# Patient Record
Sex: FEMALE | Race: WHITE | Employment: OTHER | URBAN - METROPOLITAN AREA
[De-identification: names, ages, dates, MRNs, and addresses within clinical notes are randomized per-mention and may not be internally consistent; named-entity substitution may affect disease eponyms.]

---

## 2017-03-25 ENCOUNTER — APPOINTMENT (OUTPATIENT)
Dept: GENERAL RADIOLOGY | Age: 42
End: 2017-03-25
Payer: MEDICARE

## 2017-03-25 ENCOUNTER — HOSPITAL ENCOUNTER (EMERGENCY)
Age: 42
Discharge: HOME | End: 2017-03-25
Attending: EMERGENCY MEDICINE
Payer: MEDICARE

## 2017-03-25 VITALS
TEMPERATURE: 97.6 F | BODY MASS INDEX: 41.65 KG/M2 | RESPIRATION RATE: 18 BRPM | HEART RATE: 78 BPM | SYSTOLIC BLOOD PRESSURE: 113 MMHG | DIASTOLIC BLOOD PRESSURE: 68 MMHG | HEIGHT: 65 IN | OXYGEN SATURATION: 99 % | WEIGHT: 250 LBS

## 2017-03-25 DIAGNOSIS — G43.009 MIGRAINE WITHOUT AURA AND WITHOUT STATUS MIGRAINOSUS, NOT INTRACTABLE: Primary | ICD-10-CM

## 2017-03-25 DIAGNOSIS — J40 BRONCHITIS: ICD-10-CM

## 2017-03-25 LAB
ALBUMIN SERPL-MCNC: 3.7 G/DL (ref 3.9–4.9)
ALP BLD-CCNC: 154 U/L (ref 40–130)
ALT SERPL-CCNC: 38 U/L (ref 0–33)
ANION GAP SERPL CALCULATED.3IONS-SCNC: 12 MEQ/L (ref 7–13)
AST SERPL-CCNC: 30 U/L (ref 0–35)
BASOPHILS ABSOLUTE: 0.1 K/UL (ref 0–0.2)
BASOPHILS RELATIVE PERCENT: 0.7 %
BILIRUB SERPL-MCNC: 0.3 MG/DL (ref 0–1.2)
BUN BLDV-MCNC: 8 MG/DL (ref 6–20)
CALCIUM SERPL-MCNC: 9 MG/DL (ref 8.6–10.2)
CHLORIDE BLD-SCNC: 105 MEQ/L (ref 98–107)
CO2: 22 MEQ/L (ref 22–29)
CREAT SERPL-MCNC: 1.11 MG/DL (ref 0.5–0.9)
EOSINOPHILS ABSOLUTE: 0.2 K/UL (ref 0–0.7)
EOSINOPHILS RELATIVE PERCENT: 3.2 %
GFR AFRICAN AMERICAN: >60
GFR NON-AFRICAN AMERICAN: 53.9
GLOBULIN: 2.6 G/DL (ref 2.3–3.5)
GLUCOSE BLD-MCNC: 113 MG/DL (ref 74–109)
HCG QUALITATIVE: NEGATIVE
HCT VFR BLD CALC: 36.5 % (ref 37–47)
HEMOGLOBIN: 11.8 G/DL (ref 12–16)
LYMPHOCYTES ABSOLUTE: 2 K/UL (ref 1–4.8)
LYMPHOCYTES RELATIVE PERCENT: 26.4 %
MCH RBC QN AUTO: 26.7 PG (ref 27–31.3)
MCHC RBC AUTO-ENTMCNC: 32.4 % (ref 33–37)
MCV RBC AUTO: 82.4 FL (ref 82–100)
MONOCYTES ABSOLUTE: 0.5 K/UL (ref 0.2–0.8)
MONOCYTES RELATIVE PERCENT: 7.1 %
NEUTROPHILS ABSOLUTE: 4.6 K/UL (ref 1.4–6.5)
NEUTROPHILS RELATIVE PERCENT: 62.6 %
PDW BLD-RTO: 15.2 % (ref 11.5–14.5)
PLATELET # BLD: 260 K/UL (ref 130–400)
POTASSIUM SERPL-SCNC: 3.9 MEQ/L (ref 3.5–5.1)
RAPID INFLUENZA  B AGN: NEGATIVE
RAPID INFLUENZA A AGN: NEGATIVE
RBC # BLD: 4.42 M/UL (ref 4.2–5.4)
SODIUM BLD-SCNC: 139 MEQ/L (ref 132–144)
TOTAL PROTEIN: 6.3 G/DL (ref 6.4–8.1)
WBC # BLD: 7.4 K/UL (ref 4.8–10.8)

## 2017-03-25 PROCEDURE — 99284 EMERGENCY DEPT VISIT MOD MDM: CPT

## 2017-03-25 PROCEDURE — 36415 COLL VENOUS BLD VENIPUNCTURE: CPT

## 2017-03-25 PROCEDURE — 2580000003 HC RX 258: Performed by: EMERGENCY MEDICINE

## 2017-03-25 PROCEDURE — 85025 COMPLETE CBC W/AUTO DIFF WBC: CPT

## 2017-03-25 PROCEDURE — 80053 COMPREHEN METABOLIC PANEL: CPT

## 2017-03-25 PROCEDURE — 96374 THER/PROPH/DIAG INJ IV PUSH: CPT

## 2017-03-25 PROCEDURE — 96375 TX/PRO/DX INJ NEW DRUG ADDON: CPT

## 2017-03-25 PROCEDURE — 86403 PARTICLE AGGLUT ANTBDY SCRN: CPT

## 2017-03-25 PROCEDURE — 71010 XR CHEST PORTABLE: CPT

## 2017-03-25 PROCEDURE — 6360000002 HC RX W HCPCS: Performed by: EMERGENCY MEDICINE

## 2017-03-25 PROCEDURE — 84703 CHORIONIC GONADOTROPIN ASSAY: CPT

## 2017-03-25 RX ORDER — DIPHENHYDRAMINE HYDROCHLORIDE 50 MG/ML
50 INJECTION INTRAMUSCULAR; INTRAVENOUS ONCE
Status: COMPLETED | OUTPATIENT
Start: 2017-03-25 | End: 2017-03-25

## 2017-03-25 RX ORDER — PREDNISONE 10 MG/1
60 TABLET ORAL DAILY
Qty: 30 TABLET | Refills: 0 | Status: SHIPPED | OUTPATIENT
Start: 2017-03-25 | End: 2017-03-30

## 2017-03-25 RX ORDER — BENZONATATE 100 MG/1
100 CAPSULE ORAL 3 TIMES DAILY PRN
Qty: 20 CAPSULE | Refills: 0 | Status: ON HOLD | OUTPATIENT
Start: 2017-03-25 | End: 2018-06-08

## 2017-03-25 RX ORDER — MORPHINE SULFATE 4 MG/ML
4 INJECTION, SOLUTION INTRAMUSCULAR; INTRAVENOUS ONCE
Status: COMPLETED | OUTPATIENT
Start: 2017-03-25 | End: 2017-03-25

## 2017-03-25 RX ORDER — PROMETHAZINE HYDROCHLORIDE 25 MG/1
12.5-25 TABLET ORAL EVERY 6 HOURS PRN
Qty: 12 TABLET | Refills: 0 | Status: SHIPPED | OUTPATIENT
Start: 2017-03-25 | End: 2017-04-01

## 2017-03-25 RX ORDER — 0.9 % SODIUM CHLORIDE 0.9 %
1000 INTRAVENOUS SOLUTION INTRAVENOUS ONCE
Status: COMPLETED | OUTPATIENT
Start: 2017-03-25 | End: 2017-03-25

## 2017-03-25 RX ADMIN — PROCHLORPERAZINE EDISYLATE 10 MG: 5 INJECTION INTRAMUSCULAR; INTRAVENOUS at 13:26

## 2017-03-25 RX ADMIN — SODIUM CHLORIDE 1000 ML: 9 INJECTION, SOLUTION INTRAVENOUS at 13:21

## 2017-03-25 RX ADMIN — DIPHENHYDRAMINE HYDROCHLORIDE 50 MG: 50 INJECTION, SOLUTION INTRAMUSCULAR; INTRAVENOUS at 13:21

## 2017-03-25 RX ADMIN — MORPHINE SULFATE 4 MG: 4 INJECTION, SOLUTION INTRAMUSCULAR; INTRAVENOUS at 13:25

## 2017-03-25 ASSESSMENT — PAIN DESCRIPTION - FREQUENCY: FREQUENCY: CONTINUOUS

## 2017-03-25 ASSESSMENT — ENCOUNTER SYMPTOMS
DIARRHEA: 0
NAUSEA: 1
BACK PAIN: 0
FACIAL SWELLING: 0
VOMITING: 1
ABDOMINAL PAIN: 0
SHORTNESS OF BREATH: 0
EYE PAIN: 0
COUGH: 1

## 2017-03-25 ASSESSMENT — PAIN DESCRIPTION - LOCATION: LOCATION: HEAD;EAR

## 2017-03-25 ASSESSMENT — PAIN SCALES - GENERAL
PAINLEVEL_OUTOF10: 10
PAINLEVEL_OUTOF10: 10

## 2017-03-25 ASSESSMENT — PAIN DESCRIPTION - DESCRIPTORS: DESCRIPTORS: ACHING;THROBBING

## 2017-04-06 ENCOUNTER — HOSPITAL ENCOUNTER (EMERGENCY)
Age: 42
Discharge: HOME | End: 2017-04-06
Attending: EMERGENCY MEDICINE
Payer: MEDICARE

## 2017-04-06 VITALS
DIASTOLIC BLOOD PRESSURE: 83 MMHG | HEIGHT: 65 IN | WEIGHT: 265 LBS | RESPIRATION RATE: 18 BRPM | TEMPERATURE: 98.2 F | OXYGEN SATURATION: 96 % | HEART RATE: 67 BPM | SYSTOLIC BLOOD PRESSURE: 101 MMHG | BODY MASS INDEX: 44.15 KG/M2

## 2017-04-06 DIAGNOSIS — J01.10 ACUTE FRONTAL SINUSITIS, RECURRENCE NOT SPECIFIED: Primary | ICD-10-CM

## 2017-04-06 PROCEDURE — 6360000002 HC RX W HCPCS: Performed by: EMERGENCY MEDICINE

## 2017-04-06 PROCEDURE — 96372 THER/PROPH/DIAG INJ SC/IM: CPT

## 2017-04-06 PROCEDURE — 99282 EMERGENCY DEPT VISIT SF MDM: CPT

## 2017-04-06 PROCEDURE — 6370000000 HC RX 637 (ALT 250 FOR IP): Performed by: EMERGENCY MEDICINE

## 2017-04-06 RX ORDER — TRIAMCINOLONE ACETONIDE 40 MG/ML
40 INJECTION, SUSPENSION INTRA-ARTICULAR; INTRAMUSCULAR ONCE
Status: COMPLETED | OUTPATIENT
Start: 2017-04-06 | End: 2017-04-06

## 2017-04-06 RX ORDER — DOXYCYCLINE 100 MG/1
100 TABLET ORAL 2 TIMES DAILY
Qty: 20 TABLET | Refills: 0 | Status: SHIPPED | OUTPATIENT
Start: 2017-04-06 | End: 2017-04-16

## 2017-04-06 RX ORDER — NAPROXEN 500 MG/1
500 TABLET ORAL ONCE
Status: COMPLETED | OUTPATIENT
Start: 2017-04-06 | End: 2017-04-06

## 2017-04-06 RX ADMIN — NAPROXEN 500 MG: 500 TABLET ORAL at 18:45

## 2017-04-06 RX ADMIN — TRIAMCINOLONE ACETONIDE 40 MG: 40 INJECTION, SUSPENSION INTRA-ARTICULAR; INTRAMUSCULAR at 18:45

## 2017-04-06 ASSESSMENT — ENCOUNTER SYMPTOMS
SINUS PRESSURE: 1
SHORTNESS OF BREATH: 0
ABDOMINAL PAIN: 0
COUGH: 1
SORE THROAT: 0
DIARRHEA: 0
VOMITING: 0
NAUSEA: 0

## 2017-04-06 ASSESSMENT — PAIN SCALES - GENERAL
PAINLEVEL_OUTOF10: 8
PAINLEVEL_OUTOF10: 3
PAINLEVEL_OUTOF10: 9

## 2017-04-06 ASSESSMENT — PAIN DESCRIPTION - FREQUENCY: FREQUENCY: INTERMITTENT

## 2017-04-06 ASSESSMENT — PAIN DESCRIPTION - ORIENTATION: ORIENTATION: LEFT

## 2017-04-06 ASSESSMENT — PAIN DESCRIPTION - LOCATION: LOCATION: RIB CAGE

## 2017-04-27 ENCOUNTER — APPOINTMENT (OUTPATIENT)
Dept: GENERAL RADIOLOGY | Age: 42
End: 2017-04-27
Payer: MEDICARE

## 2017-04-27 ENCOUNTER — HOSPITAL ENCOUNTER (EMERGENCY)
Age: 42
Discharge: HOME | End: 2017-04-27
Attending: EMERGENCY MEDICINE
Payer: MEDICARE

## 2017-04-27 VITALS
RESPIRATION RATE: 24 BRPM | TEMPERATURE: 97.9 F | BODY MASS INDEX: 42.75 KG/M2 | SYSTOLIC BLOOD PRESSURE: 125 MMHG | OXYGEN SATURATION: 100 % | HEIGHT: 66 IN | HEART RATE: 62 BPM | WEIGHT: 266 LBS | DIASTOLIC BLOOD PRESSURE: 77 MMHG

## 2017-04-27 DIAGNOSIS — S22.32XD CLOSED FRACTURE OF ONE RIB OF LEFT SIDE WITH ROUTINE HEALING, SUBSEQUENT ENCOUNTER: Primary | ICD-10-CM

## 2017-04-27 PROCEDURE — 71101 X-RAY EXAM UNILAT RIBS/CHEST: CPT

## 2017-04-27 PROCEDURE — 96372 THER/PROPH/DIAG INJ SC/IM: CPT

## 2017-04-27 PROCEDURE — 6360000002 HC RX W HCPCS: Performed by: EMERGENCY MEDICINE

## 2017-04-27 PROCEDURE — 6370000000 HC RX 637 (ALT 250 FOR IP): Performed by: EMERGENCY MEDICINE

## 2017-04-27 PROCEDURE — 99283 EMERGENCY DEPT VISIT LOW MDM: CPT

## 2017-04-27 RX ORDER — PROMETHAZINE HYDROCHLORIDE 25 MG/ML
12.5 INJECTION, SOLUTION INTRAMUSCULAR; INTRAVENOUS ONCE
Status: COMPLETED | OUTPATIENT
Start: 2017-04-27 | End: 2017-04-27

## 2017-04-27 RX ORDER — MORPHINE SULFATE 4 MG/ML
4 INJECTION, SOLUTION INTRAMUSCULAR; INTRAVENOUS ONCE
Status: COMPLETED | OUTPATIENT
Start: 2017-04-27 | End: 2017-04-27

## 2017-04-27 RX ORDER — ONDANSETRON 4 MG/1
4 TABLET, ORALLY DISINTEGRATING ORAL ONCE
Status: COMPLETED | OUTPATIENT
Start: 2017-04-27 | End: 2017-04-27

## 2017-04-27 RX ORDER — OXYCODONE HYDROCHLORIDE AND ACETAMINOPHEN 5; 325 MG/1; MG/1
1 TABLET ORAL ONCE
Status: COMPLETED | OUTPATIENT
Start: 2017-04-27 | End: 2017-04-27

## 2017-04-27 RX ADMIN — ONDANSETRON 4 MG: 4 TABLET, ORALLY DISINTEGRATING ORAL at 18:14

## 2017-04-27 RX ADMIN — OXYCODONE HYDROCHLORIDE AND ACETAMINOPHEN 1 TABLET: 5; 325 TABLET ORAL at 18:05

## 2017-04-27 RX ADMIN — MORPHINE SULFATE 4 MG: 4 INJECTION, SOLUTION INTRAMUSCULAR; INTRAVENOUS at 19:17

## 2017-04-27 RX ADMIN — PROMETHAZINE HYDROCHLORIDE 12.5 MG: 25 INJECTION INTRAMUSCULAR; INTRAVENOUS at 18:56

## 2017-04-27 ASSESSMENT — ENCOUNTER SYMPTOMS
VOMITING: 0
COUGH: 0
COLOR CHANGE: 0
ABDOMINAL PAIN: 0
WHEEZING: 0
SHORTNESS OF BREATH: 0
NAUSEA: 0
DIARRHEA: 0
STRIDOR: 0
CONSTIPATION: 0

## 2017-04-27 ASSESSMENT — PAIN DESCRIPTION - LOCATION
LOCATION: RIB CAGE
LOCATION: RIB CAGE

## 2017-04-27 ASSESSMENT — PAIN SCALES - GENERAL
PAINLEVEL_OUTOF10: 10

## 2017-04-27 ASSESSMENT — PAIN DESCRIPTION - ORIENTATION: ORIENTATION: LEFT

## 2017-04-27 ASSESSMENT — PAIN DESCRIPTION - PAIN TYPE: TYPE: ACUTE PAIN

## 2017-06-27 ENCOUNTER — HOSPITAL ENCOUNTER (EMERGENCY)
Age: 42
Discharge: HOME | End: 2017-06-27
Payer: MEDICARE

## 2017-06-27 VITALS
BODY MASS INDEX: 44.32 KG/M2 | TEMPERATURE: 98.6 F | RESPIRATION RATE: 18 BRPM | OXYGEN SATURATION: 98 % | DIASTOLIC BLOOD PRESSURE: 68 MMHG | SYSTOLIC BLOOD PRESSURE: 115 MMHG | WEIGHT: 266 LBS | HEART RATE: 98 BPM | HEIGHT: 65 IN

## 2017-06-27 DIAGNOSIS — M54.40 BACK PAIN OF LUMBAR REGION WITH SCIATICA: Primary | ICD-10-CM

## 2017-06-27 PROCEDURE — 6360000002 HC RX W HCPCS: Performed by: NURSE PRACTITIONER

## 2017-06-27 PROCEDURE — 99282 EMERGENCY DEPT VISIT SF MDM: CPT

## 2017-06-27 PROCEDURE — 96372 THER/PROPH/DIAG INJ SC/IM: CPT

## 2017-06-27 RX ORDER — LIDOCAINE 50 MG/G
1 PATCH TOPICAL DAILY
Qty: 20 PATCH | Refills: 0 | Status: ON HOLD | OUTPATIENT
Start: 2017-06-27 | End: 2018-06-08

## 2017-06-27 RX ORDER — ORPHENADRINE CITRATE 30 MG/ML
60 INJECTION INTRAMUSCULAR; INTRAVENOUS ONCE
Status: COMPLETED | OUTPATIENT
Start: 2017-06-27 | End: 2017-06-27

## 2017-06-27 RX ORDER — METHYLPREDNISOLONE ACETATE 80 MG/ML
80 INJECTION, SUSPENSION INTRA-ARTICULAR; INTRALESIONAL; INTRAMUSCULAR; SOFT TISSUE ONCE
Status: COMPLETED | OUTPATIENT
Start: 2017-06-27 | End: 2017-06-27

## 2017-06-27 RX ORDER — ONDANSETRON 4 MG/1
4 TABLET, ORALLY DISINTEGRATING ORAL ONCE
Status: COMPLETED | OUTPATIENT
Start: 2017-06-27 | End: 2017-06-27

## 2017-06-27 RX ORDER — PREDNISONE 10 MG/1
50 TABLET ORAL DAILY
Qty: 25 TABLET | Refills: 0 | Status: SHIPPED | OUTPATIENT
Start: 2017-06-27 | End: 2017-07-02

## 2017-06-27 RX ADMIN — ONDANSETRON 4 MG: 4 TABLET, ORALLY DISINTEGRATING ORAL at 20:04

## 2017-06-27 RX ADMIN — METHYLPREDNISOLONE ACETATE 80 MG: 80 INJECTION, SUSPENSION INTRA-ARTICULAR; INTRALESIONAL; INTRAMUSCULAR; SOFT TISSUE at 19:40

## 2017-06-27 RX ADMIN — ORPHENADRINE CITRATE 60 MG: 30 INJECTION INTRAMUSCULAR; INTRAVENOUS at 19:40

## 2017-06-27 ASSESSMENT — PAIN SCALES - GENERAL: PAINLEVEL_OUTOF10: 10

## 2017-06-27 ASSESSMENT — PAIN DESCRIPTION - LOCATION: LOCATION: BACK

## 2017-06-27 ASSESSMENT — ENCOUNTER SYMPTOMS
COUGH: 0
SHORTNESS OF BREATH: 0
BACK PAIN: 1
ABDOMINAL PAIN: 0

## 2017-06-27 ASSESSMENT — PAIN DESCRIPTION - ORIENTATION: ORIENTATION: LOWER

## 2017-06-27 ASSESSMENT — PAIN DESCRIPTION - PAIN TYPE: TYPE: ACUTE PAIN;CHRONIC PAIN

## 2018-06-04 ENCOUNTER — HOSPITAL ENCOUNTER (EMERGENCY)
Age: 43
Discharge: HOME | End: 2018-06-04
Payer: MEDICARE

## 2018-06-04 VITALS
HEART RATE: 81 BPM | WEIGHT: 264 LBS | OXYGEN SATURATION: 96 % | SYSTOLIC BLOOD PRESSURE: 126 MMHG | RESPIRATION RATE: 16 BRPM | HEIGHT: 65 IN | BODY MASS INDEX: 43.99 KG/M2 | DIASTOLIC BLOOD PRESSURE: 77 MMHG | TEMPERATURE: 98.2 F

## 2018-06-04 DIAGNOSIS — Z76.0 PRESCRIPTION REFILL: Primary | ICD-10-CM

## 2018-06-04 PROCEDURE — 6370000000 HC RX 637 (ALT 250 FOR IP): Performed by: PHYSICIAN ASSISTANT

## 2018-06-04 PROCEDURE — 99283 EMERGENCY DEPT VISIT LOW MDM: CPT

## 2018-06-04 RX ORDER — OXYCODONE AND ACETAMINOPHEN 10; 325 MG/1; MG/1
1 TABLET ORAL EVERY 6 HOURS PRN
Qty: 12 TABLET | Refills: 0 | Status: SHIPPED | OUTPATIENT
Start: 2018-06-04 | End: 2018-06-07

## 2018-06-04 RX ORDER — OXYCODONE HYDROCHLORIDE AND ACETAMINOPHEN 5; 325 MG/1; MG/1
2 TABLET ORAL ONCE
Status: COMPLETED | OUTPATIENT
Start: 2018-06-04 | End: 2018-06-04

## 2018-06-04 RX ADMIN — OXYCODONE HYDROCHLORIDE AND ACETAMINOPHEN 2 TABLET: 5; 325 TABLET ORAL at 13:22

## 2018-06-04 ASSESSMENT — ENCOUNTER SYMPTOMS
COLOR CHANGE: 0
ABDOMINAL DISTENTION: 0
EYE DISCHARGE: 0
RHINORRHEA: 0
CONSTIPATION: 0
SHORTNESS OF BREATH: 0
ABDOMINAL PAIN: 0
SORE THROAT: 0

## 2018-06-04 ASSESSMENT — PAIN SCALES - GENERAL
PAINLEVEL_OUTOF10: 10
PAINLEVEL_OUTOF10: 10

## 2018-06-04 ASSESSMENT — PAIN DESCRIPTION - DESCRIPTORS
DESCRIPTORS: STABBING
DESCRIPTORS: STABBING

## 2018-06-04 ASSESSMENT — PAIN DESCRIPTION - PAIN TYPE
TYPE: ACUTE PAIN
TYPE: ACUTE PAIN

## 2018-06-04 ASSESSMENT — PAIN DESCRIPTION - FREQUENCY
FREQUENCY: CONTINUOUS
FREQUENCY: CONTINUOUS

## 2018-06-04 ASSESSMENT — PAIN DESCRIPTION - PROGRESSION: CLINICAL_PROGRESSION: GRADUALLY WORSENING

## 2018-06-04 ASSESSMENT — PAIN DESCRIPTION - ORIENTATION
ORIENTATION: MID;LOWER
ORIENTATION: MID;LOWER

## 2018-06-04 ASSESSMENT — PAIN DESCRIPTION - LOCATION
LOCATION: ABDOMEN

## 2018-06-04 ASSESSMENT — PAIN DESCRIPTION - ONSET: ONSET: ON-GOING

## 2018-06-05 ENCOUNTER — HOSPITAL ENCOUNTER (EMERGENCY)
Age: 43
Discharge: HOME | End: 2018-06-06
Payer: MEDICARE

## 2018-06-05 ENCOUNTER — APPOINTMENT (OUTPATIENT)
Dept: CT IMAGING | Age: 43
End: 2018-06-05
Payer: MEDICARE

## 2018-06-05 DIAGNOSIS — G89.18 POST-OP PAIN: Primary | ICD-10-CM

## 2018-06-05 LAB
ALBUMIN SERPL-MCNC: 3.7 G/DL (ref 3.9–4.9)
ALP BLD-CCNC: 93 U/L (ref 40–130)
ALT SERPL-CCNC: 11 U/L (ref 0–33)
ANION GAP SERPL CALCULATED.3IONS-SCNC: 11 MEQ/L (ref 7–13)
AST SERPL-CCNC: 15 U/L (ref 0–35)
BASOPHILS ABSOLUTE: 0.1 K/UL (ref 0–0.2)
BASOPHILS RELATIVE PERCENT: 0.8 %
BILIRUB SERPL-MCNC: 0.3 MG/DL (ref 0–1.2)
BILIRUBIN URINE: NEGATIVE
BLOOD, URINE: ABNORMAL
BUN BLDV-MCNC: 7 MG/DL (ref 6–20)
CALCIUM SERPL-MCNC: 8 MG/DL (ref 8.6–10.2)
CHLORIDE BLD-SCNC: 99 MEQ/L (ref 98–107)
CLARITY: ABNORMAL
CO2: 24 MEQ/L (ref 22–29)
COLOR: YELLOW
CREAT SERPL-MCNC: 0.9 MG/DL (ref 0.5–0.9)
EOSINOPHILS ABSOLUTE: 0.3 K/UL (ref 0–0.7)
EOSINOPHILS RELATIVE PERCENT: 4.3 %
GFR AFRICAN AMERICAN: >60
GFR NON-AFRICAN AMERICAN: >60
GLOBULIN: 2.7 G/DL (ref 2.3–3.5)
GLUCOSE BLD-MCNC: 99 MG/DL (ref 74–109)
GLUCOSE URINE: NEGATIVE MG/DL
HCT VFR BLD CALC: 35.8 % (ref 37–47)
HEMOGLOBIN: 11.9 G/DL (ref 12–16)
KETONES, URINE: NEGATIVE MG/DL
LACTIC ACID: 0.8 MMOL/L (ref 0.5–2.2)
LEUKOCYTE ESTERASE, URINE: ABNORMAL
LIPASE: 18 U/L (ref 13–60)
LYMPHOCYTES ABSOLUTE: 1.5 K/UL (ref 1–4.8)
LYMPHOCYTES RELATIVE PERCENT: 20.5 %
MCH RBC QN AUTO: 28.5 PG (ref 27–31.3)
MCHC RBC AUTO-ENTMCNC: 33.3 % (ref 33–37)
MCV RBC AUTO: 85.6 FL (ref 82–100)
MONOCYTES ABSOLUTE: 0.4 K/UL (ref 0.2–0.8)
MONOCYTES RELATIVE PERCENT: 5.2 %
NEUTROPHILS ABSOLUTE: 5.1 K/UL (ref 1.4–6.5)
NEUTROPHILS RELATIVE PERCENT: 69.2 %
NITRITE, URINE: NEGATIVE
PDW BLD-RTO: 15.5 % (ref 11.5–14.5)
PH UA: 7 (ref 5–9)
PLATELET # BLD: 271 K/UL (ref 130–400)
POTASSIUM SERPL-SCNC: 3.3 MEQ/L (ref 3.5–5.1)
PROTEIN UA: 30 MG/DL
RBC # BLD: 4.18 M/UL (ref 4.2–5.4)
SODIUM BLD-SCNC: 134 MEQ/L (ref 132–144)
SPECIFIC GRAVITY UA: 1 (ref 1–1.03)
TOTAL PROTEIN: 6.4 G/DL (ref 6.4–8.1)
URINE REFLEX TO CULTURE: YES
UROBILINOGEN, URINE: 0.2 E.U./DL
WBC # BLD: 7.4 K/UL (ref 4.8–10.8)

## 2018-06-05 PROCEDURE — 96375 TX/PRO/DX INJ NEW DRUG ADDON: CPT

## 2018-06-05 PROCEDURE — 96374 THER/PROPH/DIAG INJ IV PUSH: CPT

## 2018-06-05 PROCEDURE — 81001 URINALYSIS AUTO W/SCOPE: CPT

## 2018-06-05 PROCEDURE — 87086 URINE CULTURE/COLONY COUNT: CPT

## 2018-06-05 PROCEDURE — 2580000003 HC RX 258: Performed by: PHYSICIAN ASSISTANT

## 2018-06-05 PROCEDURE — 83605 ASSAY OF LACTIC ACID: CPT

## 2018-06-05 PROCEDURE — 80053 COMPREHEN METABOLIC PANEL: CPT

## 2018-06-05 PROCEDURE — 99284 EMERGENCY DEPT VISIT MOD MDM: CPT

## 2018-06-05 PROCEDURE — 36415 COLL VENOUS BLD VENIPUNCTURE: CPT

## 2018-06-05 PROCEDURE — 83690 ASSAY OF LIPASE: CPT

## 2018-06-05 PROCEDURE — 85025 COMPLETE CBC W/AUTO DIFF WBC: CPT

## 2018-06-05 PROCEDURE — 74176 CT ABD & PELVIS W/O CONTRAST: CPT

## 2018-06-05 PROCEDURE — 6360000002 HC RX W HCPCS: Performed by: PHYSICIAN ASSISTANT

## 2018-06-05 PROCEDURE — 51798 US URINE CAPACITY MEASURE: CPT

## 2018-06-05 RX ORDER — 0.9 % SODIUM CHLORIDE 0.9 %
1000 INTRAVENOUS SOLUTION INTRAVENOUS ONCE
Status: COMPLETED | OUTPATIENT
Start: 2018-06-05 | End: 2018-06-06

## 2018-06-05 RX ORDER — ONDANSETRON 2 MG/ML
4 INJECTION INTRAMUSCULAR; INTRAVENOUS ONCE
Status: COMPLETED | OUTPATIENT
Start: 2018-06-05 | End: 2018-06-05

## 2018-06-05 RX ADMIN — HYDROMORPHONE HYDROCHLORIDE 1 MG: 1 INJECTION, SOLUTION INTRAMUSCULAR; INTRAVENOUS; SUBCUTANEOUS at 22:34

## 2018-06-05 RX ADMIN — SODIUM CHLORIDE 1000 ML: 9 INJECTION, SOLUTION INTRAVENOUS at 22:34

## 2018-06-05 RX ADMIN — ONDANSETRON 4 MG: 2 INJECTION INTRAMUSCULAR; INTRAVENOUS at 22:33

## 2018-06-05 ASSESSMENT — PAIN DESCRIPTION - PAIN TYPE
TYPE: ACUTE PAIN

## 2018-06-05 ASSESSMENT — ENCOUNTER SYMPTOMS
ABDOMINAL PAIN: 1
NAUSEA: 1
DIARRHEA: 0
SHORTNESS OF BREATH: 0
VOMITING: 1
COUGH: 0

## 2018-06-05 ASSESSMENT — PAIN DESCRIPTION - ORIENTATION
ORIENTATION: LOWER

## 2018-06-05 ASSESSMENT — PAIN DESCRIPTION - LOCATION
LOCATION: ABDOMEN

## 2018-06-05 ASSESSMENT — PAIN DESCRIPTION - DESCRIPTORS: DESCRIPTORS: ACHING;THROBBING;SORE

## 2018-06-05 ASSESSMENT — PAIN DESCRIPTION - FREQUENCY
FREQUENCY: CONTINUOUS
FREQUENCY: INTERMITTENT

## 2018-06-05 ASSESSMENT — PAIN SCALES - GENERAL
PAINLEVEL_OUTOF10: 10
PAINLEVEL_OUTOF10: 10
PAINLEVEL_OUTOF10: 3

## 2018-06-06 VITALS
SYSTOLIC BLOOD PRESSURE: 111 MMHG | WEIGHT: 261 LBS | TEMPERATURE: 98.7 F | BODY MASS INDEX: 43.43 KG/M2 | OXYGEN SATURATION: 95 % | DIASTOLIC BLOOD PRESSURE: 59 MMHG | HEART RATE: 80 BPM | RESPIRATION RATE: 18 BRPM

## 2018-06-06 LAB
AMORPHOUS: ABNORMAL
BACTERIA: ABNORMAL /HPF
CRYSTALS, UA: ABNORMAL
EPITHELIAL CELLS, UA: ABNORMAL /HPF
MUCUS: PRESENT
RBC UA: ABNORMAL /HPF (ref 0–2)
WBC UA: ABNORMAL /HPF (ref 0–5)
YEAST: PRESENT

## 2018-06-06 RX ORDER — ONDANSETRON 4 MG/1
4-8 TABLET, ORALLY DISINTEGRATING ORAL EVERY 12 HOURS PRN
Qty: 12 TABLET | Refills: 0 | Status: ON HOLD | OUTPATIENT
Start: 2018-06-06 | End: 2018-06-08

## 2018-06-07 LAB — URINE CULTURE, ROUTINE: NORMAL

## 2018-06-08 ENCOUNTER — APPOINTMENT (OUTPATIENT)
Dept: CT IMAGING | Age: 43
End: 2018-06-08
Payer: MEDICARE

## 2018-06-08 ENCOUNTER — APPOINTMENT (OUTPATIENT)
Dept: GENERAL RADIOLOGY | Age: 43
End: 2018-06-08
Payer: MEDICARE

## 2018-06-08 ENCOUNTER — HOSPITAL ENCOUNTER (OUTPATIENT)
Age: 43
Setting detail: OBSERVATION
Discharge: HOME | End: 2018-06-09
Attending: INTERNAL MEDICINE | Admitting: INTERNAL MEDICINE
Payer: MEDICARE

## 2018-06-08 ENCOUNTER — APPOINTMENT (OUTPATIENT)
Dept: ULTRASOUND IMAGING | Age: 43
End: 2018-06-08
Payer: MEDICARE

## 2018-06-08 DIAGNOSIS — G89.29 OTHER CHRONIC PAIN: ICD-10-CM

## 2018-06-08 DIAGNOSIS — R55 SYNCOPE AND COLLAPSE: Primary | ICD-10-CM

## 2018-06-08 LAB
ALBUMIN SERPL-MCNC: 3.4 G/DL (ref 3.9–4.9)
ALP BLD-CCNC: 102 U/L (ref 40–130)
ALT SERPL-CCNC: 14 U/L (ref 0–33)
AMPHETAMINE SCREEN, URINE: ABNORMAL
ANION GAP SERPL CALCULATED.3IONS-SCNC: 13 MEQ/L (ref 7–13)
AST SERPL-CCNC: 25 U/L (ref 0–35)
BACTERIA: ABNORMAL /HPF
BARBITURATE SCREEN URINE: ABNORMAL
BASOPHILS ABSOLUTE: 0.1 K/UL (ref 0–0.2)
BASOPHILS RELATIVE PERCENT: 1.1 %
BENZODIAZEPINE SCREEN, URINE: POSITIVE
BILIRUB SERPL-MCNC: 0.3 MG/DL (ref 0–1.2)
BILIRUBIN URINE: NEGATIVE
BLOOD, URINE: ABNORMAL
BUN BLDV-MCNC: 7 MG/DL (ref 6–20)
CALCIUM SERPL-MCNC: 8.7 MG/DL (ref 8.6–10.2)
CANNABINOID SCREEN URINE: ABNORMAL
CARBAMAZEPINE LEVEL: <2 UG/ML (ref 4–10)
CHLORIDE BLD-SCNC: 101 MEQ/L (ref 98–107)
CHP ED QC CHECK: YES
CLARITY: ABNORMAL
CO2: 23 MEQ/L (ref 22–29)
COCAINE METABOLITE SCREEN URINE: ABNORMAL
COLOR: YELLOW
CREAT SERPL-MCNC: 0.99 MG/DL (ref 0.5–0.9)
EKG ATRIAL RATE: 61 BPM
EKG P AXIS: 45 DEGREES
EKG P-R INTERVAL: 196 MS
EKG Q-T INTERVAL: 434 MS
EKG QRS DURATION: 96 MS
EKG QTC CALCULATION (BAZETT): 436 MS
EKG R AXIS: 67 DEGREES
EKG T AXIS: 109 DEGREES
EKG VENTRICULAR RATE: 61 BPM
EOSINOPHILS ABSOLUTE: 0.4 K/UL (ref 0–0.7)
EOSINOPHILS RELATIVE PERCENT: 5.5 %
GFR AFRICAN AMERICAN: >60
GFR NON-AFRICAN AMERICAN: >60
GLOBULIN: 3.1 G/DL (ref 2.3–3.5)
GLUCOSE BLD-MCNC: 67 MG/DL
GLUCOSE BLD-MCNC: 67 MG/DL (ref 60–115)
GLUCOSE BLD-MCNC: 82 MG/DL (ref 74–109)
GLUCOSE URINE: NEGATIVE MG/DL
HCT VFR BLD CALC: 35.5 % (ref 37–47)
HEMOGLOBIN: 11.5 G/DL (ref 12–16)
KETONES, URINE: NEGATIVE MG/DL
LACTIC ACID: 0.9 MMOL/L (ref 0.5–2.2)
LEUKOCYTE ESTERASE, URINE: ABNORMAL
LYMPHOCYTES ABSOLUTE: 1.9 K/UL (ref 1–4.8)
LYMPHOCYTES RELATIVE PERCENT: 27.6 %
Lab: ABNORMAL
MCH RBC QN AUTO: 28.7 PG (ref 27–31.3)
MCHC RBC AUTO-ENTMCNC: 32.3 % (ref 33–37)
MCV RBC AUTO: 88.7 FL (ref 82–100)
MONOCYTES ABSOLUTE: 0.5 K/UL (ref 0.2–0.8)
MONOCYTES RELATIVE PERCENT: 6.7 %
NEUTROPHILS ABSOLUTE: 4 K/UL (ref 1.4–6.5)
NEUTROPHILS RELATIVE PERCENT: 59.1 %
NITRITE, URINE: NEGATIVE
OPIATE SCREEN URINE: ABNORMAL
PDW BLD-RTO: 15.8 % (ref 11.5–14.5)
PERFORMED ON: NORMAL
PH UA: 6.5 (ref 5–9)
PHENCYCLIDINE SCREEN URINE: ABNORMAL
PLATELET # BLD: 233 K/UL (ref 130–400)
POTASSIUM SERPL-SCNC: 4.2 MEQ/L (ref 3.5–5.1)
PROTEIN UA: 30 MG/DL
RBC # BLD: 4 M/UL (ref 4.2–5.4)
RBC UA: ABNORMAL /HPF (ref 0–2)
SODIUM BLD-SCNC: 137 MEQ/L (ref 132–144)
SPECIFIC GRAVITY UA: 1.01 (ref 1–1.03)
TOTAL CK: 67 U/L (ref 0–170)
TOTAL PROTEIN: 6.5 G/DL (ref 6.4–8.1)
UROBILINOGEN, URINE: 0.2 E.U./DL
WBC # BLD: 6.8 K/UL (ref 4.8–10.8)
WBC UA: ABNORMAL /HPF (ref 0–5)
YEAST: PRESENT

## 2018-06-08 PROCEDURE — 6360000002 HC RX W HCPCS: Performed by: PHYSICIAN ASSISTANT

## 2018-06-08 PROCEDURE — 6370000000 HC RX 637 (ALT 250 FOR IP): Performed by: INTERNAL MEDICINE

## 2018-06-08 PROCEDURE — G0378 HOSPITAL OBSERVATION PER HR: HCPCS

## 2018-06-08 PROCEDURE — 85025 COMPLETE CBC W/AUTO DIFF WBC: CPT

## 2018-06-08 PROCEDURE — 80307 DRUG TEST PRSMV CHEM ANLYZR: CPT

## 2018-06-08 PROCEDURE — 96375 TX/PRO/DX INJ NEW DRUG ADDON: CPT

## 2018-06-08 PROCEDURE — 80156 ASSAY CARBAMAZEPINE TOTAL: CPT

## 2018-06-08 PROCEDURE — 6360000002 HC RX W HCPCS: Performed by: INTERNAL MEDICINE

## 2018-06-08 PROCEDURE — 96374 THER/PROPH/DIAG INJ IV PUSH: CPT

## 2018-06-08 PROCEDURE — 99285 EMERGENCY DEPT VISIT HI MDM: CPT

## 2018-06-08 PROCEDURE — 6360000002 HC RX W HCPCS: Performed by: NURSE PRACTITIONER

## 2018-06-08 PROCEDURE — 74018 RADEX ABDOMEN 1 VIEW: CPT

## 2018-06-08 PROCEDURE — 72125 CT NECK SPINE W/O DYE: CPT

## 2018-06-08 PROCEDURE — 2580000003 HC RX 258: Performed by: PHYSICIAN ASSISTANT

## 2018-06-08 PROCEDURE — 81001 URINALYSIS AUTO W/SCOPE: CPT

## 2018-06-08 PROCEDURE — 72050 X-RAY EXAM NECK SPINE 4/5VWS: CPT

## 2018-06-08 PROCEDURE — 36415 COLL VENOUS BLD VENIPUNCTURE: CPT

## 2018-06-08 PROCEDURE — 83605 ASSAY OF LACTIC ACID: CPT

## 2018-06-08 PROCEDURE — 93005 ELECTROCARDIOGRAM TRACING: CPT

## 2018-06-08 PROCEDURE — 82550 ASSAY OF CK (CPK): CPT

## 2018-06-08 PROCEDURE — 96376 TX/PRO/DX INJ SAME DRUG ADON: CPT

## 2018-06-08 PROCEDURE — 70450 CT HEAD/BRAIN W/O DYE: CPT

## 2018-06-08 PROCEDURE — 93880 EXTRACRANIAL BILAT STUDY: CPT

## 2018-06-08 PROCEDURE — 71045 X-RAY EXAM CHEST 1 VIEW: CPT

## 2018-06-08 PROCEDURE — 94640 AIRWAY INHALATION TREATMENT: CPT

## 2018-06-08 PROCEDURE — 80053 COMPREHEN METABOLIC PANEL: CPT

## 2018-06-08 RX ORDER — ALPRAZOLAM 0.5 MG/1
1 TABLET ORAL NIGHTLY
Status: DISCONTINUED | OUTPATIENT
Start: 2018-06-08 | End: 2018-06-09 | Stop reason: HOSPADM

## 2018-06-08 RX ORDER — ONDANSETRON 2 MG/ML
4 INJECTION INTRAMUSCULAR; INTRAVENOUS EVERY 6 HOURS PRN
Status: DISCONTINUED | OUTPATIENT
Start: 2018-06-08 | End: 2018-06-09 | Stop reason: HOSPADM

## 2018-06-08 RX ORDER — KETOROLAC TROMETHAMINE 30 MG/ML
30 INJECTION, SOLUTION INTRAMUSCULAR; INTRAVENOUS EVERY 6 HOURS PRN
Status: DISCONTINUED | OUTPATIENT
Start: 2018-06-08 | End: 2018-06-09 | Stop reason: HOSPADM

## 2018-06-08 RX ORDER — SODIUM CHLORIDE 0.9 % (FLUSH) 0.9 %
10 SYRINGE (ML) INJECTION PRN
Status: DISCONTINUED | OUTPATIENT
Start: 2018-06-08 | End: 2018-06-09 | Stop reason: HOSPADM

## 2018-06-08 RX ORDER — TRAMADOL HYDROCHLORIDE 50 MG/1
50 TABLET ORAL EVERY 6 HOURS PRN
Status: DISCONTINUED | OUTPATIENT
Start: 2018-06-08 | End: 2018-06-09 | Stop reason: HOSPADM

## 2018-06-08 RX ORDER — PAROXETINE HYDROCHLORIDE 20 MG/1
60 TABLET, FILM COATED ORAL EVERY MORNING
Status: DISCONTINUED | OUTPATIENT
Start: 2018-06-09 | End: 2018-06-09 | Stop reason: HOSPADM

## 2018-06-08 RX ORDER — PROPRANOLOL HCL 60 MG
60 CAPSULE, EXTENDED RELEASE 24HR ORAL DAILY
Status: DISCONTINUED | OUTPATIENT
Start: 2018-06-08 | End: 2018-06-09 | Stop reason: HOSPADM

## 2018-06-08 RX ORDER — SODIUM CHLORIDE 9 MG/ML
INJECTION, SOLUTION INTRAVENOUS CONTINUOUS
Status: DISCONTINUED | OUTPATIENT
Start: 2018-06-08 | End: 2018-06-09 | Stop reason: HOSPADM

## 2018-06-08 RX ORDER — CETIRIZINE HYDROCHLORIDE 10 MG/1
10 TABLET ORAL DAILY
Status: DISCONTINUED | OUTPATIENT
Start: 2018-06-08 | End: 2018-06-09 | Stop reason: HOSPADM

## 2018-06-08 RX ORDER — ZOLPIDEM TARTRATE 5 MG/1
10 TABLET ORAL NIGHTLY
Status: DISCONTINUED | OUTPATIENT
Start: 2018-06-08 | End: 2018-06-09 | Stop reason: HOSPADM

## 2018-06-08 RX ORDER — SODIUM CHLORIDE 0.9 % (FLUSH) 0.9 %
10 SYRINGE (ML) INJECTION EVERY 12 HOURS SCHEDULED
Status: DISCONTINUED | OUTPATIENT
Start: 2018-06-08 | End: 2018-06-09 | Stop reason: HOSPADM

## 2018-06-08 RX ORDER — ONDANSETRON 2 MG/ML
4 INJECTION INTRAMUSCULAR; INTRAVENOUS ONCE
Status: COMPLETED | OUTPATIENT
Start: 2018-06-08 | End: 2018-06-08

## 2018-06-08 RX ORDER — FENTANYL CITRATE 50 UG/ML
50 INJECTION, SOLUTION INTRAMUSCULAR; INTRAVENOUS ONCE
Status: COMPLETED | OUTPATIENT
Start: 2018-06-08 | End: 2018-06-08

## 2018-06-08 RX ORDER — DIPHENHYDRAMINE HCL 25 MG
25 TABLET ORAL EVERY 6 HOURS PRN
Status: DISCONTINUED | OUTPATIENT
Start: 2018-06-08 | End: 2018-06-09 | Stop reason: HOSPADM

## 2018-06-08 RX ORDER — BUDESONIDE AND FORMOTEROL FUMARATE DIHYDRATE 160; 4.5 UG/1; UG/1
2 AEROSOL RESPIRATORY (INHALATION) 2 TIMES DAILY
Status: DISCONTINUED | OUTPATIENT
Start: 2018-06-08 | End: 2018-06-08 | Stop reason: CLARIF

## 2018-06-08 RX ORDER — ARIPIPRAZOLE 15 MG/1
15 TABLET ORAL DAILY
Status: DISCONTINUED | OUTPATIENT
Start: 2018-06-08 | End: 2018-06-09 | Stop reason: HOSPADM

## 2018-06-08 RX ORDER — OMEPRAZOLE 20 MG/1
40 CAPSULE, DELAYED RELEASE ORAL DAILY
COMMUNITY
End: 2018-09-03

## 2018-06-08 RX ORDER — TRAZODONE HYDROCHLORIDE 150 MG/1
300 TABLET ORAL NIGHTLY
Status: DISCONTINUED | OUTPATIENT
Start: 2018-06-08 | End: 2018-06-09 | Stop reason: HOSPADM

## 2018-06-08 RX ADMIN — KETOROLAC TROMETHAMINE 30 MG: 30 INJECTION, SOLUTION INTRAMUSCULAR at 16:24

## 2018-06-08 RX ADMIN — ONDANSETRON 4 MG: 2 INJECTION INTRAMUSCULAR; INTRAVENOUS at 12:02

## 2018-06-08 RX ADMIN — FENTANYL CITRATE 50 MCG: 50 INJECTION, SOLUTION INTRAMUSCULAR; INTRAVENOUS at 12:05

## 2018-06-08 RX ADMIN — ONDANSETRON 4 MG: 2 INJECTION INTRAMUSCULAR; INTRAVENOUS at 09:30

## 2018-06-08 RX ADMIN — TRAZODONE HYDROCHLORIDE 300 MG: 150 TABLET ORAL at 20:28

## 2018-06-08 RX ADMIN — DIPHENHYDRAMINE HCL 25 MG: 25 TABLET ORAL at 15:56

## 2018-06-08 RX ADMIN — ZOLPIDEM TARTRATE 10 MG: 5 TABLET ORAL at 23:18

## 2018-06-08 RX ADMIN — ENOXAPARIN SODIUM 40 MG: 40 INJECTION SUBCUTANEOUS at 20:27

## 2018-06-08 RX ADMIN — HYDROMORPHONE HYDROCHLORIDE 1 MG: 1 INJECTION, SOLUTION INTRAMUSCULAR; INTRAVENOUS; SUBCUTANEOUS at 20:27

## 2018-06-08 RX ADMIN — SODIUM CHLORIDE: 9 INJECTION, SOLUTION INTRAVENOUS at 15:32

## 2018-06-08 RX ADMIN — TOPIRAMATE 200 MG: 200 TABLET, FILM COATED ORAL at 20:29

## 2018-06-08 RX ADMIN — ALPRAZOLAM 1 MG: 0.5 TABLET ORAL at 23:17

## 2018-06-08 RX ADMIN — Medication 2 PUFF: at 20:49

## 2018-06-08 ASSESSMENT — ENCOUNTER SYMPTOMS
EYE DISCHARGE: 0
SORE THROAT: 0
ABDOMINAL PAIN: 1
CONSTIPATION: 0
RHINORRHEA: 0
SHORTNESS OF BREATH: 0
ABDOMINAL DISTENTION: 0
COLOR CHANGE: 0

## 2018-06-08 ASSESSMENT — PAIN DESCRIPTION - LOCATION
LOCATION: ABDOMEN;NECK;BACK
LOCATION: BACK;ABDOMEN;NECK

## 2018-06-08 ASSESSMENT — PAIN DESCRIPTION - PAIN TYPE
TYPE: SURGICAL PAIN;ACUTE PAIN
TYPE: ACUTE PAIN

## 2018-06-08 ASSESSMENT — PAIN SCALES - GENERAL
PAINLEVEL_OUTOF10: 5
PAINLEVEL_OUTOF10: 10

## 2018-06-09 VITALS
WEIGHT: 265 LBS | SYSTOLIC BLOOD PRESSURE: 118 MMHG | OXYGEN SATURATION: 96 % | BODY MASS INDEX: 44.15 KG/M2 | TEMPERATURE: 98 F | HEIGHT: 65 IN | HEART RATE: 65 BPM | DIASTOLIC BLOOD PRESSURE: 76 MMHG | RESPIRATION RATE: 16 BRPM

## 2018-06-09 LAB
ANION GAP SERPL CALCULATED.3IONS-SCNC: 12 MEQ/L (ref 7–13)
BASOPHILS ABSOLUTE: 0.1 K/UL (ref 0–0.2)
BASOPHILS RELATIVE PERCENT: 1.3 %
BUN BLDV-MCNC: 7 MG/DL (ref 6–20)
CALCIUM SERPL-MCNC: 8.3 MG/DL (ref 8.6–10.2)
CHLORIDE BLD-SCNC: 103 MEQ/L (ref 98–107)
CO2: 22 MEQ/L (ref 22–29)
CREAT SERPL-MCNC: 0.83 MG/DL (ref 0.5–0.9)
EOSINOPHILS ABSOLUTE: 0.3 K/UL (ref 0–0.7)
EOSINOPHILS RELATIVE PERCENT: 6 %
GFR AFRICAN AMERICAN: >60
GFR NON-AFRICAN AMERICAN: >60
GLUCOSE BLD-MCNC: 99 MG/DL (ref 74–109)
HCT VFR BLD CALC: 33.4 % (ref 37–47)
HEMOGLOBIN: 11.2 G/DL (ref 12–16)
LYMPHOCYTES ABSOLUTE: 1.7 K/UL (ref 1–4.8)
LYMPHOCYTES RELATIVE PERCENT: 30.5 %
MCH RBC QN AUTO: 28.7 PG (ref 27–31.3)
MCHC RBC AUTO-ENTMCNC: 33.4 % (ref 33–37)
MCV RBC AUTO: 86 FL (ref 82–100)
MONOCYTES ABSOLUTE: 0.4 K/UL (ref 0.2–0.8)
MONOCYTES RELATIVE PERCENT: 7.2 %
NEUTROPHILS ABSOLUTE: 3.1 K/UL (ref 1.4–6.5)
NEUTROPHILS RELATIVE PERCENT: 55 %
PDW BLD-RTO: 15.5 % (ref 11.5–14.5)
PLATELET # BLD: 258 K/UL (ref 130–400)
POTASSIUM REFLEX MAGNESIUM: 3.7 MEQ/L (ref 3.5–5.1)
RBC # BLD: 3.88 M/UL (ref 4.2–5.4)
SODIUM BLD-SCNC: 137 MEQ/L (ref 132–144)
WBC # BLD: 5.7 K/UL (ref 4.8–10.8)

## 2018-06-09 PROCEDURE — 6370000000 HC RX 637 (ALT 250 FOR IP): Performed by: INTERNAL MEDICINE

## 2018-06-09 PROCEDURE — 85025 COMPLETE CBC W/AUTO DIFF WBC: CPT

## 2018-06-09 PROCEDURE — 6360000002 HC RX W HCPCS: Performed by: INTERNAL MEDICINE

## 2018-06-09 PROCEDURE — G0378 HOSPITAL OBSERVATION PER HR: HCPCS

## 2018-06-09 PROCEDURE — 36415 COLL VENOUS BLD VENIPUNCTURE: CPT

## 2018-06-09 PROCEDURE — 2580000003 HC RX 258: Performed by: PHYSICIAN ASSISTANT

## 2018-06-09 PROCEDURE — 96376 TX/PRO/DX INJ SAME DRUG ADON: CPT

## 2018-06-09 PROCEDURE — 94760 N-INVAS EAR/PLS OXIMETRY 1: CPT

## 2018-06-09 PROCEDURE — 6360000002 HC RX W HCPCS: Performed by: PHYSICIAN ASSISTANT

## 2018-06-09 PROCEDURE — 94640 AIRWAY INHALATION TREATMENT: CPT

## 2018-06-09 PROCEDURE — 80048 BASIC METABOLIC PNL TOTAL CA: CPT

## 2018-06-09 RX ORDER — ACETAMINOPHEN 500 MG
1000 TABLET ORAL ONCE
Status: DISCONTINUED | OUTPATIENT
Start: 2018-06-09 | End: 2018-06-09 | Stop reason: HOSPADM

## 2018-06-09 RX ADMIN — KETOROLAC TROMETHAMINE 30 MG: 30 INJECTION, SOLUTION INTRAMUSCULAR at 14:35

## 2018-06-09 RX ADMIN — CETIRIZINE HYDROCHLORIDE 10 MG: 10 TABLET, FILM COATED ORAL at 08:35

## 2018-06-09 RX ADMIN — KETOROLAC TROMETHAMINE 30 MG: 30 INJECTION, SOLUTION INTRAMUSCULAR at 08:35

## 2018-06-09 RX ADMIN — ONDANSETRON 4 MG: 2 INJECTION INTRAMUSCULAR; INTRAVENOUS at 17:51

## 2018-06-09 RX ADMIN — Medication 2 PUFF: at 07:41

## 2018-06-09 RX ADMIN — PROPRANOLOL HYDROCHLORIDE 60 MG: 60 CAPSULE, EXTENDED RELEASE ORAL at 08:36

## 2018-06-09 RX ADMIN — DIPHENHYDRAMINE HCL 25 MG: 25 TABLET ORAL at 03:36

## 2018-06-09 RX ADMIN — ARIPIPRAZOLE 15 MG: 15 TABLET ORAL at 08:35

## 2018-06-09 RX ADMIN — DIPHENHYDRAMINE HCL 25 MG: 25 TABLET ORAL at 14:35

## 2018-06-09 RX ADMIN — TOPIRAMATE 200 MG: 200 TABLET, FILM COATED ORAL at 08:35

## 2018-06-09 RX ADMIN — PAROXETINE HYDROCHLORIDE 60 MG: 20 TABLET, FILM COATED ORAL at 08:35

## 2018-06-09 RX ADMIN — DIPHENHYDRAMINE HCL 25 MG: 25 TABLET ORAL at 08:35

## 2018-06-09 RX ADMIN — SODIUM CHLORIDE: 9 INJECTION, SOLUTION INTRAVENOUS at 08:35

## 2018-06-09 ASSESSMENT — PAIN SCALES - GENERAL
PAINLEVEL_OUTOF10: 10
PAINLEVEL_OUTOF10: 10

## 2018-06-11 PROCEDURE — 93010 ELECTROCARDIOGRAM REPORT: CPT | Performed by: INTERNAL MEDICINE

## 2018-06-19 ENCOUNTER — HOSPITAL ENCOUNTER (EMERGENCY)
Age: 43
Discharge: HOME | End: 2018-06-19
Attending: EMERGENCY MEDICINE
Payer: MEDICARE

## 2018-06-19 ENCOUNTER — APPOINTMENT (OUTPATIENT)
Dept: CT IMAGING | Age: 43
End: 2018-06-19
Payer: MEDICARE

## 2018-06-19 VITALS
HEART RATE: 62 BPM | OXYGEN SATURATION: 98 % | BODY MASS INDEX: 39.32 KG/M2 | DIASTOLIC BLOOD PRESSURE: 73 MMHG | HEIGHT: 65 IN | TEMPERATURE: 97.7 F | RESPIRATION RATE: 18 BRPM | SYSTOLIC BLOOD PRESSURE: 119 MMHG | WEIGHT: 236 LBS

## 2018-06-19 DIAGNOSIS — R10.32 CHRONIC BILATERAL LOWER ABDOMINAL PAIN: Primary | ICD-10-CM

## 2018-06-19 DIAGNOSIS — R31.29 HEMATURIA, MICROSCOPIC: ICD-10-CM

## 2018-06-19 DIAGNOSIS — R10.31 CHRONIC BILATERAL LOWER ABDOMINAL PAIN: Primary | ICD-10-CM

## 2018-06-19 DIAGNOSIS — G89.29 CHRONIC BILATERAL LOWER ABDOMINAL PAIN: Primary | ICD-10-CM

## 2018-06-19 LAB
BACTERIA: ABNORMAL /HPF
BILIRUBIN URINE: NEGATIVE
BLOOD, URINE: ABNORMAL
CLARITY: ABNORMAL
COLOR: YELLOW
CRYSTALS, UA: ABNORMAL
EPITHELIAL CELLS, UA: ABNORMAL /HPF
GLUCOSE URINE: NEGATIVE MG/DL
KETONES, URINE: NEGATIVE MG/DL
LEUKOCYTE ESTERASE, URINE: ABNORMAL
NITRITE, URINE: NEGATIVE
PH UA: 7 (ref 5–9)
PROTEIN UA: 30 MG/DL
RBC UA: ABNORMAL /HPF (ref 0–2)
RENAL EPITHELIAL, UA: ABNORMAL /HPF
SPECIFIC GRAVITY UA: 1.02 (ref 1–1.03)
URINE REFLEX TO CULTURE: YES
UROBILINOGEN, URINE: 0.2 E.U./DL
WBC UA: ABNORMAL /HPF (ref 0–5)
YEAST: PRESENT

## 2018-06-19 PROCEDURE — 6370000000 HC RX 637 (ALT 250 FOR IP): Performed by: EMERGENCY MEDICINE

## 2018-06-19 PROCEDURE — 87086 URINE CULTURE/COLONY COUNT: CPT

## 2018-06-19 PROCEDURE — 81001 URINALYSIS AUTO W/SCOPE: CPT

## 2018-06-19 PROCEDURE — 6360000002 HC RX W HCPCS: Performed by: EMERGENCY MEDICINE

## 2018-06-19 PROCEDURE — 99284 EMERGENCY DEPT VISIT MOD MDM: CPT

## 2018-06-19 PROCEDURE — 74176 CT ABD & PELVIS W/O CONTRAST: CPT

## 2018-06-19 RX ORDER — ONDANSETRON 4 MG/1
4 TABLET, ORALLY DISINTEGRATING ORAL ONCE
Status: COMPLETED | OUTPATIENT
Start: 2018-06-19 | End: 2018-06-19

## 2018-06-19 RX ORDER — OXYCODONE HYDROCHLORIDE AND ACETAMINOPHEN 5; 325 MG/1; MG/1
2 TABLET ORAL ONCE
Status: COMPLETED | OUTPATIENT
Start: 2018-06-19 | End: 2018-06-19

## 2018-06-19 RX ORDER — HYOSCYAMINE SULFATE 0.12 MG/1
1 TABLET SUBLINGUAL 3 TIMES DAILY PRN
Qty: 9 EACH | Refills: 0 | Status: SHIPPED | OUTPATIENT
Start: 2018-06-19 | End: 2018-09-03

## 2018-06-19 RX ADMIN — ONDANSETRON 4 MG: 4 TABLET, ORALLY DISINTEGRATING ORAL at 20:47

## 2018-06-19 RX ADMIN — OXYCODONE HYDROCHLORIDE AND ACETAMINOPHEN 2 TABLET: 5; 325 TABLET ORAL at 19:32

## 2018-06-19 ASSESSMENT — PAIN SCALES - GENERAL: PAINLEVEL_OUTOF10: 10

## 2018-06-19 ASSESSMENT — ENCOUNTER SYMPTOMS
COUGH: 0
SHORTNESS OF BREATH: 0
VOMITING: 0
ABDOMINAL PAIN: 1

## 2018-06-19 ASSESSMENT — PAIN DESCRIPTION - LOCATION: LOCATION: ABDOMEN;BACK

## 2018-06-19 ASSESSMENT — PAIN DESCRIPTION - PAIN TYPE: TYPE: ACUTE PAIN

## 2018-06-21 LAB — URINE CULTURE, ROUTINE: NORMAL

## 2018-06-27 ENCOUNTER — APPOINTMENT (OUTPATIENT)
Dept: CT IMAGING | Age: 43
End: 2018-06-27
Payer: MEDICARE

## 2018-06-27 ENCOUNTER — HOSPITAL ENCOUNTER (EMERGENCY)
Age: 43
Discharge: HOME | End: 2018-06-27
Attending: STUDENT IN AN ORGANIZED HEALTH CARE EDUCATION/TRAINING PROGRAM
Payer: MEDICARE

## 2018-06-27 VITALS
OXYGEN SATURATION: 100 % | HEIGHT: 65 IN | HEART RATE: 67 BPM | SYSTOLIC BLOOD PRESSURE: 126 MMHG | BODY MASS INDEX: 44.32 KG/M2 | WEIGHT: 266 LBS | TEMPERATURE: 97.5 F | RESPIRATION RATE: 16 BRPM | DIASTOLIC BLOOD PRESSURE: 78 MMHG

## 2018-06-27 DIAGNOSIS — R10.30 LOWER ABDOMINAL PAIN: Primary | ICD-10-CM

## 2018-06-27 DIAGNOSIS — F11.10 OPIATE ABUSE, CONTINUOUS (HCC): ICD-10-CM

## 2018-06-27 LAB
ALBUMIN SERPL-MCNC: 3.7 G/DL (ref 3.9–4.9)
ALP BLD-CCNC: 109 U/L (ref 40–130)
ALT SERPL-CCNC: 13 U/L (ref 0–33)
AMPHETAMINE SCREEN, URINE: POSITIVE
ANION GAP SERPL CALCULATED.3IONS-SCNC: 13 MEQ/L (ref 7–13)
AST SERPL-CCNC: 16 U/L (ref 0–35)
BARBITURATE SCREEN URINE: ABNORMAL
BASOPHILS ABSOLUTE: 0.1 K/UL (ref 0–0.2)
BASOPHILS RELATIVE PERCENT: 1 %
BENZODIAZEPINE SCREEN, URINE: POSITIVE
BILIRUB SERPL-MCNC: 0.3 MG/DL (ref 0–1.2)
BILIRUBIN URINE: NEGATIVE
BLOOD, URINE: NEGATIVE
BUN BLDV-MCNC: 15 MG/DL (ref 6–20)
CALCIUM SERPL-MCNC: 8.5 MG/DL (ref 8.6–10.2)
CANNABINOID SCREEN URINE: ABNORMAL
CHLORIDE BLD-SCNC: 94 MEQ/L (ref 98–107)
CLARITY: ABNORMAL
CO2: 22 MEQ/L (ref 22–29)
COCAINE METABOLITE SCREEN URINE: ABNORMAL
COLOR: YELLOW
CREAT SERPL-MCNC: 0.88 MG/DL (ref 0.5–0.9)
EOSINOPHILS ABSOLUTE: 0.3 K/UL (ref 0–0.7)
EOSINOPHILS RELATIVE PERCENT: 3.8 %
EPITHELIAL CELLS, UA: ABNORMAL /HPF
GFR AFRICAN AMERICAN: >60
GFR NON-AFRICAN AMERICAN: >60
GLOBULIN: 2.9 G/DL (ref 2.3–3.5)
GLUCOSE BLD-MCNC: 93 MG/DL (ref 74–109)
GLUCOSE URINE: NEGATIVE MG/DL
HCT VFR BLD CALC: 34.8 % (ref 37–47)
HEMOGLOBIN: 11.6 G/DL (ref 12–16)
KETONES, URINE: NEGATIVE MG/DL
LACTIC ACID: 1 MMOL/L (ref 0.5–2.2)
LEUKOCYTE ESTERASE, URINE: ABNORMAL
LYMPHOCYTES ABSOLUTE: 1 K/UL (ref 1–4.8)
LYMPHOCYTES RELATIVE PERCENT: 13.8 %
Lab: ABNORMAL
MCH RBC QN AUTO: 28.3 PG (ref 27–31.3)
MCHC RBC AUTO-ENTMCNC: 33.3 % (ref 33–37)
MCV RBC AUTO: 84.9 FL (ref 82–100)
MONOCYTES ABSOLUTE: 0.3 K/UL (ref 0.2–0.8)
MONOCYTES RELATIVE PERCENT: 4.3 %
NEUTROPHILS ABSOLUTE: 5.5 K/UL (ref 1.4–6.5)
NEUTROPHILS RELATIVE PERCENT: 77.1 %
NITRITE, URINE: NEGATIVE
OPIATE SCREEN URINE: POSITIVE
PDW BLD-RTO: 15.3 % (ref 11.5–14.5)
PH UA: 7.5 (ref 5–9)
PHENCYCLIDINE SCREEN URINE: ABNORMAL
PLATELET # BLD: 264 K/UL (ref 130–400)
POTASSIUM SERPL-SCNC: 4.2 MEQ/L (ref 3.5–5.1)
PROTEIN UA: ABNORMAL MG/DL
RBC # BLD: 4.1 M/UL (ref 4.2–5.4)
RBC UA: ABNORMAL /HPF (ref 0–2)
SODIUM BLD-SCNC: 129 MEQ/L (ref 132–144)
SPECIFIC GRAVITY UA: 1.02 (ref 1–1.03)
TOTAL CK: 61 U/L (ref 0–170)
TOTAL PROTEIN: 6.6 G/DL (ref 6.4–8.1)
URINE REFLEX TO CULTURE: YES
UROBILINOGEN, URINE: 1 E.U./DL
WBC # BLD: 7.1 K/UL (ref 4.8–10.8)
WBC UA: ABNORMAL /HPF (ref 0–5)

## 2018-06-27 PROCEDURE — 83605 ASSAY OF LACTIC ACID: CPT

## 2018-06-27 PROCEDURE — 85025 COMPLETE CBC W/AUTO DIFF WBC: CPT

## 2018-06-27 PROCEDURE — 74176 CT ABD & PELVIS W/O CONTRAST: CPT

## 2018-06-27 PROCEDURE — 87086 URINE CULTURE/COLONY COUNT: CPT

## 2018-06-27 PROCEDURE — 80307 DRUG TEST PRSMV CHEM ANLYZR: CPT

## 2018-06-27 PROCEDURE — 96374 THER/PROPH/DIAG INJ IV PUSH: CPT

## 2018-06-27 PROCEDURE — 2500000003 HC RX 250 WO HCPCS: Performed by: NURSE PRACTITIONER

## 2018-06-27 PROCEDURE — 81001 URINALYSIS AUTO W/SCOPE: CPT

## 2018-06-27 PROCEDURE — 99284 EMERGENCY DEPT VISIT MOD MDM: CPT

## 2018-06-27 PROCEDURE — 82550 ASSAY OF CK (CPK): CPT

## 2018-06-27 PROCEDURE — 36415 COLL VENOUS BLD VENIPUNCTURE: CPT

## 2018-06-27 PROCEDURE — 80053 COMPREHEN METABOLIC PANEL: CPT

## 2018-06-27 RX ORDER — KETAMINE HYDROCHLORIDE 50 MG/ML
10 INJECTION, SOLUTION, CONCENTRATE INTRAMUSCULAR; INTRAVENOUS ONCE
Status: COMPLETED | OUTPATIENT
Start: 2018-06-27 | End: 2018-06-27

## 2018-06-27 RX ADMIN — KETAMINE HYDROCHLORIDE 10 MG: 50 INJECTION, SOLUTION INTRAMUSCULAR; INTRAVENOUS at 21:00

## 2018-06-27 ASSESSMENT — ENCOUNTER SYMPTOMS
TROUBLE SWALLOWING: 0
COUGH: 0
SORE THROAT: 0
ABDOMINAL PAIN: 1
NAUSEA: 0
BACK PAIN: 0
COLOR CHANGE: 0
CONSTIPATION: 0
VOMITING: 0
SHORTNESS OF BREATH: 0
VOICE CHANGE: 0
DIARRHEA: 0

## 2018-06-27 ASSESSMENT — PAIN DESCRIPTION - FREQUENCY: FREQUENCY: CONTINUOUS

## 2018-06-27 ASSESSMENT — PAIN SCALES - GENERAL
PAINLEVEL_OUTOF10: 10
PAINLEVEL_OUTOF10: 10

## 2018-06-27 ASSESSMENT — PAIN DESCRIPTION - PAIN TYPE: TYPE: ACUTE PAIN

## 2018-06-27 ASSESSMENT — PAIN DESCRIPTION - DESCRIPTORS: DESCRIPTORS: ACHING;BURNING;STABBING

## 2018-06-28 ASSESSMENT — ENCOUNTER SYMPTOMS
NAUSEA: 0
TROUBLE SWALLOWING: 0
VOICE CHANGE: 0
COUGH: 0
BACK PAIN: 0
SHORTNESS OF BREATH: 0
COLOR CHANGE: 0
CONSTIPATION: 0
SORE THROAT: 0
VOMITING: 0
DIARRHEA: 0
ABDOMINAL PAIN: 1

## 2018-06-29 LAB — URINE CULTURE, ROUTINE: NORMAL

## 2018-07-10 ENCOUNTER — HOSPITAL ENCOUNTER (EMERGENCY)
Age: 43
Discharge: HOME | End: 2018-07-10
Attending: EMERGENCY MEDICINE
Payer: MEDICARE

## 2018-07-10 VITALS
TEMPERATURE: 98.6 F | HEIGHT: 65 IN | SYSTOLIC BLOOD PRESSURE: 128 MMHG | BODY MASS INDEX: 40.65 KG/M2 | DIASTOLIC BLOOD PRESSURE: 83 MMHG | OXYGEN SATURATION: 99 % | HEART RATE: 62 BPM | RESPIRATION RATE: 20 BRPM | WEIGHT: 244 LBS

## 2018-07-10 DIAGNOSIS — N39.0 URINARY TRACT INFECTION ASSOCIATED WITH CYSTOSTOMY CATHETER, INITIAL ENCOUNTER (HCC): Primary | ICD-10-CM

## 2018-07-10 DIAGNOSIS — T83.510A URINARY TRACT INFECTION ASSOCIATED WITH CYSTOSTOMY CATHETER, INITIAL ENCOUNTER (HCC): Primary | ICD-10-CM

## 2018-07-10 LAB
ALBUMIN SERPL-MCNC: 3.6 G/DL (ref 3.9–4.9)
ALP BLD-CCNC: 139 U/L (ref 40–130)
ALT SERPL-CCNC: 60 U/L (ref 0–33)
ANION GAP SERPL CALCULATED.3IONS-SCNC: 13 MEQ/L (ref 7–13)
AST SERPL-CCNC: 33 U/L (ref 0–35)
BACTERIA: ABNORMAL /HPF
BASOPHILS ABSOLUTE: 0.1 K/UL (ref 0–0.2)
BASOPHILS RELATIVE PERCENT: 0.6 %
BILIRUB SERPL-MCNC: 0.4 MG/DL (ref 0–1.2)
BILIRUBIN URINE: ABNORMAL
BLOOD, URINE: NEGATIVE
BUN BLDV-MCNC: 16 MG/DL (ref 6–20)
CALCIUM SERPL-MCNC: 8.9 MG/DL (ref 8.6–10.2)
CHLORIDE BLD-SCNC: 103 MEQ/L (ref 98–107)
CLARITY: ABNORMAL
CO2: 21 MEQ/L (ref 22–29)
COLOR: ABNORMAL
CREAT SERPL-MCNC: 0.85 MG/DL (ref 0.5–0.9)
EOSINOPHILS ABSOLUTE: 0.2 K/UL (ref 0–0.7)
EOSINOPHILS RELATIVE PERCENT: 1.8 %
EPITHELIAL CELLS, UA: ABNORMAL /HPF
GFR AFRICAN AMERICAN: >60
GFR NON-AFRICAN AMERICAN: >60
GLOBULIN: 3.3 G/DL (ref 2.3–3.5)
GLUCOSE BLD-MCNC: 101 MG/DL (ref 74–109)
GLUCOSE URINE: NEGATIVE MG/DL
HCT VFR BLD CALC: 36.6 % (ref 37–47)
HEMOGLOBIN: 12.4 G/DL (ref 12–16)
KETONES, URINE: ABNORMAL MG/DL
LACTIC ACID: 1.3 MMOL/L (ref 0.5–2.2)
LEUKOCYTE ESTERASE, URINE: ABNORMAL
LYMPHOCYTES ABSOLUTE: 1.6 K/UL (ref 1–4.8)
LYMPHOCYTES RELATIVE PERCENT: 17.8 %
MCH RBC QN AUTO: 29.3 PG (ref 27–31.3)
MCHC RBC AUTO-ENTMCNC: 33.7 % (ref 33–37)
MCV RBC AUTO: 86.8 FL (ref 82–100)
MONOCYTES ABSOLUTE: 0.6 K/UL (ref 0.2–0.8)
MONOCYTES RELATIVE PERCENT: 6.7 %
MUCUS: PRESENT
NEUTROPHILS ABSOLUTE: 6.5 K/UL (ref 1.4–6.5)
NEUTROPHILS RELATIVE PERCENT: 73.1 %
NITRITE, URINE: NEGATIVE
PDW BLD-RTO: 14.9 % (ref 11.5–14.5)
PH UA: 5.5 (ref 5–9)
PLATELET # BLD: 256 K/UL (ref 130–400)
POTASSIUM SERPL-SCNC: 3.6 MEQ/L (ref 3.5–5.1)
PROTEIN UA: 100 MG/DL
RBC # BLD: 4.22 M/UL (ref 4.2–5.4)
RBC UA: ABNORMAL /HPF (ref 0–2)
RENAL EPITHELIAL, UA: ABNORMAL /HPF
SODIUM BLD-SCNC: 137 MEQ/L (ref 132–144)
SPECIFIC GRAVITY UA: 1.04 (ref 1–1.03)
TOTAL PROTEIN: 6.9 G/DL (ref 6.4–8.1)
URINE REFLEX TO CULTURE: YES
UROBILINOGEN, URINE: 0.2 E.U./DL
WBC # BLD: 8.9 K/UL (ref 4.8–10.8)
WBC UA: ABNORMAL /HPF (ref 0–5)
YEAST: PRESENT

## 2018-07-10 PROCEDURE — 85025 COMPLETE CBC W/AUTO DIFF WBC: CPT

## 2018-07-10 PROCEDURE — 80053 COMPREHEN METABOLIC PANEL: CPT

## 2018-07-10 PROCEDURE — 99284 EMERGENCY DEPT VISIT MOD MDM: CPT

## 2018-07-10 PROCEDURE — 83605 ASSAY OF LACTIC ACID: CPT

## 2018-07-10 PROCEDURE — 81001 URINALYSIS AUTO W/SCOPE: CPT

## 2018-07-10 PROCEDURE — 96374 THER/PROPH/DIAG INJ IV PUSH: CPT

## 2018-07-10 PROCEDURE — 2500000003 HC RX 250 WO HCPCS: Performed by: EMERGENCY MEDICINE

## 2018-07-10 PROCEDURE — 6370000000 HC RX 637 (ALT 250 FOR IP): Performed by: PHYSICIAN ASSISTANT

## 2018-07-10 PROCEDURE — 2580000003 HC RX 258: Performed by: EMERGENCY MEDICINE

## 2018-07-10 PROCEDURE — 87086 URINE CULTURE/COLONY COUNT: CPT

## 2018-07-10 RX ORDER — 0.9 % SODIUM CHLORIDE 0.9 %
1000 INTRAVENOUS SOLUTION INTRAVENOUS ONCE
Status: COMPLETED | OUTPATIENT
Start: 2018-07-10 | End: 2018-07-10

## 2018-07-10 RX ORDER — SULFAMETHOXAZOLE AND TRIMETHOPRIM 800; 160 MG/1; MG/1
1 TABLET ORAL 2 TIMES DAILY
Qty: 20 TABLET | Refills: 0 | Status: SHIPPED | OUTPATIENT
Start: 2018-07-10 | End: 2018-07-20

## 2018-07-10 RX ORDER — KETAMINE HYDROCHLORIDE 50 MG/ML
10 INJECTION, SOLUTION, CONCENTRATE INTRAMUSCULAR; INTRAVENOUS ONCE
Status: COMPLETED | OUTPATIENT
Start: 2018-07-10 | End: 2018-07-10

## 2018-07-10 RX ORDER — 0.9 % SODIUM CHLORIDE 0.9 %
500 INTRAVENOUS SOLUTION INTRAVENOUS ONCE
Status: DISCONTINUED | OUTPATIENT
Start: 2018-07-10 | End: 2018-07-10

## 2018-07-10 RX ORDER — SULFAMETHOXAZOLE AND TRIMETHOPRIM 800; 160 MG/1; MG/1
1 TABLET ORAL ONCE
Status: COMPLETED | OUTPATIENT
Start: 2018-07-10 | End: 2018-07-10

## 2018-07-10 RX ORDER — FLUCONAZOLE 150 MG/1
150 TABLET ORAL ONCE
Qty: 2 TABLET | Refills: 0 | Status: SHIPPED | OUTPATIENT
Start: 2018-07-10 | End: 2018-07-10

## 2018-07-10 RX ORDER — FLUCONAZOLE 100 MG/1
200 TABLET ORAL ONCE
Status: COMPLETED | OUTPATIENT
Start: 2018-07-10 | End: 2018-07-10

## 2018-07-10 RX ORDER — KETAMINE HYDROCHLORIDE 100 MG/ML
0.5 INJECTION, SOLUTION INTRAMUSCULAR; INTRAVENOUS ONCE
Status: DISCONTINUED | OUTPATIENT
Start: 2018-07-10 | End: 2018-07-10

## 2018-07-10 RX ADMIN — KETAMINE HYDROCHLORIDE 10 MG: 50 INJECTION, SOLUTION INTRAMUSCULAR; INTRAVENOUS at 21:22

## 2018-07-10 RX ADMIN — SULFAMETHOXAZOLE AND TRIMETHOPRIM 1 TABLET: 800; 160 TABLET ORAL at 22:49

## 2018-07-10 RX ADMIN — FLUCONAZOLE 200 MG: 100 TABLET ORAL at 22:49

## 2018-07-10 RX ADMIN — SODIUM CHLORIDE 1000 ML: 9 INJECTION, SOLUTION INTRAVENOUS at 21:22

## 2018-07-10 ASSESSMENT — PAIN DESCRIPTION - DESCRIPTORS: DESCRIPTORS: ACHING;CRAMPING

## 2018-07-10 ASSESSMENT — PAIN DESCRIPTION - ORIENTATION: ORIENTATION: LOWER

## 2018-07-10 ASSESSMENT — PAIN SCALES - GENERAL: PAINLEVEL_OUTOF10: 10

## 2018-07-10 ASSESSMENT — PAIN DESCRIPTION - LOCATION: LOCATION: ABDOMEN;BACK

## 2018-07-10 ASSESSMENT — PAIN DESCRIPTION - PAIN TYPE: TYPE: ACUTE PAIN

## 2018-07-11 NOTE — ED PROVIDER NOTES
insurance does not cover ODT. PAROXETINE (PAXIL) 40 MG TABLET    Take 60 mg by mouth every morning     PROPRANOLOL (INDERAL LA) 60 MG CR CAPSULE    Take 1 capsule by mouth daily    TOPIRAMATE (TOPAMAX) 200 MG TABLET    Take 200 mg by mouth 2 times daily    TRAZODONE (DESYREL) 50 MG TABLET    Take 300 mg by mouth nightly        ALLERGIES     Latex; Ciprofloxacin; Contrast [iodides]; Daypro [oxaprozin]; Estrogens; Iodine; Lamictal [lamotrigine]; Lyrica [pregabalin]; Macrobid [nitrofurantoin monohyd macro]; Macrolides and ketolides; Shellfish-derived products; Tape [adhesive tape]; Tegretol [carbamazepine]; Toradol [ketorolac tromethamine]; Tramadol; Vicodin [hydrocodone-acetaminophen]; and Zanaflex [tizanidine hcl]    FAMILY HISTORY       Family History   Problem Relation Age of Onset    Cancer Father     Cancer Paternal Aunt           SOCIAL HISTORY       Social History     Social History    Marital status: Single     Spouse name: N/A    Number of children: N/A    Years of education: N/A     Social History Main Topics    Smoking status: Never Smoker    Smokeless tobacco: Never Used    Alcohol use No    Drug use: No    Sexual activity: No     Other Topics Concern    None     Social History Narrative    None       SCREENINGS             PHYSICAL EXAM    (up to 7 for level 4, 8 or more for level 5)     ED Triage Vitals [07/10/18 2020]   BP Temp Temp Source Pulse Resp SpO2 Height Weight   (!) 149/99 98.6 °F (37 °C) Oral 66 18 98 % 5' 5\" (1.651 m) 244 lb (110.7 kg)       Physical Exam   Constitutional: She is oriented to person, place, and time. She appears well-developed and well-nourished. No distress. HENT:   Head: Normocephalic and atraumatic. Mouth/Throat: No oropharyngeal exudate. Eyes: Pupils are equal, round, and reactive to light. Conjunctivae and EOM are normal. No scleral icterus. Neck: Normal range of motion. Neck supple. No tracheal deviation present.    Cardiovascular: Normal rate,

## 2018-07-12 LAB — URINE CULTURE, ROUTINE: NORMAL

## 2018-07-15 ENCOUNTER — HOSPITAL ENCOUNTER (EMERGENCY)
Age: 43
Discharge: HOME | End: 2018-07-15
Attending: FAMILY MEDICINE
Payer: MEDICARE

## 2018-07-15 VITALS
DIASTOLIC BLOOD PRESSURE: 72 MMHG | RESPIRATION RATE: 20 BRPM | OXYGEN SATURATION: 95 % | HEART RATE: 63 BPM | HEIGHT: 65 IN | SYSTOLIC BLOOD PRESSURE: 130 MMHG | WEIGHT: 244 LBS | TEMPERATURE: 98.2 F | BODY MASS INDEX: 40.65 KG/M2

## 2018-07-15 DIAGNOSIS — N39.0 URINARY TRACT INFECTION ASSOCIATED WITH CYSTOSTOMY CATHETER, INITIAL ENCOUNTER (HCC): Primary | ICD-10-CM

## 2018-07-15 DIAGNOSIS — G89.18 POSTOPERATIVE PAIN: ICD-10-CM

## 2018-07-15 DIAGNOSIS — K59.00 CONSTIPATION, UNSPECIFIED CONSTIPATION TYPE: ICD-10-CM

## 2018-07-15 DIAGNOSIS — T83.510A URINARY TRACT INFECTION ASSOCIATED WITH CYSTOSTOMY CATHETER, INITIAL ENCOUNTER (HCC): Primary | ICD-10-CM

## 2018-07-15 LAB
ALBUMIN SERPL-MCNC: 3.7 G/DL (ref 3.9–4.9)
ALP BLD-CCNC: 127 U/L (ref 40–130)
ALT SERPL-CCNC: 27 U/L (ref 0–33)
AMYLASE: 14 U/L (ref 28–100)
ANION GAP SERPL CALCULATED.3IONS-SCNC: 11 MEQ/L (ref 7–13)
AST SERPL-CCNC: 17 U/L (ref 0–35)
BACTERIA: ABNORMAL /HPF
BASOPHILS ABSOLUTE: 0.1 K/UL (ref 0–0.2)
BASOPHILS RELATIVE PERCENT: 1 %
BILIRUB SERPL-MCNC: <0.2 MG/DL (ref 0–1.2)
BILIRUBIN URINE: NEGATIVE
BLOOD, URINE: ABNORMAL
BUN BLDV-MCNC: 13 MG/DL (ref 6–20)
CALCIUM SERPL-MCNC: 8.8 MG/DL (ref 8.6–10.2)
CHLORIDE BLD-SCNC: 105 MEQ/L (ref 98–107)
CLARITY: ABNORMAL
CO2: 24 MEQ/L (ref 22–29)
COLOR: YELLOW
CREAT SERPL-MCNC: 1.06 MG/DL (ref 0.5–0.9)
EOSINOPHILS ABSOLUTE: 0.2 K/UL (ref 0–0.7)
EOSINOPHILS RELATIVE PERCENT: 4.5 %
EPITHELIAL CELLS, UA: ABNORMAL /HPF
GFR AFRICAN AMERICAN: >60
GFR NON-AFRICAN AMERICAN: 56.5
GLOBULIN: 2.9 G/DL (ref 2.3–3.5)
GLUCOSE BLD-MCNC: 95 MG/DL (ref 74–109)
GLUCOSE URINE: NEGATIVE MG/DL
HCT VFR BLD CALC: 35.7 % (ref 37–47)
HEMOGLOBIN: 11.8 G/DL (ref 12–16)
KETONES, URINE: NEGATIVE MG/DL
LEUKOCYTE ESTERASE, URINE: ABNORMAL
LIPASE: 17 U/L (ref 13–60)
LYMPHOCYTES ABSOLUTE: 1.5 K/UL (ref 1–4.8)
LYMPHOCYTES RELATIVE PERCENT: 26.8 %
MCH RBC QN AUTO: 28.4 PG (ref 27–31.3)
MCHC RBC AUTO-ENTMCNC: 33.1 % (ref 33–37)
MCV RBC AUTO: 85.8 FL (ref 82–100)
MONOCYTES ABSOLUTE: 0.3 K/UL (ref 0.2–0.8)
MONOCYTES RELATIVE PERCENT: 6.1 %
MUCUS: PRESENT
NEUTROPHILS ABSOLUTE: 3.4 K/UL (ref 1.4–6.5)
NEUTROPHILS RELATIVE PERCENT: 61.6 %
NITRITE, URINE: NEGATIVE
PDW BLD-RTO: 15.4 % (ref 11.5–14.5)
PH UA: 5.5 (ref 5–9)
PLATELET # BLD: 267 K/UL (ref 130–400)
POTASSIUM REFLEX MAGNESIUM: 3.9 MEQ/L (ref 3.5–5.1)
PROTEIN UA: 100 MG/DL
RBC # BLD: 4.16 M/UL (ref 4.2–5.4)
RBC UA: ABNORMAL /HPF (ref 0–2)
RENAL EPITHELIAL, UA: ABNORMAL /HPF
SODIUM BLD-SCNC: 140 MEQ/L (ref 132–144)
SPECIFIC GRAVITY UA: 1.01 (ref 1–1.03)
TOTAL PROTEIN: 6.6 G/DL (ref 6.4–8.1)
URINE REFLEX TO CULTURE: YES
UROBILINOGEN, URINE: 0.2 E.U./DL
WBC # BLD: 5.5 K/UL (ref 4.8–10.8)
WBC UA: ABNORMAL /HPF (ref 0–5)
YEAST: PRESENT

## 2018-07-15 PROCEDURE — 36415 COLL VENOUS BLD VENIPUNCTURE: CPT

## 2018-07-15 PROCEDURE — 80053 COMPREHEN METABOLIC PANEL: CPT

## 2018-07-15 PROCEDURE — 96372 THER/PROPH/DIAG INJ SC/IM: CPT

## 2018-07-15 PROCEDURE — 82150 ASSAY OF AMYLASE: CPT

## 2018-07-15 PROCEDURE — 81001 URINALYSIS AUTO W/SCOPE: CPT

## 2018-07-15 PROCEDURE — 85025 COMPLETE CBC W/AUTO DIFF WBC: CPT

## 2018-07-15 PROCEDURE — 6360000002 HC RX W HCPCS: Performed by: FAMILY MEDICINE

## 2018-07-15 PROCEDURE — 87086 URINE CULTURE/COLONY COUNT: CPT

## 2018-07-15 PROCEDURE — 6370000000 HC RX 637 (ALT 250 FOR IP): Performed by: FAMILY MEDICINE

## 2018-07-15 PROCEDURE — 83690 ASSAY OF LIPASE: CPT

## 2018-07-15 PROCEDURE — 99283 EMERGENCY DEPT VISIT LOW MDM: CPT

## 2018-07-15 RX ORDER — DICYCLOMINE HYDROCHLORIDE 10 MG/ML
20 INJECTION INTRAMUSCULAR ONCE
Status: COMPLETED | OUTPATIENT
Start: 2018-07-15 | End: 2018-07-15

## 2018-07-15 RX ORDER — SULFAMETHOXAZOLE AND TRIMETHOPRIM 800; 160 MG/1; MG/1
1 TABLET ORAL ONCE
Status: COMPLETED | OUTPATIENT
Start: 2018-07-15 | End: 2018-07-15

## 2018-07-15 RX ORDER — SULFAMETHOXAZOLE AND TRIMETHOPRIM 800; 160 MG/1; MG/1
1 TABLET ORAL 2 TIMES DAILY
Qty: 20 TABLET | Refills: 0 | Status: SHIPPED | OUTPATIENT
Start: 2018-07-15 | End: 2018-07-25

## 2018-07-15 RX ORDER — POLYETHYLENE GLYCOL 3350 17 G/17G
17 POWDER, FOR SOLUTION ORAL DAILY
Qty: 510 G | Refills: 0 | Status: SHIPPED | OUTPATIENT
Start: 2018-07-15 | End: 2018-08-14

## 2018-07-15 RX ADMIN — DICYCLOMINE HYDROCHLORIDE 20 MG: 20 INJECTION, SOLUTION INTRAMUSCULAR at 21:23

## 2018-07-15 RX ADMIN — SULFAMETHOXAZOLE AND TRIMETHOPRIM 1 TABLET: 800; 160 TABLET ORAL at 21:43

## 2018-07-15 ASSESSMENT — ENCOUNTER SYMPTOMS
GASTROINTESTINAL NEGATIVE: 1
RESPIRATORY NEGATIVE: 1
EYES NEGATIVE: 1

## 2018-07-15 ASSESSMENT — PAIN DESCRIPTION - ONSET: ONSET: ON-GOING

## 2018-07-15 ASSESSMENT — PAIN SCALES - GENERAL: PAINLEVEL_OUTOF10: 10

## 2018-07-15 ASSESSMENT — PAIN DESCRIPTION - DESCRIPTORS: DESCRIPTORS: PRESSURE;SHARP

## 2018-07-15 ASSESSMENT — PAIN DESCRIPTION - FREQUENCY: FREQUENCY: CONTINUOUS

## 2018-07-16 NOTE — ED PROVIDER NOTES
[facility] ED  eMERGENCY dEPARTMENT eNCOUnter      Pt Name: Galo Ivory  MRN: [id number]  [place] 1975  Date of evaluation: 7/15/2018  Provider: Rosa Chacon MD    CHIEF COMPLAINT       Chief Complaint   Patient presents with    Abdominal Pain     suprapubic cath complications. pain in lower back. HISTORY OF PRESENT ILLNESS   (Location/Symptom, Timing/Onset, Context/Setting, Quality, Duration, Modifying Factors, Severity)  Note limiting factors. Galo Ivory is a 37 y.o. female who presents to the emergency department   Abdominal pain  Patient presented to the ER for abdominal pain diffuse. State the pain have been going on for the last 1-1/2 months since she had a suprapubic cath placed June 1, 2018. Patient already been in the ER twice had 2 CAT scans that showed no acute disease had positive UTI that she is currently on amoxicillin for. Denied fevers chills currently taking oxycodone from her primary care doctor for the pain states have irritation around the catheter site but deny fevers chills followed smelling discharge and denies any other complaint or concern            Nursing Notes were reviewed. REVIEW OF SYSTEMS    (2-9 systems for level 4, 10 or more for level 5)     Review of Systems   Constitutional: Negative. Negative for activity change, appetite change, chills, diaphoresis, fatigue, fever and unexpected weight change. HENT: Negative. Eyes: Negative. Respiratory: Negative. Apnea:      Cardiovascular: Negative. Gastrointestinal: Negative. Endocrine: Negative. Genitourinary: Negative. Musculoskeletal: Negative. Hematological: Negative. Except as noted above the remainder of the review of systems was reviewed and negative.        PAST MEDICAL HISTORY     Past Medical History:   Diagnosis Date    Asthma     Cerebral artery occlusion with cerebral infarction ([facility] [address]     Chronic back pain     Depression     Headache     Kidney stone  Seizures ([facility] [address] 5/24/2016         SURGICAL HISTORY       Past Surgical History:   Procedure Laterality Date    APPENDECTOMY      BACK SURGERY      CHOLECYSTECTOMY      HYSTERECTOMY      KNEE SURGERY Bilateral     OTHER SURGICAL HISTORY      sup/pub cath june 1, 2018         CURRENT MEDICATIONS       Previous Medications    ALPRAZOLAM (XANAX) 0.5 MG TABLET    Take 1 mg by mouth nightly. .    ARIPIPRAZOLE (ABILIFY) 15 MG TABLET    Take 15 mg by mouth daily    BUDESONIDE-FORMOTEROL (SYMBICORT) 80-4.5 MCG/ACT AERO    Inhale 2 puffs into the lungs 2 times daily as needed     EPINEPHRINE 1 MG/ML INJECTION    Inject 0.2 mg into the skin as needed    ESLICARBAZEPINE ACETATE 400 MG TABS    Take 800 mg by mouth nightly     FEXOFENADINE (ALLEGRA) 60 MG TABLET    Take 60 mg by mouth 2 times daily    HYOSCYAMINE SULFATE SL (LEVSIN/SL) 0.125 MG SUBL    Place 1 tablet under the tongue 3 times daily as needed (ab pain)    MECLIZINE (ANTIVERT) 25 MG CHEW    Take 25 mg by mouth 2 times daily as needed    NITROGLYCERIN (NITROSTAT) 0.4 MG SL TABLET    Place 0.4 mg under the tongue every 5 minutes as needed for Chest pain Dissolve 1 tablet under tongue for chest pain and repeat every 5 min up to max of 3 total doses. If no relief after 3 doses call 911    OMEPRAZOLE (PRILOSEC) 20 MG DELAYED RELEASE CAPSULE    Take 40 mg by mouth daily    ONDANSETRON (ZOFRAN ODT) 4 MG DISINTEGRATING TABLET    Take 1-2 tablets by mouth every 12 hours as needed for Nausea May Sub regular tablet (non-ODT) if insurance does not cover ODT.     PAROXETINE (PAXIL) 40 MG TABLET    Take 60 mg by mouth every morning     PROPRANOLOL (INDERAL LA) 60 MG CR CAPSULE    Take 1 capsule by mouth daily    SULFAMETHOXAZOLE-TRIMETHOPRIM (BACTRIM DS) 800-160 MG PER TABLET    Take 1 tablet by mouth 2 times daily for 10 days    TOPIRAMATE (TOPAMAX) 200 MG TABLET    Take 200 mg by mouth 2 times daily    TRAZODONE (DESYREL) 50 MG TABLET    Take 300 mg by mouth nightly ALLERGIES     Latex; Ciprofloxacin; Contrast [iodides]; Daypro [oxaprozin]; Estrogens; Iodine; Lamictal [lamotrigine]; Lyrica [pregabalin]; Macrobid [nitrofurantoin monohyd macro]; Macrolides and ketolides; Shellfish-derived products; Tape [adhesive tape]; Tegretol [carbamazepine]; Toradol [ketorolac tromethamine]; Tramadol; Vicodin [hydrocodone-acetaminophen]; and Zanaflex [tizanidine hcl]    FAMILY HISTORY       Family History   Problem Relation Age of Onset    Cancer Father     Cancer Paternal Aunt           SOCIAL HISTORY       Social History     Social History    Marital status:      Spouse name: N/A    Number of children: N/A    Years of education: N/A     Social History Main Topics    Smoking status: Never Smoker    Smokeless tobacco: Never Used    Alcohol use No    Drug use: No    Sexual activity: No     Other Topics Concern    None     Social History Narrative    None       SCREENINGS             PHYSICAL EXAM    (up to 7 for level 4, 8 or more for level 5)     ED Triage Vitals   BP Temp Temp src Pulse Resp SpO2 Height Weight   07/15/18 1959 07/15/18 1955 -- 07/15/18 1955 07/15/18 1955 07/15/18 1955 07/15/18 1955 07/15/18 1955   124/76 98.2 °F (36.8 °C)  69 19 98 % 5' 5\" (1.651 m) 244 lb (110.7 kg)       Physical Exam   Constitutional: She is oriented to person, place, and time. She appears well-developed and well-nourished. HENT:   Head: Normocephalic and atraumatic. Eyes: Conjunctivae and EOM are normal. Pupils are equal, round, and reactive to light. Neck: Normal range of motion. Neck supple. Cardiovascular: Normal rate, regular rhythm, normal heart sounds and intact distal pulses. Pulmonary/Chest: Effort normal and breath sounds normal.   Abdominal: Soft. There is generalized tenderness. Suprapubic cath in place with sutures around it small granulation tissue around it but no sign of abdominal wall cellulitis   Musculoskeletal: Normal range of motion. Management Options  Constipation, unspecified constipation type:   Postoperative pain:   Urinary tract infection associated with cystostomy catheter, initial encounter [facility]):   Diagnosis management comments: Impression presents with pain for the last one and half months since suprapubic catheter was placed. Patient have been in the ER multiple times and was seen with her primary care doctor for same complaint had 2 CAT scans that were normal.  Culture was negative twice today present for the same complaint renal function stable side of the suprapubic catheter looks irritated but no sign of abdominal wall cellulitis there is positive for leukocytes Bactrim was added to patient antibiotic and was advised to follow-up with her primary care And surgeon for post op follow-up  Patient verbalized understanding and agreed with the plan       Amount and/or Complexity of Data Reviewed  Clinical lab tests: ordered and reviewed          CONSULTS:  None    PROCEDURES:  Unless otherwise noted below, none     Procedures    FINAL IMPRESSION      1. Urinary tract infection associated with cystostomy catheter, initial encounter ([facility] [address]    2. Constipation, unspecified constipation type    3. Postoperative pain          DISPOSITION/PLAN   DISPOSITION Decision To Discharge 07/15/2018 09:38:09 PM      PATIENT REFERRED TO:  No follow-up provider specified.     DISCHARGE MEDICATIONS:  New Prescriptions    POLYETHYLENE GLYCOL (MIRALAX) POWDER    Take 17 g by mouth daily          (Please note that portions of this note were completed with a voice recognition program.  Efforts were made to edit the dictations but occasionally words are mis-transcribed.)    Courtney Gonzalez MD (electronically signed)  Attending Emergency Physician          Berenice Adams MD  07/15/18 6052       Berenice Adams MD  07/15/18 [phone]

## 2018-07-17 LAB — URINE CULTURE, ROUTINE: NORMAL

## 2018-07-25 ENCOUNTER — APPOINTMENT (OUTPATIENT)
Dept: CT IMAGING | Age: 43
End: 2018-07-25
Payer: MEDICARE

## 2018-07-25 ENCOUNTER — HOSPITAL ENCOUNTER (EMERGENCY)
Age: 43
Discharge: HOME | End: 2018-07-26
Payer: MEDICARE

## 2018-07-25 ENCOUNTER — APPOINTMENT (OUTPATIENT)
Dept: GENERAL RADIOLOGY | Age: 43
End: 2018-07-25
Payer: MEDICARE

## 2018-07-25 VITALS
TEMPERATURE: 98.7 F | HEART RATE: 72 BPM | DIASTOLIC BLOOD PRESSURE: 47 MMHG | RESPIRATION RATE: 20 BRPM | BODY MASS INDEX: 44.32 KG/M2 | HEIGHT: 65 IN | WEIGHT: 266 LBS | OXYGEN SATURATION: 96 % | SYSTOLIC BLOOD PRESSURE: 124 MMHG

## 2018-07-25 DIAGNOSIS — Z76.5 DRUG-SEEKING BEHAVIOR: ICD-10-CM

## 2018-07-25 DIAGNOSIS — R55 SYNCOPE AND COLLAPSE: Primary | ICD-10-CM

## 2018-07-25 LAB
ALBUMIN SERPL-MCNC: 3.6 G/DL (ref 3.9–4.9)
ALP BLD-CCNC: 106 U/L (ref 40–130)
ALT SERPL-CCNC: 15 U/L (ref 0–33)
AMPHETAMINE SCREEN, URINE: ABNORMAL
ANION GAP SERPL CALCULATED.3IONS-SCNC: 12 MEQ/L (ref 7–13)
AST SERPL-CCNC: 26 U/L (ref 0–35)
BARBITURATE SCREEN URINE: ABNORMAL
BASOPHILS ABSOLUTE: 0.1 K/UL (ref 0–0.2)
BASOPHILS RELATIVE PERCENT: 0.9 %
BENZODIAZEPINE SCREEN, URINE: POSITIVE
BILIRUB SERPL-MCNC: 0.3 MG/DL (ref 0–1.2)
BILIRUBIN URINE: NEGATIVE
BLOOD, URINE: NEGATIVE
BUN BLDV-MCNC: 11 MG/DL (ref 6–20)
CALCIUM SERPL-MCNC: 8.6 MG/DL (ref 8.6–10.2)
CANNABINOID SCREEN URINE: ABNORMAL
CHLORIDE BLD-SCNC: 99 MEQ/L (ref 98–107)
CLARITY: CLEAR
CO2: 22 MEQ/L (ref 22–29)
COCAINE METABOLITE SCREEN URINE: ABNORMAL
COLOR: YELLOW
CREAT SERPL-MCNC: 0.91 MG/DL (ref 0.5–0.9)
EKG ATRIAL RATE: 72 BPM
EKG P AXIS: 45 DEGREES
EKG P-R INTERVAL: 200 MS
EKG Q-T INTERVAL: 398 MS
EKG QRS DURATION: 96 MS
EKG QTC CALCULATION (BAZETT): 435 MS
EKG R AXIS: 83 DEGREES
EKG T AXIS: 103 DEGREES
EKG VENTRICULAR RATE: 72 BPM
EOSINOPHILS ABSOLUTE: 0.2 K/UL (ref 0–0.7)
EOSINOPHILS RELATIVE PERCENT: 3.2 %
EPITHELIAL CELLS, UA: ABNORMAL /HPF
GFR AFRICAN AMERICAN: >60
GFR NON-AFRICAN AMERICAN: >60
GLOBULIN: 3.2 G/DL (ref 2.3–3.5)
GLUCOSE BLD-MCNC: 96 MG/DL (ref 74–109)
GLUCOSE URINE: NEGATIVE MG/DL
HCT VFR BLD CALC: 35.1 % (ref 37–47)
HEMOGLOBIN: 12.1 G/DL (ref 12–16)
KETONES, URINE: NEGATIVE MG/DL
LEUKOCYTE ESTERASE, URINE: ABNORMAL
LYMPHOCYTES ABSOLUTE: 1.2 K/UL (ref 1–4.8)
LYMPHOCYTES RELATIVE PERCENT: 17.7 %
Lab: ABNORMAL
MCH RBC QN AUTO: 29.2 PG (ref 27–31.3)
MCHC RBC AUTO-ENTMCNC: 34.5 % (ref 33–37)
MCV RBC AUTO: 84.5 FL (ref 82–100)
MONOCYTES ABSOLUTE: 0.3 K/UL (ref 0.2–0.8)
MONOCYTES RELATIVE PERCENT: 4.6 %
NEUTROPHILS ABSOLUTE: 4.8 K/UL (ref 1.4–6.5)
NEUTROPHILS RELATIVE PERCENT: 73.6 %
NITRITE, URINE: NEGATIVE
OPIATE SCREEN URINE: ABNORMAL
PDW BLD-RTO: 15 % (ref 11.5–14.5)
PH UA: 7 (ref 5–9)
PHENCYCLIDINE SCREEN URINE: ABNORMAL
PLATELET # BLD: 241 K/UL (ref 130–400)
POTASSIUM SERPL-SCNC: 5.1 MEQ/L (ref 3.5–5.1)
PROTEIN UA: NEGATIVE MG/DL
RBC # BLD: 4.16 M/UL (ref 4.2–5.4)
RBC UA: ABNORMAL /HPF (ref 0–2)
SODIUM BLD-SCNC: 133 MEQ/L (ref 132–144)
SPECIFIC GRAVITY UA: 1.01 (ref 1–1.03)
TOTAL PROTEIN: 6.8 G/DL (ref 6.4–8.1)
TROPONIN: <0.01 NG/ML (ref 0–0.01)
URINE REFLEX TO CULTURE: YES
UROBILINOGEN, URINE: 0.2 E.U./DL
WBC # BLD: 6.6 K/UL (ref 4.8–10.8)
WBC UA: ABNORMAL /HPF (ref 0–5)

## 2018-07-25 PROCEDURE — 93005 ELECTROCARDIOGRAM TRACING: CPT

## 2018-07-25 PROCEDURE — 87086 URINE CULTURE/COLONY COUNT: CPT

## 2018-07-25 PROCEDURE — 80307 DRUG TEST PRSMV CHEM ANLYZR: CPT

## 2018-07-25 PROCEDURE — 84484 ASSAY OF TROPONIN QUANT: CPT

## 2018-07-25 PROCEDURE — 70450 CT HEAD/BRAIN W/O DYE: CPT

## 2018-07-25 PROCEDURE — 81001 URINALYSIS AUTO W/SCOPE: CPT

## 2018-07-25 PROCEDURE — 2580000003 HC RX 258: Performed by: NURSE PRACTITIONER

## 2018-07-25 PROCEDURE — 80053 COMPREHEN METABOLIC PANEL: CPT

## 2018-07-25 PROCEDURE — 85025 COMPLETE CBC W/AUTO DIFF WBC: CPT

## 2018-07-25 PROCEDURE — 71046 X-RAY EXAM CHEST 2 VIEWS: CPT

## 2018-07-25 PROCEDURE — 99285 EMERGENCY DEPT VISIT HI MDM: CPT

## 2018-07-25 PROCEDURE — 36415 COLL VENOUS BLD VENIPUNCTURE: CPT

## 2018-07-25 RX ORDER — 0.9 % SODIUM CHLORIDE 0.9 %
1000 INTRAVENOUS SOLUTION INTRAVENOUS ONCE
Status: COMPLETED | OUTPATIENT
Start: 2018-07-25 | End: 2018-07-26

## 2018-07-25 RX ADMIN — SODIUM CHLORIDE 1000 ML: 9 INJECTION, SOLUTION INTRAVENOUS at 22:25

## 2018-07-25 ASSESSMENT — ENCOUNTER SYMPTOMS
BACK PAIN: 0
NAUSEA: 0
COLOR CHANGE: 0
SHORTNESS OF BREATH: 0
COUGH: 0
CONSTIPATION: 0
TROUBLE SWALLOWING: 0
VOMITING: 0
ABDOMINAL PAIN: 0
SORE THROAT: 0
DIARRHEA: 0
VOICE CHANGE: 0

## 2018-07-25 ASSESSMENT — PAIN DESCRIPTION - ORIENTATION: ORIENTATION: LOWER;MID

## 2018-07-25 ASSESSMENT — PAIN DESCRIPTION - LOCATION: LOCATION: ABDOMEN

## 2018-07-25 ASSESSMENT — PAIN DESCRIPTION - PAIN TYPE: TYPE: CHRONIC PAIN

## 2018-07-25 ASSESSMENT — PAIN SCALES - GENERAL: PAINLEVEL_OUTOF10: 10

## 2018-07-26 PROCEDURE — 93010 ELECTROCARDIOGRAM REPORT: CPT | Performed by: INTERNAL MEDICINE

## 2018-07-26 PROCEDURE — 96374 THER/PROPH/DIAG INJ IV PUSH: CPT

## 2018-07-26 PROCEDURE — 6360000002 HC RX W HCPCS: Performed by: NURSE PRACTITIONER

## 2018-07-26 RX ORDER — ONDANSETRON 2 MG/ML
4 INJECTION INTRAMUSCULAR; INTRAVENOUS ONCE
Status: COMPLETED | OUTPATIENT
Start: 2018-07-26 | End: 2018-07-26

## 2018-07-26 RX ADMIN — ONDANSETRON HYDROCHLORIDE 4 MG: 2 INJECTION, SOLUTION INTRAMUSCULAR; INTRAVENOUS at 00:27

## 2018-07-26 NOTE — ED TRIAGE NOTES
Pt to Ed with c/o syncopal episode at home. Pt states she was on the couch and went to get up and doesn't remember what happened. Pt has a hx of seizures. Episode witnessed by  per pt. Pt states she hit her head on the floor. Pt c/o blurred vision that she has been having for hours. Pt able to move extremities x4. Speech clear, tongue midline. Pt has right leg weakness from prior CVA.

## 2018-07-26 NOTE — ED NOTES
Bed: 19  Expected date: 7/25/18  Expected time: 9:24 PM  Means of arrival:   Comments:  36 yo f syncopal episode, A/O x 4,      Tomas Olson, RN  07/25/18 2129

## 2018-07-26 NOTE — ED PROVIDER NOTES
time again the patient requested pain medication and I explained to her that she would not be getting a prescription for pain medication and reviewed her with her that she just received a prescription for 120 oxycodone. The patient did not appear to be happy with me that I would not give her any pain medication in the ER or any for home. CRITICAL CARE TIME     Total Critical Care time (not applicable if blank)      Total minutes, excluding separately reportable procedures. There was a high probability of clinically significant/life threatening deterioration in the patient's condition which required my urgent intervention. This includes discussing the case with consultants, reviewing laboratory studies and images independently, arranging disposition, and speaking with patient/family    CONSULTS:  None    PROCEDURES:  Unless otherwise noted below, none     Procedures    FINAL IMPRESSION      1. Syncope and collapse    2. Drug-seeking behavior          DISPOSITION/PLAN   DISPOSITION Decision To Discharge 07/26/2018 12:02:30 AM      PATIENT REFERRED TO:  Yolis Joseph Alert  [phone]    Schedule an appointment as soon as possible for a visit in 1 day        DISCHARGE MEDICATIONS:  Discharge Medication List as of 7/26/2018 12:02 AM        Attestation: The Prescription Monitoring Report for this patient was reviewed today. (Michael Nicholas, SILKE - CNP)  Documentation: Possible medication side effects, risk of tolerance/dependence & alternative treatments discussed. , Potential drug abuse or diversion identified, see note documentation. (SILKE Esquivel - CNP)    (Please note that portions of this note were completed with a voice recognition program.  Efforts were made to edit the dictations but occasionally words and phrases are mis-transcribed.)    SILKE Esquivel CNP  (electronically signed)                 SILKE Esquivel -

## 2018-07-27 LAB — URINE CULTURE, ROUTINE: NORMAL

## 2018-08-02 ENCOUNTER — HOSPITAL ENCOUNTER (EMERGENCY)
Age: 43
Discharge: HOME | End: 2018-08-02
Attending: EMERGENCY MEDICINE
Payer: MEDICARE

## 2018-08-02 VITALS
BODY MASS INDEX: 46.07 KG/M2 | HEART RATE: 87 BPM | WEIGHT: 244 LBS | OXYGEN SATURATION: 98 % | DIASTOLIC BLOOD PRESSURE: 87 MMHG | SYSTOLIC BLOOD PRESSURE: 124 MMHG | RESPIRATION RATE: 20 BRPM | HEIGHT: 61 IN | TEMPERATURE: 97.4 F

## 2018-08-02 PROCEDURE — 6370000000 HC RX 637 (ALT 250 FOR IP): Performed by: EMERGENCY MEDICINE

## 2018-08-02 PROCEDURE — 6360000002 HC RX W HCPCS: Performed by: EMERGENCY MEDICINE

## 2018-08-02 PROCEDURE — 99282 EMERGENCY DEPT VISIT SF MDM: CPT

## 2018-08-02 RX ORDER — DIPHENHYDRAMINE HCL 25 MG
25 TABLET ORAL ONCE
Status: COMPLETED | OUTPATIENT
Start: 2018-08-02 | End: 2018-08-02

## 2018-08-02 RX ORDER — METOCLOPRAMIDE 10 MG/1
10 TABLET ORAL 3 TIMES DAILY PRN
Qty: 15 TABLET | Refills: 0 | Status: SHIPPED | OUTPATIENT
Start: 2018-08-02 | End: 2018-09-03

## 2018-08-02 RX ORDER — DEXAMETHASONE SODIUM PHOSPHATE 10 MG/ML
10 INJECTION INTRAMUSCULAR; INTRAVENOUS ONCE
Status: COMPLETED | OUTPATIENT
Start: 2018-08-02 | End: 2018-08-02

## 2018-08-02 RX ORDER — PROCHLORPERAZINE MALEATE 10 MG
10 TABLET ORAL ONCE
Status: COMPLETED | OUTPATIENT
Start: 2018-08-02 | End: 2018-08-02

## 2018-08-02 RX ADMIN — PROCHLORPERAZINE MALEATE 10 MG: 10 TABLET, FILM COATED ORAL at 01:39

## 2018-08-02 RX ADMIN — DIPHENHYDRAMINE HCL 25 MG: 25 TABLET ORAL at 01:39

## 2018-08-02 RX ADMIN — DEXAMETHASONE SODIUM PHOSPHATE 10 MG: 10 INJECTION INTRAMUSCULAR; INTRAVENOUS at 01:39

## 2018-08-02 ASSESSMENT — PAIN DESCRIPTION - FREQUENCY: FREQUENCY: CONTINUOUS

## 2018-08-02 ASSESSMENT — PAIN DESCRIPTION - PAIN TYPE: TYPE: ACUTE PAIN

## 2018-08-02 ASSESSMENT — PAIN DESCRIPTION - DESCRIPTORS: DESCRIPTORS: THROBBING

## 2018-08-02 ASSESSMENT — ENCOUNTER SYMPTOMS
COUGH: 0
SHORTNESS OF BREATH: 0
VOMITING: 0

## 2018-08-02 ASSESSMENT — PAIN SCALES - GENERAL: PAINLEVEL_OUTOF10: 10

## 2018-08-02 ASSESSMENT — PAIN DESCRIPTION - LOCATION: LOCATION: HEAD

## 2018-08-02 NOTE — ED NOTES
Patient's discharge instructions reviewed. Verbalized understanding. Asked to speak to [address]  about her care. [address] RN notified. Kamla Khan RN  08/02/18 0937

## 2018-08-02 NOTE — ED PROVIDER NOTES
by mouth 2 times daily    TRAZODONE (DESYREL) 50 MG TABLET    Take 300 mg by mouth nightly        ALLERGIES     Latex; Ciprofloxacin; Contrast [iodides]; Daypro [oxaprozin]; Estrogens; Iodine; Lamictal [lamotrigine]; Lyrica [pregabalin]; Macrobid [nitrofurantoin monohyd macro]; Macrolides and ketolides; Shellfish-derived products; Tape [adhesive tape]; Tegretol [carbamazepine]; Toradol [ketorolac tromethamine]; Tramadol; Vicodin [hydrocodone-acetaminophen]; and Zanaflex [tizanidine hcl]    FAMILY HISTORY       Family History   Problem Relation Age of Onset    Cancer Father     Cancer Paternal Aunt           SOCIAL HISTORY       Social History     Social History    Marital status:      Spouse name: N/A    Number of children: N/A    Years of education: N/A     Social History Main Topics    Smoking status: Never Smoker    Smokeless tobacco: Never Used    Alcohol use No    Drug use: No    Sexual activity: No     Other Topics Concern    None     Social History Narrative    None       SCREENINGS             PHYSICAL EXAM    (up to 7 for level 4, 8 or more for level 5)     ED Triage Vitals [08/02/18 0056]   BP Temp Temp src Pulse Resp SpO2 Height Weight   124/87 97.4 °F (36.3 °C) -- 87 20 98 % 5' 1\" (1.549 m) 244 lb (110.7 kg)       Physical Exam   Constitutional: She appears well-developed and well-nourished. No distress. HENT:   Head: Normocephalic and atraumatic. Mouth/Throat: Oropharynx is clear and moist.   No mathew sign, no raccoon eye, no palpable scalp hematoma or skull fracture   Eyes: Conjunctivae are normal.   Pupils are equally round and reacting normally. Extraoccular muscles are grossly intact. Neck: Normal range of motion. No tracheal deviation present. Cardiovascular: Normal rate, regular rhythm, normal heart sounds and intact distal pulses. Exam reveals no friction rub. No murmur heard. Pulmonary/Chest: Effort normal and breath sounds normal. No stridor.  No respiratory visits to the emergency department for headaches, her recent CT scan and her presentation today I don't think any neuro imaging is indicated. I am going to give her Benadryl and Reglan by mouth, I will also give her some Decadron to try to prevent recurrence. I will discharge her with a prescription for Reglan and Benadryl. Standard anticipatory guidance given to patient upon discharge. Have given them a specific time frame in which to follow-up and who to follow-up with. I have also advised them that they should return to the emergency department if they get worse, or not getting better or develop any new or concerning symptoms. Patient demonstrates understanding. No orders to display              CONSULTS:  None    PROCEDURES:  Unless otherwise noted below, none     Procedures    FINAL IMPRESSION      1.  Chronic migraine          DISPOSITION/PLAN   DISPOSITION Decision To Discharge 08/02/2018 01:15:29 AM      PATIENT REFERRED TO:  Ashu Avila MD  [facility] [address] [phone]    In 3 days        DISCHARGE MEDICATIONS:  New Prescriptions    METOCLOPRAMIDE (REGLAN) 10 MG TABLET    Take 1 tablet by mouth 3 times daily as needed (headache) Take a 25mg benadryl with this medication          (Please note that portions of this note were completed with a voice recognition program.  Efforts were made to edit the dictations but occasionally words are mis-transcribed.)    Lyla Jaime DO (electronically signed)  Attending Emergency Physician          Lyla Jaime DO  08/02/18 0120

## 2018-08-16 ENCOUNTER — HOSPITAL ENCOUNTER (EMERGENCY)
Age: 43
Discharge: HOME | End: 2018-08-16
Attending: STUDENT IN AN ORGANIZED HEALTH CARE EDUCATION/TRAINING PROGRAM
Payer: MEDICARE

## 2018-08-16 ENCOUNTER — APPOINTMENT (OUTPATIENT)
Dept: GENERAL RADIOLOGY | Age: 43
End: 2018-08-16
Payer: MEDICARE

## 2018-08-16 VITALS
OXYGEN SATURATION: 97 % | BODY MASS INDEX: 37.49 KG/M2 | DIASTOLIC BLOOD PRESSURE: 65 MMHG | TEMPERATURE: 97.3 F | HEIGHT: 65 IN | SYSTOLIC BLOOD PRESSURE: 106 MMHG | HEART RATE: 60 BPM | WEIGHT: 225 LBS | RESPIRATION RATE: 14 BRPM

## 2018-08-16 DIAGNOSIS — R07.9 CHEST PAIN, UNSPECIFIED TYPE: Primary | ICD-10-CM

## 2018-08-16 DIAGNOSIS — G89.29 OTHER CHRONIC PAIN: ICD-10-CM

## 2018-08-16 DIAGNOSIS — E66.9 OBESITY, UNSPECIFIED CLASSIFICATION, UNSPECIFIED OBESITY TYPE, UNSPECIFIED WHETHER SERIOUS COMORBIDITY PRESENT: ICD-10-CM

## 2018-08-16 LAB
ALBUMIN SERPL-MCNC: 3.9 G/DL (ref 3.9–4.9)
ALP BLD-CCNC: 126 U/L (ref 40–130)
ALT SERPL-CCNC: 15 U/L (ref 0–33)
ANION GAP SERPL CALCULATED.3IONS-SCNC: 15 MEQ/L (ref 7–13)
APTT: 30.4 SEC (ref 21.6–35.4)
AST SERPL-CCNC: 23 U/L (ref 0–35)
BASOPHILS ABSOLUTE: 0.1 K/UL (ref 0–0.2)
BASOPHILS RELATIVE PERCENT: 0.8 %
BILIRUB SERPL-MCNC: 0.3 MG/DL (ref 0–1.2)
BUN BLDV-MCNC: 13 MG/DL (ref 6–20)
CALCIUM SERPL-MCNC: 8.9 MG/DL (ref 8.6–10.2)
CHLORIDE BLD-SCNC: 96 MEQ/L (ref 98–107)
CO2: 22 MEQ/L (ref 22–29)
CREAT SERPL-MCNC: 0.96 MG/DL (ref 0.5–0.9)
EKG ATRIAL RATE: 64 BPM
EKG ATRIAL RATE: 65 BPM
EKG P AXIS: 22 DEGREES
EKG P AXIS: 34 DEGREES
EKG P-R INTERVAL: 200 MS
EKG P-R INTERVAL: 202 MS
EKG Q-T INTERVAL: 430 MS
EKG Q-T INTERVAL: 438 MS
EKG QRS DURATION: 92 MS
EKG QRS DURATION: 98 MS
EKG QTC CALCULATION (BAZETT): 443 MS
EKG QTC CALCULATION (BAZETT): 455 MS
EKG R AXIS: 65 DEGREES
EKG R AXIS: 66 DEGREES
EKG T AXIS: 84 DEGREES
EKG T AXIS: 85 DEGREES
EKG VENTRICULAR RATE: 64 BPM
EKG VENTRICULAR RATE: 65 BPM
EOSINOPHILS ABSOLUTE: 0.2 K/UL (ref 0–0.7)
EOSINOPHILS RELATIVE PERCENT: 3.2 %
GFR AFRICAN AMERICAN: >60
GFR NON-AFRICAN AMERICAN: >60
GLOBULIN: 3 G/DL (ref 2.3–3.5)
GLUCOSE BLD-MCNC: 76 MG/DL (ref 74–109)
HCT VFR BLD CALC: 36.7 % (ref 37–47)
HEMOGLOBIN: 12.6 G/DL (ref 12–16)
INR BLD: 1
LYMPHOCYTES ABSOLUTE: 1.7 K/UL (ref 1–4.8)
LYMPHOCYTES RELATIVE PERCENT: 24.5 %
MCH RBC QN AUTO: 28.9 PG (ref 27–31.3)
MCHC RBC AUTO-ENTMCNC: 34.3 % (ref 33–37)
MCV RBC AUTO: 84.3 FL (ref 82–100)
MONOCYTES ABSOLUTE: 0.4 K/UL (ref 0.2–0.8)
MONOCYTES RELATIVE PERCENT: 6.5 %
NEUTROPHILS ABSOLUTE: 4.5 K/UL (ref 1.4–6.5)
NEUTROPHILS RELATIVE PERCENT: 65 %
PDW BLD-RTO: 14.8 % (ref 11.5–14.5)
PLATELET # BLD: 291 K/UL (ref 130–400)
POTASSIUM SERPL-SCNC: 3.9 MEQ/L (ref 3.5–5.1)
PROTHROMBIN TIME: 10.7 SEC (ref 9.6–12.3)
RBC # BLD: 4.35 M/UL (ref 4.2–5.4)
SODIUM BLD-SCNC: 133 MEQ/L (ref 132–144)
TOTAL CK: 143 U/L (ref 0–170)
TOTAL PROTEIN: 6.9 G/DL (ref 6.4–8.1)
TROPONIN: <0.01 NG/ML (ref 0–0.01)
TROPONIN: <0.01 NG/ML (ref 0–0.01)
WBC # BLD: 6.9 K/UL (ref 4.8–10.8)

## 2018-08-16 PROCEDURE — 36415 COLL VENOUS BLD VENIPUNCTURE: CPT

## 2018-08-16 PROCEDURE — 6360000002 HC RX W HCPCS: Performed by: STUDENT IN AN ORGANIZED HEALTH CARE EDUCATION/TRAINING PROGRAM

## 2018-08-16 PROCEDURE — 84484 ASSAY OF TROPONIN QUANT: CPT

## 2018-08-16 PROCEDURE — 6370000000 HC RX 637 (ALT 250 FOR IP): Performed by: STUDENT IN AN ORGANIZED HEALTH CARE EDUCATION/TRAINING PROGRAM

## 2018-08-16 PROCEDURE — 85025 COMPLETE CBC W/AUTO DIFF WBC: CPT

## 2018-08-16 PROCEDURE — 85730 THROMBOPLASTIN TIME PARTIAL: CPT

## 2018-08-16 PROCEDURE — 82550 ASSAY OF CK (CPK): CPT

## 2018-08-16 PROCEDURE — 96372 THER/PROPH/DIAG INJ SC/IM: CPT

## 2018-08-16 PROCEDURE — 80053 COMPREHEN METABOLIC PANEL: CPT

## 2018-08-16 PROCEDURE — 93005 ELECTROCARDIOGRAM TRACING: CPT

## 2018-08-16 PROCEDURE — 71045 X-RAY EXAM CHEST 1 VIEW: CPT

## 2018-08-16 PROCEDURE — 85610 PROTHROMBIN TIME: CPT

## 2018-08-16 PROCEDURE — 99285 EMERGENCY DEPT VISIT HI MDM: CPT

## 2018-08-16 RX ORDER — ORPHENADRINE CITRATE 30 MG/ML
60 INJECTION INTRAMUSCULAR; INTRAVENOUS ONCE
Status: COMPLETED | OUTPATIENT
Start: 2018-08-16 | End: 2018-08-16

## 2018-08-16 RX ORDER — CYCLOBENZAPRINE HCL 10 MG
10 TABLET ORAL 3 TIMES DAILY PRN
Qty: 9 TABLET | Refills: 0 | Status: SHIPPED | OUTPATIENT
Start: 2018-08-16 | End: 2018-08-26

## 2018-08-16 RX ORDER — CYCLOBENZAPRINE HCL 10 MG
10 TABLET ORAL ONCE
Status: COMPLETED | OUTPATIENT
Start: 2018-08-16 | End: 2018-08-16

## 2018-08-16 RX ORDER — ACETAMINOPHEN 500 MG
1000 TABLET ORAL ONCE
Status: DISCONTINUED | OUTPATIENT
Start: 2018-08-16 | End: 2018-08-16 | Stop reason: HOSPADM

## 2018-08-16 RX ORDER — ASPIRIN 81 MG/1
162 TABLET, CHEWABLE ORAL ONCE
Status: COMPLETED | OUTPATIENT
Start: 2018-08-16 | End: 2018-08-16

## 2018-08-16 RX ADMIN — ASPIRIN 81 MG 162 MG: 81 TABLET ORAL at 17:25

## 2018-08-16 RX ADMIN — ORPHENADRINE CITRATE 60 MG: 30 INJECTION INTRAMUSCULAR; INTRAVENOUS at 17:25

## 2018-08-16 RX ADMIN — CYCLOBENZAPRINE HYDROCHLORIDE 10 MG: 10 TABLET, FILM COATED ORAL at 20:53

## 2018-08-16 ASSESSMENT — ENCOUNTER SYMPTOMS
DIARRHEA: 0
SINUS PRESSURE: 0
TROUBLE SWALLOWING: 0
COUGH: 0
ABDOMINAL PAIN: 0
BACK PAIN: 0
CHEST TIGHTNESS: 0
SHORTNESS OF BREATH: 0
VOMITING: 0

## 2018-08-16 ASSESSMENT — PAIN SCALES - GENERAL
PAINLEVEL_OUTOF10: 10
PAINLEVEL_OUTOF10: 10

## 2018-08-16 ASSESSMENT — PAIN DESCRIPTION - LOCATION: LOCATION: CHEST

## 2018-08-16 ASSESSMENT — HEART SCORE: ECG: 1

## 2018-08-16 NOTE — ED TRIAGE NOTES
Pt c/o chest pain since this morning. Home health nurse gave her 1 nitro but her  Blood pressure then dropped. Pt took 2 asa at home and 2 given by squad. Pt was seen at [place] recently for chest pain and told she was having heart burn.  Skin warm dry color wnl

## 2018-08-16 NOTE — ED NOTES
Monitor alarm sounded off showing vfib/vtach  Went in room check on patient and asked if she was ok rubi said she was fine  Had no ppain  Monitor now shows normal sinus rythim   Dr Rickey Bazan notifed  repaet ekg performed and I printed off monitor strip for dr cat  He believes its artifact and dr also reviewd ecg      Teddy Dial RN  08/16/18 1946

## 2018-08-16 NOTE — ED PROVIDER NOTES
back pain     Depression     Headache     Kidney stone     Seizures ([facility] [address] 5/24/2016         SURGICAL HISTORY       Past Surgical History:   Procedure Laterality Date    APPENDECTOMY      BACK SURGERY      CHOLECYSTECTOMY      HYSTERECTOMY      KNEE SURGERY Bilateral     OTHER SURGICAL HISTORY      sup/pub cath june 1, 2018         CURRENT MEDICATIONS       Previous Medications    ALPRAZOLAM (XANAX) 0.5 MG TABLET    Take 1 mg by mouth nightly. .    ARIPIPRAZOLE (ABILIFY) 15 MG TABLET    Take 15 mg by mouth daily    BUDESONIDE-FORMOTEROL (SYMBICORT) 80-4.5 MCG/ACT AERO    Inhale 2 puffs into the lungs 2 times daily as needed     EPINEPHRINE 1 MG/ML INJECTION    Inject 0.2 mg into the skin as needed    ESLICARBAZEPINE ACETATE 400 MG TABS    Take 800 mg by mouth nightly     FEXOFENADINE (ALLEGRA) 60 MG TABLET    Take 60 mg by mouth 2 times daily    HYOSCYAMINE SULFATE SL (LEVSIN/SL) 0.125 MG SUBL    Place 1 tablet under the tongue 3 times daily as needed (ab pain)    MECLIZINE (ANTIVERT) 25 MG CHEW    Take 25 mg by mouth 2 times daily as needed    METOCLOPRAMIDE (REGLAN) 10 MG TABLET    Take 1 tablet by mouth 3 times daily as needed (headache) Take a 25mg benadryl with this medication    NITROGLYCERIN (NITROSTAT) 0.4 MG SL TABLET    Place 0.4 mg under the tongue every 5 minutes as needed for Chest pain Dissolve 1 tablet under tongue for chest pain and repeat every 5 min up to max of 3 total doses. If no relief after 3 doses call 911    OMEPRAZOLE (PRILOSEC) 20 MG DELAYED RELEASE CAPSULE    Take 40 mg by mouth daily    ONDANSETRON (ZOFRAN ODT) 4 MG DISINTEGRATING TABLET    Take 1-2 tablets by mouth every 12 hours as needed for Nausea May Sub regular tablet (non-ODT) if insurance does not cover ODT.     PAROXETINE (PAXIL) 40 MG TABLET    Take 60 mg by mouth every morning     PROPRANOLOL (INDERAL LA) 60 MG CR CAPSULE    Take 1 capsule by mouth daily    TOPIRAMATE (TOPAMAX) 200 MG TABLET    Take 200 mg by mouth 2 times daily    TRAZODONE (DESYREL) 50 MG TABLET    Take 300 mg by mouth nightly        ALLERGIES     Latex; Ciprofloxacin; Contrast [iodides]; Daypro [oxaprozin]; Estrogens; Iodine; Lamictal [lamotrigine]; Lyrica [pregabalin]; Macrobid [nitrofurantoin monohyd macro]; Macrolides and ketolides; Shellfish-derived products; Tape [adhesive tape]; Tegretol [carbamazepine]; Toradol [ketorolac tromethamine]; Tramadol; Tylenol [acetaminophen]; Vicodin [hydrocodone-acetaminophen]; and Zanaflex [tizanidine hcl]    FAMILY HISTORY       Family History   Problem Relation Age of Onset    Cancer Father     Cancer Paternal Aunt           SOCIAL HISTORY       Social History     Social History    Marital status:      Spouse name: N/A    Number of children: N/A    Years of education: N/A     Social History Main Topics    Smoking status: Never Smoker    Smokeless tobacco: Never Used    Alcohol use No    Drug use: No    Sexual activity: No     Other Topics Concern    None     Social History Narrative    None       SCREENINGS      Heart Score for chest pain patients  History: Moderately Suspicious  ECG: Non-Specifc repolarization disturbance/LBTB/PM  Patient Age: < 45 years  *Risk factors for Atherosclerotic disease: Obesity  Risk Factors: 1 or 2 risk factors  Troponin: < 1X normal limit  Heart Score Total: 3      PHYSICAL EXAM    (up to 7 for level 4, 8 or more for level 5)     ED Triage Vitals [08/16/18 1631]   BP Temp Temp src Pulse Resp SpO2 Height Weight   119/71 -- -- 64 20 96 % 5' 5\" (1.651 m) 225 lb (102.1 kg)       Physical Exam   Constitutional: She is oriented to person, place, and time. She appears well-developed and well-nourished. No distress. HENT:   Head: Normocephalic and atraumatic. Head is without Hutchison's sign. Right Ear: External ear normal.   Left Ear: External ear normal.   Nose: Nose normal.   Mouth/Throat: Oropharynx is clear and moist. No oropharyngeal exudate.    Eyes: Pupils are equal, round, and reactive to light. Conjunctivae and EOM are normal. Right eye exhibits no discharge. No foreign body present in the right eye. Left eye exhibits no discharge and no exudate. No scleral icterus. Neck: Normal range of motion. Neck supple. No JVD present. No neck rigidity. No tracheal deviation present. No thyromegaly present. Cardiovascular: Normal rate, regular rhythm, normal heart sounds and intact distal pulses. Exam reveals no gallop, no distant heart sounds and no friction rub. No murmur heard. Pulmonary/Chest: Effort normal and breath sounds normal. No stridor. No respiratory distress. She has no wheezes. She has no rales. She exhibits bony tenderness. She exhibits no tenderness. Abdominal: Soft. Bowel sounds are normal. She exhibits no distension, no pulsatile liver, no ascites and no mass. There is no hepatosplenomegaly. There is no tenderness. There is no rebound and no guarding. Musculoskeletal: Normal range of motion. She exhibits no edema or tenderness. No calf tenderness to palpation. Lymphadenopathy:        Head (right side): No submental adenopathy present. Head (left side): No submental adenopathy present. She has no cervical adenopathy. Neurological: She is alert and oriented to person, place, and time. She has normal reflexes. No cranial nerve deficit. Coordination normal.   Skin: Skin is warm and dry. No rash noted. She is not diaphoretic. No erythema. No pallor. Psychiatric: She has a normal mood and affect. Her behavior is normal. Judgment and thought content normal.   Nursing note and vitals reviewed. DIAGNOSTIC RESULTS     EKG: All EKG's are interpreted by the Emergency Department Physician who either signs or Co-signs this chart in the absence of a cardiologist.    EKG: Normal sinus rhythm at 64 bpm, there are Q waves in III and aVF, there is T-wave inversion in leads V1, V2, V3 and V4. There is T-wave flattening in V5.   This EKG was

## 2018-08-16 NOTE — ED NOTES
Bed: 26  Expected date: 8/16/18  Expected time: 4:00 PM  Means of arrival: [facility]  Comments:  39 F - CP 68,133/75,99% 2l. NTG x2. ASA x4.      José Davidson RN  08/16/18 0031

## 2018-08-17 PROCEDURE — 93010 ELECTROCARDIOGRAM REPORT: CPT | Performed by: INTERNAL MEDICINE

## 2018-08-17 NOTE — ED NOTES
Pt was very pleasant as I went over dispo and thank me for her care      Teddy Erwin, LEANA  08/16/18 2100

## 2018-08-19 ASSESSMENT — ENCOUNTER SYMPTOMS
VOMITING: 0
DIARRHEA: 0
APNEA: 0
ALLERGIC/IMMUNOLOGIC NEGATIVE: 1
BACK PAIN: 1
EYE PAIN: 0
ABDOMINAL PAIN: 1
COLOR CHANGE: 0
TROUBLE SWALLOWING: 0
SHORTNESS OF BREATH: 0

## 2018-08-20 PROCEDURE — 93010 ELECTROCARDIOGRAM REPORT: CPT | Performed by: INTERNAL MEDICINE

## 2018-09-03 ENCOUNTER — APPOINTMENT (OUTPATIENT)
Dept: CT IMAGING | Age: 43
End: 2018-09-03
Payer: MEDICARE

## 2018-09-03 ENCOUNTER — HOSPITAL ENCOUNTER (EMERGENCY)
Age: 43
Discharge: HOME | End: 2018-09-04
Payer: MEDICARE

## 2018-09-03 DIAGNOSIS — N39.0 URINARY TRACT INFECTION ASSOCIATED WITH CATHETERIZATION OF URINARY TRACT, UNSPECIFIED INDWELLING URINARY CATHETER TYPE, INITIAL ENCOUNTER (HCC): ICD-10-CM

## 2018-09-03 DIAGNOSIS — R56.9 SEIZURE (HCC): Primary | ICD-10-CM

## 2018-09-03 DIAGNOSIS — T83.511A URINARY TRACT INFECTION ASSOCIATED WITH CATHETERIZATION OF URINARY TRACT, UNSPECIFIED INDWELLING URINARY CATHETER TYPE, INITIAL ENCOUNTER (HCC): ICD-10-CM

## 2018-09-03 LAB
ALBUMIN SERPL-MCNC: 3.8 G/DL (ref 3.9–4.9)
ALP BLD-CCNC: 88 U/L (ref 40–130)
ALT SERPL-CCNC: 7 U/L (ref 0–33)
ANION GAP SERPL CALCULATED.3IONS-SCNC: 12 MEQ/L (ref 7–13)
AST SERPL-CCNC: 16 U/L (ref 0–35)
BACTERIA: ABNORMAL /HPF
BILIRUB SERPL-MCNC: <0.2 MG/DL (ref 0–1.2)
BILIRUBIN URINE: ABNORMAL
BLOOD, URINE: ABNORMAL
BUN BLDV-MCNC: 10 MG/DL (ref 6–20)
CALCIUM SERPL-MCNC: 8.6 MG/DL (ref 8.6–10.2)
CHLORIDE BLD-SCNC: 95 MEQ/L (ref 98–107)
CLARITY: ABNORMAL
CO2: 24 MEQ/L (ref 22–29)
COLOR: ABNORMAL
CREAT SERPL-MCNC: 1 MG/DL (ref 0.5–0.9)
EKG ATRIAL RATE: 75 BPM
EKG P AXIS: 54 DEGREES
EKG P-R INTERVAL: 222 MS
EKG Q-T INTERVAL: 416 MS
EKG QRS DURATION: 94 MS
EKG QTC CALCULATION (BAZETT): 464 MS
EKG R AXIS: 72 DEGREES
EKG T AXIS: 89 DEGREES
EKG VENTRICULAR RATE: 75 BPM
EPITHELIAL CELLS, UA: ABNORMAL /HPF
GFR AFRICAN AMERICAN: >60
GFR NON-AFRICAN AMERICAN: >60
GLOBULIN: 2.7 G/DL (ref 2.3–3.5)
GLUCOSE BLD-MCNC: 109 MG/DL (ref 74–109)
GLUCOSE URINE: NEGATIVE MG/DL
KETONES, URINE: NEGATIVE MG/DL
LEUKOCYTE ESTERASE, URINE: ABNORMAL
MAGNESIUM: 1.9 MG/DL (ref 1.7–2.3)
NITRITE, URINE: POSITIVE
PH UA: 5 (ref 5–9)
POTASSIUM SERPL-SCNC: 4 MEQ/L (ref 3.5–5.1)
PROTEIN UA: 100 MG/DL
RBC UA: ABNORMAL /HPF (ref 0–2)
SODIUM BLD-SCNC: 131 MEQ/L (ref 132–144)
SPECIFIC GRAVITY UA: 1.03 (ref 1–1.03)
TOTAL PROTEIN: 6.5 G/DL (ref 6.4–8.1)
URINE REFLEX TO CULTURE: YES
UROBILINOGEN, URINE: 0.2 E.U./DL
WBC UA: ABNORMAL /HPF (ref 0–5)

## 2018-09-03 PROCEDURE — 81001 URINALYSIS AUTO W/SCOPE: CPT

## 2018-09-03 PROCEDURE — 99285 EMERGENCY DEPT VISIT HI MDM: CPT

## 2018-09-03 PROCEDURE — 70450 CT HEAD/BRAIN W/O DYE: CPT

## 2018-09-03 PROCEDURE — 87077 CULTURE AEROBIC IDENTIFY: CPT

## 2018-09-03 PROCEDURE — 87086 URINE CULTURE/COLONY COUNT: CPT

## 2018-09-03 PROCEDURE — 80053 COMPREHEN METABOLIC PANEL: CPT

## 2018-09-03 PROCEDURE — 83735 ASSAY OF MAGNESIUM: CPT

## 2018-09-03 PROCEDURE — 85025 COMPLETE CBC W/AUTO DIFF WBC: CPT

## 2018-09-03 PROCEDURE — 93005 ELECTROCARDIOGRAM TRACING: CPT

## 2018-09-03 PROCEDURE — 87186 SC STD MICRODIL/AGAR DIL: CPT

## 2018-09-03 PROCEDURE — 36415 COLL VENOUS BLD VENIPUNCTURE: CPT

## 2018-09-03 RX ORDER — SODIUM CHLORIDE 0.9 % (FLUSH) 0.9 %
3 SYRINGE (ML) INJECTION EVERY 8 HOURS
Status: DISCONTINUED | OUTPATIENT
Start: 2018-09-03 | End: 2018-09-04 | Stop reason: HOSPADM

## 2018-09-03 RX ORDER — LORAZEPAM 0.5 MG/1
0.5 TABLET ORAL DAILY PRN
COMMUNITY

## 2018-09-03 RX ORDER — ZOLPIDEM TARTRATE 10 MG/1
10 TABLET ORAL NIGHTLY PRN
COMMUNITY

## 2018-09-03 RX ORDER — FLUCONAZOLE 200 MG/1
200 TABLET ORAL PRN
COMMUNITY
End: 2018-12-16

## 2018-09-03 ASSESSMENT — PAIN DESCRIPTION - PAIN TYPE: TYPE: ACUTE PAIN

## 2018-09-03 ASSESSMENT — PAIN DESCRIPTION - LOCATION: LOCATION: HEAD

## 2018-09-03 ASSESSMENT — PAIN DESCRIPTION - DESCRIPTORS: DESCRIPTORS: ACHING

## 2018-09-03 ASSESSMENT — PAIN SCALES - GENERAL: PAINLEVEL_OUTOF10: 10

## 2018-09-04 VITALS
RESPIRATION RATE: 18 BRPM | HEIGHT: 65 IN | OXYGEN SATURATION: 99 % | HEART RATE: 76 BPM | WEIGHT: 236 LBS | SYSTOLIC BLOOD PRESSURE: 101 MMHG | BODY MASS INDEX: 39.32 KG/M2 | DIASTOLIC BLOOD PRESSURE: 50 MMHG | TEMPERATURE: 98.2 F

## 2018-09-04 LAB
ANISOCYTOSIS: ABNORMAL
BANDED NEUTROPHILS RELATIVE PERCENT: 2 % (ref 5–11)
BASOPHILS ABSOLUTE: 0.3 K/UL (ref 0–0.2)
BASOPHILS RELATIVE PERCENT: 4 %
EOSINOPHILS ABSOLUTE: 0.1 K/UL (ref 0–0.7)
EOSINOPHILS RELATIVE PERCENT: 1 %
HCT VFR BLD CALC: 32.6 % (ref 37–47)
HEMOGLOBIN: 11.2 G/DL (ref 12–16)
LYMPHOCYTES ABSOLUTE: 2.3 K/UL (ref 1–4.8)
LYMPHOCYTES RELATIVE PERCENT: 27 %
MCH RBC QN AUTO: 28.7 PG (ref 27–31.3)
MCHC RBC AUTO-ENTMCNC: 34.3 % (ref 33–37)
MCV RBC AUTO: 83.8 FL (ref 82–100)
MONOCYTES ABSOLUTE: 0.3 K/UL (ref 0.2–0.8)
MONOCYTES RELATIVE PERCENT: 3.8 %
NEUTROPHILS ABSOLUTE: 5.6 K/UL (ref 1.4–6.5)
NEUTROPHILS RELATIVE PERCENT: 62 %
PDW BLD-RTO: 14.8 % (ref 11.5–14.5)
PLATELET # BLD: 272 K/UL (ref 130–400)
PLATELET SLIDE REVIEW: NORMAL
POIKILOCYTES: ABNORMAL
RBC # BLD: 3.89 M/UL (ref 4.2–5.4)
WBC # BLD: 8.7 K/UL (ref 4.8–10.8)

## 2018-09-04 PROCEDURE — 96375 TX/PRO/DX INJ NEW DRUG ADDON: CPT

## 2018-09-04 PROCEDURE — 96374 THER/PROPH/DIAG INJ IV PUSH: CPT

## 2018-09-04 PROCEDURE — 93010 ELECTROCARDIOGRAM REPORT: CPT | Performed by: INTERNAL MEDICINE

## 2018-09-04 PROCEDURE — 6360000002 HC RX W HCPCS: Performed by: PHYSICIAN ASSISTANT

## 2018-09-04 PROCEDURE — 6370000000 HC RX 637 (ALT 250 FOR IP): Performed by: PHYSICIAN ASSISTANT

## 2018-09-04 PROCEDURE — 2580000003 HC RX 258: Performed by: PHYSICIAN ASSISTANT

## 2018-09-04 RX ORDER — MORPHINE SULFATE 4 MG/ML
4 INJECTION, SOLUTION INTRAMUSCULAR; INTRAVENOUS ONCE
Status: COMPLETED | OUTPATIENT
Start: 2018-09-04 | End: 2018-09-04

## 2018-09-04 RX ORDER — SULFAMETHOXAZOLE AND TRIMETHOPRIM 800; 160 MG/1; MG/1
1 TABLET ORAL 2 TIMES DAILY
Qty: 20 TABLET | Refills: 0 | Status: SHIPPED | OUTPATIENT
Start: 2018-09-04 | End: 2018-09-14

## 2018-09-04 RX ORDER — LORAZEPAM 2 MG/ML
1 INJECTION INTRAMUSCULAR ONCE
Status: COMPLETED | OUTPATIENT
Start: 2018-09-04 | End: 2018-09-04

## 2018-09-04 RX ORDER — SULFAMETHOXAZOLE AND TRIMETHOPRIM 800; 160 MG/1; MG/1
1 TABLET ORAL ONCE
Status: COMPLETED | OUTPATIENT
Start: 2018-09-04 | End: 2018-09-04

## 2018-09-04 RX ORDER — ONDANSETRON 4 MG/1
4 TABLET, ORALLY DISINTEGRATING ORAL ONCE
Status: COMPLETED | OUTPATIENT
Start: 2018-09-04 | End: 2018-09-04

## 2018-09-04 RX ADMIN — LORAZEPAM 1 MG: 2 INJECTION INTRAMUSCULAR; INTRAVENOUS at 01:15

## 2018-09-04 RX ADMIN — Medication 3 ML: at 01:16

## 2018-09-04 RX ADMIN — SULFAMETHOXAZOLE AND TRIMETHOPRIM 1 TABLET: 800; 160 TABLET ORAL at 01:15

## 2018-09-04 RX ADMIN — ONDANSETRON 4 MG: 4 TABLET, ORALLY DISINTEGRATING ORAL at 00:38

## 2018-09-04 RX ADMIN — MORPHINE SULFATE 4 MG: 4 INJECTION, SOLUTION INTRAMUSCULAR; INTRAVENOUS at 01:16

## 2018-09-04 ASSESSMENT — ENCOUNTER SYMPTOMS
EYE DISCHARGE: 0
ANAL BLEEDING: 0
NAUSEA: 1
BACK PAIN: 1
VOMITING: 0
VOICE CHANGE: 0
APNEA: 0
PHOTOPHOBIA: 0
ABDOMINAL DISTENTION: 0

## 2018-09-04 ASSESSMENT — PAIN SCALES - GENERAL: PAINLEVEL_OUTOF10: 10

## 2018-09-04 NOTE — ED TRIAGE NOTES
Patient arrived to the ER with c/o seizures x3 today  Upon arrival patient A&Ox3  Per squad the received information from  that patient has seized 3 times at home today.  stated that the seizures were lasting 3-6 mins each time.    Patient alert and oriented x3  Respirations equal and unlabored, lungs clear throughout  Skin pink warm dry   Dressing applied to Sup/Pub catheter site

## 2018-09-04 NOTE — ED PROVIDER NOTES
easily. Psychiatric/Behavioral: Negative for behavioral problems, self-injury and sleep disturbance. All other systems reviewed and are negative. Except as noted above the remainder of the review of systems was reviewed and negative. PAST MEDICAL HISTORY     Past Medical History:   Diagnosis Date    Asthma     Cerebral artery occlusion with cerebral infarction ([facility] [address]     Chronic back pain     Depression     Headache     Kidney stone     Seizures ([facility] [address] 5/24/2016         SURGICAL HISTORY       Past Surgical History:   Procedure Laterality Date    APPENDECTOMY      BACK SURGERY      CHOLECYSTECTOMY      HYSTERECTOMY      KNEE SURGERY Bilateral     OTHER SURGICAL HISTORY      sup/pub cath june 1, 2018         CURRENT MEDICATIONS       Previous Medications    ALPRAZOLAM (XANAX) 0.5 MG TABLET    Take 0.5 mg by mouth 2 times daily. .    ARIPIPRAZOLE (ABILIFY) 15 MG TABLET    Take 15 mg by mouth daily    BUDESONIDE-FORMOTEROL (SYMBICORT) 80-4.5 MCG/ACT AERO    Inhale 2 puffs into the lungs 2 times daily as needed     EPINEPHRINE 1 MG/ML INJECTION    Inject 0.2 mg into the skin as needed    ESLICARBAZEPINE ACETATE 400 MG TABS    Take 400 mg by mouth nightly     FEXOFENADINE (ALLEGRA) 60 MG TABLET    Take 60 mg by mouth 2 times daily    FLUCONAZOLE (DIFLUCAN) 200 MG TABLET    Take 200 mg by mouth as needed As needed for Yeast Infection    LORAZEPAM (ATIVAN) 0.5 MG TABLET    Take 0.5 mg by mouth daily as needed for Anxiety. Xavier Regan NITROGLYCERIN (NITROSTAT) 0.4 MG SL TABLET    Place 0.4 mg under the tongue every 5 minutes as needed for Chest pain Dissolve 1 tablet under tongue for chest pain and repeat every 5 min up to max of 3 total doses. If no relief after 3 doses call 911    ONDANSETRON (ZOFRAN ODT) 4 MG DISINTEGRATING TABLET    Take 1-2 tablets by mouth every 12 hours as needed for Nausea May Sub regular tablet (non-ODT) if insurance does not cover ODT.     PAROXETINE (PAXIL) 40 MG TABLET    Take reactive to light. Right eye exhibits no discharge. Left eye exhibits no discharge. Neck: Normal range of motion. Neck supple. Cardiovascular: Normal rate, regular rhythm, normal heart sounds and intact distal pulses. Pulmonary/Chest: Effort normal and breath sounds normal. No stridor. No respiratory distress. She has no wheezes. Abdominal: Soft. Bowel sounds are normal. She exhibits no distension. There is no tenderness. Musculoskeletal: She exhibits no tenderness. noted right upper and lower extremity patient weakness states history of strokes. Neurological: She is alert and oriented to person, place, and time. Skin: Skin is warm. No erythema. Psychiatric: She has a normal mood and affect. Her behavior is normal.   Nursing note and vitals reviewed. DIAGNOSTIC RESULTS     EKG: All EKG's are interpreted by the Emergency Department Physician who either signs or Co-signs this chart in the absence of a cardiologist.     Sinus rhythm first degree AV block OR interval 222 ms prolonged QT.  Compared to 16 aug/    RADIOLOGY:   Non-plain film images such as CT, Ultrasound and MRI are read by the radiologist. Plain radiographic images are visualized and preliminarily interpreted by the emergency physician with the below findings:     Final radiology report    Interpretation per the Radiologist below, if available at the time of this note:    CT Head WO Contrast    (Results Pending)         ED BEDSIDE ULTRASOUND:   Performed by ED Physician - none    LABS:  Labs Reviewed   COMPREHENSIVE METABOLIC PANEL - Abnormal; Notable for the following:        Result Value    Sodium 131 (*)     Chloride 95 (*)     CREATININE 1.00 (*)     Alb 3.8 (*)     All other components within normal limits   CBC WITH AUTO DIFFERENTIAL - Abnormal; Notable for the following:     RBC 3.89 (*)     Hemoglobin 11.2 (*)     Hematocrit 32.6 (*)     RDW 14.8 (*)     All other components within normal limits   URINE RT REFLEX TO CULTURE 2 days      [facility]) ED  [address]  [address] [address]  [phone]    If symptoms worsen    Eleanor Mendoza, [address]  [phone]    Schedule an appointment as soon as possible for a visit in 2 days        DISCHARGE MEDICATIONS:  New Prescriptions    No medications on file          (Please note that portions of this note were completed with a voice recognition program.  Efforts were made to edit the dictations but occasionally words are mis-transcribed.)    Cinthia Neri PA-C (electronically signed)  Attending Emergency Physician         Cinthia Neri PA-C  09/04/18 6632

## 2018-09-04 NOTE — ED NOTES
Blood samples collected via Butterfly needle  Patient tolerated well  Blood sampled labeled and sent to lab per this LEANA Middleton RN  09/03/18 3272

## 2018-09-06 ENCOUNTER — APPOINTMENT (OUTPATIENT)
Dept: GENERAL RADIOLOGY | Age: 43
End: 2018-09-06
Payer: MEDICARE

## 2018-09-06 ENCOUNTER — HOSPITAL ENCOUNTER (EMERGENCY)
Age: 43
Discharge: HOME | End: 2018-09-06
Attending: STUDENT IN AN ORGANIZED HEALTH CARE EDUCATION/TRAINING PROGRAM
Payer: MEDICARE

## 2018-09-06 VITALS
RESPIRATION RATE: 16 BRPM | HEART RATE: 71 BPM | TEMPERATURE: 98.5 F | OXYGEN SATURATION: 98 % | DIASTOLIC BLOOD PRESSURE: 53 MMHG | SYSTOLIC BLOOD PRESSURE: 106 MMHG

## 2018-09-06 DIAGNOSIS — G40.919 BREAKTHROUGH SEIZURE (HCC): Primary | ICD-10-CM

## 2018-09-06 DIAGNOSIS — E87.1 HYPONATREMIA: ICD-10-CM

## 2018-09-06 LAB
ALBUMIN SERPL-MCNC: 3.7 G/DL (ref 3.9–4.9)
ALP BLD-CCNC: 99 U/L (ref 40–130)
ALT SERPL-CCNC: 19 U/L (ref 0–33)
AMPHETAMINE SCREEN, URINE: NORMAL
ANION GAP SERPL CALCULATED.3IONS-SCNC: 12 MEQ/L (ref 7–13)
AST SERPL-CCNC: 25 U/L (ref 0–35)
BARBITURATE SCREEN URINE: NORMAL
BASOPHILS ABSOLUTE: 0.1 K/UL (ref 0–0.2)
BASOPHILS RELATIVE PERCENT: 1.1 %
BENZODIAZEPINE SCREEN, URINE: NORMAL
BILIRUB SERPL-MCNC: 0.3 MG/DL (ref 0–1.2)
BUN BLDV-MCNC: 8 MG/DL (ref 6–20)
CALCIUM SERPL-MCNC: 8.9 MG/DL (ref 8.6–10.2)
CANNABINOID SCREEN URINE: NORMAL
CHLORIDE BLD-SCNC: 98 MEQ/L (ref 98–107)
CHP ED QC CHECK: NORMAL
CO2: 21 MEQ/L (ref 22–29)
COCAINE METABOLITE SCREEN URINE: NORMAL
CREAT SERPL-MCNC: 0.95 MG/DL (ref 0.5–0.9)
EOSINOPHILS ABSOLUTE: 0.3 K/UL (ref 0–0.7)
EOSINOPHILS RELATIVE PERCENT: 4.7 %
ETHANOL PERCENT: NORMAL G/DL
ETHANOL: <10 MG/DL (ref 0–0.08)
GFR AFRICAN AMERICAN: >60
GFR NON-AFRICAN AMERICAN: >60
GLOBULIN: 2.8 G/DL (ref 2.3–3.5)
GLUCOSE BLD-MCNC: 114 MG/DL (ref 74–109)
GLUCOSE BLD-MCNC: 85 MG/DL
GLUCOSE BLD-MCNC: 85 MG/DL (ref 60–115)
HCT VFR BLD CALC: 35.6 % (ref 37–47)
HEMOGLOBIN: 12 G/DL (ref 12–16)
LACTIC ACID: 0.9 MMOL/L (ref 0.5–2.2)
LYMPHOCYTES ABSOLUTE: 1.3 K/UL (ref 1–4.8)
LYMPHOCYTES RELATIVE PERCENT: 22.1 %
Lab: NORMAL
MCH RBC QN AUTO: 28.7 PG (ref 27–31.3)
MCHC RBC AUTO-ENTMCNC: 33.9 % (ref 33–37)
MCV RBC AUTO: 84.9 FL (ref 82–100)
MONOCYTES ABSOLUTE: 0.4 K/UL (ref 0.2–0.8)
MONOCYTES RELATIVE PERCENT: 6.2 %
NEUTROPHILS ABSOLUTE: 3.9 K/UL (ref 1.4–6.5)
NEUTROPHILS RELATIVE PERCENT: 65.9 %
OPIATE SCREEN URINE: NORMAL
ORGANISM: ABNORMAL
PDW BLD-RTO: 14.5 % (ref 11.5–14.5)
PERFORMED ON: NORMAL
PHENCYCLIDINE SCREEN URINE: NORMAL
PLATELET # BLD: 290 K/UL (ref 130–400)
POTASSIUM SERPL-SCNC: 4 MEQ/L (ref 3.5–5.1)
PROLACTIN: 16.2 NG/ML
RBC # BLD: 4.19 M/UL (ref 4.2–5.4)
SODIUM BLD-SCNC: 131 MEQ/L (ref 132–144)
TOTAL CK: 66 U/L (ref 0–170)
TOTAL PROTEIN: 6.5 G/DL (ref 6.4–8.1)
URINE CULTURE, ROUTINE: ABNORMAL
URINE CULTURE, ROUTINE: ABNORMAL
WBC # BLD: 5.9 K/UL (ref 4.8–10.8)

## 2018-09-06 PROCEDURE — 84146 ASSAY OF PROLACTIN: CPT

## 2018-09-06 PROCEDURE — 80307 DRUG TEST PRSMV CHEM ANLYZR: CPT

## 2018-09-06 PROCEDURE — 80053 COMPREHEN METABOLIC PANEL: CPT

## 2018-09-06 PROCEDURE — 99284 EMERGENCY DEPT VISIT MOD MDM: CPT

## 2018-09-06 PROCEDURE — 82550 ASSAY OF CK (CPK): CPT

## 2018-09-06 PROCEDURE — 71045 X-RAY EXAM CHEST 1 VIEW: CPT

## 2018-09-06 PROCEDURE — 96365 THER/PROPH/DIAG IV INF INIT: CPT

## 2018-09-06 PROCEDURE — 85025 COMPLETE CBC W/AUTO DIFF WBC: CPT

## 2018-09-06 PROCEDURE — 83605 ASSAY OF LACTIC ACID: CPT

## 2018-09-06 PROCEDURE — G0480 DRUG TEST DEF 1-7 CLASSES: HCPCS

## 2018-09-06 PROCEDURE — 6360000002 HC RX W HCPCS: Performed by: STUDENT IN AN ORGANIZED HEALTH CARE EDUCATION/TRAINING PROGRAM

## 2018-09-06 PROCEDURE — 36415 COLL VENOUS BLD VENIPUNCTURE: CPT

## 2018-09-06 PROCEDURE — 2580000003 HC RX 258: Performed by: STUDENT IN AN ORGANIZED HEALTH CARE EDUCATION/TRAINING PROGRAM

## 2018-09-06 RX ORDER — LEVETIRACETAM 500 MG/1
750 TABLET ORAL 2 TIMES DAILY
Qty: 60 TABLET | Refills: 0 | Status: ON HOLD | OUTPATIENT
Start: 2018-09-06 | End: 2020-08-20 | Stop reason: HOSPADM

## 2018-09-06 RX ORDER — 0.9 % SODIUM CHLORIDE 0.9 %
1000 INTRAVENOUS SOLUTION INTRAVENOUS ONCE
Status: COMPLETED | OUTPATIENT
Start: 2018-09-06 | End: 2018-09-06

## 2018-09-06 RX ORDER — ONDANSETRON 4 MG/1
4 TABLET, ORALLY DISINTEGRATING ORAL ONCE
Status: COMPLETED | OUTPATIENT
Start: 2018-09-06 | End: 2018-09-06

## 2018-09-06 RX ADMIN — LEVETIRACETAM 1000 MG: 100 INJECTION, SOLUTION INTRAVENOUS at 15:25

## 2018-09-06 RX ADMIN — ONDANSETRON 4 MG: 4 TABLET, ORALLY DISINTEGRATING ORAL at 16:51

## 2018-09-06 RX ADMIN — SODIUM CHLORIDE 1000 ML: 9 INJECTION, SOLUTION INTRAVENOUS at 15:56

## 2018-09-06 ASSESSMENT — ENCOUNTER SYMPTOMS
TROUBLE SWALLOWING: 0
SINUS PRESSURE: 0
VOMITING: 0
BACK PAIN: 0
SHORTNESS OF BREATH: 0
COUGH: 0
ABDOMINAL PAIN: 0
DIARRHEA: 0
CHEST TIGHTNESS: 0

## 2018-09-06 NOTE — ED PROVIDER NOTES
[address] [facility] ED  eMERGENCY dEPARTMENT eNCOUnter      Pt Name: Ashkan Long  MRN: [id number]  [place] 1975  Date of evaluation: 9/6/2018  Provider: Sonali Ross Dr       Chief Complaint   Patient presents with    Seizures     Pt c/o seizure today         HISTORY OF PRESENT ILLNESS   (Location/Symptom, Timing/Onset, Context/Setting, Quality, Duration, Modifying Factors, Severity)  Note limiting factors. Ashkan Long is a 37 y.o. female who presents to the emergency department With complaint of seizure today. Patient was laying in bed home health that comes to the patient's house and she had had a convulsive episode. It is unknown how long the seizure lasted. Patient had another seizure per EMS. Patient denies any fever, chills or cough. Patient states that she has a headache which often happens after a seizure. The ER physician asked the patient if she has seen a neurologist and she states now my family doctor takes care of all of my medical needs including seizures. Patient recently had a death of a friend and has had increase in seizures since the recent death. HPI    Nursing Notes were reviewed. REVIEW OF SYSTEMS    (2-9 systems for level 4, 10 or more for level 5)     Review of Systems   Constitutional: Negative for activity change, appetite change, chills, fever and unexpected weight change. HENT: Negative for drooling, ear pain, nosebleeds, sinus pressure and trouble swallowing. Respiratory: Negative for cough, chest tightness and shortness of breath. Cardiovascular: Negative for chest pain and leg swelling. Gastrointestinal: Negative for abdominal pain, diarrhea and vomiting. Endocrine: Negative for polydipsia and polyphagia. Genitourinary: Negative for dysuria, flank pain and frequency. Musculoskeletal: Negative for back pain and myalgias. Skin: Negative for pallor and rash. Neurological: Positive for seizures and headaches.  Negative PROPRANOLOL (INDERAL LA) 60 MG CR CAPSULE    Take 1 capsule by mouth daily    SULFAMETHOXAZOLE-TRIMETHOPRIM (BACTRIM DS) 800-160 MG PER TABLET    Take 1 tablet by mouth 2 times daily for 10 days    TOPIRAMATE (TOPAMAX) 200 MG TABLET    Take 200 mg by mouth 2 times daily    TRAZODONE (DESYREL) 50 MG TABLET    Take 300 mg by mouth nightly     ZOLPIDEM (AMBIEN) 10 MG TABLET    Take 10 mg by mouth nightly as needed for Sleep. .       ALLERGIES     Latex; Ciprofloxacin; Contrast [iodides]; Daypro [oxaprozin]; Estrogens; Iodine; Lamictal [lamotrigine]; Lyrica [pregabalin]; Macrobid [nitrofurantoin monohyd macro]; Macrolides and ketolides; Shellfish-derived products; Tape [adhesive tape]; Tegretol [carbamazepine]; Toradol [ketorolac tromethamine]; Tramadol; Tylenol [acetaminophen]; Vicodin [hydrocodone-acetaminophen]; and Zanaflex [tizanidine hcl]    FAMILY HISTORY       Family History   Problem Relation Age of Onset    Cancer Father     Cancer Paternal Aunt           SOCIAL HISTORY       Social History     Social History    Marital status:      Spouse name: N/A    Number of children: N/A    Years of education: N/A     Social History Main Topics    Smoking status: Never Smoker    Smokeless tobacco: Never Used    Alcohol use No    Drug use: No    Sexual activity: No     Other Topics Concern    Not on file     Social History Narrative    No narrative on file       SCREENINGS             PHYSICAL EXAM    (up to 7 for level 4, 8 or more for level 5)     ED Triage Vitals [09/06/18 1457]   BP Temp Temp Source Pulse Resp SpO2 Height Weight   116/72 98.5 °F (36.9 °C) Oral 78 18 100 % -- --       Physical Exam   Constitutional: She is oriented to person, place, and time. She appears well-developed and well-nourished. No distress. No photophobia. No phonophobia. HENT:   Head: Normocephalic and atraumatic. Head is without Hutchison's sign.    Right Ear: External ear normal.   Left Ear: External ear normal. Nose: Nose normal.   Mouth/Throat: Oropharynx is clear and moist. No oropharyngeal exudate. Eyes: Pupils are equal, round, and reactive to light. Conjunctivae and EOM are normal. No foreign body present in the right eye. Left eye exhibits no exudate. No scleral icterus. Neck: Normal range of motion. Neck supple. No JVD present. No neck rigidity. No tracheal deviation present. No thyromegaly present. No meningismus. Cardiovascular: Normal rate, regular rhythm, normal heart sounds and intact distal pulses. Exam reveals no gallop, no distant heart sounds and no friction rub. No murmur heard. Pulmonary/Chest: Effort normal and breath sounds normal. No stridor. No respiratory distress. She has no wheezes. Abdominal: Soft. Bowel sounds are normal. She exhibits no distension, no pulsatile liver, no ascites and no mass. There is no hepatosplenomegaly. There is no tenderness. There is no rebound and no guarding. Musculoskeletal: Normal range of motion. She exhibits no edema or tenderness. Lymphadenopathy:        Head (right side): No submental adenopathy present. Head (left side): No submental adenopathy present. She has no cervical adenopathy. Neurological: She is alert and oriented to person, place, and time. She has normal reflexes. No cranial nerve deficit. Coordination normal.   Skin: Skin is warm and dry. No rash noted. She is not diaphoretic. No erythema. No pallor. Psychiatric: She has a normal mood and affect. Her behavior is normal. Judgment and thought content normal.   Nursing note and vitals reviewed.       DIAGNOSTIC RESULTS     EKG: All EKG's are interpreted by the Emergency Department Physician who either signs or Co-signs this chart in the absence of a cardiologist.        RADIOLOGY:   Non-plain film images such as CT, Ultrasound and MRI are read by the radiologist. Plain radiographic images are visualized and preliminarily interpreted by the emergency physician with the

## 2018-09-08 ENCOUNTER — HOSPITAL ENCOUNTER (EMERGENCY)
Age: 43
Discharge: HOME | End: 2018-09-08
Attending: EMERGENCY MEDICINE
Payer: MEDICARE

## 2018-09-08 ENCOUNTER — APPOINTMENT (OUTPATIENT)
Dept: CT IMAGING | Age: 43
End: 2018-09-08
Payer: MEDICARE

## 2018-09-08 VITALS
TEMPERATURE: 98 F | HEART RATE: 68 BPM | SYSTOLIC BLOOD PRESSURE: 135 MMHG | RESPIRATION RATE: 16 BRPM | DIASTOLIC BLOOD PRESSURE: 76 MMHG | OXYGEN SATURATION: 95 %

## 2018-09-08 DIAGNOSIS — G40.919 BREAKTHROUGH SEIZURE (HCC): Primary | ICD-10-CM

## 2018-09-08 DIAGNOSIS — N10 ACUTE PYELONEPHRITIS: ICD-10-CM

## 2018-09-08 LAB
ALBUMIN SERPL-MCNC: 3.6 G/DL (ref 3.9–4.9)
ALP BLD-CCNC: 96 U/L (ref 40–130)
ALT SERPL-CCNC: 19 U/L (ref 0–33)
AMPHETAMINE SCREEN, URINE: ABNORMAL
ANION GAP SERPL CALCULATED.3IONS-SCNC: 11 MEQ/L (ref 7–13)
AST SERPL-CCNC: 19 U/L (ref 0–35)
BACTERIA: ABNORMAL /HPF
BARBITURATE SCREEN URINE: ABNORMAL
BASOPHILS ABSOLUTE: 0 K/UL (ref 0–0.2)
BASOPHILS RELATIVE PERCENT: 0.7 %
BENZODIAZEPINE SCREEN, URINE: POSITIVE
BILIRUB SERPL-MCNC: <0.2 MG/DL (ref 0–1.2)
BILIRUBIN URINE: NEGATIVE
BLOOD, URINE: ABNORMAL
BUN BLDV-MCNC: 8 MG/DL (ref 6–20)
CALCIUM SERPL-MCNC: 8.9 MG/DL (ref 8.6–10.2)
CANNABINOID SCREEN URINE: ABNORMAL
CHLORIDE BLD-SCNC: 100 MEQ/L (ref 98–107)
CLARITY: ABNORMAL
CO2: 23 MEQ/L (ref 22–29)
COCAINE METABOLITE SCREEN URINE: ABNORMAL
COLOR: YELLOW
CREAT SERPL-MCNC: 1.07 MG/DL (ref 0.5–0.9)
EOSINOPHILS ABSOLUTE: 0.2 K/UL (ref 0–0.7)
EOSINOPHILS RELATIVE PERCENT: 4.6 %
EPITHELIAL CELLS, UA: ABNORMAL /HPF
GFR AFRICAN AMERICAN: >60
GFR NON-AFRICAN AMERICAN: 55.9
GLOBULIN: 2.9 G/DL (ref 2.3–3.5)
GLUCOSE BLD-MCNC: 98 MG/DL (ref 74–109)
GLUCOSE URINE: NEGATIVE MG/DL
HCT VFR BLD CALC: 35.1 % (ref 37–47)
HEMOGLOBIN: 11.9 G/DL (ref 12–16)
KETONES, URINE: NEGATIVE MG/DL
LEUKOCYTE ESTERASE, URINE: ABNORMAL
LYMPHOCYTES ABSOLUTE: 1.5 K/UL (ref 1–4.8)
LYMPHOCYTES RELATIVE PERCENT: 27.9 %
Lab: ABNORMAL
MCH RBC QN AUTO: 28.7 PG (ref 27–31.3)
MCHC RBC AUTO-ENTMCNC: 34 % (ref 33–37)
MCV RBC AUTO: 84.5 FL (ref 82–100)
MONOCYTES ABSOLUTE: 0.3 K/UL (ref 0.2–0.8)
MONOCYTES RELATIVE PERCENT: 6.2 %
NEUTROPHILS ABSOLUTE: 3.1 K/UL (ref 1.4–6.5)
NEUTROPHILS RELATIVE PERCENT: 60.6 %
NITRITE, URINE: POSITIVE
OPIATE SCREEN URINE: ABNORMAL
PDW BLD-RTO: 14.4 % (ref 11.5–14.5)
PH UA: 7.5 (ref 5–9)
PHENCYCLIDINE SCREEN URINE: ABNORMAL
PLATELET # BLD: 292 K/UL (ref 130–400)
POTASSIUM SERPL-SCNC: 4.2 MEQ/L (ref 3.5–5.1)
PROLACTIN: 19.6 NG/ML
PROTEIN UA: 30 MG/DL
RBC # BLD: 4.15 M/UL (ref 4.2–5.4)
RBC UA: ABNORMAL /HPF (ref 0–2)
SODIUM BLD-SCNC: 134 MEQ/L (ref 132–144)
SPECIFIC GRAVITY UA: 1.02 (ref 1–1.03)
TOTAL CK: 48 U/L (ref 0–170)
TOTAL PROTEIN: 6.5 G/DL (ref 6.4–8.1)
URINE REFLEX TO CULTURE: YES
UROBILINOGEN, URINE: 0.2 E.U./DL
WBC # BLD: 5.2 K/UL (ref 4.8–10.8)
WBC UA: ABNORMAL /HPF (ref 0–5)

## 2018-09-08 PROCEDURE — 96365 THER/PROPH/DIAG IV INF INIT: CPT

## 2018-09-08 PROCEDURE — 2580000003 HC RX 258: Performed by: EMERGENCY MEDICINE

## 2018-09-08 PROCEDURE — 96376 TX/PRO/DX INJ SAME DRUG ADON: CPT

## 2018-09-08 PROCEDURE — 70450 CT HEAD/BRAIN W/O DYE: CPT

## 2018-09-08 PROCEDURE — 99284 EMERGENCY DEPT VISIT MOD MDM: CPT

## 2018-09-08 PROCEDURE — 80307 DRUG TEST PRSMV CHEM ANLYZR: CPT

## 2018-09-08 PROCEDURE — 80053 COMPREHEN METABOLIC PANEL: CPT

## 2018-09-08 PROCEDURE — 36415 COLL VENOUS BLD VENIPUNCTURE: CPT

## 2018-09-08 PROCEDURE — 85025 COMPLETE CBC W/AUTO DIFF WBC: CPT

## 2018-09-08 PROCEDURE — 80201 ASSAY OF TOPIRAMATE: CPT

## 2018-09-08 PROCEDURE — 81001 URINALYSIS AUTO W/SCOPE: CPT

## 2018-09-08 PROCEDURE — 80177 DRUG SCRN QUAN LEVETIRACETAM: CPT

## 2018-09-08 PROCEDURE — 87086 URINE CULTURE/COLONY COUNT: CPT

## 2018-09-08 PROCEDURE — 87186 SC STD MICRODIL/AGAR DIL: CPT

## 2018-09-08 PROCEDURE — 96366 THER/PROPH/DIAG IV INF ADDON: CPT

## 2018-09-08 PROCEDURE — 6360000002 HC RX W HCPCS: Performed by: EMERGENCY MEDICINE

## 2018-09-08 PROCEDURE — 82550 ASSAY OF CK (CPK): CPT

## 2018-09-08 PROCEDURE — 87077 CULTURE AEROBIC IDENTIFY: CPT

## 2018-09-08 PROCEDURE — 96375 TX/PRO/DX INJ NEW DRUG ADDON: CPT

## 2018-09-08 PROCEDURE — 96372 THER/PROPH/DIAG INJ SC/IM: CPT

## 2018-09-08 PROCEDURE — 96368 THER/DIAG CONCURRENT INF: CPT

## 2018-09-08 PROCEDURE — 84146 ASSAY OF PROLACTIN: CPT

## 2018-09-08 RX ORDER — PHENOBARBITAL SODIUM 65 MG/ML
130 INJECTION INTRAMUSCULAR ONCE
Status: COMPLETED | OUTPATIENT
Start: 2018-09-08 | End: 2018-09-08

## 2018-09-08 RX ORDER — PHENOBARBITAL 60 MG/1
60 TABLET ORAL 2 TIMES DAILY
Qty: 14 TABLET | Refills: 0 | Status: SHIPPED | OUTPATIENT
Start: 2018-09-08 | End: 2018-09-15

## 2018-09-08 RX ORDER — 0.9 % SODIUM CHLORIDE 0.9 %
1000 INTRAVENOUS SOLUTION INTRAVENOUS ONCE
Status: COMPLETED | OUTPATIENT
Start: 2018-09-08 | End: 2018-09-08

## 2018-09-08 RX ORDER — LORAZEPAM 2 MG/ML
2 INJECTION INTRAMUSCULAR ONCE
Status: COMPLETED | OUTPATIENT
Start: 2018-09-08 | End: 2018-09-08

## 2018-09-08 RX ORDER — LORAZEPAM 2 MG/ML
INJECTION INTRAMUSCULAR
Status: DISCONTINUED
Start: 2018-09-08 | End: 2018-09-09 | Stop reason: HOSPADM

## 2018-09-08 RX ORDER — LORAZEPAM 2 MG/ML
1 INJECTION INTRAMUSCULAR ONCE
Status: COMPLETED | OUTPATIENT
Start: 2018-09-08 | End: 2018-09-08

## 2018-09-08 RX ORDER — KETOROLAC TROMETHAMINE 30 MG/ML
30 INJECTION, SOLUTION INTRAMUSCULAR; INTRAVENOUS ONCE
Status: DISCONTINUED | OUTPATIENT
Start: 2018-09-08 | End: 2018-09-09 | Stop reason: HOSPADM

## 2018-09-08 RX ORDER — CEPHALEXIN 500 MG/1
500 CAPSULE ORAL 4 TIMES DAILY
Qty: 40 CAPSULE | Refills: 0 | Status: SHIPPED | OUTPATIENT
Start: 2018-09-08 | End: 2018-09-29

## 2018-09-08 RX ORDER — PHENOBARBITAL SODIUM 65 MG/ML
65 INJECTION INTRAMUSCULAR ONCE
Status: DISCONTINUED | OUTPATIENT
Start: 2018-09-08 | End: 2018-09-08

## 2018-09-08 RX ORDER — DIPHENHYDRAMINE HYDROCHLORIDE 50 MG/ML
25 INJECTION INTRAMUSCULAR; INTRAVENOUS ONCE
Status: DISCONTINUED | OUTPATIENT
Start: 2018-09-08 | End: 2018-09-09 | Stop reason: HOSPADM

## 2018-09-08 RX ADMIN — LORAZEPAM 2 MG: 2 INJECTION INTRAMUSCULAR; INTRAVENOUS at 19:41

## 2018-09-08 RX ADMIN — PHENOBARBITAL SODIUM 130 MG: 65 INJECTION INTRAMUSCULAR; INTRAVENOUS at 19:47

## 2018-09-08 RX ADMIN — SODIUM CHLORIDE 1000 ML: 9 INJECTION, SOLUTION INTRAVENOUS at 20:48

## 2018-09-08 RX ADMIN — LORAZEPAM 1 MG: 2 INJECTION INTRAMUSCULAR; INTRAVENOUS at 21:30

## 2018-09-08 RX ADMIN — LEVETIRACETAM 500 MG: 100 INJECTION, SOLUTION INTRAVENOUS at 22:32

## 2018-09-08 RX ADMIN — CEFTRIAXONE SODIUM 1 G: 1 INJECTION, POWDER, FOR SOLUTION INTRAMUSCULAR; INTRAVENOUS at 21:30

## 2018-09-08 ASSESSMENT — ENCOUNTER SYMPTOMS
EYE ITCHING: 0
ABDOMINAL PAIN: 0
DIARRHEA: 0
WHEEZING: 0
TROUBLE SWALLOWING: 0
PHOTOPHOBIA: 0
EYE DISCHARGE: 0
EYE REDNESS: 0
RHINORRHEA: 0
COUGH: 0
ANAL BLEEDING: 0
EYE PAIN: 0
CHEST TIGHTNESS: 0
BLOOD IN STOOL: 0
SHORTNESS OF BREATH: 0
FACIAL SWELLING: 0
CHOKING: 0
BACK PAIN: 0
NAUSEA: 0
VOICE CHANGE: 0
VOMITING: 0
STRIDOR: 0
COLOR CHANGE: 0
CONSTIPATION: 0
ABDOMINAL DISTENTION: 0
SINUS PRESSURE: 0
SORE THROAT: 0

## 2018-09-08 ASSESSMENT — PAIN SCALES - GENERAL: PAINLEVEL_OUTOF10: 10

## 2018-09-08 NOTE — ED PROVIDER NOTES
[address] [facility] ED  eMERGENCY dEPARTMENT eNCOUnter      Pt Name: Vincent Cook  MRN: [id number]  [place] 1975  Date of evaluation: 2018  Provider: Maya Pearl MD    CHIEF COMPLAINT       Chief Complaint   Patient presents with    Seizures     hx seizures. approx 13 seizures with 25 minutes per . Pt presents postictal.         HISTORY OF PRESENT ILLNESS   (Location/Symptom, Timing/Onset, Context/Setting, Quality, Duration, Modifying Factors, Severity)  Note limiting factors. Vincent Cook is a 37 y.o. female who presents to the emergency department Who presents to the emergency department by EMS after suffering 13 seizures within 25 minutes according to her . The seizures were witnessed duration less than 1 minute. Timing & onset approximately 30 minutes ago. Context in setting the patient was in bed at home when the seizures occurred.  relates there was a family death and the  was today. Quality pain is none the patient nods her head stating there are no areas of discomfort or pain anywhere. Duration as above modifying factors patient just here in this emergency department 2 days ago for exactly the same thing she was started on Keppra than in his head more breakthrough seizures. Severity:    HPI    Nursing Notes were reviewed. REVIEW OF SYSTEMS    (2-9 systems for level 4, 10 or more for level 5)     Review of Systems   Constitutional: Negative for appetite change, chills, diaphoresis, fatigue and fever. HENT: Negative for congestion, dental problem, ear discharge, ear pain, facial swelling, hearing loss, mouth sores, nosebleeds, postnasal drip, rhinorrhea, sinus pressure, sneezing, sore throat, tinnitus, trouble swallowing and voice change. Eyes: Negative for photophobia, pain, discharge, redness, itching and visual disturbance. Respiratory: Negative for cough, choking, chest tightness, shortness of breath, wheezing and stridor. Cardiovascular: Negative for chest pain, palpitations and leg swelling. Gastrointestinal: Negative for abdominal distention, abdominal pain, anal bleeding, blood in stool, constipation, diarrhea, nausea and vomiting. Endocrine: Negative for cold intolerance, heat intolerance, polydipsia and polyphagia. Genitourinary: Negative for decreased urine volume, difficulty urinating, dysuria, enuresis, flank pain, frequency, genital sores, hematuria and urgency. Musculoskeletal: Negative for arthralgias, back pain, gait problem, joint swelling, myalgias, neck pain and neck stiffness. Skin: Negative for color change, pallor, rash and wound. Allergic/Immunologic: Negative for environmental allergies and food allergies. Neurological: Positive for seizures and speech difficulty. Negative for dizziness, tremors, syncope, facial asymmetry, weakness, light-headedness, numbness and headaches. Hematological: Negative for adenopathy. Does not bruise/bleed easily. Psychiatric/Behavioral: Negative for agitation, behavioral problems, confusion, decreased concentration, dysphoric mood, hallucinations, self-injury, sleep disturbance and suicidal ideas. The patient is not nervous/anxious and is not hyperactive. Except as noted above the remainder of the review of systems was reviewed and negative. PAST MEDICAL HISTORY     Past Medical History:   Diagnosis Date    Asthma     Cerebral artery occlusion with cerebral infarction ([facility] [address]     Chronic back pain     Depression     Headache     Kidney stone     Seizures ([facility] [address] 5/24/2016         SURGICAL HISTORY       Past Surgical History:   Procedure Laterality Date    APPENDECTOMY      BACK SURGERY      CHOLECYSTECTOMY      HYSTERECTOMY      KNEE SURGERY Bilateral     OTHER SURGICAL HISTORY      sup/pub cath june 1, 2018         CURRENT MEDICATIONS       Previous Medications    ALPRAZOLAM (XANAX) 0.5 MG TABLET    Take 0.5 mg by mouth 2 times daily. Ascencion Bahena Tramadol; Tylenol [acetaminophen]; Vicodin [hydrocodone-acetaminophen]; and Zanaflex [tizanidine hcl]    FAMILY HISTORY       Family History   Problem Relation Age of Onset    Cancer Father     Cancer Paternal Aunt           SOCIAL HISTORY       Social History     Social History    Marital status:      Spouse name: N/A    Number of children: N/A    Years of education: N/A     Social History Main Topics    Smoking status: Never Smoker    Smokeless tobacco: Never Used    Alcohol use No    Drug use: No    Sexual activity: No     Other Topics Concern    Not on file     Social History Narrative    No narrative on file       SCREENINGS    Alex Coma Scale  Eye Opening: Spontaneous  Best Verbal Response: Confused  Best Motor Response: Obeys commands  Alex Coma Scale Score: 14        PHYSICAL EXAM    (up to 7 for level 4, 8 or more for level 5)     ED Triage Vitals [09/08/18 1918]   BP Temp Temp Source Pulse Resp SpO2 Height Weight   139/79 98.1 °F (36.7 °C) Oral 82 (!) 100 100 % -- --       Physical Exam   Constitutional: She is oriented to person, place, and time. She appears well-developed and well-nourished. No distress. HENT:   Head: Normocephalic and atraumatic. Right Ear: External ear normal.   Left Ear: External ear normal.   Nose: Nose normal.   Mouth/Throat: Oropharynx is clear and moist. No oropharyngeal exudate. Eyes: Pupils are equal, round, and reactive to light. Conjunctivae and EOM are normal. Right eye exhibits no discharge. Left eye exhibits no discharge. No scleral icterus. Neck: Normal range of motion. Neck supple. No JVD present. No tracheal deviation present. No thyromegaly present. Cardiovascular: Normal rate, regular rhythm, normal heart sounds and intact distal pulses. Exam reveals no gallop and no friction rub. No murmur heard. Pulmonary/Chest: Effort normal and breath sounds normal. No stridor. No respiratory distress. She has no wheezes. She has no rales.

## 2018-09-08 NOTE — ED TRIAGE NOTES
Pt presents to ED by squad with  at side.  states pt had approx 13 seizures at home within approx 25 minutes. Hx seizures. Pt presents postictal-answering simple questions but A&O x1.

## 2018-09-08 NOTE — ED NOTES
Bed: 18  Expected date: 9/8/18  Expected time: 7:01 PM  Means of arrival: [facility]  Comments:  [address], RN  09/08/18 5962

## 2018-09-09 NOTE — ED NOTES
Patient revitalized prior to CT. Pain assessment was reevaluated. Patient rates pain a 10/10. To CT via cart with seizure pads on side rails.       Gurvinder Gone  09/08/18 2031

## 2018-09-09 NOTE — ED NOTES
Patient return from CT. No acute distress noted. Bedside tele was reapplied. Attempt made to get urine from the gallego catheter but attempt was unsuccessfully. Unable to get urine. Gaudencio RN was notified.        Glenny Vick  09/08/18 2046

## 2018-09-09 NOTE — ED NOTES
Patient to CT via [address]. No acute distress noted.       Savannah Frias  09/08/18 2024

## 2018-09-10 LAB
ORGANISM: ABNORMAL
URINE CULTURE, ROUTINE: ABNORMAL
URINE CULTURE, ROUTINE: ABNORMAL

## 2018-09-11 LAB
KEPPRA: 9 UG/ML (ref 12–46)
TOPIRAMATE LEVEL: 10.3 UG/ML (ref 5–20)

## 2018-09-29 ENCOUNTER — APPOINTMENT (OUTPATIENT)
Dept: CT IMAGING | Age: 43
End: 2018-09-29
Payer: MEDICARE

## 2018-09-29 ENCOUNTER — APPOINTMENT (OUTPATIENT)
Dept: GENERAL RADIOLOGY | Age: 43
End: 2018-09-29
Payer: MEDICARE

## 2018-09-29 ENCOUNTER — HOSPITAL ENCOUNTER (EMERGENCY)
Age: 43
Discharge: HOME | End: 2018-09-29
Attending: EMERGENCY MEDICINE
Payer: MEDICARE

## 2018-09-29 VITALS
WEIGHT: 244 LBS | SYSTOLIC BLOOD PRESSURE: 141 MMHG | RESPIRATION RATE: 14 BRPM | HEIGHT: 65 IN | BODY MASS INDEX: 40.65 KG/M2 | DIASTOLIC BLOOD PRESSURE: 76 MMHG | HEART RATE: 71 BPM | TEMPERATURE: 98.5 F | OXYGEN SATURATION: 98 %

## 2018-09-29 DIAGNOSIS — S09.90XA CLOSED HEAD INJURY, INITIAL ENCOUNTER: ICD-10-CM

## 2018-09-29 DIAGNOSIS — S93.401A SPRAIN OF RIGHT ANKLE, UNSPECIFIED LIGAMENT, INITIAL ENCOUNTER: ICD-10-CM
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
## 2018-09-29 DIAGNOSIS — M25.511 ACUTE PAIN OF RIGHT SHOULDER: ICD-10-CM

## 2018-09-29 PROCEDURE — 6360000002 HC RX W HCPCS: Performed by: EMERGENCY MEDICINE

## 2018-09-29 PROCEDURE — 73610 X-RAY EXAM OF ANKLE: CPT

## 2018-09-29 PROCEDURE — 6370000000 HC RX 637 (ALT 250 FOR IP): Performed by: EMERGENCY MEDICINE

## 2018-09-29 PROCEDURE — 99284 EMERGENCY DEPT VISIT MOD MDM: CPT

## 2018-09-29 PROCEDURE — 72125 CT NECK SPINE W/O DYE: CPT

## 2018-09-29 PROCEDURE — 96372 THER/PROPH/DIAG INJ SC/IM: CPT

## 2018-09-29 PROCEDURE — 73030 X-RAY EXAM OF SHOULDER: CPT

## 2018-09-29 PROCEDURE — 70450 CT HEAD/BRAIN W/O DYE: CPT

## 2018-09-29 RX ORDER — MELOXICAM 15 MG/1
15 TABLET ORAL DAILY
Qty: 30 TABLET | Refills: 0 | Status: ON HOLD | OUTPATIENT
Start: 2018-09-29 | End: 2019-01-17

## 2018-09-29 RX ORDER — MECLIZINE HYDROCHLORIDE 25 MG/1
25 TABLET ORAL 2 TIMES DAILY PRN
Qty: 30 TABLET | Refills: 0 | Status: SHIPPED | OUTPATIENT
Start: 2018-09-29 | End: 2018-10-09

## 2018-09-29 RX ORDER — ONDANSETRON 4 MG/1
4 TABLET, ORALLY DISINTEGRATING ORAL ONCE
Status: COMPLETED | OUTPATIENT
Start: 2018-09-29 | End: 2018-09-29

## 2018-09-29 RX ORDER — MORPHINE SULFATE 2 MG/ML
4 INJECTION, SOLUTION INTRAMUSCULAR; INTRAVENOUS ONCE
Status: COMPLETED | OUTPATIENT
Start: 2018-09-29 | End: 2018-09-29

## 2018-09-29 RX ORDER — KETOROLAC TROMETHAMINE 30 MG/ML
30 INJECTION, SOLUTION INTRAMUSCULAR; INTRAVENOUS ONCE
Status: DISCONTINUED | OUTPATIENT
Start: 2018-09-29 | End: 2018-09-29 | Stop reason: HOSPADM

## 2018-09-29 RX ORDER — MECLIZINE HYDROCHLORIDE 25 MG/1
25 TABLET ORAL ONCE
Status: COMPLETED | OUTPATIENT
Start: 2018-09-29 | End: 2018-09-29

## 2018-09-29 RX ADMIN — MORPHINE SULFATE 4 MG: 2 INJECTION, SOLUTION INTRAMUSCULAR; INTRAVENOUS at 19:38

## 2018-09-29 RX ADMIN — MECLIZINE HYDROCHLORIDE 25 MG: 25 TABLET ORAL at 21:19

## 2018-09-29 RX ADMIN — ONDANSETRON 4 MG: 4 TABLET, ORALLY DISINTEGRATING ORAL at 19:38

## 2018-09-29 ASSESSMENT — PAIN DESCRIPTION - ORIENTATION
ORIENTATION: RIGHT
ORIENTATION: RIGHT

## 2018-09-29 ASSESSMENT — PAIN SCALES - WONG BAKER: WONGBAKER_NUMERICALRESPONSE: 2

## 2018-09-29 ASSESSMENT — ENCOUNTER SYMPTOMS
ABDOMINAL PAIN: 0
DIARRHEA: 0
BACK PAIN: 0
COUGH: 0
SHORTNESS OF BREATH: 0
VOMITING: 0
SORE THROAT: 0
NAUSEA: 0

## 2018-09-29 ASSESSMENT — PAIN SCALES - GENERAL
PAINLEVEL_OUTOF10: 2
PAINLEVEL_OUTOF10: 8
PAINLEVEL_OUTOF10: 10

## 2018-09-29 ASSESSMENT — PAIN DESCRIPTION - FREQUENCY: FREQUENCY: CONTINUOUS

## 2018-09-29 ASSESSMENT — PAIN DESCRIPTION - DESCRIPTORS: DESCRIPTORS: ACHING;POUNDING

## 2018-09-29 ASSESSMENT — PAIN DESCRIPTION - PROGRESSION: CLINICAL_PROGRESSION: GRADUALLY WORSENING

## 2018-09-29 NOTE — ED TRIAGE NOTES
Pt here after fall this afternoon at 3pm pt states she fell out of shower hitting her head then loc hurting her right shoulder right ankle and back of her head

## 2018-09-29 NOTE — ED PROVIDER NOTES
[address] [facility] ED  eMERGENCY dEPARTMENT eNCOUnter      Pt Name: Gi Osborn  MRN: [id number]  [place] 1975  Date of evaluation: 9/29/2018  Provider: Bridget Cho MD    CHIEF COMPLAINT           HPI  Gi Osborn is a 37 y.o. female per chart review has a h/o seizures, syncope, depression/anxiety, low back pain presents to the ED s/p fall. Pt notes she slipped in the shower around 3:30 pm.  Pt notes she hit her head with LOC. Pt notes moderate, constant, aching, headache, neck pain, R shoulder pain, R ankle pain. ROS  Review of Systems   Constitutional: Negative for activity change, chills and fever. HENT: Negative for ear pain and sore throat. Eyes: Negative for visual disturbance. Respiratory: Negative for cough and shortness of breath. Cardiovascular: Negative for chest pain, palpitations and leg swelling. Gastrointestinal: Negative for abdominal pain, diarrhea, nausea and vomiting. Genitourinary: Negative for dysuria. Musculoskeletal: Positive for neck pain. Negative for back pain. R shoulder pain, R ankle pain   Skin: Negative for rash. Neurological: Positive for headaches. Negative for dizziness and weakness. Except as noted above the remainder of the review of systems was reviewed and negative. PAST MEDICAL HISTORY     Past Medical History:   Diagnosis Date    Asthma     Cerebral artery occlusion with cerebral infarction ([facility] [address]     Chronic back pain     Depression     Headache     Kidney stone     Seizures ([facility] [address] 5/24/2016         SURGICAL HISTORY       Past Surgical History:   Procedure Laterality Date    APPENDECTOMY      BACK SURGERY      CHOLECYSTECTOMY      HYSTERECTOMY      KNEE SURGERY Bilateral     OTHER SURGICAL HISTORY      sup/pub cath june 1, 2018         CURRENT MEDICATIONS       Previous Medications    ALPRAZOLAM (XANAX) 0.5 MG TABLET    Take 0.5 mg by mouth 2 times daily.  .    ARIPIPRAZOLE (ABILIFY) 15 MG TABLET    Take 15 mg

## 2018-09-30 NOTE — ED NOTES
Air cast applied to right ankle good movement and sensations msp intact     Valdez Angelo RN  09/29/18 2121

## 2018-11-20 ENCOUNTER — HOSPITAL ENCOUNTER (EMERGENCY)
Age: 43
Discharge: HOME | End: 2018-11-20
Payer: MEDICARE

## 2018-11-20 ENCOUNTER — APPOINTMENT (OUTPATIENT)
Dept: CT IMAGING | Age: 43
End: 2018-11-20
Payer: MEDICARE

## 2018-11-20 ENCOUNTER — APPOINTMENT (OUTPATIENT)
Dept: GENERAL RADIOLOGY | Age: 43
End: 2018-11-20
Payer: MEDICARE

## 2018-11-20 VITALS
TEMPERATURE: 98.6 F | SYSTOLIC BLOOD PRESSURE: 100 MMHG | DIASTOLIC BLOOD PRESSURE: 52 MMHG | RESPIRATION RATE: 18 BRPM | WEIGHT: 217 LBS | HEART RATE: 58 BPM | OXYGEN SATURATION: 97 % | HEIGHT: 65 IN | BODY MASS INDEX: 36.15 KG/M2

## 2018-11-20 DIAGNOSIS — T83.510A URINARY TRACT INFECTION ASSOCIATED WITH CYSTOSTOMY CATHETER, INITIAL ENCOUNTER (HCC): ICD-10-CM

## 2018-11-20 DIAGNOSIS — S09.90XA INJURY OF HEAD, INITIAL ENCOUNTER: Primary | ICD-10-CM

## 2018-11-20 DIAGNOSIS — N39.0 URINARY TRACT INFECTION ASSOCIATED WITH CYSTOSTOMY CATHETER, INITIAL ENCOUNTER (HCC): ICD-10-CM

## 2018-11-20 LAB
ALBUMIN SERPL-MCNC: 3.7 G/DL (ref 3.9–4.9)
ALP BLD-CCNC: 100 U/L (ref 40–130)
ALT SERPL-CCNC: 16 U/L (ref 0–33)
AMORPHOUS: ABNORMAL
ANION GAP SERPL CALCULATED.3IONS-SCNC: 10 MEQ/L (ref 7–13)
ANISOCYTOSIS: ABNORMAL
AST SERPL-CCNC: 19 U/L (ref 0–35)
BACTERIA: ABNORMAL /HPF
BASOPHILS ABSOLUTE: 0 K/UL (ref 0–0.2)
BASOPHILS RELATIVE PERCENT: 1.2 %
BILIRUB SERPL-MCNC: 0.4 MG/DL (ref 0–1.2)
BILIRUBIN URINE: ABNORMAL
BLOOD, URINE: ABNORMAL
BUN BLDV-MCNC: 11 MG/DL (ref 6–20)
CALCIUM SERPL-MCNC: 9.2 MG/DL (ref 8.6–10.2)
CHLORIDE BLD-SCNC: 100 MEQ/L (ref 98–107)
CLARITY: ABNORMAL
CO2: 24 MEQ/L (ref 22–29)
COLOR: ABNORMAL
CREAT SERPL-MCNC: 1.05 MG/DL (ref 0.5–0.9)
CRYSTALS, UA: ABNORMAL
EOSINOPHILS ABSOLUTE: 0.2 K/UL (ref 0–0.7)
EOSINOPHILS RELATIVE PERCENT: 2 %
EPITHELIAL CELLS, UA: ABNORMAL /HPF
GFR AFRICAN AMERICAN: >60
GFR NON-AFRICAN AMERICAN: 57
GLOBULIN: 3 G/DL (ref 2.3–3.5)
GLUCOSE BLD-MCNC: 89 MG/DL (ref 74–109)
GLUCOSE URINE: NEGATIVE MG/DL
HCT VFR BLD CALC: 37.7 % (ref 37–47)
HEMOGLOBIN: 12.8 G/DL (ref 12–16)
INR BLD: 1.1
KETONES, URINE: ABNORMAL MG/DL
LEUKOCYTE ESTERASE, URINE: ABNORMAL
LYMPHOCYTES ABSOLUTE: 2.1 K/UL (ref 1–4.8)
LYMPHOCYTES RELATIVE PERCENT: 21 %
MCH RBC QN AUTO: 28.9 PG (ref 27–31.3)
MCHC RBC AUTO-ENTMCNC: 33.9 % (ref 33–37)
MCV RBC AUTO: 85.2 FL (ref 82–100)
MICROCYTES: ABNORMAL
MONOCYTES ABSOLUTE: 0.1 K/UL (ref 0.2–0.8)
MONOCYTES RELATIVE PERCENT: 0.9 %
NEUTROPHILS ABSOLUTE: 7.6 K/UL (ref 1.4–6.5)
NEUTROPHILS RELATIVE PERCENT: 76 %
NITRITE, URINE: POSITIVE
PDW BLD-RTO: 16.5 % (ref 11.5–14.5)
PH UA: 5.5 (ref 5–9)
PLATELET # BLD: 210 K/UL (ref 130–400)
PLATELET SLIDE REVIEW: NORMAL
POTASSIUM SERPL-SCNC: 4.7 MEQ/L (ref 3.5–5.1)
PROTEIN UA: 30 MG/DL
PROTHROMBIN TIME: 11.1 SEC (ref 9.6–12.3)
RBC # BLD: 4.42 M/UL (ref 4.2–5.4)
RBC UA: ABNORMAL /HPF (ref 0–2)
SODIUM BLD-SCNC: 134 MEQ/L (ref 132–144)
SPECIFIC GRAVITY UA: 1.02 (ref 1–1.03)
TOTAL PROTEIN: 6.7 G/DL (ref 6.4–8.1)
URINE REFLEX TO CULTURE: YES
UROBILINOGEN, URINE: 1 E.U./DL
WBC # BLD: 10 K/UL (ref 4.8–10.8)
WBC UA: ABNORMAL /HPF (ref 0–5)

## 2018-11-20 PROCEDURE — 36415 COLL VENOUS BLD VENIPUNCTURE: CPT

## 2018-11-20 PROCEDURE — 87086 URINE CULTURE/COLONY COUNT: CPT

## 2018-11-20 PROCEDURE — 99285 EMERGENCY DEPT VISIT HI MDM: CPT

## 2018-11-20 PROCEDURE — 85025 COMPLETE CBC W/AUTO DIFF WBC: CPT

## 2018-11-20 PROCEDURE — 80053 COMPREHEN METABOLIC PANEL: CPT

## 2018-11-20 PROCEDURE — 2580000003 HC RX 258: Performed by: NURSE PRACTITIONER

## 2018-11-20 PROCEDURE — 81001 URINALYSIS AUTO W/SCOPE: CPT

## 2018-11-20 PROCEDURE — 96374 THER/PROPH/DIAG INJ IV PUSH: CPT

## 2018-11-20 PROCEDURE — 85610 PROTHROMBIN TIME: CPT

## 2018-11-20 PROCEDURE — 74176 CT ABD & PELVIS W/O CONTRAST: CPT

## 2018-11-20 PROCEDURE — 73110 X-RAY EXAM OF WRIST: CPT

## 2018-11-20 PROCEDURE — 6370000000 HC RX 637 (ALT 250 FOR IP): Performed by: NURSE PRACTITIONER

## 2018-11-20 PROCEDURE — 70450 CT HEAD/BRAIN W/O DYE: CPT

## 2018-11-20 PROCEDURE — 96375 TX/PRO/DX INJ NEW DRUG ADDON: CPT

## 2018-11-20 PROCEDURE — 87077 CULTURE AEROBIC IDENTIFY: CPT

## 2018-11-20 PROCEDURE — 6360000002 HC RX W HCPCS: Performed by: NURSE PRACTITIONER

## 2018-11-20 PROCEDURE — 72125 CT NECK SPINE W/O DYE: CPT

## 2018-11-20 PROCEDURE — 87186 SC STD MICRODIL/AGAR DIL: CPT

## 2018-11-20 RX ORDER — ONDANSETRON 2 MG/ML
4 INJECTION INTRAMUSCULAR; INTRAVENOUS EVERY 10 MIN PRN
Status: DISCONTINUED | OUTPATIENT
Start: 2018-11-20 | End: 2018-11-20 | Stop reason: HOSPADM

## 2018-11-20 RX ORDER — FENTANYL CITRATE 50 UG/ML
50 INJECTION, SOLUTION INTRAMUSCULAR; INTRAVENOUS EVERY 30 MIN PRN
Status: DISCONTINUED | OUTPATIENT
Start: 2018-11-20 | End: 2018-11-20 | Stop reason: HOSPADM

## 2018-11-20 RX ORDER — SULFAMETHOXAZOLE AND TRIMETHOPRIM 800; 160 MG/1; MG/1
1 TABLET ORAL ONCE
Status: COMPLETED | OUTPATIENT
Start: 2018-11-20 | End: 2018-11-20

## 2018-11-20 RX ORDER — 0.9 % SODIUM CHLORIDE 0.9 %
1000 INTRAVENOUS SOLUTION INTRAVENOUS ONCE
Status: COMPLETED | OUTPATIENT
Start: 2018-11-20 | End: 2018-11-20

## 2018-11-20 RX ORDER — SULFAMETHOXAZOLE AND TRIMETHOPRIM 800; 160 MG/1; MG/1
1 TABLET ORAL 2 TIMES DAILY
Qty: 14 TABLET | Refills: 0 | Status: ON HOLD | OUTPATIENT
Start: 2018-11-20 | End: 2019-01-17

## 2018-11-20 RX ADMIN — FENTANYL CITRATE 50 MCG: 50 INJECTION, SOLUTION INTRAMUSCULAR; INTRAVENOUS at 10:27

## 2018-11-20 RX ADMIN — SULFAMETHOXAZOLE AND TRIMETHOPRIM 1 TABLET: 800; 160 TABLET ORAL at 11:32

## 2018-11-20 RX ADMIN — ONDANSETRON 4 MG: 2 INJECTION INTRAMUSCULAR; INTRAVENOUS at 10:27

## 2018-11-20 RX ADMIN — SODIUM CHLORIDE 1000 ML: 9 INJECTION, SOLUTION INTRAVENOUS at 10:27

## 2018-11-20 ASSESSMENT — PAIN DESCRIPTION - DURATION
DURATION_2: CONTINUOUS
DURATION_2: INTERMITTENT

## 2018-11-20 ASSESSMENT — ENCOUNTER SYMPTOMS
NAUSEA: 0
PHOTOPHOBIA: 0
DIARRHEA: 0
COUGH: 0
SHORTNESS OF BREATH: 0
VOMITING: 0
EYE PAIN: 0
RHINORRHEA: 0
ABDOMINAL PAIN: 0
BACK PAIN: 0
SORE THROAT: 0

## 2018-11-20 ASSESSMENT — PAIN SCALES - GENERAL
PAINLEVEL_OUTOF10: 8
PAINLEVEL_OUTOF10: 5
PAINLEVEL_OUTOF10: 8

## 2018-11-20 ASSESSMENT — PAIN DESCRIPTION - LOCATION
LOCATION: HAND
LOCATION_2: ABDOMEN
LOCATION: WRIST
LOCATION_2: ABDOMEN

## 2018-11-20 ASSESSMENT — PAIN DESCRIPTION - DESCRIPTORS
DESCRIPTORS_2: DISCOMFORT
DESCRIPTORS: ACHING;DISCOMFORT
DESCRIPTORS: ACHING;DISCOMFORT
DESCRIPTORS_2: DISCOMFORT;PRESSURE;SHARP

## 2018-11-20 ASSESSMENT — PAIN DESCRIPTION - PROGRESSION: CLINICAL_PROGRESSION_2: GRADUALLY WORSENING

## 2018-11-20 ASSESSMENT — PAIN DESCRIPTION - INTENSITY
RATING_2: 8
RATING_2: 5

## 2018-11-20 ASSESSMENT — PAIN DESCRIPTION - ONSET
ONSET: ON-GOING
ONSET: ON-GOING

## 2018-11-20 ASSESSMENT — PAIN DESCRIPTION - DIRECTION: RADIATING_TOWARDS_2: VAGINA

## 2018-11-20 ASSESSMENT — PAIN DESCRIPTION - ORIENTATION
ORIENTATION: LEFT
ORIENTATION: LEFT

## 2018-11-20 ASSESSMENT — PAIN DESCRIPTION - FREQUENCY
FREQUENCY: CONTINUOUS
FREQUENCY: CONTINUOUS

## 2018-11-23 LAB
ORGANISM: ABNORMAL
ORGANISM: ABNORMAL
URINE CULTURE, ROUTINE: ABNORMAL

## 2018-11-24 ENCOUNTER — APPOINTMENT (OUTPATIENT)
Dept: GENERAL RADIOLOGY | Age: 43
End: 2018-11-24
Payer: MEDICARE

## 2018-11-24 ENCOUNTER — HOSPITAL ENCOUNTER (EMERGENCY)
Age: 43
Discharge: HOME | End: 2018-11-24
Payer: MEDICARE

## 2018-11-24 VITALS
OXYGEN SATURATION: 100 % | HEIGHT: 65 IN | BODY MASS INDEX: 36.15 KG/M2 | HEART RATE: 63 BPM | DIASTOLIC BLOOD PRESSURE: 59 MMHG | WEIGHT: 217 LBS | RESPIRATION RATE: 16 BRPM | TEMPERATURE: 97.8 F | SYSTOLIC BLOOD PRESSURE: 106 MMHG

## 2018-11-24 DIAGNOSIS — M54.5 BILATERAL LOW BACK PAIN, UNSPECIFIED CHRONICITY, WITH SCIATICA PRESENCE UNSPECIFIED: ICD-10-CM

## 2018-11-24 DIAGNOSIS — N39.0 URINARY TRACT INFECTION ASSOCIATED WITH CATHETERIZATION OF URINARY TRACT, UNSPECIFIED INDWELLING URINARY CATHETER TYPE, SUBSEQUENT ENCOUNTER: Primary | ICD-10-CM

## 2018-11-24 DIAGNOSIS — M54.2 NECK PAIN: ICD-10-CM

## 2018-11-24 DIAGNOSIS — T83.511D URINARY TRACT INFECTION ASSOCIATED WITH CATHETERIZATION OF URINARY TRACT, UNSPECIFIED INDWELLING URINARY CATHETER TYPE, SUBSEQUENT ENCOUNTER: Primary | ICD-10-CM

## 2018-11-24 LAB
ALBUMIN SERPL-MCNC: 3.6 G/DL (ref 3.9–4.9)
ALP BLD-CCNC: 87 U/L (ref 40–130)
ALT SERPL-CCNC: 11 U/L (ref 0–33)
ANION GAP SERPL CALCULATED.3IONS-SCNC: 9 MEQ/L (ref 7–13)
AST SERPL-CCNC: 12 U/L (ref 0–35)
BACTERIA: ABNORMAL /HPF
BASOPHILS ABSOLUTE: 0.1 K/UL (ref 0–0.2)
BASOPHILS RELATIVE PERCENT: 0.9 %
BILIRUB SERPL-MCNC: <0.2 MG/DL (ref 0–1.2)
BILIRUBIN URINE: NEGATIVE
BLOOD, URINE: ABNORMAL
BUN BLDV-MCNC: 8 MG/DL (ref 6–20)
CALCIUM SERPL-MCNC: 8.9 MG/DL (ref 8.6–10.2)
CHLORIDE BLD-SCNC: 103 MEQ/L (ref 98–107)
CLARITY: ABNORMAL
CO2: 25 MEQ/L (ref 22–29)
COLOR: YELLOW
CREAT SERPL-MCNC: 1.15 MG/DL (ref 0.5–0.9)
CRYSTALS, UA: ABNORMAL
EOSINOPHILS ABSOLUTE: 0.1 K/UL (ref 0–0.7)
EOSINOPHILS RELATIVE PERCENT: 1.9 %
EPITHELIAL CELLS, UA: ABNORMAL /HPF
GFR AFRICAN AMERICAN: >60
GFR NON-AFRICAN AMERICAN: 51.3
GLOBULIN: 2.5 G/DL (ref 2.3–3.5)
GLUCOSE BLD-MCNC: 119 MG/DL (ref 74–109)
GLUCOSE URINE: NEGATIVE MG/DL
HCT VFR BLD CALC: 34.7 % (ref 37–47)
HEMOGLOBIN: 11.5 G/DL (ref 12–16)
KETONES, URINE: NEGATIVE MG/DL
LACTIC ACID: 1 MMOL/L (ref 0.5–2.2)
LEUKOCYTE ESTERASE, URINE: ABNORMAL
LYMPHOCYTES ABSOLUTE: 1.3 K/UL (ref 1–4.8)
LYMPHOCYTES RELATIVE PERCENT: 18.2 %
MCH RBC QN AUTO: 28.5 PG (ref 27–31.3)
MCHC RBC AUTO-ENTMCNC: 33.1 % (ref 33–37)
MCV RBC AUTO: 86 FL (ref 82–100)
MONOCYTES ABSOLUTE: 0.3 K/UL (ref 0.2–0.8)
MONOCYTES RELATIVE PERCENT: 4.6 %
NEUTROPHILS ABSOLUTE: 5.4 K/UL (ref 1.4–6.5)
NEUTROPHILS RELATIVE PERCENT: 74.4 %
NITRITE, URINE: NEGATIVE
PDW BLD-RTO: 16.3 % (ref 11.5–14.5)
PH UA: 6 (ref 5–9)
PLATELET # BLD: 263 K/UL (ref 130–400)
POTASSIUM SERPL-SCNC: 4 MEQ/L (ref 3.5–5.1)
PROTEIN UA: 30 MG/DL
RBC # BLD: 4.03 M/UL (ref 4.2–5.4)
RBC UA: ABNORMAL /HPF (ref 0–2)
SODIUM BLD-SCNC: 137 MEQ/L (ref 132–144)
SPECIFIC GRAVITY UA: 1.02 (ref 1–1.03)
TOTAL PROTEIN: 6.1 G/DL (ref 6.4–8.1)
URINE REFLEX TO CULTURE: YES
UROBILINOGEN, URINE: 1 E.U./DL
WBC # BLD: 7.2 K/UL (ref 4.8–10.8)
WBC UA: ABNORMAL /HPF (ref 0–5)

## 2018-11-24 PROCEDURE — 83605 ASSAY OF LACTIC ACID: CPT

## 2018-11-24 PROCEDURE — 71045 X-RAY EXAM CHEST 1 VIEW: CPT

## 2018-11-24 PROCEDURE — 99285 EMERGENCY DEPT VISIT HI MDM: CPT

## 2018-11-24 PROCEDURE — 36415 COLL VENOUS BLD VENIPUNCTURE: CPT

## 2018-11-24 PROCEDURE — 80053 COMPREHEN METABOLIC PANEL: CPT

## 2018-11-24 PROCEDURE — 85025 COMPLETE CBC W/AUTO DIFF WBC: CPT

## 2018-11-24 PROCEDURE — 87086 URINE CULTURE/COLONY COUNT: CPT

## 2018-11-24 PROCEDURE — 6360000002 HC RX W HCPCS: Performed by: PHYSICIAN ASSISTANT

## 2018-11-24 PROCEDURE — 96374 THER/PROPH/DIAG INJ IV PUSH: CPT

## 2018-11-24 PROCEDURE — 81001 URINALYSIS AUTO W/SCOPE: CPT

## 2018-11-24 PROCEDURE — 6370000000 HC RX 637 (ALT 250 FOR IP): Performed by: PHYSICIAN ASSISTANT

## 2018-11-24 RX ORDER — LORAZEPAM 2 MG/ML
1 INJECTION INTRAMUSCULAR ONCE
Status: COMPLETED | OUTPATIENT
Start: 2018-11-24 | End: 2018-11-24

## 2018-11-24 RX ORDER — OXYCODONE HYDROCHLORIDE AND ACETAMINOPHEN 5; 325 MG/1; MG/1
1 TABLET ORAL ONCE
Status: COMPLETED | OUTPATIENT
Start: 2018-11-24 | End: 2018-11-24

## 2018-11-24 RX ORDER — OXYCODONE AND ACETAMINOPHEN 10; 325 MG/1; MG/1
1 TABLET ORAL 2 TIMES DAILY
Status: ON HOLD | COMMUNITY
End: 2019-06-13 | Stop reason: HOSPADM

## 2018-11-24 RX ADMIN — LORAZEPAM 1 MG: 2 INJECTION INTRAMUSCULAR; INTRAVENOUS at 19:25

## 2018-11-24 RX ADMIN — OXYCODONE AND ACETAMINOPHEN 1 TABLET: 5; 325 TABLET ORAL at 19:25

## 2018-11-24 RX ADMIN — OXYCODONE AND ACETAMINOPHEN 1 TABLET: 5; 325 TABLET ORAL at 22:07

## 2018-11-24 ASSESSMENT — ENCOUNTER SYMPTOMS
EYE DISCHARGE: 0
COUGH: 1
BACK PAIN: 1
NAUSEA: 1
APNEA: 0
VOMITING: 1
ABDOMINAL DISTENTION: 0
VOICE CHANGE: 0
ANAL BLEEDING: 0
PHOTOPHOBIA: 0
ABDOMINAL PAIN: 0

## 2018-11-24 ASSESSMENT — PAIN DESCRIPTION - LOCATION
LOCATION: NECK
LOCATION: NECK

## 2018-11-24 ASSESSMENT — PAIN SCALES - GENERAL
PAINLEVEL_OUTOF10: 8
PAINLEVEL_OUTOF10: 10
PAINLEVEL_OUTOF10: 10
PAINLEVEL_OUTOF10: 9

## 2018-11-24 ASSESSMENT — PAIN DESCRIPTION - ONSET: ONSET: ON-GOING

## 2018-11-24 ASSESSMENT — PAIN DESCRIPTION - PAIN TYPE
TYPE: ACUTE PAIN
TYPE: ACUTE PAIN

## 2018-11-24 ASSESSMENT — PAIN DESCRIPTION - FREQUENCY: FREQUENCY: INTERMITTENT

## 2018-11-24 ASSESSMENT — PAIN DESCRIPTION - DESCRIPTORS: DESCRIPTORS: ACHING;TIGHTNESS

## 2018-11-24 ASSESSMENT — PAIN DESCRIPTION - PROGRESSION: CLINICAL_PROGRESSION: NOT CHANGED

## 2018-11-24 NOTE — ED PROVIDER NOTES
for abdominal distention, abdominal pain and anal bleeding. Endocrine: Negative for cold intolerance, heat intolerance and polyphagia. Genitourinary: Negative for genital sores. Musculoskeletal: Positive for back pain and neck pain. Negative for joint swelling and neck stiffness. Skin: Negative for pallor. Allergic/Immunologic: Negative for immunocompromised state. Neurological: Negative for seizures and facial asymmetry. Hematological: Does not bruise/bleed easily. Psychiatric/Behavioral: Negative for behavioral problems, self-injury and sleep disturbance. All other systems reviewed and are negative. Except as noted above the remainder of the review of systems was reviewed and negative. PAST MEDICAL HISTORY     Past Medical History:   Diagnosis Date    Asthma     Cerebral artery occlusion with [facility] infarction ([facility] [address]     Chronic back pain     Depression     Headache     Kidney stone     Seizures ([facility] [address] 5/24/2016         SURGICALHISTORY       Past Surgical History:   Procedure Laterality Date    APPENDECTOMY      BACK SURGERY      CHOLECYSTECTOMY      HYSTERECTOMY      KNEE SURGERY Bilateral     OTHER SURGICAL HISTORY      sup/pub cath june 1, 2018         CURRENT MEDICATIONS       Previous Medications    ALPRAZOLAM (XANAX) 0.5 MG TABLET    Take 0.5 mg by mouth 2 times daily.  .    ARIPIPRAZOLE (ABILIFY) 15 MG TABLET    Take 15 mg by mouth daily    BUDESONIDE-FORMOTEROL (SYMBICORT) 80-4.5 MCG/ACT AERO    Inhale 2 puffs into the lungs 2 times daily as needed     EPINEPHRINE 1 MG/ML INJECTION    Inject 0.2 mg into the skin as needed    ESLICARBAZEPINE ACETATE 400 MG TABS    Take 400 mg by mouth nightly     FEXOFENADINE (ALLEGRA) 60 MG TABLET    Take 60 mg by mouth 2 times daily    FLUCONAZOLE (DIFLUCAN) 200 MG TABLET    Take 200 mg by mouth as needed As needed for Yeast Infection    LEVETIRACETAM (KEPPRA) 500 MG TABLET    Take 2 tablets by mouth 2 times daily    LORAZEPAM (ATIVAN) 0.5 MG TABLET    Take 0.5 mg by mouth daily as needed for Anxiety. .    MELOXICAM (MOBIC) 15 MG TABLET    Take 1 tablet by mouth daily    NITROGLYCERIN (NITROSTAT) 0.4 MG SL TABLET    Place 0.4 mg under the tongue every 5 minutes as needed for Chest pain Dissolve 1 tablet under tongue for chest pain and repeat every 5 min up to max of 3 total doses. If no relief after 3 doses call 911    ONDANSETRON (ZOFRAN ODT) 4 MG DISINTEGRATING TABLET    Take 1-2 tablets by mouth every 12 hours as needed for Nausea May Sub regular tablet (non-ODT) if insurance does not cover ODT. OXYCODONE-ACETAMINOPHEN (PERCOCET)  MG PER TABLET    Take 1 tablet by mouth 2 times daily. Judythe Layer PAROXETINE (PAXIL) 40 MG TABLET    Take 40 mg by mouth every morning     PROPRANOLOL (INDERAL LA) 60 MG CR CAPSULE    Take 1 capsule by mouth daily    SULFAMETHOXAZOLE-TRIMETHOPRIM (BACTRIM DS) 800-160 MG PER TABLET    Take 1 tablet by mouth 2 times daily    TOPIRAMATE (TOPAMAX) 200 MG TABLET    Take 200 mg by mouth 2 times daily    TRAZODONE (DESYREL) 50 MG TABLET    Take 300 mg by mouth nightly     ZOLPIDEM (AMBIEN) 10 MG TABLET    Take 10 mg by mouth nightly as needed for Sleep. .       ALLERGIES     Latex; Ciprofloxacin; Contrast [iodides]; Daypro [oxaprozin]; Estrogens; Iodine; Lamictal [lamotrigine]; Lyrica [pregabalin]; Macrobid [nitrofurantoin monohyd macro]; Macrolides and ketolides; Shellfish-derived products; Tape [adhesive tape]; Tegretol [carbamazepine]; Toradol [ketorolac tromethamine]; Tramadol; Tylenol [acetaminophen];  Vicodin [hydrocodone-acetaminophen]; and Zanaflex [tizanidine hcl]    FAMILY HISTORY       Family History   Problem Relation Age of Onset    Cancer Father     Cancer Paternal Aunt           SOCIAL HISTORY       Social History     Social History    Marital status:      Spouse name: N/A    Number of children: N/A    Years of education: N/A     Social History Main Topics    Smoking status: Never Smoker interpreted by the emergency physician with the below findings:     nad/see rad report      Interpretation per the Radiologist below, if available at the time ofthis note:    XR CHEST PORTABLE    (Results Pending)         ED BEDSIDE ULTRASOUND:   Performed by ED Physician - none    LABS:  Labs Reviewed   COMPREHENSIVE METABOLIC PANEL - Abnormal; Notable for the following:        Result Value    Glucose 119 (*)     CREATININE 1.15 (*)     GFR Non- 51.3 (*)     Total Protein 6.1 (*)     Alb 3.6 (*)     All other components within normal limits   CBC WITH AUTO DIFFERENTIAL - Abnormal; Notable for the following:     RBC 4.03 (*)     Hemoglobin 11.5 (*)     Hematocrit 34.7 (*)     RDW 16.3 (*)     All other components within normal limits   URINE RT REFLEX TO CULTURE - Abnormal; Notable for the following:     Clarity, UA SL CLOUDY (*)     Blood, Urine SMALL (*)     Protein, UA 30 (*)     Leukocyte Esterase, Urine MODERATE (*)     All other components within normal limits   URINE CULTURE   LACTIC ACID, PLASMA   MICROSCOPIC URINALYSIS       All other labs were within normal range or not returned as of this dictation. EMERGENCY DEPARTMENT COURSE and DIFFERENTIAL DIAGNOSIS/MDM:   Vitals:    Vitals:    11/24/18 1759 11/24/18 1930 11/24/18 2104   BP: 132/79 (!) 100/35 (!) 106/59   Pulse: 71 66 63   Resp: 18 18 16   Temp: 97.9 °F (36.6 °C)  97.8 °F (36.6 °C)   TempSrc: Oral Oral Oral   SpO2: 97% 97% 100%   Weight: 217 lb (98.4 kg)     Height: 5' 5\" (1.651 m)              MDM  Number of Diagnoses or Management Options  Bilateral low back pain, unspecified chronicity, with sciatica presence unspecified:   Neck pain:   Urinary tract infection associated with catheterization of urinary tract, unspecified indwelling urinary catheter type, subsequent encounter:   Diagnosis management comments: Patient's serial exams on initial assessment are inconsistent.   She has tenderness and immediately afterwards has no

## 2018-11-26 LAB — URINE CULTURE, ROUTINE: NORMAL

## 2018-12-06 ENCOUNTER — HOSPITAL ENCOUNTER (EMERGENCY)
Age: 43
Discharge: HOME | End: 2018-12-06
Attending: EMERGENCY MEDICINE
Payer: MEDICARE

## 2018-12-06 VITALS
WEIGHT: 212 LBS | HEART RATE: 79 BPM | HEIGHT: 65 IN | TEMPERATURE: 98.1 F | OXYGEN SATURATION: 98 % | RESPIRATION RATE: 16 BRPM | SYSTOLIC BLOOD PRESSURE: 109 MMHG | BODY MASS INDEX: 35.32 KG/M2 | DIASTOLIC BLOOD PRESSURE: 74 MMHG

## 2018-12-06 DIAGNOSIS — N30.00 ACUTE CYSTITIS WITHOUT HEMATURIA: ICD-10-CM

## 2018-12-06 DIAGNOSIS — J11.1 INFLUENZA-LIKE SYNDROME: Primary | ICD-10-CM

## 2018-12-06 LAB
ANION GAP SERPL CALCULATED.3IONS-SCNC: 14 MEQ/L (ref 7–13)
BASOPHILS ABSOLUTE: 0.1 K/UL (ref 0–0.2)
BASOPHILS RELATIVE PERCENT: 1.1 %
BUN BLDV-MCNC: 6 MG/DL (ref 6–20)
CALCIUM SERPL-MCNC: 9.2 MG/DL (ref 8.6–10.2)
CHLORIDE BLD-SCNC: 101 MEQ/L (ref 98–107)
CO2: 22 MEQ/L (ref 22–29)
CREAT SERPL-MCNC: 0.9 MG/DL (ref 0.5–0.9)
EOSINOPHILS ABSOLUTE: 0.2 K/UL (ref 0–0.7)
EOSINOPHILS RELATIVE PERCENT: 3.5 %
GFR AFRICAN AMERICAN: >60
GFR NON-AFRICAN AMERICAN: >60
GLUCOSE BLD-MCNC: 80 MG/DL (ref 74–109)
HCT VFR BLD CALC: 36.7 % (ref 37–47)
HEMOGLOBIN: 12.5 G/DL (ref 12–16)
LYMPHOCYTES ABSOLUTE: 1.1 K/UL (ref 1–4.8)
LYMPHOCYTES RELATIVE PERCENT: 16.7 %
MCH RBC QN AUTO: 29.4 PG (ref 27–31.3)
MCHC RBC AUTO-ENTMCNC: 33.9 % (ref 33–37)
MCV RBC AUTO: 86.8 FL (ref 82–100)
MONOCYTES ABSOLUTE: 0.4 K/UL (ref 0.2–0.8)
MONOCYTES RELATIVE PERCENT: 5.5 %
NEUTROPHILS ABSOLUTE: 5 K/UL (ref 1.4–6.5)
NEUTROPHILS RELATIVE PERCENT: 73.2 %
PDW BLD-RTO: 17.1 % (ref 11.5–14.5)
PLATELET # BLD: 242 K/UL (ref 130–400)
POTASSIUM SERPL-SCNC: 4 MEQ/L (ref 3.5–5.1)
RAPID INFLUENZA  B AGN: NEGATIVE
RAPID INFLUENZA A AGN: NEGATIVE
RBC # BLD: 4.24 M/UL (ref 4.2–5.4)
SODIUM BLD-SCNC: 137 MEQ/L (ref 132–144)
WBC # BLD: 6.8 K/UL (ref 4.8–10.8)

## 2018-12-06 PROCEDURE — 96365 THER/PROPH/DIAG IV INF INIT: CPT

## 2018-12-06 PROCEDURE — 96375 TX/PRO/DX INJ NEW DRUG ADDON: CPT

## 2018-12-06 PROCEDURE — 36415 COLL VENOUS BLD VENIPUNCTURE: CPT

## 2018-12-06 PROCEDURE — 80048 BASIC METABOLIC PNL TOTAL CA: CPT

## 2018-12-06 PROCEDURE — 2580000003 HC RX 258: Performed by: EMERGENCY MEDICINE

## 2018-12-06 PROCEDURE — 99284 EMERGENCY DEPT VISIT MOD MDM: CPT

## 2018-12-06 PROCEDURE — 6360000002 HC RX W HCPCS: Performed by: EMERGENCY MEDICINE

## 2018-12-06 PROCEDURE — 87804 INFLUENZA ASSAY W/OPTIC: CPT

## 2018-12-06 PROCEDURE — 85025 COMPLETE CBC W/AUTO DIFF WBC: CPT

## 2018-12-06 RX ORDER — LORAZEPAM 2 MG/ML
0.5 INJECTION INTRAMUSCULAR ONCE
Status: COMPLETED | OUTPATIENT
Start: 2018-12-06 | End: 2018-12-06

## 2018-12-06 RX ORDER — PROMETHAZINE HYDROCHLORIDE 25 MG/1
25 SUPPOSITORY RECTAL EVERY 6 HOURS PRN
Qty: 20 SUPPOSITORY | Refills: 0 | Status: SHIPPED | OUTPATIENT
Start: 2018-12-06 | End: 2018-12-13

## 2018-12-06 RX ORDER — 0.9 % SODIUM CHLORIDE 0.9 %
1000 INTRAVENOUS SOLUTION INTRAVENOUS ONCE
Status: COMPLETED | OUTPATIENT
Start: 2018-12-06 | End: 2018-12-06

## 2018-12-06 RX ORDER — SODIUM CHLORIDE 0.9 % (FLUSH) 0.9 %
3 SYRINGE (ML) INJECTION EVERY 8 HOURS
Status: DISCONTINUED | OUTPATIENT
Start: 2018-12-06 | End: 2018-12-07 | Stop reason: HOSPADM

## 2018-12-06 RX ORDER — ONDANSETRON 2 MG/ML
4 INJECTION INTRAMUSCULAR; INTRAVENOUS ONCE
Status: COMPLETED | OUTPATIENT
Start: 2018-12-06 | End: 2018-12-06

## 2018-12-06 RX ORDER — SULFAMETHOXAZOLE AND TRIMETHOPRIM 800; 160 MG/1; MG/1
1 TABLET ORAL 2 TIMES DAILY
Qty: 14 TABLET | Refills: 0 | Status: SHIPPED | OUTPATIENT
Start: 2018-12-06 | End: 2018-12-13

## 2018-12-06 RX ORDER — ONDANSETRON 4 MG/1
4 TABLET, FILM COATED ORAL EVERY 8 HOURS PRN
Qty: 20 TABLET | Refills: 0 | Status: ON HOLD | OUTPATIENT
Start: 2018-12-06 | End: 2019-01-19 | Stop reason: HOSPADM

## 2018-12-06 RX ADMIN — CEFTRIAXONE SODIUM 1 G: 1 INJECTION, POWDER, FOR SOLUTION INTRAMUSCULAR; INTRAVENOUS at 20:18

## 2018-12-06 RX ADMIN — LORAZEPAM 0.5 MG: 2 INJECTION INTRAMUSCULAR; INTRAVENOUS at 21:27

## 2018-12-06 RX ADMIN — ONDANSETRON 4 MG: 2 INJECTION INTRAMUSCULAR; INTRAVENOUS at 20:18

## 2018-12-06 RX ADMIN — SODIUM CHLORIDE 1000 ML: 9 INJECTION, SOLUTION INTRAVENOUS at 20:17

## 2018-12-06 ASSESSMENT — PAIN SCALES - GENERAL
PAINLEVEL_OUTOF10: 0
PAINLEVEL_OUTOF10: 10

## 2018-12-06 ASSESSMENT — PAIN DESCRIPTION - DESCRIPTORS: DESCRIPTORS: ACHING

## 2018-12-06 ASSESSMENT — PAIN DESCRIPTION - FREQUENCY: FREQUENCY: CONTINUOUS

## 2018-12-06 ASSESSMENT — PAIN DESCRIPTION - ONSET: ONSET: PROGRESSIVE

## 2018-12-06 ASSESSMENT — PAIN DESCRIPTION - PAIN TYPE: TYPE: ACUTE PAIN

## 2018-12-06 ASSESSMENT — PAIN DESCRIPTION - LOCATION: LOCATION: GENERALIZED

## 2018-12-07 NOTE — ED NOTES
heplock placed in right ac, labs and blood cultures obtained and at the bedside     Maeve Mendez RN  12/06/18 1955

## 2018-12-07 NOTE — ED PROVIDER NOTES
All other components within normal limits   RAPID INFLUENZA A/B ANTIGENS       All other labs were within normal range or not returned as of this dictation. EMERGENCY DEPARTMENT COURSE and DIFFERENTIAL DIAGNOSIS/MDM:   Vitals:    Vitals:    12/06/18 1859 12/06/18 2041   BP: 111/73 107/71   Pulse: 74 78   Resp: 18 17   Temp: 98.1 °F (36.7 °C)    TempSrc: Oral    SpO2: 98% 98%   Weight: 212 lb (96.2 kg)    Height: 5' 5\" (1.651 m)            MDM    CRITICAL CARE TIME   Total Critical Care time was  minutes, excluding separately reportableprocedures. There was a high probability of clinicallysignificant/life threatening deterioration in the patient's condition which required my urgent intervention. CONSULTS:  None    PROCEDURES:  Unless otherwise noted below, none     Procedures    FINAL IMPRESSION      1. Influenza-like syndrome    2. Acute cystitis without hematuria          DISPOSITION/PLAN   DISPOSITION Decision To Discharge 12/06/2018 09:10:20 PM      PATIENT REFERRED TO:  Huber Brown [facility]  [phone]    In 3 days  Return for weakening, worsening, persistent fevers      DISCHARGE MEDICATIONS:  New Prescriptions    ONDANSETRON (ZOFRAN) 4 MG TABLET    Take 1 tablet by mouth every 8 hours as needed for Nausea    PROMETHAZINE (PHENERGAN) 25 MG SUPPOSITORY    Place 1 suppository rectally every 6 hours as needed for Nausea WARNING:  May cause drowsiness. May impair ability to operate vehicles or machinery. Do not use in combination with alcohol.     SULFAMETHOXAZOLE-TRIMETHOPRIM (BACTRIM DS) 800-160 MG PER TABLET    Take 1 tablet by mouth 2 times daily for 7 days          (Please note that portions of this note were completed with a voice recognition program.  Efforts were made to edit the dictations but occasionally words are mis-transcribed.)    Meredith Ball MD (electronically signed)  Attending Emergency Physician          Meredith Ball MD  12/06/18 5260

## 2018-12-09 ENCOUNTER — HOSPITAL ENCOUNTER (EMERGENCY)
Age: 43
Discharge: HOME | End: 2018-12-09
Payer: MEDICARE

## 2018-12-09 ENCOUNTER — APPOINTMENT (OUTPATIENT)
Dept: GENERAL RADIOLOGY | Age: 43
End: 2018-12-09
Payer: MEDICARE

## 2018-12-09 VITALS
HEIGHT: 65 IN | RESPIRATION RATE: 15 BRPM | OXYGEN SATURATION: 96 % | HEART RATE: 60 BPM | TEMPERATURE: 98.1 F | SYSTOLIC BLOOD PRESSURE: 125 MMHG | BODY MASS INDEX: 35.32 KG/M2 | DIASTOLIC BLOOD PRESSURE: 68 MMHG | WEIGHT: 212 LBS

## 2018-12-09 DIAGNOSIS — R53.1 GENERAL WEAKNESS: ICD-10-CM

## 2018-12-09 DIAGNOSIS — R19.7 VOMITING AND DIARRHEA: Primary | ICD-10-CM

## 2018-12-09 DIAGNOSIS — R11.10 VOMITING AND DIARRHEA: Primary | ICD-10-CM

## 2018-12-09 LAB
ALBUMIN SERPL-MCNC: 3.6 G/DL (ref 3.9–4.9)
ALP BLD-CCNC: 84 U/L (ref 40–130)
ALT SERPL-CCNC: 14 U/L (ref 0–33)
AMORPHOUS: ABNORMAL
ANION GAP SERPL CALCULATED.3IONS-SCNC: 12 MEQ/L (ref 7–13)
AST SERPL-CCNC: 15 U/L (ref 0–35)
BACTERIA: ABNORMAL /HPF
BASOPHILS ABSOLUTE: 0 K/UL (ref 0–0.2)
BASOPHILS RELATIVE PERCENT: 0.6 %
BILIRUB SERPL-MCNC: 0.3 MG/DL (ref 0–1.2)
BILIRUBIN URINE: NEGATIVE
BLOOD, URINE: ABNORMAL
BUN BLDV-MCNC: 4 MG/DL (ref 6–20)
CALCIUM SERPL-MCNC: 9 MG/DL (ref 8.6–10.2)
CHLORIDE BLD-SCNC: 102 MEQ/L (ref 98–107)
CLARITY: CLEAR
CO2: 25 MEQ/L (ref 22–29)
COLOR: YELLOW
CREAT SERPL-MCNC: 1.01 MG/DL (ref 0.5–0.9)
CRYSTALS, UA: ABNORMAL
EOSINOPHILS ABSOLUTE: 0.2 K/UL (ref 0–0.7)
EOSINOPHILS RELATIVE PERCENT: 3.2 %
EPITHELIAL CELLS, UA: ABNORMAL /HPF
GFR AFRICAN AMERICAN: >60
GFR NON-AFRICAN AMERICAN: 59.6
GLOBULIN: 3 G/DL (ref 2.3–3.5)
GLUCOSE BLD-MCNC: 102 MG/DL (ref 74–109)
GLUCOSE URINE: NEGATIVE MG/DL
HCT VFR BLD CALC: 34.4 % (ref 37–47)
HEMOGLOBIN: 11.6 G/DL (ref 12–16)
KETONES, URINE: NEGATIVE MG/DL
LEUKOCYTE ESTERASE, URINE: ABNORMAL
LYMPHOCYTES ABSOLUTE: 1.2 K/UL (ref 1–4.8)
LYMPHOCYTES RELATIVE PERCENT: 17.7 %
MCH RBC QN AUTO: 29.2 PG (ref 27–31.3)
MCHC RBC AUTO-ENTMCNC: 33.8 % (ref 33–37)
MCV RBC AUTO: 86.4 FL (ref 82–100)
MONOCYTES ABSOLUTE: 0.3 K/UL (ref 0.2–0.8)
MONOCYTES RELATIVE PERCENT: 4.8 %
NEUTROPHILS ABSOLUTE: 4.8 K/UL (ref 1.4–6.5)
NEUTROPHILS RELATIVE PERCENT: 73.7 %
NITRITE, URINE: NEGATIVE
PDW BLD-RTO: 17.6 % (ref 11.5–14.5)
PH UA: 6 (ref 5–9)
PLATELET # BLD: 248 K/UL (ref 130–400)
POTASSIUM SERPL-SCNC: 3.5 MEQ/L (ref 3.5–5.1)
PROTEIN UA: ABNORMAL MG/DL
RBC # BLD: 3.99 M/UL (ref 4.2–5.4)
RBC UA: ABNORMAL /HPF (ref 0–2)
SODIUM BLD-SCNC: 139 MEQ/L (ref 132–144)
SPECIFIC GRAVITY UA: 1.01 (ref 1–1.03)
TOTAL PROTEIN: 6.6 G/DL (ref 6.4–8.1)
URINE REFLEX TO CULTURE: YES
UROBILINOGEN, URINE: 0.2 E.U./DL
WBC # BLD: 6.5 K/UL (ref 4.8–10.8)
WBC UA: ABNORMAL /HPF (ref 0–5)
YEAST: PRESENT

## 2018-12-09 PROCEDURE — 96375 TX/PRO/DX INJ NEW DRUG ADDON: CPT

## 2018-12-09 PROCEDURE — 6370000000 HC RX 637 (ALT 250 FOR IP): Performed by: PHYSICIAN ASSISTANT

## 2018-12-09 PROCEDURE — 80053 COMPREHEN METABOLIC PANEL: CPT

## 2018-12-09 PROCEDURE — 96372 THER/PROPH/DIAG INJ SC/IM: CPT

## 2018-12-09 PROCEDURE — 96374 THER/PROPH/DIAG INJ IV PUSH: CPT

## 2018-12-09 PROCEDURE — 6360000002 HC RX W HCPCS: Performed by: PHYSICIAN ASSISTANT

## 2018-12-09 PROCEDURE — 71045 X-RAY EXAM CHEST 1 VIEW: CPT

## 2018-12-09 PROCEDURE — 87086 URINE CULTURE/COLONY COUNT: CPT

## 2018-12-09 PROCEDURE — 85025 COMPLETE CBC W/AUTO DIFF WBC: CPT

## 2018-12-09 PROCEDURE — 99284 EMERGENCY DEPT VISIT MOD MDM: CPT

## 2018-12-09 PROCEDURE — 81001 URINALYSIS AUTO W/SCOPE: CPT

## 2018-12-09 PROCEDURE — 36415 COLL VENOUS BLD VENIPUNCTURE: CPT

## 2018-12-09 RX ORDER — HYDROXYZINE PAMOATE 50 MG/1
50 CAPSULE ORAL ONCE
Status: COMPLETED | OUTPATIENT
Start: 2018-12-09 | End: 2018-12-09

## 2018-12-09 RX ORDER — AMITRIPTYLINE HYDROCHLORIDE 100 MG/1
100 TABLET, FILM COATED ORAL NIGHTLY
COMMUNITY

## 2018-12-09 RX ORDER — HYDROXYZINE PAMOATE 25 MG/1
25-50 CAPSULE ORAL 3 TIMES DAILY PRN
Qty: 30 CAPSULE | Refills: 0 | Status: SHIPPED | OUTPATIENT
Start: 2018-12-09 | End: 2018-12-23

## 2018-12-09 RX ORDER — ONDANSETRON 4 MG/1
4 TABLET, ORALLY DISINTEGRATING ORAL EVERY 8 HOURS PRN
Qty: 20 TABLET | Refills: 0 | Status: SHIPPED | OUTPATIENT
Start: 2018-12-09 | End: 2019-01-30

## 2018-12-09 RX ORDER — KETOROLAC TROMETHAMINE 30 MG/ML
30 INJECTION, SOLUTION INTRAMUSCULAR; INTRAVENOUS ONCE
Status: COMPLETED | OUTPATIENT
Start: 2018-12-09 | End: 2018-12-09

## 2018-12-09 RX ORDER — ONDANSETRON 2 MG/ML
4 INJECTION INTRAMUSCULAR; INTRAVENOUS ONCE
Status: COMPLETED | OUTPATIENT
Start: 2018-12-09 | End: 2018-12-09

## 2018-12-09 RX ORDER — DIPHENHYDRAMINE HYDROCHLORIDE 50 MG/ML
25 INJECTION INTRAMUSCULAR; INTRAVENOUS ONCE
Status: COMPLETED | OUTPATIENT
Start: 2018-12-09 | End: 2018-12-09

## 2018-12-09 RX ORDER — PROMETHAZINE HYDROCHLORIDE 25 MG/ML
25 INJECTION, SOLUTION INTRAMUSCULAR; INTRAVENOUS ONCE
Status: COMPLETED | OUTPATIENT
Start: 2018-12-09 | End: 2018-12-09

## 2018-12-09 RX ADMIN — KETOROLAC TROMETHAMINE 30 MG: 30 INJECTION, SOLUTION INTRAMUSCULAR at 20:56

## 2018-12-09 RX ADMIN — HYDROXYZINE PAMOATE 50 MG: 50 CAPSULE ORAL at 22:04

## 2018-12-09 RX ADMIN — ONDANSETRON 4 MG: 2 INJECTION INTRAMUSCULAR; INTRAVENOUS at 20:57

## 2018-12-09 RX ADMIN — PROMETHAZINE HYDROCHLORIDE 25 MG: 25 INJECTION, SOLUTION INTRAMUSCULAR; INTRAVENOUS at 22:05

## 2018-12-09 RX ADMIN — DIPHENHYDRAMINE HYDROCHLORIDE 25 MG: 50 INJECTION, SOLUTION INTRAMUSCULAR; INTRAVENOUS at 20:57

## 2018-12-09 ASSESSMENT — ENCOUNTER SYMPTOMS
SHORTNESS OF BREATH: 0
VOMITING: 1
EYE PAIN: 0
NAUSEA: 1
ABDOMINAL PAIN: 0
RHINORRHEA: 1
DIARRHEA: 1
COUGH: 1
TROUBLE SWALLOWING: 0
APNEA: 0
COLOR CHANGE: 0
ALLERGIC/IMMUNOLOGIC NEGATIVE: 1

## 2018-12-09 ASSESSMENT — PAIN DESCRIPTION - LOCATION: LOCATION: GENERALIZED

## 2018-12-09 ASSESSMENT — PAIN SCALES - GENERAL
PAINLEVEL_OUTOF10: 5
PAINLEVEL_OUTOF10: 5

## 2018-12-09 ASSESSMENT — PAIN DESCRIPTION - PAIN TYPE: TYPE: ACUTE PAIN

## 2018-12-09 ASSESSMENT — PAIN DESCRIPTION - ONSET: ONSET: ON-GOING

## 2018-12-09 ASSESSMENT — PAIN DESCRIPTION - DESCRIPTORS: DESCRIPTORS: ACHING

## 2018-12-09 ASSESSMENT — PAIN DESCRIPTION - PROGRESSION: CLINICAL_PROGRESSION: NOT CHANGED

## 2018-12-10 NOTE — ED NOTES
Bed: 24  Expected date:   Expected time:   Means of arrival:   Comments:  43yr female, flu like symptoms x 8 days, seen here 3 days ago had positive flu swab, PRINCESS Blanchard RN  12/09/18 8350

## 2018-12-11 LAB — URINE CULTURE, ROUTINE: NORMAL

## 2018-12-16 ENCOUNTER — HOSPITAL ENCOUNTER (EMERGENCY)
Age: 43
Discharge: HOME | End: 2018-12-16
Payer: MEDICARE

## 2018-12-16 VITALS
TEMPERATURE: 98.6 F | SYSTOLIC BLOOD PRESSURE: 109 MMHG | OXYGEN SATURATION: 95 % | DIASTOLIC BLOOD PRESSURE: 83 MMHG | HEART RATE: 62 BPM | BODY MASS INDEX: 35.28 KG/M2 | WEIGHT: 212 LBS | RESPIRATION RATE: 14 BRPM

## 2018-12-16 DIAGNOSIS — B37.41 YEAST CYSTITIS: ICD-10-CM

## 2018-12-16 DIAGNOSIS — N30.01 ACUTE CYSTITIS WITH HEMATURIA: Primary | ICD-10-CM

## 2018-12-16 LAB
ALBUMIN SERPL-MCNC: 3.4 G/DL (ref 3.9–4.9)
ALP BLD-CCNC: 86 U/L (ref 40–130)
ALT SERPL-CCNC: 20 U/L (ref 0–33)
ANION GAP SERPL CALCULATED.3IONS-SCNC: 13 MEQ/L (ref 7–13)
AST SERPL-CCNC: 18 U/L (ref 0–35)
BACTERIA: NEGATIVE /HPF
BASOPHILS ABSOLUTE: 0.1 K/UL (ref 0–0.2)
BASOPHILS RELATIVE PERCENT: 1.2 %
BILIRUB SERPL-MCNC: 0.4 MG/DL (ref 0–1.2)
BILIRUBIN URINE: NEGATIVE
BLOOD, URINE: ABNORMAL
BUN BLDV-MCNC: 6 MG/DL (ref 6–20)
CALCIUM SERPL-MCNC: 8.8 MG/DL (ref 8.6–10.2)
CHLORIDE BLD-SCNC: 104 MEQ/L (ref 98–107)
CLARITY: CLEAR
CO2: 23 MEQ/L (ref 22–29)
COLOR: YELLOW
CREAT SERPL-MCNC: 0.95 MG/DL (ref 0.5–0.9)
EOSINOPHILS ABSOLUTE: 0.2 K/UL (ref 0–0.7)
EOSINOPHILS RELATIVE PERCENT: 2.5 %
EPITHELIAL CELLS, UA: ABNORMAL /HPF (ref 0–5)
GFR AFRICAN AMERICAN: >60
GFR NON-AFRICAN AMERICAN: >60
GLOBULIN: 2.8 G/DL (ref 2.3–3.5)
GLUCOSE BLD-MCNC: 89 MG/DL (ref 74–109)
GLUCOSE URINE: NEGATIVE MG/DL
HCT VFR BLD CALC: 37.8 % (ref 37–47)
HEMOGLOBIN: 12.6 G/DL (ref 12–16)
HYALINE CASTS: ABNORMAL /HPF (ref 0–5)
KETONES, URINE: NEGATIVE MG/DL
LEUKOCYTE ESTERASE, URINE: ABNORMAL
LYMPHOCYTES ABSOLUTE: 1.6 K/UL (ref 1–4.8)
LYMPHOCYTES RELATIVE PERCENT: 19.8 %
MCH RBC QN AUTO: 29.2 PG (ref 27–31.3)
MCHC RBC AUTO-ENTMCNC: 33.5 % (ref 33–37)
MCV RBC AUTO: 87.1 FL (ref 82–100)
MONOCYTES ABSOLUTE: 0.5 K/UL (ref 0.2–0.8)
MONOCYTES RELATIVE PERCENT: 5.7 %
NEUTROPHILS ABSOLUTE: 5.6 K/UL (ref 1.4–6.5)
NEUTROPHILS RELATIVE PERCENT: 70.8 %
NITRITE, URINE: NEGATIVE
PDW BLD-RTO: 17.3 % (ref 11.5–14.5)
PH UA: 6 (ref 5–9)
PLATELET # BLD: 227 K/UL (ref 130–400)
POTASSIUM SERPL-SCNC: 3.9 MEQ/L (ref 3.5–5.1)
PROTEIN UA: ABNORMAL MG/DL
RBC # BLD: 4.34 M/UL (ref 4.2–5.4)
RBC UA: ABNORMAL /HPF (ref 0–5)
REJECTED TEST: NORMAL
SODIUM BLD-SCNC: 140 MEQ/L (ref 132–144)
SPECIFIC GRAVITY UA: 1.01 (ref 1–1.03)
TOTAL PROTEIN: 6.2 G/DL (ref 6.4–8.1)
URINE REFLEX TO CULTURE: YES
UROBILINOGEN, URINE: 0.2 E.U./DL
WBC # BLD: 7.9 K/UL (ref 4.8–10.8)
WBC UA: ABNORMAL /HPF (ref 0–5)
YEAST: PRESENT

## 2018-12-16 PROCEDURE — 85025 COMPLETE CBC W/AUTO DIFF WBC: CPT

## 2018-12-16 PROCEDURE — 81001 URINALYSIS AUTO W/SCOPE: CPT

## 2018-12-16 PROCEDURE — 6370000000 HC RX 637 (ALT 250 FOR IP): Performed by: NURSE PRACTITIONER

## 2018-12-16 PROCEDURE — 96365 THER/PROPH/DIAG IV INF INIT: CPT

## 2018-12-16 PROCEDURE — 80053 COMPREHEN METABOLIC PANEL: CPT

## 2018-12-16 PROCEDURE — 99284 EMERGENCY DEPT VISIT MOD MDM: CPT

## 2018-12-16 PROCEDURE — 2580000003 HC RX 258: Performed by: NURSE PRACTITIONER

## 2018-12-16 PROCEDURE — 6360000002 HC RX W HCPCS: Performed by: NURSE PRACTITIONER

## 2018-12-16 PROCEDURE — 87086 URINE CULTURE/COLONY COUNT: CPT

## 2018-12-16 PROCEDURE — 96375 TX/PRO/DX INJ NEW DRUG ADDON: CPT

## 2018-12-16 PROCEDURE — 36415 COLL VENOUS BLD VENIPUNCTURE: CPT

## 2018-12-16 RX ORDER — FLUCONAZOLE 150 MG/1
150 TABLET ORAL DAILY
Qty: 2 TABLET | Refills: 0 | Status: SHIPPED | OUTPATIENT
Start: 2018-12-16 | End: 2018-12-18

## 2018-12-16 RX ORDER — KETOROLAC TROMETHAMINE 30 MG/ML
30 INJECTION, SOLUTION INTRAMUSCULAR; INTRAVENOUS ONCE
Status: COMPLETED | OUTPATIENT
Start: 2018-12-16 | End: 2018-12-16

## 2018-12-16 RX ORDER — DIPHENHYDRAMINE HYDROCHLORIDE 50 MG/ML
25 INJECTION INTRAMUSCULAR; INTRAVENOUS ONCE
Status: COMPLETED | OUTPATIENT
Start: 2018-12-16 | End: 2018-12-16

## 2018-12-16 RX ORDER — LORAZEPAM 2 MG/ML
1 INJECTION INTRAMUSCULAR ONCE
Status: COMPLETED | OUTPATIENT
Start: 2018-12-16 | End: 2018-12-16

## 2018-12-16 RX ORDER — FLUCONAZOLE 100 MG/1
200 TABLET ORAL ONCE
Status: COMPLETED | OUTPATIENT
Start: 2018-12-16 | End: 2018-12-16

## 2018-12-16 RX ORDER — CEPHALEXIN 500 MG/1
500 CAPSULE ORAL 2 TIMES DAILY
Qty: 14 CAPSULE | Refills: 0 | Status: SHIPPED | OUTPATIENT
Start: 2018-12-16 | End: 2018-12-23

## 2018-12-16 RX ORDER — 0.9 % SODIUM CHLORIDE 0.9 %
1000 INTRAVENOUS SOLUTION INTRAVENOUS ONCE
Status: COMPLETED | OUTPATIENT
Start: 2018-12-16 | End: 2018-12-16

## 2018-12-16 RX ORDER — ONDANSETRON 2 MG/ML
4 INJECTION INTRAMUSCULAR; INTRAVENOUS ONCE
Status: COMPLETED | OUTPATIENT
Start: 2018-12-16 | End: 2018-12-16

## 2018-12-16 RX ADMIN — ONDANSETRON 4 MG: 2 INJECTION INTRAMUSCULAR; INTRAVENOUS at 04:46

## 2018-12-16 RX ADMIN — DIPHENHYDRAMINE HYDROCHLORIDE 25 MG: 50 INJECTION, SOLUTION INTRAMUSCULAR; INTRAVENOUS at 05:33

## 2018-12-16 RX ADMIN — FLUCONAZOLE 200 MG: 100 TABLET ORAL at 06:03

## 2018-12-16 RX ADMIN — CEFTRIAXONE SODIUM 1 G: 1 INJECTION, POWDER, FOR SOLUTION INTRAMUSCULAR; INTRAVENOUS at 06:04

## 2018-12-16 RX ADMIN — LORAZEPAM 1 MG: 2 INJECTION, SOLUTION INTRAMUSCULAR; INTRAVENOUS at 04:46

## 2018-12-16 RX ADMIN — SODIUM CHLORIDE 1000 ML: 9 INJECTION, SOLUTION INTRAVENOUS at 05:11

## 2018-12-16 RX ADMIN — KETOROLAC TROMETHAMINE 30 MG: 30 INJECTION, SOLUTION INTRAMUSCULAR at 05:33

## 2018-12-16 ASSESSMENT — ENCOUNTER SYMPTOMS
VOMITING: 1
CONSTIPATION: 0
WHEEZING: 0
NAUSEA: 1
DIARRHEA: 0
BLOOD IN STOOL: 0
RHINORRHEA: 0
SORE THROAT: 0
TROUBLE SWALLOWING: 0
BACK PAIN: 0
ABDOMINAL PAIN: 1
COUGH: 0
ABDOMINAL DISTENTION: 0
COLOR CHANGE: 0
CHEST TIGHTNESS: 0
SHORTNESS OF BREATH: 0

## 2018-12-16 ASSESSMENT — PAIN SCALES - GENERAL
PAINLEVEL_OUTOF10: 9
PAINLEVEL_OUTOF10: 9

## 2018-12-16 ASSESSMENT — PAIN DESCRIPTION - FREQUENCY: FREQUENCY: CONTINUOUS

## 2018-12-16 ASSESSMENT — PAIN DESCRIPTION - LOCATION: LOCATION: ABDOMEN

## 2018-12-16 ASSESSMENT — PAIN DESCRIPTION - DESCRIPTORS: DESCRIPTORS: CRAMPING;SHARP

## 2018-12-16 ASSESSMENT — PAIN DESCRIPTION - PAIN TYPE: TYPE: ACUTE PAIN

## 2018-12-16 NOTE — ED PROVIDER NOTES
CARE TIME       CONSULTS:  None    PROCEDURES:  Unless otherwise noted below, none     Procedures    FINAL IMPRESSION      1. Acute cystitis with hematuria    2.  Yeast cystitis          DISPOSITION/PLAN   DISPOSITION Decision To Discharge 12/16/2018 06:39:37 AM      PATIENT REFERRED TO:  Vipin Bean  [address]  [place] [address]  [phone]    Call in 1 day      Cecilia Up MD  [address] [facility]  [phone]    Call in 1 day        DISCHARGE MEDICATIONS:  New Prescriptions    CEPHALEXIN (KEFLEX) 500 MG CAPSULE    Take 1 capsule by mouth 2 times daily for 7 days    FLUCONAZOLE (DIFLUCAN) 150 MG TABLET    Take 1 tablet by mouth daily for 2 doses          (Please notethat portions of this note were completed with a voice recognition program.  Efforts were made to edit the dictations but occasionally words are mis-transcribed.)    Carmian Evans PA-C (electronically signed)  Attending Emergency Physician         Carmina Evans PA-C  12/16/18 4474

## 2018-12-17 ENCOUNTER — HOSPITAL ENCOUNTER (EMERGENCY)
Age: 43
Discharge: HOME | End: 2018-12-18
Payer: MEDICARE

## 2018-12-17 ENCOUNTER — APPOINTMENT (OUTPATIENT)
Dept: GENERAL RADIOLOGY | Age: 43
End: 2018-12-17
Payer: MEDICARE

## 2018-12-17 DIAGNOSIS — F41.1 ANXIETY STATE: ICD-10-CM

## 2018-12-17 DIAGNOSIS — R11.2 NAUSEA AND VOMITING, INTRACTABILITY OF VOMITING NOT SPECIFIED, UNSPECIFIED VOMITING TYPE: ICD-10-CM

## 2018-12-17 DIAGNOSIS — R10.84 GENERALIZED ABDOMINAL PAIN: Primary | ICD-10-CM

## 2018-12-17 DIAGNOSIS — R05.9 COUGH: ICD-10-CM

## 2018-12-17 DIAGNOSIS — Z91.199 HISTORY OF NONCOMPLIANCE WITH MEDICAL TREATMENT: ICD-10-CM

## 2018-12-17 DIAGNOSIS — J06.9 UPPER RESPIRATORY TRACT INFECTION, UNSPECIFIED TYPE: ICD-10-CM

## 2018-12-17 LAB
AMPHETAMINE SCREEN, URINE: POSITIVE
ANION GAP SERPL CALCULATED.3IONS-SCNC: 13 MEQ/L (ref 7–13)
BARBITURATE SCREEN URINE: ABNORMAL
BASOPHILS ABSOLUTE: 0.1 K/UL (ref 0–0.2)
BASOPHILS RELATIVE PERCENT: 0.7 %
BENZODIAZEPINE SCREEN, URINE: ABNORMAL
BUN BLDV-MCNC: 6 MG/DL (ref 6–20)
CALCIUM SERPL-MCNC: 9.1 MG/DL (ref 8.6–10.2)
CANNABINOID SCREEN URINE: ABNORMAL
CHLORIDE BLD-SCNC: 100 MEQ/L (ref 98–107)
CO2: 26 MEQ/L (ref 22–29)
COCAINE METABOLITE SCREEN URINE: ABNORMAL
CREAT SERPL-MCNC: 1.07 MG/DL (ref 0.5–0.9)
EKG ATRIAL RATE: 54 BPM
EKG P AXIS: 36 DEGREES
EKG P-R INTERVAL: 172 MS
EKG Q-T INTERVAL: 442 MS
EKG QRS DURATION: 90 MS
EKG QTC CALCULATION (BAZETT): 419 MS
EKG R AXIS: 71 DEGREES
EKG T AXIS: 68 DEGREES
EKG VENTRICULAR RATE: 54 BPM
EOSINOPHILS ABSOLUTE: 0.2 K/UL (ref 0–0.7)
EOSINOPHILS RELATIVE PERCENT: 2.4 %
GFR AFRICAN AMERICAN: >60
GFR NON-AFRICAN AMERICAN: 55.8
GLUCOSE BLD-MCNC: 95 MG/DL (ref 74–109)
HCT VFR BLD CALC: 38.8 % (ref 37–47)
HEMOGLOBIN: 13.1 G/DL (ref 12–16)
LIPASE: 22 U/L (ref 13–60)
LYMPHOCYTES ABSOLUTE: 1.6 K/UL (ref 1–4.8)
LYMPHOCYTES RELATIVE PERCENT: 21.4 %
Lab: ABNORMAL
MCH RBC QN AUTO: 29.3 PG (ref 27–31.3)
MCHC RBC AUTO-ENTMCNC: 33.6 % (ref 33–37)
MCV RBC AUTO: 87 FL (ref 82–100)
MONOCYTES ABSOLUTE: 0.4 K/UL (ref 0.2–0.8)
MONOCYTES RELATIVE PERCENT: 5.1 %
NEUTROPHILS ABSOLUTE: 5.4 K/UL (ref 1.4–6.5)
NEUTROPHILS RELATIVE PERCENT: 70.4 %
OPIATE SCREEN URINE: ABNORMAL
PDW BLD-RTO: 17.4 % (ref 11.5–14.5)
PHENCYCLIDINE SCREEN URINE: ABNORMAL
PLATELET # BLD: 264 K/UL (ref 130–400)
POTASSIUM SERPL-SCNC: 4.2 MEQ/L (ref 3.5–5.1)
RBC # BLD: 4.46 M/UL (ref 4.2–5.4)
SODIUM BLD-SCNC: 139 MEQ/L (ref 132–144)
TROPONIN: <0.01 NG/ML (ref 0–0.01)
URINE CULTURE, ROUTINE: NORMAL
WBC # BLD: 7.6 K/UL (ref 4.8–10.8)

## 2018-12-17 PROCEDURE — 36415 COLL VENOUS BLD VENIPUNCTURE: CPT

## 2018-12-17 PROCEDURE — 93005 ELECTROCARDIOGRAM TRACING: CPT

## 2018-12-17 PROCEDURE — 80048 BASIC METABOLIC PNL TOTAL CA: CPT

## 2018-12-17 PROCEDURE — 83690 ASSAY OF LIPASE: CPT

## 2018-12-17 PROCEDURE — 85025 COMPLETE CBC W/AUTO DIFF WBC: CPT

## 2018-12-17 PROCEDURE — 99285 EMERGENCY DEPT VISIT HI MDM: CPT

## 2018-12-17 PROCEDURE — 84484 ASSAY OF TROPONIN QUANT: CPT

## 2018-12-17 PROCEDURE — 6360000002 HC RX W HCPCS: Performed by: PHYSICIAN ASSISTANT

## 2018-12-17 PROCEDURE — 71046 X-RAY EXAM CHEST 2 VIEWS: CPT

## 2018-12-17 PROCEDURE — 96375 TX/PRO/DX INJ NEW DRUG ADDON: CPT

## 2018-12-17 PROCEDURE — 80307 DRUG TEST PRSMV CHEM ANLYZR: CPT

## 2018-12-17 PROCEDURE — 2580000003 HC RX 258: Performed by: PHYSICIAN ASSISTANT

## 2018-12-17 PROCEDURE — 96374 THER/PROPH/DIAG INJ IV PUSH: CPT

## 2018-12-17 RX ORDER — ONDANSETRON 2 MG/ML
4 INJECTION INTRAMUSCULAR; INTRAVENOUS ONCE
Status: COMPLETED | OUTPATIENT
Start: 2018-12-17 | End: 2018-12-17

## 2018-12-17 RX ORDER — DIPHENHYDRAMINE HYDROCHLORIDE 50 MG/ML
50 INJECTION INTRAMUSCULAR; INTRAVENOUS ONCE
Status: COMPLETED | OUTPATIENT
Start: 2018-12-17 | End: 2018-12-17

## 2018-12-17 RX ORDER — 0.9 % SODIUM CHLORIDE 0.9 %
1000 INTRAVENOUS SOLUTION INTRAVENOUS ONCE
Status: COMPLETED | OUTPATIENT
Start: 2018-12-17 | End: 2018-12-18

## 2018-12-17 RX ORDER — KETOROLAC TROMETHAMINE 30 MG/ML
30 INJECTION, SOLUTION INTRAMUSCULAR; INTRAVENOUS ONCE
Status: COMPLETED | OUTPATIENT
Start: 2018-12-17 | End: 2018-12-17

## 2018-12-17 RX ADMIN — ONDANSETRON 4 MG: 2 INJECTION INTRAMUSCULAR; INTRAVENOUS at 22:38

## 2018-12-17 RX ADMIN — DIPHENHYDRAMINE HYDROCHLORIDE 50 MG: 50 INJECTION, SOLUTION INTRAMUSCULAR; INTRAVENOUS at 22:40

## 2018-12-17 RX ADMIN — SODIUM CHLORIDE 1000 ML: 9 INJECTION, SOLUTION INTRAVENOUS at 22:37

## 2018-12-17 RX ADMIN — KETOROLAC TROMETHAMINE 30 MG: 30 INJECTION, SOLUTION INTRAMUSCULAR at 22:39

## 2018-12-17 ASSESSMENT — PAIN DESCRIPTION - PAIN TYPE: TYPE: ACUTE PAIN

## 2018-12-17 ASSESSMENT — PAIN DESCRIPTION - LOCATION: LOCATION: ABDOMEN

## 2018-12-17 ASSESSMENT — PAIN SCALES - GENERAL
PAINLEVEL_OUTOF10: 10
PAINLEVEL_OUTOF10: 10

## 2018-12-18 VITALS
HEART RATE: 60 BPM | BODY MASS INDEX: 36.99 KG/M2 | HEIGHT: 65 IN | DIASTOLIC BLOOD PRESSURE: 66 MMHG | TEMPERATURE: 97.9 F | OXYGEN SATURATION: 99 % | SYSTOLIC BLOOD PRESSURE: 117 MMHG | RESPIRATION RATE: 16 BRPM | WEIGHT: 222 LBS

## 2018-12-18 RX ORDER — PROMETHAZINE HYDROCHLORIDE 25 MG/1
25 SUPPOSITORY RECTAL EVERY 6 HOURS PRN
Qty: 7 SUPPOSITORY | Refills: 0 | Status: SHIPPED | OUTPATIENT
Start: 2018-12-18 | End: 2018-12-25

## 2018-12-18 ASSESSMENT — ENCOUNTER SYMPTOMS
BLOOD IN STOOL: 0
EYE DISCHARGE: 0
NAUSEA: 1
DIARRHEA: 0
ABDOMINAL PAIN: 1
VOMITING: 1
VOICE CHANGE: 0
COUGH: 1
ABDOMINAL DISTENTION: 0
ANAL BLEEDING: 0
COLOR CHANGE: 0
PHOTOPHOBIA: 0
WHEEZING: 1
APNEA: 0

## 2018-12-18 NOTE — ED NOTES
Pt c/o n&v, veena lorenz aware of it, 0 emesis noted. Pt a&ox4, skin w/d/pink, pulses palp. msp' s intact.      Brock Skiff, ELANA  12/18/18 [address] [place] [address], RN  12/18/18 7681

## 2018-12-18 NOTE — ED PROVIDER NOTES
for immunocompromised state. Neurological: Negative for seizures and facial asymmetry. Hematological: Does not bruise/bleed easily. Psychiatric/Behavioral: Negative for behavioral problems, self-injury and sleep disturbance. All other systems reviewed and are negative. Except as noted above the remainder of the review of systems was reviewed and negative. PAST MEDICAL HISTORY     Past Medical History:   Diagnosis Date    Asthma     Cerebral [facility] occlusion with [facility] infarction ([facility] [address]     Chronic back pain     Depression     Headache     Kidney stone     Seizures ([facility] [address] 5/24/2016         SURGICALHISTORY       Past Surgical History:   Procedure Laterality Date    APPENDECTOMY      BACK SURGERY      CHOLECYSTECTOMY      HYSTERECTOMY      KNEE SURGERY Bilateral     OTHER SURGICAL HISTORY      sup/pub cath june 1, 2018         CURRENT MEDICATIONS       Previous Medications    ALPRAZOLAM (XANAX) 0.5 MG TABLET    Take 0.5 mg by mouth 2 times daily. Jolaine Dent     AMITRIPTYLINE (ELAVIL) 100 MG TABLET    Take 100 mg by mouth nightly    ARIPIPRAZOLE (ABILIFY) 15 MG TABLET    Take 15 mg by mouth daily    BUDESONIDE-FORMOTEROL (SYMBICORT) 80-4.5 MCG/ACT AERO    Inhale 2 puffs into the lungs 2 times daily as needed     CEPHALEXIN (KEFLEX) 500 MG CAPSULE    Take 1 capsule by mouth 2 times daily for 7 days    EPINEPHRINE 1 MG/ML INJECTION    Inject 0.2 mg into the skin as needed    ESLICARBAZEPINE ACETATE (APTIOM) 800 MG TABLET    Take 400 mg by mouth nightly    ESLICARBAZEPINE ACETATE 400 MG TABS    Take 400 mg by mouth nightly     FEXOFENADINE (ALLEGRA) 60 MG TABLET    Take 60 mg by mouth 2 times daily    FLUCONAZOLE (DIFLUCAN) 150 MG TABLET    Take 1 tablet by mouth daily for 2 doses    HYDROXYZINE (VISTARIL) 25 MG CAPSULE    Take 1-2 capsules by mouth 3 times daily as needed for Anxiety    LEVETIRACETAM (KEPPRA) 500 MG TABLET    Take 2 tablets by mouth 2 times daily    LORAZEPAM (ATIVAN) 0.5 MG TABLET Smoker    Smokeless tobacco: Never Used    Alcohol use No    Drug use: No    Sexual activity: No     Other Topics Concern    None     Social History Narrative    None       SCREENINGS      @FLOW([id number])@      PHYSICAL EXAM    (up to 7 for level 4, 8 or more for level 5)     ED Triage Vitals [12/17/18 2143]   BP Temp Temp Source Pulse Resp SpO2 Height Weight   133/61 97.9 °F (36.6 °C) Oral 66 20 98 % 5' 5\" (1.651 m) 222 lb (100.7 kg)       Physical Exam   Constitutional: She is oriented to person, place, and time. She appears well-developed and well-nourished. No distress. HENT:   Head: Normocephalic and atraumatic. Mouth/Throat: Oropharynx is clear and moist. No oropharyngeal exudate. Moist oral mucosa. Eyes: Pupils are equal, round, and reactive to light. Right eye exhibits no discharge. Left eye exhibits no discharge. Neck: Normal range of motion. Neck supple. Cardiovascular: Normal rate, regular rhythm, normal heart sounds and intact distal pulses. Pulmonary/Chest: Effort normal and breath sounds normal. No stridor. No respiratory distress. She has no wheezes. She has no rales. Abdominal: Soft. Bowel sounds are normal. She exhibits no distension. There is tenderness. There is no rebound and no guarding. Genitourinary:   Genitourinary Comments: Suprapubic noted   Musculoskeletal: Normal range of motion. She exhibits no edema. Neurological: She is alert and oriented to person, place, and time. Skin: Skin is warm. She is not diaphoretic. No erythema. Psychiatric: She has a normal mood and affect. Nursing note and vitals reviewed. DIAGNOSTIC RESULTS     EKG: All EKG's are interpreted by the Emergency Department Physician who either signs or Co-signsthis chart in the absence of a cardiologist.    ekg sinus bradycardia rate 54 compared to 9/3/18 ekg v2 similar in appearance.     RADIOLOGY:   Non-plain filmimages such as CT, Ultrasound and MRI are read by the radiologist. Janet Hill We are to discharge to home. Amount and/or Complexity of Data Reviewed  Clinical lab tests: reviewed and ordered  Tests in the radiology section of CPT®: reviewed and ordered  Discuss the patient with other providers: yes        CRITICAL CARE TIME       CONSULTS:  None    PROCEDURES:  Unless otherwise noted below, none     Procedures    FINAL IMPRESSION      1. Generalized abdominal pain    2. Nausea and vomiting, intractability of vomiting not specified, unspecified vomiting type    3. Upper respiratory tract infection, unspecified type    4. History of noncompliance with medical treatment    5. Cough    6. Anxiety state          DISPOSITION/PLAN   DISPOSITION Discharge - Pending Orders Complete 12/18/2018 12:44:34 AM      PATIENT REFERRED TO:  [address] Summer Perez  [phone]    Schedule an appointment as soon as possible for a visit in 2 days      [facility]) ED  [address]  [address] [address]  [phone]    If symptoms worsen    Shea Morales MD  [address], [address]  [phone]    Schedule an appointment as soon as possible for a visit       Dr Hernandez/Urology    Schedule an appointment as soon as possible for a visit in 2 days        DISCHARGE MEDICATIONS:  New Prescriptions    PROMETHAZINE (PROMETHEGAN) 25 MG SUPPOSITORY    Place 1 suppository rectally every 6 hours as needed for Nausea (or vomiting)     Attestation: The Prescription Monitoring Report for this patient was reviewed today.  Elsa Lopez PA-C)    (Please note that portions of this note were completed with a voice recognition program.  Efforts were made to edit the dictations but occasionally words are mis-transcribed.)    Elsa Lopez PA-C (electronically signed)  Attending Emergency Physician         Elsa Lopez PA-C  12/18/18 7576

## 2018-12-20 ENCOUNTER — APPOINTMENT (OUTPATIENT)
Dept: CT IMAGING | Age: 43
End: 2018-12-20
Payer: MEDICARE

## 2018-12-20 ENCOUNTER — HOSPITAL ENCOUNTER (EMERGENCY)
Age: 43
Discharge: HOME | End: 2018-12-21
Attending: EMERGENCY MEDICINE
Payer: MEDICARE

## 2018-12-20 DIAGNOSIS — R10.84 GENERALIZED ABDOMINAL PAIN: ICD-10-CM

## 2018-12-20 DIAGNOSIS — R10.9 FLANK PAIN: Primary | ICD-10-CM

## 2018-12-20 LAB
ALBUMIN SERPL-MCNC: 3.9 G/DL (ref 3.9–4.9)
ALP BLD-CCNC: 100 U/L (ref 40–130)
ALT SERPL-CCNC: 25 U/L (ref 0–33)
ANION GAP SERPL CALCULATED.3IONS-SCNC: 13 MEQ/L (ref 7–13)
AST SERPL-CCNC: 25 U/L (ref 0–35)
BACTERIA: ABNORMAL /HPF
BASOPHILS ABSOLUTE: 0.1 K/UL (ref 0–0.2)
BASOPHILS RELATIVE PERCENT: 0.8 %
BILIRUB SERPL-MCNC: 0.5 MG/DL (ref 0–1.2)
BILIRUBIN URINE: ABNORMAL
BLOOD, URINE: ABNORMAL
BUN BLDV-MCNC: 6 MG/DL (ref 6–20)
CALCIUM SERPL-MCNC: 8.9 MG/DL (ref 8.6–10.2)
CHLORIDE BLD-SCNC: 100 MEQ/L (ref 98–107)
CLARITY: ABNORMAL
CO2: 25 MEQ/L (ref 22–29)
COLOR: ABNORMAL
CREAT SERPL-MCNC: 1.21 MG/DL (ref 0.5–0.9)
CRYSTALS, UA: ABNORMAL
EOSINOPHILS ABSOLUTE: 0.1 K/UL (ref 0–0.7)
EOSINOPHILS RELATIVE PERCENT: 1.4 %
EPITHELIAL CELLS, UA: ABNORMAL /HPF
EPITHELIAL CELLS, UA: ABNORMAL /HPF (ref 0–5)
GFR AFRICAN AMERICAN: 58.6
GFR NON-AFRICAN AMERICAN: 48.4
GLOBULIN: 3 G/DL (ref 2.3–3.5)
GLUCOSE BLD-MCNC: 88 MG/DL (ref 74–109)
GLUCOSE URINE: NEGATIVE MG/DL
HCT VFR BLD CALC: 38.5 % (ref 37–47)
HEMOGLOBIN: 12.6 G/DL (ref 12–16)
HYALINE CASTS: ABNORMAL /HPF (ref 0–5)
KETONES, URINE: ABNORMAL MG/DL
LACTIC ACID: 1.4 MMOL/L (ref 0.5–2.2)
LEUKOCYTE ESTERASE, URINE: ABNORMAL
LYMPHOCYTES ABSOLUTE: 1.2 K/UL (ref 1–4.8)
LYMPHOCYTES RELATIVE PERCENT: 13.7 %
MCH RBC QN AUTO: 28.8 PG (ref 27–31.3)
MCHC RBC AUTO-ENTMCNC: 32.7 % (ref 33–37)
MCV RBC AUTO: 88.1 FL (ref 82–100)
MONOCYTES ABSOLUTE: 0.5 K/UL (ref 0.2–0.8)
MONOCYTES RELATIVE PERCENT: 5.3 %
NEUTROPHILS ABSOLUTE: 6.8 K/UL (ref 1.4–6.5)
NEUTROPHILS RELATIVE PERCENT: 78.8 %
NITRITE, URINE: NEGATIVE
PDW BLD-RTO: 17.1 % (ref 11.5–14.5)
PH UA: 5.5 (ref 5–9)
PLATELET # BLD: 241 K/UL (ref 130–400)
POTASSIUM SERPL-SCNC: 4.1 MEQ/L (ref 3.5–5.1)
PROTEIN UA: >=300 MG/DL
RBC # BLD: 4.37 M/UL (ref 4.2–5.4)
RBC UA: ABNORMAL /HPF (ref 0–2)
SODIUM BLD-SCNC: 138 MEQ/L (ref 132–144)
SPECIFIC GRAVITY UA: 1.03 (ref 1–1.03)
TOTAL PROTEIN: 6.9 G/DL (ref 6.4–8.1)
URINE REFLEX TO CULTURE: YES
UROBILINOGEN, URINE: 0.2 E.U./DL
WBC # BLD: 8.7 K/UL (ref 4.8–10.8)
WBC UA: ABNORMAL /HPF (ref 0–5)
YEAST: PRESENT

## 2018-12-20 PROCEDURE — 6360000002 HC RX W HCPCS: Performed by: EMERGENCY MEDICINE

## 2018-12-20 PROCEDURE — 83605 ASSAY OF LACTIC ACID: CPT

## 2018-12-20 PROCEDURE — 85025 COMPLETE CBC W/AUTO DIFF WBC: CPT

## 2018-12-20 PROCEDURE — 81001 URINALYSIS AUTO W/SCOPE: CPT

## 2018-12-20 PROCEDURE — 2580000003 HC RX 258: Performed by: EMERGENCY MEDICINE

## 2018-12-20 PROCEDURE — 96374 THER/PROPH/DIAG INJ IV PUSH: CPT

## 2018-12-20 PROCEDURE — 99284 EMERGENCY DEPT VISIT MOD MDM: CPT

## 2018-12-20 PROCEDURE — 80053 COMPREHEN METABOLIC PANEL: CPT

## 2018-12-20 PROCEDURE — 51798 US URINE CAPACITY MEASURE: CPT

## 2018-12-20 PROCEDURE — 74176 CT ABD & PELVIS W/O CONTRAST: CPT

## 2018-12-20 PROCEDURE — 6370000000 HC RX 637 (ALT 250 FOR IP): Performed by: EMERGENCY MEDICINE

## 2018-12-20 PROCEDURE — 87086 URINE CULTURE/COLONY COUNT: CPT

## 2018-12-20 PROCEDURE — 36415 COLL VENOUS BLD VENIPUNCTURE: CPT

## 2018-12-20 RX ORDER — 0.9 % SODIUM CHLORIDE 0.9 %
1000 INTRAVENOUS SOLUTION INTRAVENOUS ONCE
Status: COMPLETED | OUTPATIENT
Start: 2018-12-20 | End: 2018-12-21

## 2018-12-20 RX ORDER — 0.9 % SODIUM CHLORIDE 0.9 %
1000 INTRAVENOUS SOLUTION INTRAVENOUS ONCE
Status: DISCONTINUED | OUTPATIENT
Start: 2018-12-20 | End: 2018-12-21 | Stop reason: HOSPADM

## 2018-12-20 RX ORDER — METOCLOPRAMIDE HYDROCHLORIDE 5 MG/ML
10 INJECTION INTRAMUSCULAR; INTRAVENOUS ONCE
Status: COMPLETED | OUTPATIENT
Start: 2018-12-20 | End: 2018-12-20

## 2018-12-20 RX ORDER — LOPERAMIDE HYDROCHLORIDE 2 MG/1
4 CAPSULE ORAL ONCE
Status: COMPLETED | OUTPATIENT
Start: 2018-12-20 | End: 2018-12-20

## 2018-12-20 RX ADMIN — SODIUM CHLORIDE 1000 ML: 9 INJECTION, SOLUTION INTRAVENOUS at 21:56

## 2018-12-20 RX ADMIN — METOCLOPRAMIDE 10 MG: 5 INJECTION, SOLUTION INTRAMUSCULAR; INTRAVENOUS at 21:56

## 2018-12-20 RX ADMIN — LOPERAMIDE HYDROCHLORIDE 4 MG: 2 CAPSULE ORAL at 21:57

## 2018-12-20 ASSESSMENT — PAIN DESCRIPTION - PAIN TYPE: TYPE: ACUTE PAIN

## 2018-12-20 ASSESSMENT — ENCOUNTER SYMPTOMS
SHORTNESS OF BREATH: 0
BACK PAIN: 1
ABDOMINAL PAIN: 0
VOMITING: 1
DIARRHEA: 1
SORE THROAT: 0
EYE PAIN: 0
NAUSEA: 1
CHEST TIGHTNESS: 0

## 2018-12-20 ASSESSMENT — PAIN DESCRIPTION - LOCATION: LOCATION: FLANK

## 2018-12-20 ASSESSMENT — PAIN SCALES - GENERAL: PAINLEVEL_OUTOF10: 10

## 2018-12-20 ASSESSMENT — PAIN DESCRIPTION - ORIENTATION: ORIENTATION: RIGHT;LEFT

## 2018-12-21 VITALS
HEART RATE: 78 BPM | HEIGHT: 65 IN | WEIGHT: 220 LBS | SYSTOLIC BLOOD PRESSURE: 126 MMHG | BODY MASS INDEX: 36.65 KG/M2 | TEMPERATURE: 97.8 F | OXYGEN SATURATION: 97 % | DIASTOLIC BLOOD PRESSURE: 55 MMHG | RESPIRATION RATE: 18 BRPM

## 2018-12-21 RX ORDER — DICYCLOMINE HYDROCHLORIDE 10 MG/1
20 CAPSULE ORAL EVERY 6 HOURS PRN
Qty: 20 CAPSULE | Refills: 0 | Status: SHIPPED | OUTPATIENT
Start: 2018-12-21 | End: 2019-02-15

## 2018-12-21 NOTE — ED PROVIDER NOTES
[address] [facility] ED  eMERGENCY dEPARTMENTeNCOUnter      Pt Name: Joslyn Walsh  MRN: [id number]  Armsnayanagfroderick 1975  Date ofevaluation: 12/20/2018  Provider: SILKE Garcia [id number]       Chief Complaint   Patient presents with    Flank Pain     bilateral starting today         HISTORY OF PRESENT ILLNESS   (Location/Symptom, Timing/Onset,Context/Setting, Quality, Duration, Modifying Factors, Severity)  Note limiting factors. Joslyn Walsh is a 37 y.o. female who presents to the emergency department . Patient presents with bilateral back pain. When I first went into the room, she told me that she's been sick for the last 27 days. She states that she's been vomiting. She did not go to her doctor because he is in [place] and they don't currently have a car. She was seen this week in the emergency department . She states that she threw up the Zofran and pooped out a Phenergan suppository because she has diarrhea. She did not take anything for the diarrhea. She does have a suprapubic catheter and is under treatment for urinary tract infection with Bactrim at this time. She states that her urologist is in [place]. HPI    NursingNotes were reviewed. REVIEW OF SYSTEMS    (2-9 systems for level 4, 10 or more for level 5)     Review of Systems   Constitutional: Negative for activity change, appetite change, fatigue and fever. HENT: Negative for congestion and sore throat. Eyes: Negative for pain and visual disturbance. Respiratory: Negative for chest tightness and shortness of breath. Cardiovascular: Negative for chest pain. Gastrointestinal: Positive for diarrhea, nausea and vomiting. Negative for abdominal pain. Endocrine: Negative for polydipsia. Genitourinary: Positive for flank pain. Negative for urgency. Musculoskeletal: Positive for back pain. Negative for gait problem and neck stiffness. Skin: Negative for rash.    Neurological: Negative for the below findings:    Preliminary Ct abdomen and pelvis shows: No nephrolithiasis, hydronephrosis, or obstructive uropathy. Is a suprapubic catheter in place. Mild stranding along the track, likely reactive and no fluid collections. Normal course and caliber of intestines with inflammatory changes. Interpretation per the Radiologist below, if available at the time ofthis note:    CT ABDOMEN PELVIS WO CONTRAST Additional Contrast? None    (Results Pending)         ED BEDSIDE ULTRASOUND:   Performed by ED Physician - none    LABS:  Labs Reviewed   CBC WITH AUTO DIFFERENTIAL - Abnormal; Notable for the following:        Result Value    MCHC 32.7 (*)     RDW 17.1 (*)     Neutrophils # 6.8 (*)     All other components within normal limits   COMPREHENSIVE METABOLIC PANEL - Abnormal; Notable for the following:     CREATININE 1.21 (*)     GFR Non- 48.4 (*)     GFR  58.6 (*)     All other components within normal limits   URINE RT REFLEX TO CULTURE - Abnormal; Notable for the following:     Color, UA DARK YELLOW (*)     Clarity, UA TURBID (*)     Bilirubin Urine SMALL (*)     Ketones, Urine TRACE (*)     Blood, Urine LARGE (*)     Protein, UA >=300 (*)     Leukocyte Esterase, Urine SMALL (*)     All other components within normal limits   MICROSCOPIC URINALYSIS - Abnormal; Notable for the following:     WBC, UA 20-50 (*)     RBC, UA 10-20 (*)     Yeast, UA Present (*)     All other components within normal limits   URINE CULTURE   LACTIC ACID, PLASMA       All other labs were within normal range or not returned as of this dictation.     EMERGENCY DEPARTMENT COURSE and DIFFERENTIAL DIAGNOSIS/MDM:   Vitals:    Vitals:    12/20/18 2016 12/20/18 2251 12/21/18 0000   BP: (!) 123/57 (!) 121/55 (!) 126/55   Pulse: 77 74 78   Resp: 18 18 18   Temp: 97.8 °F (36.6 °C)     TempSrc: Oral     SpO2: 96% 97% 97%   Weight: 220 lb (99.8 kg)     Height: 5' 5\" (1.651 m)         I assumed care of this patient at the end of my attending shift. She presents to the emergency room with the above-noted complaint. She is currently taking an antibiotic for urinary tract infection. Seen this week with the same complaint. Her labs, CT scan of abdomen and pelvis are unremarkable. She was provided with IV fluids and Reglan and Imodium while in the emergency room. She is nontoxic and vital signs are normal. I will discharge her home in stable condition. I provided her with a prescription for Bentyl. I've instructed her on side effects of this medication. I have provided her with anticipatory guidance have instructed her on follow-up and when to return to the emergency room and what timeframe. Patient verbalized understanding has no further questions at this time. MDM      REASSESSMENT          CRITICAL CARE TIME   Total Critical Care time was  minutes, excluding separatelyreportable procedures. There was a high probability ofclinically significant/life threatening deterioration in the patient's condition which required my urgent intervention. CONSULTS:  None    PROCEDURES:  Unless otherwise noted below, none     Procedures    FINAL IMPRESSION      1. Flank pain    2. Generalized abdominal pain          DISPOSITION/PLAN   DISPOSITION Decision To Discharge 12/21/2018 12:21:12 AM      PATIENT REFERREDTO:  Belia Lee  [address] [place] [address] Dr Dr Stahl [facility]  [phone]    Schedule an appointment as soon as possible for a visit in 1 day        DISCHARGEMEDICATIONS:  New Prescriptions    DICYCLOMINE (BENTYL) 10 MG CAPSULE    Take 2 capsules by mouth every 6 hours as needed (cramps)     Controlled Substances Monitoring:     RX Monitoring 12/17/2018   Attestation The Prescription Monitoring Report for this patient was reviewed today.    Documentation -       (Please note that portions of this note were completed with a voice recognition program.  Efforts were made to edit the dictations but occasionally words are

## 2018-12-22 LAB — URINE CULTURE, ROUTINE: NORMAL

## 2018-12-23 ENCOUNTER — HOSPITAL ENCOUNTER (EMERGENCY)
Age: 43
Discharge: HOME | End: 2018-12-23
Payer: MEDICARE

## 2018-12-23 VITALS
BODY MASS INDEX: 35.82 KG/M2 | RESPIRATION RATE: 16 BRPM | HEIGHT: 65 IN | TEMPERATURE: 98.2 F | DIASTOLIC BLOOD PRESSURE: 49 MMHG | OXYGEN SATURATION: 95 % | SYSTOLIC BLOOD PRESSURE: 113 MMHG | HEART RATE: 71 BPM | WEIGHT: 215 LBS

## 2018-12-23 DIAGNOSIS — G89.29 CHRONIC ABDOMINAL PAIN: Primary | ICD-10-CM

## 2018-12-23 DIAGNOSIS — R11.0 NAUSEA: ICD-10-CM

## 2018-12-23 DIAGNOSIS — R10.9 CHRONIC ABDOMINAL PAIN: Primary | ICD-10-CM

## 2018-12-23 LAB
ALBUMIN SERPL-MCNC: 3.4 G/DL (ref 3.9–4.9)
ALP BLD-CCNC: 94 U/L (ref 40–130)
ALT SERPL-CCNC: 32 U/L (ref 0–33)
AMORPHOUS: ABNORMAL
ANION GAP SERPL CALCULATED.3IONS-SCNC: 15 MEQ/L (ref 7–13)
AST SERPL-CCNC: 32 U/L (ref 0–35)
BACTERIA: ABNORMAL /HPF
BASOPHILS ABSOLUTE: 0.1 K/UL (ref 0–0.2)
BASOPHILS RELATIVE PERCENT: 1.1 %
BILIRUB SERPL-MCNC: 0.4 MG/DL (ref 0–1.2)
BILIRUBIN URINE: ABNORMAL
BLOOD, URINE: ABNORMAL
BUN BLDV-MCNC: 10 MG/DL (ref 6–20)
CALCIUM SERPL-MCNC: 8.7 MG/DL (ref 8.6–10.2)
CHLORIDE BLD-SCNC: 98 MEQ/L (ref 98–107)
CLARITY: ABNORMAL
CO2: 21 MEQ/L (ref 22–29)
COLOR: ABNORMAL
CREAT SERPL-MCNC: 0.94 MG/DL (ref 0.5–0.9)
CRYSTALS, UA: ABNORMAL
EOSINOPHILS ABSOLUTE: 0.2 K/UL (ref 0–0.7)
EOSINOPHILS RELATIVE PERCENT: 3.2 %
EPITHELIAL CELLS, UA: ABNORMAL /HPF (ref 0–5)
GFR AFRICAN AMERICAN: >60
GFR NON-AFRICAN AMERICAN: >60
GLOBULIN: 2.9 G/DL (ref 2.3–3.5)
GLUCOSE BLD-MCNC: 99 MG/DL (ref 74–109)
GLUCOSE URINE: NEGATIVE MG/DL
HCT VFR BLD CALC: 35.8 % (ref 37–47)
HEMOGLOBIN: 11.6 G/DL (ref 12–16)
KETONES, URINE: ABNORMAL MG/DL
LEUKOCYTE ESTERASE, URINE: ABNORMAL
LYMPHOCYTES ABSOLUTE: 1.2 K/UL (ref 1–4.8)
LYMPHOCYTES RELATIVE PERCENT: 20.1 %
MCH RBC QN AUTO: 28.8 PG (ref 27–31.3)
MCHC RBC AUTO-ENTMCNC: 32.5 % (ref 33–37)
MCV RBC AUTO: 88.4 FL (ref 82–100)
MONOCYTES ABSOLUTE: 0.3 K/UL (ref 0.2–0.8)
MONOCYTES RELATIVE PERCENT: 4.9 %
MUCUS: PRESENT
NEUTROPHILS ABSOLUTE: 4.4 K/UL (ref 1.4–6.5)
NEUTROPHILS RELATIVE PERCENT: 70.7 %
NITRITE, URINE: NEGATIVE
PDW BLD-RTO: 17.1 % (ref 11.5–14.5)
PH UA: 6 (ref 5–9)
PLATELET # BLD: 222 K/UL (ref 130–400)
POTASSIUM SERPL-SCNC: 3.8 MEQ/L (ref 3.5–5.1)
PROTEIN UA: 100 MG/DL
RBC # BLD: 4.05 M/UL (ref 4.2–5.4)
RBC UA: ABNORMAL /HPF (ref 0–2)
SODIUM BLD-SCNC: 134 MEQ/L (ref 132–144)
SPECIFIC GRAVITY UA: 1.03 (ref 1–1.03)
TOTAL PROTEIN: 6.3 G/DL (ref 6.4–8.1)
URINE REFLEX TO CULTURE: YES
UROBILINOGEN, URINE: 0.2 E.U./DL
WBC # BLD: 6.2 K/UL (ref 4.8–10.8)
WBC UA: >100 /HPF (ref 0–5)
YEAST: PRESENT

## 2018-12-23 PROCEDURE — 6360000002 HC RX W HCPCS: Performed by: PHYSICIAN ASSISTANT

## 2018-12-23 PROCEDURE — 87086 URINE CULTURE/COLONY COUNT: CPT

## 2018-12-23 PROCEDURE — 81001 URINALYSIS AUTO W/SCOPE: CPT

## 2018-12-23 PROCEDURE — 36415 COLL VENOUS BLD VENIPUNCTURE: CPT

## 2018-12-23 PROCEDURE — 80053 COMPREHEN METABOLIC PANEL: CPT

## 2018-12-23 PROCEDURE — 99284 EMERGENCY DEPT VISIT MOD MDM: CPT

## 2018-12-23 PROCEDURE — 6370000000 HC RX 637 (ALT 250 FOR IP): Performed by: PHYSICIAN ASSISTANT

## 2018-12-23 PROCEDURE — 85025 COMPLETE CBC W/AUTO DIFF WBC: CPT

## 2018-12-23 RX ORDER — ONDANSETRON 4 MG/1
4-8 TABLET, ORALLY DISINTEGRATING ORAL EVERY 12 HOURS PRN
Qty: 12 TABLET | Refills: 0 | Status: ON HOLD | OUTPATIENT
Start: 2018-12-23 | End: 2019-01-19 | Stop reason: HOSPADM

## 2018-12-23 RX ORDER — DICYCLOMINE HCL 20 MG
20 TABLET ORAL ONCE
Status: COMPLETED | OUTPATIENT
Start: 2018-12-23 | End: 2018-12-23

## 2018-12-23 RX ORDER — ONDANSETRON 4 MG/1
4 TABLET, ORALLY DISINTEGRATING ORAL ONCE
Status: COMPLETED | OUTPATIENT
Start: 2018-12-23 | End: 2018-12-23

## 2018-12-23 RX ADMIN — DICYCLOMINE HYDROCHLORIDE 20 MG: 20 TABLET ORAL at 02:37

## 2018-12-23 RX ADMIN — ONDANSETRON 4 MG: 4 TABLET, ORALLY DISINTEGRATING ORAL at 01:58

## 2018-12-23 ASSESSMENT — ENCOUNTER SYMPTOMS
VOMITING: 0
BACK PAIN: 1
PHOTOPHOBIA: 0
DIARRHEA: 0
SHORTNESS OF BREATH: 0
EYE PAIN: 0
COUGH: 0
SORE THROAT: 0
ABDOMINAL PAIN: 1
RHINORRHEA: 0
NAUSEA: 0

## 2018-12-23 ASSESSMENT — PAIN DESCRIPTION - PAIN TYPE: TYPE: CHRONIC PAIN

## 2018-12-23 ASSESSMENT — PAIN SCALES - GENERAL: PAINLEVEL_OUTOF10: 10

## 2018-12-23 ASSESSMENT — PAIN DESCRIPTION - PROGRESSION: CLINICAL_PROGRESSION: NOT CHANGED

## 2018-12-23 ASSESSMENT — PAIN DESCRIPTION - LOCATION: LOCATION: FLANK

## 2018-12-23 ASSESSMENT — PAIN DESCRIPTION - ORIENTATION: ORIENTATION: LEFT;RIGHT

## 2018-12-23 ASSESSMENT — PAIN DESCRIPTION - DESCRIPTORS: DESCRIPTORS: ACHING

## 2018-12-23 NOTE — ED PROVIDER NOTES
dicyclomine (BENTYL) 10 MG capsule Take 2 capsules by mouth every 6 hours as needed (cramps), Disp-20 capsule, R-0Print      promethazine (PROMETHEGAN) 25 MG suppository Place 1 suppository rectally every 6 hours as needed for Nausea (or vomiting), Disp-7 suppository, R-0Print      cephALEXin (KEFLEX) 500 MG capsule Take 1 capsule by mouth 2 times daily for 7 days, Disp-14 capsule, [id number]      !! eslicarbazepine acetate (APTIOM) 800 MG tablet Take 400 mg by mouth nightlyHistorical Med      amitriptyline (ELAVIL) 100 MG tablet Take 100 mg by mouth nightlyHistorical Med      hydrOXYzine (VISTARIL) 25 MG capsule Take 1-2 capsules by mouth 3 times daily as needed for Anxiety, Disp-30 capsule, R-0Print      !! ondansetron (ZOFRAN ODT) 4 MG disintegrating tablet Take 1 tablet by mouth every 8 hours as needed for Nausea, Disp-20 tablet, R-0Print      ondansetron (ZOFRAN) 4 MG tablet Take 1 tablet by mouth every 8 hours as needed for Nausea, Disp-20 tablet, R-0Print      oxyCODONE-acetaminophen (PERCOCET)  MG per tablet Take 1 tablet by mouth 2 times daily. Odalis Bonner Historical Med      sulfamethoxazole-trimethoprim (BACTRIM DS) 800-160 MG per tablet Take 1 tablet by mouth 2 times daily, Disp-14 tablet, R-0Print      meloxicam (MOBIC) 15 MG tablet Take 1 tablet by mouth daily, Disp-30 tablet, R-0Print      levETIRAcetam (KEPPRA) 500 MG tablet Take 2 tablets by mouth 2 times daily, Disp-60 tablet, R-0Print      zolpidem (AMBIEN) 10 MG tablet Take 10 mg by mouth nightly as needed for Sleep. Odalis Rowell Med      LORazepam (ATIVAN) 0.5 MG tablet Take 0.5 mg by mouth daily as needed for Anxiety. Odalis Rowell Med      EPINEPHrine 1 mg/mL injection Inject 0.2 mg into the skin as needed      nitroGLYCERIN (NITROSTAT) 0.4 MG SL tablet Place 0.4 mg under the tongue every 5 minutes as needed for Chest pain Dissolve 1 tablet under tongue for chest pain and repeat every 5 min up to max of 3 total doses.   If no relief after 3 doses call [phone] budesonide-formoterol (SYMBICORT) 80-4.5 MCG/ACT AERO Inhale 2 puffs into the lungs 2 times daily as needed Historical Med      propranolol (INDERAL LA) 60 MG CR capsule Take 1 capsule by mouth daily, Disp-30 capsule, R-3      ARIPiprazole (ABILIFY) 15 MG tablet Take 15 mg by mouth daily      ALPRAZolam (XANAX) 0.5 MG tablet Take 0.5 mg by mouth 2 times daily. Marian Jhaveri Historical Med      PARoxetine (PAXIL) 40 MG tablet Take 40 mg by mouth every morning Historical Med      topiramate (TOPAMAX) 200 MG tablet Take 200 mg by mouth 2 times daily      !! Eslicarbazepine Acetate 400 MG TABS Take 400 mg by mouth nightly Historical Med      traZODone (DESYREL) 50 MG tablet Take 300 mg by mouth nightly Historical Med      fexofenadine (ALLEGRA) 60 MG tablet Take 60 mg by mouth 2 times daily       ! ! - Potential duplicate medications found. Please discuss with provider. ALLERGIES     Latex; Ciprofloxacin; Contrast [iodides]; Daypro [oxaprozin]; Estrogens; Iodine; Lamictal [lamotrigine]; Lyrica [pregabalin]; Macrobid [nitrofurantoin monohyd macro]; Macrolides and ketolides; Shellfish-derived products; Tape [adhesive tape]; Tegretol [carbamazepine]; Toradol [ketorolac tromethamine]; Tramadol; Tylenol [acetaminophen];  Vicodin [hydrocodone-acetaminophen]; and Zanaflex [tizanidine hcl]    FAMILY HISTORY       Family History   Problem Relation Age of Onset    Cancer Father     Cancer Paternal Aunt           SOCIAL HISTORY       Social History     Social History    Marital status:      Spouse name: N/A    Number of children: N/A    Years of education: N/A     Social History Main Topics    Smoking status: Never Smoker    Smokeless tobacco: Never Used    Alcohol use No    Drug use: No    Sexual activity: No     Other Topics Concern    None     Social History Narrative    None       SCREENINGS      @FLOW([id number])@      PHYSICAL EXAM    (up to 7 for level 4, 8 or more for level 5)     ED Triage Vitals [12/23/18

## 2018-12-23 NOTE — ED NOTES
Lab called for draw     Evelyn Dolan.  Sadi Luz, [address] [place] [address]  12/23/18 4268

## 2018-12-24 LAB — URINE CULTURE, ROUTINE: NORMAL

## 2019-01-14 ENCOUNTER — HOSPITAL ENCOUNTER (EMERGENCY)
Age: 44
Discharge: HOME | End: 2019-01-14
Attending: EMERGENCY MEDICINE
Payer: MEDICARE

## 2019-01-14 ENCOUNTER — APPOINTMENT (OUTPATIENT)
Dept: GENERAL RADIOLOGY | Age: 44
End: 2019-01-14
Payer: MEDICARE

## 2019-01-14 ENCOUNTER — APPOINTMENT (OUTPATIENT)
Dept: CT IMAGING | Age: 44
End: 2019-01-14
Payer: MEDICARE

## 2019-01-14 VITALS
DIASTOLIC BLOOD PRESSURE: 65 MMHG | OXYGEN SATURATION: 95 % | TEMPERATURE: 98.6 F | SYSTOLIC BLOOD PRESSURE: 122 MMHG | RESPIRATION RATE: 14 BRPM | HEART RATE: 68 BPM

## 2019-01-14 DIAGNOSIS — Z43.5 ENCOUNTER FOR SUPRAPUBIC CATHETER CARE (HCC): ICD-10-CM

## 2019-01-14 DIAGNOSIS — R56.9 SEIZURE (HCC): Primary | ICD-10-CM

## 2019-01-14 LAB
ALBUMIN SERPL-MCNC: 4.1 G/DL (ref 3.9–4.9)
ALP BLD-CCNC: 100 U/L (ref 40–130)
ALT SERPL-CCNC: 15 U/L (ref 0–33)
ANION GAP SERPL CALCULATED.3IONS-SCNC: 9 MEQ/L (ref 7–13)
AST SERPL-CCNC: 17 U/L (ref 0–35)
BASOPHILS ABSOLUTE: 0.1 K/UL (ref 0–0.2)
BASOPHILS RELATIVE PERCENT: 0.9 %
BILIRUB SERPL-MCNC: 0.3 MG/DL (ref 0–1.2)
BUN BLDV-MCNC: 17 MG/DL (ref 6–20)
CALCIUM SERPL-MCNC: 8.9 MG/DL (ref 8.6–10.2)
CHLORIDE BLD-SCNC: 105 MEQ/L (ref 98–107)
CO2: 28 MEQ/L (ref 22–29)
CREAT SERPL-MCNC: 1.03 MG/DL (ref 0.5–0.9)
EKG ATRIAL RATE: 64 BPM
EKG P AXIS: 48 DEGREES
EKG P-R INTERVAL: 180 MS
EKG Q-T INTERVAL: 428 MS
EKG QRS DURATION: 86 MS
EKG QTC CALCULATION (BAZETT): 441 MS
EKG R AXIS: 74 DEGREES
EKG T AXIS: 102 DEGREES
EKG VENTRICULAR RATE: 64 BPM
EOSINOPHILS ABSOLUTE: 0.2 K/UL (ref 0–0.7)
EOSINOPHILS RELATIVE PERCENT: 2.7 %
GFR AFRICAN AMERICAN: >60
GFR NON-AFRICAN AMERICAN: 58.3
GLOBULIN: 2.8 G/DL (ref 2.3–3.5)
GLUCOSE BLD-MCNC: 83 MG/DL (ref 74–109)
HCT VFR BLD CALC: 35.8 % (ref 37–47)
HEMOGLOBIN: 12.2 G/DL (ref 12–16)
LACTIC ACID: 1 MMOL/L (ref 0.5–2.2)
LYMPHOCYTES ABSOLUTE: 1.3 K/UL (ref 1–4.8)
LYMPHOCYTES RELATIVE PERCENT: 16.8 %
MCH RBC QN AUTO: 30.4 PG (ref 27–31.3)
MCHC RBC AUTO-ENTMCNC: 34.1 % (ref 33–37)
MCV RBC AUTO: 89.2 FL (ref 82–100)
MONOCYTES ABSOLUTE: 0.4 K/UL (ref 0.2–0.8)
MONOCYTES RELATIVE PERCENT: 5.4 %
NEUTROPHILS ABSOLUTE: 5.8 K/UL (ref 1.4–6.5)
NEUTROPHILS RELATIVE PERCENT: 74.2 %
PDW BLD-RTO: 16.8 % (ref 11.5–14.5)
PLATELET # BLD: 272 K/UL (ref 130–400)
POTASSIUM SERPL-SCNC: 4.5 MEQ/L (ref 3.5–5.1)
RBC # BLD: 4.02 M/UL (ref 4.2–5.4)
SODIUM BLD-SCNC: 142 MEQ/L (ref 132–144)
TOTAL PROTEIN: 6.9 G/DL (ref 6.4–8.1)
WBC # BLD: 7.9 K/UL (ref 4.8–10.8)

## 2019-01-14 PROCEDURE — 73030 X-RAY EXAM OF SHOULDER: CPT

## 2019-01-14 PROCEDURE — 99285 EMERGENCY DEPT VISIT HI MDM: CPT

## 2019-01-14 PROCEDURE — 6370000000 HC RX 637 (ALT 250 FOR IP): Performed by: EMERGENCY MEDICINE

## 2019-01-14 PROCEDURE — 93005 ELECTROCARDIOGRAM TRACING: CPT

## 2019-01-14 PROCEDURE — 80053 COMPREHEN METABOLIC PANEL: CPT

## 2019-01-14 PROCEDURE — 96375 TX/PRO/DX INJ NEW DRUG ADDON: CPT

## 2019-01-14 PROCEDURE — 36415 COLL VENOUS BLD VENIPUNCTURE: CPT

## 2019-01-14 PROCEDURE — 83605 ASSAY OF LACTIC ACID: CPT

## 2019-01-14 PROCEDURE — 6360000002 HC RX W HCPCS: Performed by: EMERGENCY MEDICINE

## 2019-01-14 PROCEDURE — 96374 THER/PROPH/DIAG INJ IV PUSH: CPT

## 2019-01-14 PROCEDURE — 85025 COMPLETE CBC W/AUTO DIFF WBC: CPT

## 2019-01-14 PROCEDURE — 2580000003 HC RX 258: Performed by: EMERGENCY MEDICINE

## 2019-01-14 PROCEDURE — 70450 CT HEAD/BRAIN W/O DYE: CPT

## 2019-01-14 PROCEDURE — 73090 X-RAY EXAM OF FOREARM: CPT

## 2019-01-14 RX ORDER — 0.9 % SODIUM CHLORIDE 0.9 %
1000 INTRAVENOUS SOLUTION INTRAVENOUS ONCE
Status: COMPLETED | OUTPATIENT
Start: 2019-01-14 | End: 2019-01-14

## 2019-01-14 RX ORDER — MORPHINE SULFATE 4 MG/ML
4 INJECTION, SOLUTION INTRAMUSCULAR; INTRAVENOUS
Status: DISCONTINUED | OUTPATIENT
Start: 2019-01-14 | End: 2019-01-14 | Stop reason: HOSPADM

## 2019-01-14 RX ORDER — ONDANSETRON 2 MG/ML
4 INJECTION INTRAMUSCULAR; INTRAVENOUS ONCE
Status: COMPLETED | OUTPATIENT
Start: 2019-01-14 | End: 2019-01-14

## 2019-01-14 RX ORDER — DIPHENHYDRAMINE HYDROCHLORIDE 50 MG/ML
50 INJECTION INTRAMUSCULAR; INTRAVENOUS ONCE
Status: COMPLETED | OUTPATIENT
Start: 2019-01-14 | End: 2019-01-14

## 2019-01-14 RX ORDER — GINSENG 100 MG
CAPSULE ORAL ONCE
Status: COMPLETED | OUTPATIENT
Start: 2019-01-14 | End: 2019-01-14

## 2019-01-14 RX ORDER — CEPHALEXIN 500 MG/1
500 CAPSULE ORAL 2 TIMES DAILY
Qty: 10 CAPSULE | Refills: 0 | Status: SHIPPED | OUTPATIENT
Start: 2019-01-14 | End: 2019-01-19

## 2019-01-14 RX ADMIN — MORPHINE SULFATE 4 MG: 4 INJECTION, SOLUTION INTRAMUSCULAR; INTRAVENOUS at 12:56

## 2019-01-14 RX ADMIN — SODIUM CHLORIDE 1000 ML: 9 INJECTION, SOLUTION INTRAVENOUS at 12:53

## 2019-01-14 RX ADMIN — BACITRACIN: 500 OINTMENT TOPICAL at 15:21

## 2019-01-14 RX ADMIN — ONDANSETRON 4 MG: 2 INJECTION INTRAMUSCULAR; INTRAVENOUS at 12:54

## 2019-01-14 RX ADMIN — DIPHENHYDRAMINE HYDROCHLORIDE 50 MG: 50 INJECTION INTRAMUSCULAR; INTRAVENOUS at 13:13

## 2019-01-14 ASSESSMENT — ENCOUNTER SYMPTOMS
DIARRHEA: 0
SORE THROAT: 0
BACK PAIN: 0
NAUSEA: 0
VOMITING: 0
ABDOMINAL PAIN: 0
COUGH: 0
SHORTNESS OF BREATH: 0

## 2019-01-14 ASSESSMENT — PAIN DESCRIPTION - PAIN TYPE: TYPE: ACUTE PAIN;CHRONIC PAIN

## 2019-01-14 ASSESSMENT — PAIN DESCRIPTION - LOCATION: LOCATION: ARM;SHOULDER

## 2019-01-14 ASSESSMENT — PAIN DESCRIPTION - ORIENTATION: ORIENTATION: RIGHT

## 2019-01-14 ASSESSMENT — PAIN SCALES - GENERAL
PAINLEVEL_OUTOF10: 10
PAINLEVEL_OUTOF10: 8

## 2019-01-16 ENCOUNTER — HOSPITAL ENCOUNTER (OUTPATIENT)
Age: 44
Setting detail: OBSERVATION
Discharge: NURSING, REHAB OR LONG-TERM CARE | End: 2019-01-19
Attending: INTERNAL MEDICINE | Admitting: INTERNAL MEDICINE
Payer: MEDICARE

## 2019-01-16 ENCOUNTER — APPOINTMENT (OUTPATIENT)
Dept: CT IMAGING | Age: 44
End: 2019-01-16
Payer: MEDICARE

## 2019-01-16 DIAGNOSIS — T45.7X1A ANTICOAGULANT-INDUCED BLEEDING (HCC): ICD-10-CM

## 2019-01-16 DIAGNOSIS — D68.9 ANTICOAGULANT-INDUCED BLEEDING (HCC): ICD-10-CM

## 2019-01-16 DIAGNOSIS — K92.1 GASTROINTESTINAL HEMORRHAGE WITH MELENA: Primary | ICD-10-CM

## 2019-01-16 LAB
BACTERIA: NEGATIVE /HPF
BASOPHILS ABSOLUTE: 0 K/UL (ref 0–0.2)
BASOPHILS RELATIVE PERCENT: 0.3 %
BILIRUBIN URINE: NEGATIVE
BLOOD, URINE: ABNORMAL
CLARITY: ABNORMAL
COLOR: YELLOW
EOSINOPHILS ABSOLUTE: 0.2 K/UL (ref 0–0.7)
EOSINOPHILS RELATIVE PERCENT: 3.4 %
EPITHELIAL CELLS, UA: ABNORMAL /HPF (ref 0–5)
GLUCOSE URINE: NEGATIVE MG/DL
HCT VFR BLD CALC: 35.5 % (ref 37–47)
HEMOGLOBIN: 12 G/DL (ref 12–16)
HYALINE CASTS: ABNORMAL /HPF (ref 0–5)
KETONES, URINE: ABNORMAL MG/DL
LEUKOCYTE ESTERASE, URINE: ABNORMAL
LYMPHOCYTES ABSOLUTE: 1.1 K/UL (ref 1–4.8)
LYMPHOCYTES RELATIVE PERCENT: 21.7 %
MCH RBC QN AUTO: 30 PG (ref 27–31.3)
MCHC RBC AUTO-ENTMCNC: 33.8 % (ref 33–37)
MCV RBC AUTO: 88.8 FL (ref 82–100)
MONOCYTES ABSOLUTE: 0.4 K/UL (ref 0.2–0.8)
MONOCYTES RELATIVE PERCENT: 7 %
NEUTROPHILS ABSOLUTE: 3.5 K/UL (ref 1.4–6.5)
NEUTROPHILS RELATIVE PERCENT: 67.6 %
NITRITE, URINE: NEGATIVE
PDW BLD-RTO: 16.3 % (ref 11.5–14.5)
PH UA: 5.5 (ref 5–9)
PLATELET # BLD: 240 K/UL (ref 130–400)
PROTEIN UA: 100 MG/DL
RBC # BLD: 3.99 M/UL (ref 4.2–5.4)
RBC UA: >100 /HPF (ref 0–5)
SPECIFIC GRAVITY UA: 1.03 (ref 1–1.03)
URINE REFLEX TO CULTURE: YES
UROBILINOGEN, URINE: 0.2 E.U./DL
WBC # BLD: 5.2 K/UL (ref 4.8–10.8)
WBC UA: ABNORMAL /HPF (ref 0–5)

## 2019-01-16 PROCEDURE — 6360000002 HC RX W HCPCS: Performed by: PHYSICIAN ASSISTANT

## 2019-01-16 PROCEDURE — 86850 RBC ANTIBODY SCREEN: CPT

## 2019-01-16 PROCEDURE — 87086 URINE CULTURE/COLONY COUNT: CPT

## 2019-01-16 PROCEDURE — 83690 ASSAY OF LIPASE: CPT

## 2019-01-16 PROCEDURE — 36415 COLL VENOUS BLD VENIPUNCTURE: CPT

## 2019-01-16 PROCEDURE — 86901 BLOOD TYPING SEROLOGIC RH(D): CPT

## 2019-01-16 PROCEDURE — 96374 THER/PROPH/DIAG INJ IV PUSH: CPT

## 2019-01-16 PROCEDURE — 85025 COMPLETE CBC W/AUTO DIFF WBC: CPT

## 2019-01-16 PROCEDURE — 99285 EMERGENCY DEPT VISIT HI MDM: CPT

## 2019-01-16 PROCEDURE — 80053 COMPREHEN METABOLIC PANEL: CPT

## 2019-01-16 PROCEDURE — 85610 PROTHROMBIN TIME: CPT

## 2019-01-16 PROCEDURE — 83605 ASSAY OF LACTIC ACID: CPT

## 2019-01-16 PROCEDURE — 85730 THROMBOPLASTIN TIME PARTIAL: CPT

## 2019-01-16 PROCEDURE — 81001 URINALYSIS AUTO W/SCOPE: CPT

## 2019-01-16 PROCEDURE — 93010 ELECTROCARDIOGRAM REPORT: CPT | Performed by: INTERNAL MEDICINE

## 2019-01-16 PROCEDURE — 2580000003 HC RX 258: Performed by: PHYSICIAN ASSISTANT

## 2019-01-16 PROCEDURE — 86900 BLOOD TYPING SEROLOGIC ABO: CPT

## 2019-01-16 RX ORDER — ONDANSETRON 2 MG/ML
4 INJECTION INTRAMUSCULAR; INTRAVENOUS ONCE
Status: COMPLETED | OUTPATIENT
Start: 2019-01-16 | End: 2019-01-16

## 2019-01-16 RX ORDER — 0.9 % SODIUM CHLORIDE 0.9 %
1000 INTRAVENOUS SOLUTION INTRAVENOUS ONCE
Status: COMPLETED | OUTPATIENT
Start: 2019-01-16 | End: 2019-01-17

## 2019-01-16 RX ADMIN — SODIUM CHLORIDE 1000 ML: 9 INJECTION, SOLUTION INTRAVENOUS at 23:30

## 2019-01-16 RX ADMIN — ONDANSETRON 4 MG: 2 INJECTION INTRAMUSCULAR; INTRAVENOUS at 23:30

## 2019-01-16 ASSESSMENT — ENCOUNTER SYMPTOMS
RECTAL PAIN: 0
ALLERGIC/IMMUNOLOGIC NEGATIVE: 1
SHORTNESS OF BREATH: 0
TROUBLE SWALLOWING: 0
COLOR CHANGE: 0
VOMITING: 0
NAUSEA: 1
BLOOD IN STOOL: 1
EYE PAIN: 0
APNEA: 0
ABDOMINAL PAIN: 1

## 2019-01-16 ASSESSMENT — PAIN DESCRIPTION - FREQUENCY: FREQUENCY: CONTINUOUS

## 2019-01-16 ASSESSMENT — PAIN DESCRIPTION - ONSET: ONSET: PROGRESSIVE

## 2019-01-16 ASSESSMENT — PAIN SCALES - GENERAL: PAINLEVEL_OUTOF10: 10

## 2019-01-16 ASSESSMENT — PAIN DESCRIPTION - LOCATION: LOCATION: ABDOMEN

## 2019-01-16 ASSESSMENT — PAIN DESCRIPTION - PAIN TYPE: TYPE: ACUTE PAIN

## 2019-01-17 ENCOUNTER — APPOINTMENT (OUTPATIENT)
Dept: CT IMAGING | Age: 44
End: 2019-01-17
Payer: MEDICARE

## 2019-01-17 PROBLEM — K92.2 GIB (GASTROINTESTINAL BLEEDING): Status: ACTIVE | Noted: 2019-01-17

## 2019-01-17 LAB
ABO/RH: NORMAL
ALBUMIN SERPL-MCNC: 4 G/DL (ref 3.9–4.9)
ALP BLD-CCNC: 98 U/L (ref 40–130)
ALT SERPL-CCNC: 16 U/L (ref 0–33)
ANION GAP SERPL CALCULATED.3IONS-SCNC: 12 MEQ/L (ref 7–13)
ANTIBODY SCREEN: NORMAL
APTT: 32.6 SEC (ref 21.6–35.4)
AST SERPL-CCNC: 22 U/L (ref 0–35)
BILIRUB SERPL-MCNC: 0.3 MG/DL (ref 0–1.2)
BUN BLDV-MCNC: 14 MG/DL (ref 6–20)
CALCIUM SERPL-MCNC: 8.9 MG/DL (ref 8.6–10.2)
CHLORIDE BLD-SCNC: 104 MEQ/L (ref 98–107)
CO2: 23 MEQ/L (ref 22–29)
CREAT SERPL-MCNC: 1.09 MG/DL (ref 0.5–0.9)
GFR AFRICAN AMERICAN: >60
GFR NON-AFRICAN AMERICAN: 54.6
GLOBULIN: 2.8 G/DL (ref 2.3–3.5)
GLUCOSE BLD-MCNC: 114 MG/DL (ref 74–109)
HEMOCCULT STL QL: NORMAL
HEMOGLOBIN: 11.2 G/DL (ref 12–16)
HEMOGLOBIN: 11.4 G/DL (ref 12–16)
HEMOGLOBIN: 11.6 G/DL (ref 12–16)
INR BLD: 1.2
LACTIC ACID: 1.1 MMOL/L (ref 0.5–2.2)
LIPASE: 16 U/L (ref 13–60)
POTASSIUM SERPL-SCNC: 4 MEQ/L (ref 3.5–5.1)
PROTHROMBIN TIME: 11.7 SEC (ref 9–11.5)
SODIUM BLD-SCNC: 139 MEQ/L (ref 132–144)
TOTAL PROTEIN: 6.8 G/DL (ref 6.4–8.1)

## 2019-01-17 PROCEDURE — 2580000003 HC RX 258: Performed by: NURSE PRACTITIONER

## 2019-01-17 PROCEDURE — G8979 MOBILITY GOAL STATUS: HCPCS

## 2019-01-17 PROCEDURE — 97162 PT EVAL MOD COMPLEX 30 MIN: CPT

## 2019-01-17 PROCEDURE — 6370000000 HC RX 637 (ALT 250 FOR IP): Performed by: INTERNAL MEDICINE

## 2019-01-17 PROCEDURE — 96376 TX/PRO/DX INJ SAME DRUG ADON: CPT

## 2019-01-17 PROCEDURE — G0378 HOSPITAL OBSERVATION PER HR: HCPCS

## 2019-01-17 PROCEDURE — 96372 THER/PROPH/DIAG INJ SC/IM: CPT

## 2019-01-17 PROCEDURE — 6360000002 HC RX W HCPCS: Performed by: NURSE PRACTITIONER

## 2019-01-17 PROCEDURE — 36415 COLL VENOUS BLD VENIPUNCTURE: CPT

## 2019-01-17 PROCEDURE — G8978 MOBILITY CURRENT STATUS: HCPCS

## 2019-01-17 PROCEDURE — 96375 TX/PRO/DX INJ NEW DRUG ADDON: CPT

## 2019-01-17 PROCEDURE — 97166 OT EVAL MOD COMPLEX 45 MIN: CPT

## 2019-01-17 PROCEDURE — G8988 SELF CARE GOAL STATUS: HCPCS

## 2019-01-17 PROCEDURE — C9113 INJ PANTOPRAZOLE SODIUM, VIA: HCPCS | Performed by: PHYSICIAN ASSISTANT

## 2019-01-17 PROCEDURE — 85018 HEMOGLOBIN: CPT

## 2019-01-17 PROCEDURE — 74176 CT ABD & PELVIS W/O CONTRAST: CPT

## 2019-01-17 PROCEDURE — C9113 INJ PANTOPRAZOLE SODIUM, VIA: HCPCS | Performed by: NURSE PRACTITIONER

## 2019-01-17 PROCEDURE — 82274 ASSAY TEST FOR BLOOD FECAL: CPT

## 2019-01-17 PROCEDURE — 6360000002 HC RX W HCPCS: Performed by: PHYSICIAN ASSISTANT

## 2019-01-17 PROCEDURE — G8987 SELF CARE CURRENT STATUS: HCPCS

## 2019-01-17 RX ORDER — ACETAMINOPHEN 325 MG/1
650 TABLET ORAL ONCE
Status: COMPLETED | OUTPATIENT
Start: 2019-01-17 | End: 2019-01-17

## 2019-01-17 RX ORDER — DIPHENHYDRAMINE HCL 25 MG
12.5 TABLET ORAL ONCE
Status: COMPLETED | OUTPATIENT
Start: 2019-01-17 | End: 2019-01-17

## 2019-01-17 RX ORDER — DICYCLOMINE HYDROCHLORIDE 10 MG/1
20 CAPSULE ORAL EVERY 6 HOURS PRN
Status: DISCONTINUED | OUTPATIENT
Start: 2019-01-17 | End: 2019-01-19 | Stop reason: HOSPADM

## 2019-01-17 RX ORDER — ARIPIPRAZOLE 5 MG/1
15 TABLET ORAL DAILY
Status: DISCONTINUED | OUTPATIENT
Start: 2019-01-17 | End: 2019-01-19 | Stop reason: HOSPADM

## 2019-01-17 RX ORDER — SODIUM CHLORIDE 9 MG/ML
INJECTION, SOLUTION INTRAVENOUS CONTINUOUS
Status: DISCONTINUED | OUTPATIENT
Start: 2019-01-17 | End: 2019-01-18

## 2019-01-17 RX ORDER — ONDANSETRON 2 MG/ML
4 INJECTION INTRAMUSCULAR; INTRAVENOUS EVERY 6 HOURS PRN
Status: DISCONTINUED | OUTPATIENT
Start: 2019-01-17 | End: 2019-01-19 | Stop reason: HOSPADM

## 2019-01-17 RX ORDER — PROPRANOLOL HCL 60 MG
120 CAPSULE, EXTENDED RELEASE 24HR ORAL DAILY
Status: DISCONTINUED | OUTPATIENT
Start: 2019-01-17 | End: 2019-01-19 | Stop reason: HOSPADM

## 2019-01-17 RX ORDER — DIPHENHYDRAMINE HCL 25 MG
12.5 TABLET ORAL EVERY 6 HOURS PRN
Status: DISCONTINUED | OUTPATIENT
Start: 2019-01-17 | End: 2019-01-19 | Stop reason: HOSPADM

## 2019-01-17 RX ORDER — DICYCLOMINE HYDROCHLORIDE 10 MG/ML
20 INJECTION INTRAMUSCULAR ONCE
Status: COMPLETED | OUTPATIENT
Start: 2019-01-17 | End: 2019-01-17

## 2019-01-17 RX ORDER — SODIUM CHLORIDE 0.9 % (FLUSH) 0.9 %
10 SYRINGE (ML) INJECTION EVERY 12 HOURS SCHEDULED
Status: DISCONTINUED | OUTPATIENT
Start: 2019-01-17 | End: 2019-01-19 | Stop reason: HOSPADM

## 2019-01-17 RX ORDER — PANTOPRAZOLE SODIUM 40 MG/10ML
40 INJECTION, POWDER, LYOPHILIZED, FOR SOLUTION INTRAVENOUS ONCE
Status: COMPLETED | OUTPATIENT
Start: 2019-01-17 | End: 2019-01-17

## 2019-01-17 RX ORDER — PAROXETINE HYDROCHLORIDE 20 MG/1
40 TABLET, FILM COATED ORAL EVERY MORNING
Status: DISCONTINUED | OUTPATIENT
Start: 2019-01-17 | End: 2019-01-19 | Stop reason: HOSPADM

## 2019-01-17 RX ORDER — SODIUM CHLORIDE 0.9 % (FLUSH) 0.9 %
10 SYRINGE (ML) INJECTION PRN
Status: DISCONTINUED | OUTPATIENT
Start: 2019-01-17 | End: 2019-01-19 | Stop reason: HOSPADM

## 2019-01-17 RX ORDER — 0.9 % SODIUM CHLORIDE 0.9 %
10 VIAL (ML) INJECTION EVERY 12 HOURS
Status: DISCONTINUED | OUTPATIENT
Start: 2019-01-17 | End: 2019-01-19

## 2019-01-17 RX ORDER — ACETAMINOPHEN 325 MG/1
650 TABLET ORAL EVERY 6 HOURS PRN
Status: DISCONTINUED | OUTPATIENT
Start: 2019-01-17 | End: 2019-01-19 | Stop reason: HOSPADM

## 2019-01-17 RX ORDER — PANTOPRAZOLE SODIUM 40 MG/10ML
40 INJECTION, POWDER, LYOPHILIZED, FOR SOLUTION INTRAVENOUS EVERY 12 HOURS
Status: DISCONTINUED | OUTPATIENT
Start: 2019-01-17 | End: 2019-01-19

## 2019-01-17 RX ORDER — AMITRIPTYLINE HYDROCHLORIDE 50 MG/1
100 TABLET, FILM COATED ORAL NIGHTLY
Status: DISCONTINUED | OUTPATIENT
Start: 2019-01-17 | End: 2019-01-19 | Stop reason: HOSPADM

## 2019-01-17 RX ADMIN — SODIUM CHLORIDE: 9 INJECTION, SOLUTION INTRAVENOUS at 17:41

## 2019-01-17 RX ADMIN — ACETAMINOPHEN 650 MG: 325 TABLET ORAL at 07:39

## 2019-01-17 RX ADMIN — ONDANSETRON 4 MG: 2 INJECTION INTRAMUSCULAR; INTRAVENOUS at 17:41

## 2019-01-17 RX ADMIN — DICYCLOMINE HYDROCHLORIDE 20 MG: 20 INJECTION, SOLUTION INTRAMUSCULAR at 02:00

## 2019-01-17 RX ADMIN — Medication 10 ML: at 07:41

## 2019-01-17 RX ADMIN — ONDANSETRON 4 MG: 2 INJECTION INTRAMUSCULAR; INTRAVENOUS at 05:46

## 2019-01-17 RX ADMIN — PAROXETINE HYDROCHLORIDE 40 MG: 20 TABLET, FILM COATED ORAL at 11:48

## 2019-01-17 RX ADMIN — DIPHENHYDRAMINE HCL 12.5 MG: 25 TABLET ORAL at 21:43

## 2019-01-17 RX ADMIN — LEVETIRACETAM 750 MG: 500 TABLET ORAL at 21:44

## 2019-01-17 RX ADMIN — PANTOPRAZOLE SODIUM 40 MG: 40 INJECTION, POWDER, FOR SOLUTION INTRAVENOUS at 11:47

## 2019-01-17 RX ADMIN — SODIUM CHLORIDE: 9 INJECTION, SOLUTION INTRAVENOUS at 10:38

## 2019-01-17 RX ADMIN — ACETAMINOPHEN 650 MG: 325 TABLET ORAL at 21:43

## 2019-01-17 RX ADMIN — Medication 10 ML: at 11:49

## 2019-01-17 RX ADMIN — ONDANSETRON 4 MG: 2 INJECTION INTRAMUSCULAR; INTRAVENOUS at 11:47

## 2019-01-17 RX ADMIN — PROPRANOLOL HYDROCHLORIDE 120 MG: 60 CAPSULE, EXTENDED RELEASE ORAL at 11:48

## 2019-01-17 RX ADMIN — DIPHENHYDRAMINE HCL 12.5 MG: 25 TABLET ORAL at 07:38

## 2019-01-17 RX ADMIN — PANTOPRAZOLE SODIUM 40 MG: 40 INJECTION, POWDER, FOR SOLUTION INTRAVENOUS at 01:11

## 2019-01-17 RX ADMIN — ARIPIPRAZOLE 15 MG: 5 TABLET ORAL at 11:48

## 2019-01-17 RX ADMIN — TOPIRAMATE 200 MG: 200 TABLET, FILM COATED ORAL at 11:48

## 2019-01-17 RX ADMIN — TOPIRAMATE 200 MG: 200 TABLET, FILM COATED ORAL at 21:44

## 2019-01-17 RX ADMIN — SODIUM CHLORIDE: 9 INJECTION, SOLUTION INTRAVENOUS at 03:18

## 2019-01-17 RX ADMIN — AMITRIPTYLINE HYDROCHLORIDE 100 MG: 50 TABLET, FILM COATED ORAL at 21:44

## 2019-01-17 RX ADMIN — LEVETIRACETAM 750 MG: 500 TABLET ORAL at 11:48

## 2019-01-17 ASSESSMENT — PAIN SCALES - GENERAL
PAINLEVEL_OUTOF10: 3
PAINLEVEL_OUTOF10: 9
PAINLEVEL_OUTOF10: 9
PAINLEVEL_OUTOF10: 10

## 2019-01-17 ASSESSMENT — PAIN DESCRIPTION - FREQUENCY: FREQUENCY: CONTINUOUS

## 2019-01-17 ASSESSMENT — PAIN DESCRIPTION - PAIN TYPE
TYPE: ACUTE PAIN
TYPE: ACUTE PAIN

## 2019-01-17 ASSESSMENT — PAIN DESCRIPTION - ORIENTATION: ORIENTATION: MID

## 2019-01-17 ASSESSMENT — PAIN DESCRIPTION - LOCATION
LOCATION: HEAD
LOCATION: ABDOMEN

## 2019-01-17 ASSESSMENT — PAIN DESCRIPTION - DESCRIPTORS: DESCRIPTORS: ACHING

## 2019-01-18 LAB
GLUCOSE BLD-MCNC: 77 MG/DL (ref 60–115)
GLUCOSE BLD-MCNC: 91 MG/DL (ref 60–115)
GLUCOSE BLD-MCNC: 92 MG/DL (ref 60–115)
HEMOGLOBIN: 11.3 G/DL (ref 12–16)
PERFORMED ON: NORMAL
URINE CULTURE, ROUTINE: NORMAL

## 2019-01-18 PROCEDURE — G0378 HOSPITAL OBSERVATION PER HR: HCPCS

## 2019-01-18 PROCEDURE — C9113 INJ PANTOPRAZOLE SODIUM, VIA: HCPCS | Performed by: NURSE PRACTITIONER

## 2019-01-18 PROCEDURE — 6360000002 HC RX W HCPCS: Performed by: NURSE PRACTITIONER

## 2019-01-18 PROCEDURE — 97535 SELF CARE MNGMENT TRAINING: CPT

## 2019-01-18 PROCEDURE — 96376 TX/PRO/DX INJ SAME DRUG ADON: CPT

## 2019-01-18 PROCEDURE — 6370000000 HC RX 637 (ALT 250 FOR IP): Performed by: INTERNAL MEDICINE

## 2019-01-18 PROCEDURE — 97112 NEUROMUSCULAR REEDUCATION: CPT

## 2019-01-18 PROCEDURE — 2580000003 HC RX 258: Performed by: NURSE PRACTITIONER

## 2019-01-18 RX ORDER — SUMATRIPTAN 50 MG/1
50 TABLET, FILM COATED ORAL ONCE
Status: DISCONTINUED | OUTPATIENT
Start: 2019-01-18 | End: 2019-01-19 | Stop reason: HOSPADM

## 2019-01-18 RX ORDER — LORAZEPAM 2 MG/ML
1 INJECTION INTRAMUSCULAR EVERY 4 HOURS PRN
Status: DISCONTINUED | OUTPATIENT
Start: 2019-01-18 | End: 2019-01-19 | Stop reason: HOSPADM

## 2019-01-18 RX ADMIN — TOPIRAMATE 200 MG: 200 TABLET, FILM COATED ORAL at 22:17

## 2019-01-18 RX ADMIN — PAROXETINE HYDROCHLORIDE 40 MG: 20 TABLET, FILM COATED ORAL at 10:46

## 2019-01-18 RX ADMIN — TOPIRAMATE 200 MG: 200 TABLET, FILM COATED ORAL at 10:46

## 2019-01-18 RX ADMIN — ONDANSETRON 4 MG: 2 INJECTION INTRAMUSCULAR; INTRAVENOUS at 14:06

## 2019-01-18 RX ADMIN — PANTOPRAZOLE SODIUM 40 MG: 40 INJECTION, POWDER, FOR SOLUTION INTRAVENOUS at 00:10

## 2019-01-18 RX ADMIN — Medication 10 ML: at 08:24

## 2019-01-18 RX ADMIN — ARIPIPRAZOLE 15 MG: 5 TABLET ORAL at 10:46

## 2019-01-18 RX ADMIN — LEVETIRACETAM 750 MG: 500 TABLET ORAL at 22:17

## 2019-01-18 RX ADMIN — SODIUM CHLORIDE: 9 INJECTION, SOLUTION INTRAVENOUS at 06:52

## 2019-01-18 RX ADMIN — AMITRIPTYLINE HYDROCHLORIDE 100 MG: 50 TABLET, FILM COATED ORAL at 22:17

## 2019-01-18 RX ADMIN — LEVETIRACETAM 750 MG: 500 TABLET ORAL at 10:46

## 2019-01-18 RX ADMIN — SODIUM CHLORIDE: 9 INJECTION, SOLUTION INTRAVENOUS at 13:40

## 2019-01-18 RX ADMIN — ONDANSETRON 4 MG: 2 INJECTION INTRAMUSCULAR; INTRAVENOUS at 08:24

## 2019-01-18 RX ADMIN — DIPHENHYDRAMINE HCL 12.5 MG: 25 TABLET ORAL at 22:26

## 2019-01-18 RX ADMIN — PANTOPRAZOLE SODIUM 40 MG: 40 INJECTION, POWDER, FOR SOLUTION INTRAVENOUS at 13:21

## 2019-01-18 RX ADMIN — PANTOPRAZOLE SODIUM 40 MG: 40 INJECTION, POWDER, FOR SOLUTION INTRAVENOUS at 22:17

## 2019-01-18 RX ADMIN — ONDANSETRON 4 MG: 2 INJECTION INTRAMUSCULAR; INTRAVENOUS at 02:26

## 2019-01-18 RX ADMIN — ACETAMINOPHEN 650 MG: 325 TABLET ORAL at 22:26

## 2019-01-18 RX ADMIN — Medication 10 ML: at 22:18

## 2019-01-18 RX ADMIN — Medication 10 ML: at 22:17

## 2019-01-18 RX ADMIN — PROPRANOLOL HYDROCHLORIDE 120 MG: 60 CAPSULE, EXTENDED RELEASE ORAL at 10:46

## 2019-01-18 RX ADMIN — SODIUM CHLORIDE: 9 INJECTION, SOLUTION INTRAVENOUS at 00:10

## 2019-01-18 RX ADMIN — ONDANSETRON 4 MG: 2 INJECTION INTRAMUSCULAR; INTRAVENOUS at 22:17

## 2019-01-18 ASSESSMENT — PAIN SCALES - GENERAL
PAINLEVEL_OUTOF10: 10
PAINLEVEL_OUTOF10: 8
PAINLEVEL_OUTOF10: 2

## 2019-01-18 ASSESSMENT — PAIN DESCRIPTION - PAIN TYPE: TYPE: ACUTE PAIN

## 2019-01-18 ASSESSMENT — PAIN DESCRIPTION - LOCATION: LOCATION: BACK;NECK

## 2019-01-19 VITALS
OXYGEN SATURATION: 95 % | HEART RATE: 53 BPM | WEIGHT: 220 LBS | DIASTOLIC BLOOD PRESSURE: 46 MMHG | BODY MASS INDEX: 36.65 KG/M2 | TEMPERATURE: 98.4 F | SYSTOLIC BLOOD PRESSURE: 108 MMHG | RESPIRATION RATE: 18 BRPM | HEIGHT: 65 IN

## 2019-01-19 LAB
GLUCOSE BLD-MCNC: 84 MG/DL (ref 60–115)
PERFORMED ON: NORMAL

## 2019-01-19 PROCEDURE — G0378 HOSPITAL OBSERVATION PER HR: HCPCS

## 2019-01-19 PROCEDURE — 96375 TX/PRO/DX INJ NEW DRUG ADDON: CPT

## 2019-01-19 PROCEDURE — 6370000000 HC RX 637 (ALT 250 FOR IP): Performed by: INTERNAL MEDICINE

## 2019-01-19 PROCEDURE — 2580000003 HC RX 258: Performed by: NURSE PRACTITIONER

## 2019-01-19 PROCEDURE — 6360000002 HC RX W HCPCS: Performed by: INTERNAL MEDICINE

## 2019-01-19 PROCEDURE — 96376 TX/PRO/DX INJ SAME DRUG ADON: CPT

## 2019-01-19 PROCEDURE — 6370000000 HC RX 637 (ALT 250 FOR IP): Performed by: PSYCHIATRY & NEUROLOGY

## 2019-01-19 PROCEDURE — 6360000002 HC RX W HCPCS: Performed by: NURSE PRACTITIONER

## 2019-01-19 RX ORDER — ONDANSETRON 4 MG/1
4 TABLET, ORALLY DISINTEGRATING ORAL ONCE
Status: COMPLETED | OUTPATIENT
Start: 2019-01-19 | End: 2019-01-19

## 2019-01-19 RX ORDER — KETOROLAC TROMETHAMINE 15 MG/ML
15 INJECTION, SOLUTION INTRAMUSCULAR; INTRAVENOUS ONCE
Status: COMPLETED | OUTPATIENT
Start: 2019-01-19 | End: 2019-01-19

## 2019-01-19 RX ORDER — LIDOCAINE 4 G/G
1 PATCH TOPICAL DAILY
Status: DISCONTINUED | OUTPATIENT
Start: 2019-01-19 | End: 2019-01-19 | Stop reason: HOSPADM

## 2019-01-19 RX ORDER — PANTOPRAZOLE SODIUM 40 MG/1
40 TABLET, DELAYED RELEASE ORAL
Status: DISCONTINUED | OUTPATIENT
Start: 2019-01-20 | End: 2019-01-19 | Stop reason: HOSPADM

## 2019-01-19 RX ORDER — LEVETIRACETAM 500 MG/1
1000 TABLET ORAL 2 TIMES DAILY
Status: DISCONTINUED | OUTPATIENT
Start: 2019-01-19 | End: 2019-01-19 | Stop reason: HOSPADM

## 2019-01-19 RX ORDER — DIPHENHYDRAMINE HCL 25 MG
25 TABLET ORAL ONCE
Status: COMPLETED | OUTPATIENT
Start: 2019-01-19 | End: 2019-01-19

## 2019-01-19 RX ADMIN — PAROXETINE HYDROCHLORIDE 40 MG: 20 TABLET, FILM COATED ORAL at 11:11

## 2019-01-19 RX ADMIN — DIPHENHYDRAMINE HCL 25 MG: 25 TABLET ORAL at 14:43

## 2019-01-19 RX ADMIN — ONDANSETRON 4 MG: 2 INJECTION INTRAMUSCULAR; INTRAVENOUS at 05:33

## 2019-01-19 RX ADMIN — ONDANSETRON 4 MG: 4 TABLET, ORALLY DISINTEGRATING ORAL at 14:43

## 2019-01-19 RX ADMIN — PROPRANOLOL HYDROCHLORIDE 120 MG: 60 CAPSULE, EXTENDED RELEASE ORAL at 11:10

## 2019-01-19 RX ADMIN — ACETAMINOPHEN 650 MG: 325 TABLET ORAL at 15:56

## 2019-01-19 RX ADMIN — LEVETIRACETAM 750 MG: 500 TABLET ORAL at 11:10

## 2019-01-19 RX ADMIN — KETOROLAC TROMETHAMINE 15 MG: 15 INJECTION, SOLUTION INTRAMUSCULAR; INTRAVENOUS at 14:44

## 2019-01-19 RX ADMIN — ARIPIPRAZOLE 15 MG: 5 TABLET ORAL at 11:10

## 2019-01-19 RX ADMIN — LEVETIRACETAM 1000 MG: 500 TABLET ORAL at 14:43

## 2019-01-19 RX ADMIN — Medication 10 ML: at 11:35

## 2019-01-19 RX ADMIN — TOPIRAMATE 200 MG: 200 TABLET, FILM COATED ORAL at 11:10

## 2019-01-19 ASSESSMENT — PAIN SCALES - GENERAL
PAINLEVEL_OUTOF10: 10
PAINLEVEL_OUTOF10: 8

## 2019-01-30 ENCOUNTER — APPOINTMENT (OUTPATIENT)
Dept: GENERAL RADIOLOGY | Age: 44
End: 2019-01-30
Payer: MEDICARE

## 2019-01-30 ENCOUNTER — APPOINTMENT (OUTPATIENT)
Dept: CT IMAGING | Age: 44
End: 2019-01-30
Payer: MEDICARE

## 2019-01-30 ENCOUNTER — HOSPITAL ENCOUNTER (EMERGENCY)
Age: 44
Discharge: HOME | End: 2019-01-30
Attending: EMERGENCY MEDICINE
Payer: MEDICARE

## 2019-01-30 VITALS
HEIGHT: 65 IN | BODY MASS INDEX: 35.99 KG/M2 | WEIGHT: 216 LBS | RESPIRATION RATE: 18 BRPM | TEMPERATURE: 98.8 F | SYSTOLIC BLOOD PRESSURE: 127 MMHG | DIASTOLIC BLOOD PRESSURE: 61 MMHG | HEART RATE: 49 BPM | OXYGEN SATURATION: 96 %

## 2019-01-30 DIAGNOSIS — R42 DIZZINESS: Primary | ICD-10-CM

## 2019-01-30 DIAGNOSIS — N30.00 ACUTE CYSTITIS WITHOUT HEMATURIA: ICD-10-CM

## 2019-01-30 DIAGNOSIS — R11.2 NON-INTRACTABLE VOMITING WITH NAUSEA, UNSPECIFIED VOMITING TYPE: ICD-10-CM

## 2019-01-30 LAB
ALBUMIN SERPL-MCNC: 3.8 G/DL (ref 3.9–4.9)
ALP BLD-CCNC: 96 U/L (ref 40–130)
ALT SERPL-CCNC: 18 U/L (ref 0–33)
ANION GAP SERPL CALCULATED.3IONS-SCNC: 12 MEQ/L (ref 7–13)
AST SERPL-CCNC: 31 U/L (ref 0–35)
BACTERIA: NEGATIVE /HPF
BASOPHILS ABSOLUTE: 0 K/UL (ref 0–0.2)
BASOPHILS RELATIVE PERCENT: 1 %
BILIRUB SERPL-MCNC: <0.2 MG/DL (ref 0–1.2)
BILIRUBIN URINE: NEGATIVE
BLOOD, URINE: NEGATIVE
BUN BLDV-MCNC: 12 MG/DL (ref 6–20)
CALCIUM SERPL-MCNC: 8.6 MG/DL (ref 8.6–10.2)
CHLORIDE BLD-SCNC: 103 MEQ/L (ref 98–107)
CLARITY: CLEAR
CO2: 20 MEQ/L (ref 22–29)
COLOR: ABNORMAL
CREAT SERPL-MCNC: 1.23 MG/DL (ref 0.5–0.9)
EKG ATRIAL RATE: 54 BPM
EKG P AXIS: 30 DEGREES
EKG P-R INTERVAL: 218 MS
EKG Q-T INTERVAL: 454 MS
EKG QRS DURATION: 92 MS
EKG QTC CALCULATION (BAZETT): 430 MS
EKG R AXIS: 51 DEGREES
EKG T AXIS: 86 DEGREES
EKG VENTRICULAR RATE: 54 BPM
EOSINOPHILS ABSOLUTE: 0.1 K/UL (ref 0–0.7)
EOSINOPHILS RELATIVE PERCENT: 2.5 %
EPITHELIAL CELLS, UA: ABNORMAL /HPF (ref 0–5)
GFR AFRICAN AMERICAN: 57.4
GFR NON-AFRICAN AMERICAN: 47.5
GLOBULIN: 2.8 G/DL (ref 2.3–3.5)
GLUCOSE BLD-MCNC: 103 MG/DL (ref 74–109)
GLUCOSE URINE: NEGATIVE MG/DL
HCT VFR BLD CALC: 34.5 % (ref 37–47)
HEMOGLOBIN: 12.4 G/DL (ref 12–16)
HYALINE CASTS: ABNORMAL /HPF (ref 0–5)
KETONES, URINE: ABNORMAL MG/DL
LEUKOCYTE ESTERASE, URINE: ABNORMAL
LYMPHOCYTES ABSOLUTE: 1.3 K/UL (ref 1–4.8)
LYMPHOCYTES RELATIVE PERCENT: 29.2 %
MAGNESIUM: 1.9 MG/DL (ref 1.7–2.3)
MCH RBC QN AUTO: 30.8 PG (ref 27–31.3)
MCHC RBC AUTO-ENTMCNC: 35.9 % (ref 33–37)
MCV RBC AUTO: 85.8 FL (ref 82–100)
MONOCYTES ABSOLUTE: 0.3 K/UL (ref 0.2–0.8)
MONOCYTES RELATIVE PERCENT: 7.2 %
NEUTROPHILS ABSOLUTE: 2.6 K/UL (ref 1.4–6.5)
NEUTROPHILS RELATIVE PERCENT: 60.1 %
NITRITE, URINE: NEGATIVE
PDW BLD-RTO: 15.5 % (ref 11.5–14.5)
PH UA: 5.5 (ref 5–9)
PLATELET # BLD: 217 K/UL (ref 130–400)
POTASSIUM SERPL-SCNC: 5.5 MEQ/L (ref 3.5–5.1)
PROTEIN UA: ABNORMAL MG/DL
RBC # BLD: 4.03 M/UL (ref 4.2–5.4)
RBC UA: ABNORMAL /HPF (ref 0–2)
SODIUM BLD-SCNC: 135 MEQ/L (ref 132–144)
SPECIFIC GRAVITY UA: 1.03 (ref 1–1.03)
TOTAL PROTEIN: 6.6 G/DL (ref 6.4–8.1)
TROPONIN: <0.01 NG/ML (ref 0–0.01)
UROBILINOGEN, URINE: 1 E.U./DL
WBC # BLD: 4.4 K/UL (ref 4.8–10.8)
WBC UA: ABNORMAL /HPF (ref 0–5)
YEAST: PRESENT

## 2019-01-30 PROCEDURE — 2580000003 HC RX 258: Performed by: EMERGENCY MEDICINE

## 2019-01-30 PROCEDURE — 71045 X-RAY EXAM CHEST 1 VIEW: CPT

## 2019-01-30 PROCEDURE — 84484 ASSAY OF TROPONIN QUANT: CPT

## 2019-01-30 PROCEDURE — 96365 THER/PROPH/DIAG IV INF INIT: CPT

## 2019-01-30 PROCEDURE — 93005 ELECTROCARDIOGRAM TRACING: CPT

## 2019-01-30 PROCEDURE — 99285 EMERGENCY DEPT VISIT HI MDM: CPT

## 2019-01-30 PROCEDURE — 85025 COMPLETE CBC W/AUTO DIFF WBC: CPT

## 2019-01-30 PROCEDURE — 80053 COMPREHEN METABOLIC PANEL: CPT

## 2019-01-30 PROCEDURE — 81001 URINALYSIS AUTO W/SCOPE: CPT

## 2019-01-30 PROCEDURE — 36415 COLL VENOUS BLD VENIPUNCTURE: CPT

## 2019-01-30 PROCEDURE — 83735 ASSAY OF MAGNESIUM: CPT

## 2019-01-30 PROCEDURE — 6360000002 HC RX W HCPCS: Performed by: EMERGENCY MEDICINE

## 2019-01-30 PROCEDURE — 6370000000 HC RX 637 (ALT 250 FOR IP): Performed by: EMERGENCY MEDICINE

## 2019-01-30 PROCEDURE — 70450 CT HEAD/BRAIN W/O DYE: CPT

## 2019-01-30 PROCEDURE — 96375 TX/PRO/DX INJ NEW DRUG ADDON: CPT

## 2019-01-30 RX ORDER — MECLIZINE HYDROCHLORIDE 25 MG/1
25 TABLET ORAL ONCE
Status: COMPLETED | OUTPATIENT
Start: 2019-01-30 | End: 2019-01-30

## 2019-01-30 RX ORDER — FENTANYL CITRATE 50 UG/ML
50 INJECTION, SOLUTION INTRAMUSCULAR; INTRAVENOUS ONCE
Status: COMPLETED | OUTPATIENT
Start: 2019-01-30 | End: 2019-01-30

## 2019-01-30 RX ORDER — CEPHALEXIN 500 MG/1
500 CAPSULE ORAL 2 TIMES DAILY
Qty: 14 CAPSULE | Refills: 0 | Status: SHIPPED | OUTPATIENT
Start: 2019-01-30 | End: 2019-02-06

## 2019-01-30 RX ORDER — LORAZEPAM 2 MG/ML
1 INJECTION INTRAMUSCULAR ONCE
Status: COMPLETED | OUTPATIENT
Start: 2019-01-30 | End: 2019-01-30

## 2019-01-30 RX ORDER — ONDANSETRON 4 MG/1
4 TABLET, ORALLY DISINTEGRATING ORAL 3 TIMES DAILY PRN
Qty: 21 TABLET | Refills: 0 | Status: SHIPPED | OUTPATIENT
Start: 2019-01-30 | End: 2019-02-07

## 2019-01-30 RX ORDER — 0.9 % SODIUM CHLORIDE 0.9 %
1000 INTRAVENOUS SOLUTION INTRAVENOUS ONCE
Status: COMPLETED | OUTPATIENT
Start: 2019-01-30 | End: 2019-01-30

## 2019-01-30 RX ADMIN — CEFTRIAXONE SODIUM 1 G: 1 INJECTION, POWDER, FOR SOLUTION INTRAMUSCULAR; INTRAVENOUS at 15:42

## 2019-01-30 RX ADMIN — LORAZEPAM 1 MG: 2 INJECTION INTRAMUSCULAR; INTRAVENOUS at 15:42

## 2019-01-30 RX ADMIN — MECLIZINE HYDROCHLORIDE 25 MG: 25 TABLET ORAL at 14:24

## 2019-01-30 RX ADMIN — SODIUM CHLORIDE 1000 ML: 9 INJECTION, SOLUTION INTRAVENOUS at 14:24

## 2019-01-30 RX ADMIN — FENTANYL CITRATE 50 MCG: 50 INJECTION, SOLUTION INTRAMUSCULAR; INTRAVENOUS at 14:24

## 2019-01-30 ASSESSMENT — ENCOUNTER SYMPTOMS
NAUSEA: 1
SHORTNESS OF BREATH: 0
BACK PAIN: 1
ABDOMINAL PAIN: 0
VOMITING: 1
DIARRHEA: 0
COUGH: 0
SORE THROAT: 0

## 2019-01-30 ASSESSMENT — PAIN SCALES - GENERAL
PAINLEVEL_OUTOF10: 10
PAINLEVEL_OUTOF10: 0
PAINLEVEL_OUTOF10: 10

## 2019-01-30 ASSESSMENT — PAIN DESCRIPTION - PAIN TYPE: TYPE: CHRONIC PAIN

## 2019-01-30 ASSESSMENT — PAIN DESCRIPTION - LOCATION: LOCATION: BACK;NECK;LEG

## 2019-01-31 PROCEDURE — 93010 ELECTROCARDIOGRAM REPORT: CPT | Performed by: INTERNAL MEDICINE

## 2019-02-07 ENCOUNTER — HOSPITAL ENCOUNTER (EMERGENCY)
Age: 44
Discharge: HOME | End: 2019-02-07
Payer: MEDICARE

## 2019-02-07 VITALS
RESPIRATION RATE: 16 BRPM | HEIGHT: 65 IN | TEMPERATURE: 98.7 F | DIASTOLIC BLOOD PRESSURE: 59 MMHG | WEIGHT: 216 LBS | BODY MASS INDEX: 35.99 KG/M2 | OXYGEN SATURATION: 95 % | SYSTOLIC BLOOD PRESSURE: 101 MMHG | HEART RATE: 55 BPM

## 2019-02-07 DIAGNOSIS — R11.2 NON-INTRACTABLE VOMITING WITH NAUSEA, UNSPECIFIED VOMITING TYPE: Primary | ICD-10-CM

## 2019-02-07 LAB
ALBUMIN SERPL-MCNC: 4 G/DL (ref 3.9–4.9)
ALP BLD-CCNC: 107 U/L (ref 40–130)
ALT SERPL-CCNC: 16 U/L (ref 0–33)
ANION GAP SERPL CALCULATED.3IONS-SCNC: 12 MEQ/L (ref 7–13)
AST SERPL-CCNC: 22 U/L (ref 0–35)
BACTERIA: ABNORMAL /HPF
BASOPHILS ABSOLUTE: 0 K/UL (ref 0–0.2)
BASOPHILS RELATIVE PERCENT: 0.9 %
BILIRUB SERPL-MCNC: <0.2 MG/DL (ref 0–1.2)
BILIRUBIN URINE: NEGATIVE
BLOOD, URINE: ABNORMAL
BUN BLDV-MCNC: 12 MG/DL (ref 6–20)
CALCIUM SERPL-MCNC: 9 MG/DL (ref 8.6–10.2)
CHLORIDE BLD-SCNC: 103 MEQ/L (ref 98–107)
CLARITY: CLEAR
CO2: 23 MEQ/L (ref 22–29)
COLOR: YELLOW
CREAT SERPL-MCNC: 1.25 MG/DL (ref 0.5–0.9)
CRYSTALS, UA: ABNORMAL
EOSINOPHILS ABSOLUTE: 0.1 K/UL (ref 0–0.7)
EOSINOPHILS RELATIVE PERCENT: 3.4 %
EPITHELIAL CELLS, UA: ABNORMAL /HPF (ref 0–5)
GFR AFRICAN AMERICAN: 56.4
GFR NON-AFRICAN AMERICAN: 46.6
GLOBULIN: 2.9 G/DL (ref 2.3–3.5)
GLUCOSE BLD-MCNC: 82 MG/DL (ref 74–109)
GLUCOSE URINE: NEGATIVE MG/DL
HCT VFR BLD CALC: 38.6 % (ref 37–47)
HEMOGLOBIN: 12.8 G/DL (ref 12–16)
HYALINE CASTS: ABNORMAL /HPF (ref 0–5)
KETONES, URINE: NEGATIVE MG/DL
LACTIC ACID: 0.6 MMOL/L (ref 0.5–2.2)
LEUKOCYTE ESTERASE, URINE: ABNORMAL
LYMPHOCYTES ABSOLUTE: 1.4 K/UL (ref 1–4.8)
LYMPHOCYTES RELATIVE PERCENT: 33.4 %
MCH RBC QN AUTO: 28.7 PG (ref 27–31.3)
MCHC RBC AUTO-ENTMCNC: 33.2 % (ref 33–37)
MCV RBC AUTO: 86.5 FL (ref 82–100)
MONOCYTES ABSOLUTE: 0.3 K/UL (ref 0.2–0.8)
MONOCYTES RELATIVE PERCENT: 7 %
NEUTROPHILS ABSOLUTE: 2.3 K/UL (ref 1.4–6.5)
NEUTROPHILS RELATIVE PERCENT: 55.3 %
NITRITE, URINE: NEGATIVE
PDW BLD-RTO: 15.6 % (ref 11.5–14.5)
PH UA: 6.5 (ref 5–9)
PLATELET # BLD: 217 K/UL (ref 130–400)
POTASSIUM SERPL-SCNC: 4 MEQ/L (ref 3.5–5.1)
PROTEIN UA: >=300 MG/DL
RAPID INFLUENZA  B AGN: NEGATIVE
RAPID INFLUENZA A AGN: NEGATIVE
RBC # BLD: 4.46 M/UL (ref 4.2–5.4)
RBC UA: ABNORMAL /HPF (ref 0–2)
SODIUM BLD-SCNC: 138 MEQ/L (ref 132–144)
SPECIFIC GRAVITY UA: 1.02 (ref 1–1.03)
TOTAL CK: 34 U/L (ref 0–170)
TOTAL PROTEIN: 6.9 G/DL (ref 6.4–8.1)
URINE REFLEX TO CULTURE: YES
UROBILINOGEN, URINE: 0.2 E.U./DL
WBC # BLD: 4.1 K/UL (ref 4.8–10.8)
WBC UA: ABNORMAL /HPF (ref 0–5)

## 2019-02-07 PROCEDURE — 81001 URINALYSIS AUTO W/SCOPE: CPT

## 2019-02-07 PROCEDURE — 96374 THER/PROPH/DIAG INJ IV PUSH: CPT

## 2019-02-07 PROCEDURE — 87804 INFLUENZA ASSAY W/OPTIC: CPT

## 2019-02-07 PROCEDURE — 96372 THER/PROPH/DIAG INJ SC/IM: CPT

## 2019-02-07 PROCEDURE — 6360000002 HC RX W HCPCS: Performed by: PHYSICIAN ASSISTANT

## 2019-02-07 PROCEDURE — 87086 URINE CULTURE/COLONY COUNT: CPT

## 2019-02-07 PROCEDURE — 82550 ASSAY OF CK (CPK): CPT

## 2019-02-07 PROCEDURE — 2580000003 HC RX 258: Performed by: PHYSICIAN ASSISTANT

## 2019-02-07 PROCEDURE — 85025 COMPLETE CBC W/AUTO DIFF WBC: CPT

## 2019-02-07 PROCEDURE — 36415 COLL VENOUS BLD VENIPUNCTURE: CPT

## 2019-02-07 PROCEDURE — 83605 ASSAY OF LACTIC ACID: CPT

## 2019-02-07 PROCEDURE — 80053 COMPREHEN METABOLIC PANEL: CPT

## 2019-02-07 PROCEDURE — 99284 EMERGENCY DEPT VISIT MOD MDM: CPT

## 2019-02-07 RX ORDER — ONDANSETRON 4 MG/1
4 TABLET, ORALLY DISINTEGRATING ORAL EVERY 8 HOURS PRN
Qty: 20 TABLET | Refills: 0 | Status: SHIPPED | OUTPATIENT
Start: 2019-02-07 | End: 2019-02-15

## 2019-02-07 RX ORDER — ONDANSETRON 2 MG/ML
4 INJECTION INTRAMUSCULAR; INTRAVENOUS ONCE
Status: COMPLETED | OUTPATIENT
Start: 2019-02-07 | End: 2019-02-07

## 2019-02-07 RX ORDER — PROMETHAZINE HYDROCHLORIDE 25 MG/ML
25 INJECTION, SOLUTION INTRAMUSCULAR; INTRAVENOUS ONCE
Status: COMPLETED | OUTPATIENT
Start: 2019-02-07 | End: 2019-02-07

## 2019-02-07 RX ORDER — 0.9 % SODIUM CHLORIDE 0.9 %
1000 INTRAVENOUS SOLUTION INTRAVENOUS ONCE
Status: COMPLETED | OUTPATIENT
Start: 2019-02-07 | End: 2019-02-07

## 2019-02-07 RX ORDER — FENTANYL CITRATE 50 UG/ML
50 INJECTION, SOLUTION INTRAMUSCULAR; INTRAVENOUS ONCE
Status: COMPLETED | OUTPATIENT
Start: 2019-02-07 | End: 2019-02-07

## 2019-02-07 RX ADMIN — SODIUM CHLORIDE 1000 ML: 9 INJECTION, SOLUTION INTRAVENOUS at 00:56

## 2019-02-07 RX ADMIN — FENTANYL CITRATE 50 MCG: 50 INJECTION, SOLUTION INTRAMUSCULAR; INTRAVENOUS at 01:44

## 2019-02-07 RX ADMIN — PROMETHAZINE HYDROCHLORIDE 25 MG: 25 INJECTION, SOLUTION INTRAMUSCULAR; INTRAVENOUS at 01:44

## 2019-02-07 RX ADMIN — ONDANSETRON 4 MG: 2 INJECTION INTRAMUSCULAR; INTRAVENOUS at 00:56

## 2019-02-07 ASSESSMENT — ENCOUNTER SYMPTOMS
VOMITING: 1
CONSTIPATION: 1
NAUSEA: 1
EYE PAIN: 0
TROUBLE SWALLOWING: 0
BLOOD IN STOOL: 0
DIARRHEA: 0
COLOR CHANGE: 0
ANAL BLEEDING: 0
ALLERGIC/IMMUNOLOGIC NEGATIVE: 1
SHORTNESS OF BREATH: 0
APNEA: 0
ABDOMINAL PAIN: 0

## 2019-02-07 ASSESSMENT — PAIN DESCRIPTION - FREQUENCY
FREQUENCY: CONTINUOUS
FREQUENCY: CONTINUOUS

## 2019-02-07 ASSESSMENT — PAIN SCALES - GENERAL
PAINLEVEL_OUTOF10: 9
PAINLEVEL_OUTOF10: 9
PAINLEVEL_OUTOF10: 7

## 2019-02-07 ASSESSMENT — PAIN DESCRIPTION - PAIN TYPE: TYPE: ACUTE PAIN;CHRONIC PAIN

## 2019-02-07 ASSESSMENT — PAIN DESCRIPTION - LOCATION
LOCATION: BACK;NECK
LOCATION: BACK;NECK

## 2019-02-07 ASSESSMENT — PAIN DESCRIPTION - ONSET
ONSET: ON-GOING
ONSET: ON-GOING

## 2019-02-07 ASSESSMENT — PAIN DESCRIPTION - PROGRESSION: CLINICAL_PROGRESSION: NOT CHANGED

## 2019-02-07 ASSESSMENT — PAIN DESCRIPTION - DESCRIPTORS: DESCRIPTORS: STABBING;ACHING

## 2019-02-08 LAB — URINE CULTURE, ROUTINE: NORMAL

## 2019-02-15 ENCOUNTER — HOSPITAL ENCOUNTER (OUTPATIENT)
Age: 44
Setting detail: OBSERVATION
Discharge: HOME | End: 2019-02-15
Attending: STUDENT IN AN ORGANIZED HEALTH CARE EDUCATION/TRAINING PROGRAM
Payer: MEDICARE

## 2019-02-15 ENCOUNTER — APPOINTMENT (OUTPATIENT)
Dept: CT IMAGING | Age: 44
End: 2019-02-15
Payer: MEDICARE

## 2019-02-15 VITALS
HEART RATE: 54 BPM | BODY MASS INDEX: 35.99 KG/M2 | DIASTOLIC BLOOD PRESSURE: 68 MMHG | HEIGHT: 65 IN | SYSTOLIC BLOOD PRESSURE: 133 MMHG | WEIGHT: 216 LBS | RESPIRATION RATE: 16 BRPM | TEMPERATURE: 99 F | OXYGEN SATURATION: 98 %

## 2019-02-15 DIAGNOSIS — R10.84 DIFFUSE ABDOMINAL PAIN: ICD-10-CM

## 2019-02-15 DIAGNOSIS — R11.2 NAUSEA AND VOMITING, INTRACTABILITY OF VOMITING NOT SPECIFIED, UNSPECIFIED VOMITING TYPE: ICD-10-CM

## 2019-02-15 DIAGNOSIS — K62.5 RECTAL BLEEDING: Primary | ICD-10-CM

## 2019-02-15 LAB
ABO/RH: NORMAL
ALBUMIN SERPL-MCNC: 3.8 G/DL (ref 3.5–4.6)
ALP BLD-CCNC: 87 U/L (ref 40–130)
ALT SERPL-CCNC: 11 U/L (ref 0–33)
AMPHETAMINE SCREEN, URINE: POSITIVE
ANION GAP SERPL CALCULATED.3IONS-SCNC: 10 MEQ/L (ref 9–15)
ANTIBODY SCREEN: NORMAL
APTT: 28.7 SEC (ref 21.6–35.4)
AST SERPL-CCNC: 14 U/L (ref 0–35)
BACTERIA: NEGATIVE /HPF
BARBITURATE SCREEN URINE: ABNORMAL
BASOPHILS ABSOLUTE: 0 K/UL (ref 0–0.2)
BASOPHILS RELATIVE PERCENT: 0.6 %
BENZODIAZEPINE SCREEN, URINE: POSITIVE
BILIRUB SERPL-MCNC: <0.2 MG/DL (ref 0.2–0.7)
BILIRUBIN URINE: NEGATIVE
BLOOD, URINE: ABNORMAL
BUN BLDV-MCNC: 16 MG/DL (ref 6–20)
CALCIUM SERPL-MCNC: 8.7 MG/DL (ref 8.5–9.9)
CANNABINOID SCREEN URINE: ABNORMAL
CARBOXYHEMOGLOBIN: 2.1 % (ref 0–4)
CHLORIDE BLD-SCNC: 103 MEQ/L (ref 95–107)
CLARITY: ABNORMAL
CO2: 21 MEQ/L (ref 20–31)
COCAINE METABOLITE SCREEN URINE: ABNORMAL
COLOR: ABNORMAL
CREAT SERPL-MCNC: 0.97 MG/DL (ref 0.5–0.9)
CRYSTALS, UA: ABNORMAL
EKG ATRIAL RATE: 46 BPM
EKG P AXIS: 19 DEGREES
EKG P-R INTERVAL: 206 MS
EKG Q-T INTERVAL: 480 MS
EKG QRS DURATION: 92 MS
EKG QTC CALCULATION (BAZETT): 420 MS
EKG R AXIS: 66 DEGREES
EKG T AXIS: 71 DEGREES
EKG VENTRICULAR RATE: 46 BPM
EOSINOPHILS ABSOLUTE: 0.2 K/UL (ref 0–0.7)
EOSINOPHILS RELATIVE PERCENT: 3.3 %
EPITHELIAL CELLS, UA: ABNORMAL /HPF (ref 0–5)
GFR AFRICAN AMERICAN: >60
GFR NON-AFRICAN AMERICAN: >60
GLOBULIN: 2.8 G/DL (ref 2.3–3.5)
GLUCOSE BLD-MCNC: 87 MG/DL (ref 70–99)
GLUCOSE URINE: NEGATIVE MG/DL
HCT VFR BLD CALC: 36.4 % (ref 37–47)
HEMOGLOBIN: 12.1 G/DL (ref 12–16)
HYALINE CASTS: ABNORMAL /HPF (ref 0–5)
INR BLD: 1
KETONES, URINE: ABNORMAL MG/DL
LACTIC ACID: 0.7 MMOL/L (ref 0.5–2.2)
LEUKOCYTE ESTERASE, URINE: ABNORMAL
LIPASE: 17 U/L (ref 12–95)
LYMPHOCYTES ABSOLUTE: 1.3 K/UL (ref 1–4.8)
LYMPHOCYTES RELATIVE PERCENT: 26.9 %
Lab: ABNORMAL
MCH RBC QN AUTO: 28.7 PG (ref 27–31.3)
MCHC RBC AUTO-ENTMCNC: 33.3 % (ref 33–37)
MCV RBC AUTO: 86.2 FL (ref 82–100)
MONOCYTES ABSOLUTE: 0.3 K/UL (ref 0.2–0.8)
MONOCYTES RELATIVE PERCENT: 6.6 %
NEUTROPHILS ABSOLUTE: 3.1 K/UL (ref 1.4–6.5)
NEUTROPHILS RELATIVE PERCENT: 62.6 %
NITRITE, URINE: NEGATIVE
OPIATE SCREEN URINE: ABNORMAL
PARATHYROID HORMONE INTACT: 35.7 PG/ML (ref 15–65)
PDW BLD-RTO: 15.1 % (ref 11.5–14.5)
PH UA: 7 (ref 5–9)
PHENCYCLIDINE SCREEN URINE: ABNORMAL
PLATELET # BLD: 214 K/UL (ref 130–400)
POTASSIUM SERPL-SCNC: 4 MEQ/L (ref 3.4–4.9)
PROTEIN UA: 30 MG/DL
PROTHROMBIN TIME: 10.6 SEC (ref 9–11.5)
RBC # BLD: 4.22 M/UL (ref 4.2–5.4)
RBC UA: ABNORMAL /HPF (ref 0–2)
SODIUM BLD-SCNC: 134 MEQ/L (ref 135–144)
SPECIFIC GRAVITY UA: 1.03 (ref 1–1.03)
TOTAL PROTEIN: 6.6 G/DL (ref 6.3–8)
URINE REFLEX TO CULTURE: YES
UROBILINOGEN, URINE: 1 E.U./DL
WBC # BLD: 4.9 K/UL (ref 4.8–10.8)
WBC UA: ABNORMAL /HPF (ref 0–5)
YEAST: PRESENT

## 2019-02-15 PROCEDURE — 6360000004 HC RX CONTRAST MEDICATION: Performed by: STUDENT IN AN ORGANIZED HEALTH CARE EDUCATION/TRAINING PROGRAM

## 2019-02-15 PROCEDURE — 85025 COMPLETE CBC W/AUTO DIFF WBC: CPT

## 2019-02-15 PROCEDURE — 6360000002 HC RX W HCPCS: Performed by: STUDENT IN AN ORGANIZED HEALTH CARE EDUCATION/TRAINING PROGRAM

## 2019-02-15 PROCEDURE — 81001 URINALYSIS AUTO W/SCOPE: CPT

## 2019-02-15 PROCEDURE — 80053 COMPREHEN METABOLIC PANEL: CPT

## 2019-02-15 PROCEDURE — 83970 ASSAY OF PARATHORMONE: CPT

## 2019-02-15 PROCEDURE — 86900 BLOOD TYPING SEROLOGIC ABO: CPT

## 2019-02-15 PROCEDURE — 36415 COLL VENOUS BLD VENIPUNCTURE: CPT

## 2019-02-15 PROCEDURE — 87186 SC STD MICRODIL/AGAR DIL: CPT

## 2019-02-15 PROCEDURE — 85610 PROTHROMBIN TIME: CPT

## 2019-02-15 PROCEDURE — 86901 BLOOD TYPING SEROLOGIC RH(D): CPT

## 2019-02-15 PROCEDURE — 93005 ELECTROCARDIOGRAM TRACING: CPT

## 2019-02-15 PROCEDURE — 96375 TX/PRO/DX INJ NEW DRUG ADDON: CPT

## 2019-02-15 PROCEDURE — 87077 CULTURE AEROBIC IDENTIFY: CPT

## 2019-02-15 PROCEDURE — G0378 HOSPITAL OBSERVATION PER HR: HCPCS

## 2019-02-15 PROCEDURE — 6370000000 HC RX 637 (ALT 250 FOR IP): Performed by: STUDENT IN AN ORGANIZED HEALTH CARE EDUCATION/TRAINING PROGRAM

## 2019-02-15 PROCEDURE — 80307 DRUG TEST PRSMV CHEM ANLYZR: CPT

## 2019-02-15 PROCEDURE — 99285 EMERGENCY DEPT VISIT HI MDM: CPT

## 2019-02-15 PROCEDURE — A4641 RADIOPHARM DX AGENT NOC: HCPCS | Performed by: STUDENT IN AN ORGANIZED HEALTH CARE EDUCATION/TRAINING PROGRAM

## 2019-02-15 PROCEDURE — 74177 CT ABD & PELVIS W/CONTRAST: CPT

## 2019-02-15 PROCEDURE — 85730 THROMBOPLASTIN TIME PARTIAL: CPT

## 2019-02-15 PROCEDURE — 87086 URINE CULTURE/COLONY COUNT: CPT

## 2019-02-15 PROCEDURE — 96374 THER/PROPH/DIAG INJ IV PUSH: CPT

## 2019-02-15 PROCEDURE — 83690 ASSAY OF LIPASE: CPT

## 2019-02-15 PROCEDURE — 2580000003 HC RX 258: Performed by: STUDENT IN AN ORGANIZED HEALTH CARE EDUCATION/TRAINING PROGRAM

## 2019-02-15 PROCEDURE — 96365 THER/PROPH/DIAG IV INF INIT: CPT

## 2019-02-15 PROCEDURE — 83605 ASSAY OF LACTIC ACID: CPT

## 2019-02-15 PROCEDURE — 86850 RBC ANTIBODY SCREEN: CPT

## 2019-02-15 PROCEDURE — 82375 ASSAY CARBOXYHB QUANT: CPT

## 2019-02-15 RX ORDER — 0.9 % SODIUM CHLORIDE 0.9 %
10 VIAL (ML) INJECTION EVERY 12 HOURS
Status: CANCELLED | OUTPATIENT
Start: 2019-02-15

## 2019-02-15 RX ORDER — SODIUM CHLORIDE 9 MG/ML
INJECTION, SOLUTION INTRAVENOUS CONTINUOUS
Status: CANCELLED | OUTPATIENT
Start: 2019-02-15

## 2019-02-15 RX ORDER — ONDANSETRON 2 MG/ML
4 INJECTION INTRAMUSCULAR; INTRAVENOUS ONCE
Status: COMPLETED | OUTPATIENT
Start: 2019-02-15 | End: 2019-02-15

## 2019-02-15 RX ORDER — SODIUM CHLORIDE 0.9 % (FLUSH) 0.9 %
10 SYRINGE (ML) INJECTION EVERY 12 HOURS SCHEDULED
Status: CANCELLED | OUTPATIENT
Start: 2019-02-15

## 2019-02-15 RX ORDER — PANTOPRAZOLE SODIUM 40 MG/10ML
40 INJECTION, POWDER, LYOPHILIZED, FOR SOLUTION INTRAVENOUS EVERY 12 HOURS
Status: CANCELLED | OUTPATIENT
Start: 2019-02-15

## 2019-02-15 RX ORDER — CEFUROXIME AXETIL 250 MG/1
250 TABLET ORAL 2 TIMES DAILY
Qty: 14 TABLET | Refills: 0 | Status: SHIPPED | OUTPATIENT
Start: 2019-02-15 | End: 2019-02-22

## 2019-02-15 RX ORDER — 0.9 % SODIUM CHLORIDE 0.9 %
1000 INTRAVENOUS SOLUTION INTRAVENOUS ONCE
Status: COMPLETED | OUTPATIENT
Start: 2019-02-15 | End: 2019-02-15

## 2019-02-15 RX ORDER — DICYCLOMINE HYDROCHLORIDE 10 MG/1
10 CAPSULE ORAL ONCE
Status: COMPLETED | OUTPATIENT
Start: 2019-02-15 | End: 2019-02-15

## 2019-02-15 RX ORDER — DICYCLOMINE HYDROCHLORIDE 10 MG/1
10 CAPSULE ORAL EVERY 6 HOURS PRN
Qty: 10 CAPSULE | Refills: 0 | Status: SHIPPED | OUTPATIENT
Start: 2019-02-15 | End: 2020-11-03 | Stop reason: ALTCHOICE

## 2019-02-15 RX ORDER — ONDANSETRON 4 MG/1
4 TABLET, ORALLY DISINTEGRATING ORAL EVERY 8 HOURS PRN
Qty: 6 TABLET | Refills: 0 | Status: SHIPPED | OUTPATIENT
Start: 2019-02-15 | End: 2019-06-02

## 2019-02-15 RX ORDER — SODIUM CHLORIDE 0.9 % (FLUSH) 0.9 %
10 SYRINGE (ML) INJECTION PRN
Status: CANCELLED | OUTPATIENT
Start: 2019-02-15

## 2019-02-15 RX ORDER — ONDANSETRON 2 MG/ML
4 INJECTION INTRAMUSCULAR; INTRAVENOUS EVERY 6 HOURS PRN
Status: CANCELLED | OUTPATIENT
Start: 2019-02-15

## 2019-02-15 RX ADMIN — IOPAMIDOL 100 ML: 612 INJECTION, SOLUTION INTRAVENOUS at 16:25

## 2019-02-15 RX ADMIN — BARIUM SULFATE 450 ML: 21 SUSPENSION ORAL at 15:01

## 2019-02-15 RX ADMIN — SODIUM CHLORIDE 1000 ML: 9 INJECTION, SOLUTION INTRAVENOUS at 15:01

## 2019-02-15 RX ADMIN — CEFTRIAXONE SODIUM 1 G: 1 INJECTION, POWDER, FOR SOLUTION INTRAMUSCULAR; INTRAVENOUS at 18:37

## 2019-02-15 RX ADMIN — DICYCLOMINE HYDROCHLORIDE 10 MG: 10 CAPSULE ORAL at 18:37

## 2019-02-15 RX ADMIN — ONDANSETRON 4 MG: 2 INJECTION INTRAMUSCULAR; INTRAVENOUS at 15:00

## 2019-02-15 ASSESSMENT — ENCOUNTER SYMPTOMS
BLOOD IN STOOL: 1
TROUBLE SWALLOWING: 0
ANAL BLEEDING: 1
SINUS PRESSURE: 0
VOMITING: 1
NAUSEA: 1
SHORTNESS OF BREATH: 0
ABDOMINAL PAIN: 1
BACK PAIN: 0
CHEST TIGHTNESS: 0
DIARRHEA: 0
COUGH: 0

## 2019-02-15 ASSESSMENT — PAIN DESCRIPTION - ORIENTATION: ORIENTATION: LOWER

## 2019-02-15 ASSESSMENT — PAIN DESCRIPTION - DESCRIPTORS: DESCRIPTORS: STABBING

## 2019-02-15 ASSESSMENT — PAIN DESCRIPTION - FREQUENCY: FREQUENCY: CONTINUOUS

## 2019-02-15 ASSESSMENT — PAIN DESCRIPTION - LOCATION: LOCATION: ABDOMEN;BACK

## 2019-02-15 ASSESSMENT — PAIN SCALES - GENERAL: PAINLEVEL_OUTOF10: 9

## 2019-02-15 ASSESSMENT — PAIN DESCRIPTION - PAIN TYPE: TYPE: ACUTE PAIN

## 2019-02-18 LAB
ORGANISM: ABNORMAL
URINE CULTURE, ROUTINE: ABNORMAL
URINE CULTURE, ROUTINE: ABNORMAL

## 2019-02-18 PROCEDURE — 93010 ELECTROCARDIOGRAM REPORT: CPT | Performed by: INTERNAL MEDICINE

## 2019-03-09 ENCOUNTER — APPOINTMENT (OUTPATIENT)
Dept: GENERAL RADIOLOGY | Age: 44
End: 2019-03-09
Payer: MEDICARE

## 2019-03-09 ENCOUNTER — HOSPITAL ENCOUNTER (EMERGENCY)
Age: 44
Discharge: HOME | End: 2019-03-09
Payer: MEDICARE

## 2019-03-09 VITALS
SYSTOLIC BLOOD PRESSURE: 106 MMHG | TEMPERATURE: 97.7 F | HEART RATE: 52 BPM | RESPIRATION RATE: 17 BRPM | WEIGHT: 214 LBS | OXYGEN SATURATION: 96 % | DIASTOLIC BLOOD PRESSURE: 59 MMHG | HEIGHT: 65 IN | BODY MASS INDEX: 35.65 KG/M2

## 2019-03-09 DIAGNOSIS — M54.2 NECK PAIN: Primary | ICD-10-CM

## 2019-03-09 DIAGNOSIS — R07.89 CHEST WALL PAIN: ICD-10-CM

## 2019-03-09 PROCEDURE — 71046 X-RAY EXAM CHEST 2 VIEWS: CPT

## 2019-03-09 PROCEDURE — 6360000002 HC RX W HCPCS: Performed by: NURSE PRACTITIONER

## 2019-03-09 PROCEDURE — 72050 X-RAY EXAM NECK SPINE 4/5VWS: CPT

## 2019-03-09 PROCEDURE — 99283 EMERGENCY DEPT VISIT LOW MDM: CPT

## 2019-03-09 RX ORDER — CYCLOBENZAPRINE HCL 10 MG
10 TABLET ORAL 3 TIMES DAILY PRN
Qty: 10 TABLET | Refills: 0 | Status: SHIPPED | OUTPATIENT
Start: 2019-03-09 | End: 2020-02-17 | Stop reason: ALTCHOICE

## 2019-03-09 RX ORDER — ORPHENADRINE CITRATE 30 MG/ML
60 INJECTION INTRAMUSCULAR; INTRAVENOUS ONCE
Status: COMPLETED | OUTPATIENT
Start: 2019-03-09 | End: 2019-03-09

## 2019-03-09 RX ADMIN — ORPHENADRINE CITRATE 60 MG: 30 INJECTION INTRAMUSCULAR; INTRAVENOUS at 10:29

## 2019-03-09 ASSESSMENT — PAIN DESCRIPTION - LOCATION: LOCATION: RIB CAGE

## 2019-03-09 ASSESSMENT — ENCOUNTER SYMPTOMS
NAUSEA: 0
COUGH: 0
EYE PAIN: 0
ABDOMINAL PAIN: 0
VOMITING: 0
BACK PAIN: 0
PHOTOPHOBIA: 0
DIARRHEA: 0
SHORTNESS OF BREATH: 0
RHINORRHEA: 0
SORE THROAT: 0

## 2019-03-09 ASSESSMENT — PAIN SCALES - GENERAL
PAINLEVEL_OUTOF10: 10
PAINLEVEL_OUTOF10: 5

## 2019-03-09 ASSESSMENT — PAIN DESCRIPTION - PROGRESSION: CLINICAL_PROGRESSION: GRADUALLY IMPROVING

## 2019-03-09 ASSESSMENT — PAIN DESCRIPTION - ORIENTATION: ORIENTATION: RIGHT

## 2019-03-09 ASSESSMENT — PAIN DESCRIPTION - PAIN TYPE: TYPE: ACUTE PAIN

## 2019-03-20 ENCOUNTER — HOSPITAL ENCOUNTER (EMERGENCY)
Age: 44
Discharge: HOME | End: 2019-03-21
Attending: EMERGENCY MEDICINE
Payer: MEDICARE

## 2019-03-20 DIAGNOSIS — R10.2 SUPRAPUBIC PAIN: Primary | ICD-10-CM

## 2019-03-20 DIAGNOSIS — N30.00 ACUTE CYSTITIS WITHOUT HEMATURIA: ICD-10-CM

## 2019-03-20 LAB
BASOPHILS ABSOLUTE: 0 K/UL (ref 0–0.2)
BASOPHILS RELATIVE PERCENT: 0.5 %
BILIRUBIN URINE: NEGATIVE
BLOOD, URINE: NEGATIVE
CLARITY: ABNORMAL
COLOR: YELLOW
EOSINOPHILS ABSOLUTE: 0.2 K/UL (ref 0–0.7)
EOSINOPHILS RELATIVE PERCENT: 2 %
GLUCOSE URINE: NEGATIVE MG/DL
HCT VFR BLD CALC: 37.8 % (ref 37–47)
HEMOGLOBIN: 12.9 G/DL (ref 12–16)
KETONES, URINE: NEGATIVE MG/DL
LEUKOCYTE ESTERASE, URINE: ABNORMAL
LYMPHOCYTES ABSOLUTE: 1.9 K/UL (ref 1–4.8)
LYMPHOCYTES RELATIVE PERCENT: 24 %
MCH RBC QN AUTO: 28.9 PG (ref 27–31.3)
MCHC RBC AUTO-ENTMCNC: 34 % (ref 33–37)
MCV RBC AUTO: 84.8 FL (ref 82–100)
MONOCYTES ABSOLUTE: 0.5 K/UL (ref 0.2–0.8)
MONOCYTES RELATIVE PERCENT: 6.2 %
NEUTROPHILS ABSOLUTE: 5.4 K/UL (ref 1.4–6.5)
NEUTROPHILS RELATIVE PERCENT: 67.3 %
NITRITE, URINE: NEGATIVE
PDW BLD-RTO: 15.5 % (ref 11.5–14.5)
PH UA: 7.5 (ref 5–9)
PLATELET # BLD: 293 K/UL (ref 130–400)
PROTEIN UA: NEGATIVE MG/DL
RBC # BLD: 4.46 M/UL (ref 4.2–5.4)
SPECIFIC GRAVITY UA: 1.01 (ref 1–1.03)
URINE REFLEX TO CULTURE: YES
UROBILINOGEN, URINE: 0.2 E.U./DL
WBC # BLD: 7.9 K/UL (ref 4.8–10.8)

## 2019-03-20 PROCEDURE — 81001 URINALYSIS AUTO W/SCOPE: CPT

## 2019-03-20 PROCEDURE — 87186 SC STD MICRODIL/AGAR DIL: CPT

## 2019-03-20 PROCEDURE — 36415 COLL VENOUS BLD VENIPUNCTURE: CPT

## 2019-03-20 PROCEDURE — 96375 TX/PRO/DX INJ NEW DRUG ADDON: CPT

## 2019-03-20 PROCEDURE — 80053 COMPREHEN METABOLIC PANEL: CPT

## 2019-03-20 PROCEDURE — 99284 EMERGENCY DEPT VISIT MOD MDM: CPT

## 2019-03-20 PROCEDURE — 85025 COMPLETE CBC W/AUTO DIFF WBC: CPT

## 2019-03-20 PROCEDURE — 96374 THER/PROPH/DIAG INJ IV PUSH: CPT

## 2019-03-20 PROCEDURE — 87086 URINE CULTURE/COLONY COUNT: CPT

## 2019-03-20 PROCEDURE — 6360000002 HC RX W HCPCS: Performed by: EMERGENCY MEDICINE

## 2019-03-20 PROCEDURE — 87077 CULTURE AEROBIC IDENTIFY: CPT

## 2019-03-20 RX ORDER — ONDANSETRON 2 MG/ML
4 INJECTION INTRAMUSCULAR; INTRAVENOUS ONCE
Status: COMPLETED | OUTPATIENT
Start: 2019-03-20 | End: 2019-03-20

## 2019-03-20 RX ORDER — SODIUM CHLORIDE 0.9 % (FLUSH) 0.9 %
3 SYRINGE (ML) INJECTION EVERY 8 HOURS
Status: DISCONTINUED | OUTPATIENT
Start: 2019-03-20 | End: 2019-03-21 | Stop reason: HOSPADM

## 2019-03-20 RX ADMIN — HYDROMORPHONE HYDROCHLORIDE 1 MG: 1 INJECTION, SOLUTION INTRAMUSCULAR; INTRAVENOUS; SUBCUTANEOUS at 23:40

## 2019-03-20 RX ADMIN — ONDANSETRON 4 MG: 2 INJECTION INTRAMUSCULAR; INTRAVENOUS at 23:37

## 2019-03-20 ASSESSMENT — PAIN DESCRIPTION - DESCRIPTORS: DESCRIPTORS: ACHING

## 2019-03-20 ASSESSMENT — PAIN SCALES - GENERAL
PAINLEVEL_OUTOF10: 10
PAINLEVEL_OUTOF10: 10

## 2019-03-20 ASSESSMENT — PAIN DESCRIPTION - LOCATION: LOCATION: ABDOMEN

## 2019-03-20 ASSESSMENT — PAIN DESCRIPTION - ONSET: ONSET: ON-GOING

## 2019-03-20 ASSESSMENT — PAIN DESCRIPTION - FREQUENCY: FREQUENCY: CONTINUOUS

## 2019-03-20 ASSESSMENT — PAIN DESCRIPTION - PAIN TYPE: TYPE: ACUTE PAIN

## 2019-03-21 ENCOUNTER — APPOINTMENT (OUTPATIENT)
Dept: CT IMAGING | Age: 44
End: 2019-03-21
Payer: MEDICARE

## 2019-03-21 VITALS
TEMPERATURE: 98.3 F | RESPIRATION RATE: 12 BRPM | HEIGHT: 65 IN | OXYGEN SATURATION: 96 % | BODY MASS INDEX: 35.65 KG/M2 | DIASTOLIC BLOOD PRESSURE: 63 MMHG | HEART RATE: 58 BPM | WEIGHT: 214 LBS | SYSTOLIC BLOOD PRESSURE: 98 MMHG

## 2019-03-21 LAB
ALBUMIN SERPL-MCNC: 3.6 G/DL (ref 3.5–4.6)
ALP BLD-CCNC: 116 U/L (ref 40–130)
ALT SERPL-CCNC: 15 U/L (ref 0–33)
ANION GAP SERPL CALCULATED.3IONS-SCNC: 12 MEQ/L (ref 9–15)
AST SERPL-CCNC: 32 U/L (ref 0–35)
BACTERIA: ABNORMAL /HPF
BILIRUB SERPL-MCNC: 0.3 MG/DL (ref 0.2–0.7)
BUN BLDV-MCNC: 10 MG/DL (ref 6–20)
CALCIUM SERPL-MCNC: 8.8 MG/DL (ref 8.5–9.9)
CHLORIDE BLD-SCNC: 107 MEQ/L (ref 95–107)
CO2: 20 MEQ/L (ref 20–31)
CREAT SERPL-MCNC: 0.91 MG/DL (ref 0.5–0.9)
EPITHELIAL CELLS, UA: ABNORMAL /HPF
GFR AFRICAN AMERICAN: >60
GFR NON-AFRICAN AMERICAN: >60
GLOBULIN: 3.3 G/DL (ref 2.3–3.5)
GLUCOSE BLD-MCNC: 75 MG/DL (ref 70–99)
POTASSIUM SERPL-SCNC: 4.3 MEQ/L (ref 3.4–4.9)
RBC UA: ABNORMAL /HPF (ref 0–2)
SODIUM BLD-SCNC: 139 MEQ/L (ref 135–144)
TOTAL PROTEIN: 6.9 G/DL (ref 6.3–8)
WBC UA: ABNORMAL /HPF (ref 0–5)

## 2019-03-21 PROCEDURE — 74176 CT ABD & PELVIS W/O CONTRAST: CPT

## 2019-03-21 RX ORDER — OXYCODONE HYDROCHLORIDE AND ACETAMINOPHEN 5; 325 MG/1; MG/1
1-2 TABLET ORAL EVERY 6 HOURS PRN
Qty: 10 TABLET | Refills: 0 | Status: SHIPPED | OUTPATIENT
Start: 2019-03-21 | End: 2019-03-24

## 2019-03-21 RX ORDER — SULFAMETHOXAZOLE AND TRIMETHOPRIM 800; 160 MG/1; MG/1
1 TABLET ORAL 2 TIMES DAILY
Qty: 14 TABLET | Refills: 0 | Status: SHIPPED | OUTPATIENT
Start: 2019-03-21 | End: 2019-03-28

## 2019-03-23 LAB
ORGANISM: ABNORMAL
URINE CULTURE, ROUTINE: ABNORMAL
URINE CULTURE, ROUTINE: ABNORMAL

## 2019-04-08 ENCOUNTER — APPOINTMENT (OUTPATIENT)
Dept: GENERAL RADIOLOGY | Age: 44
End: 2019-04-08
Payer: MEDICARE

## 2019-04-08 ENCOUNTER — HOSPITAL ENCOUNTER (EMERGENCY)
Age: 44
Discharge: HOME | End: 2019-04-08
Payer: MEDICARE

## 2019-04-08 VITALS
SYSTOLIC BLOOD PRESSURE: 95 MMHG | TEMPERATURE: 98.4 F | DIASTOLIC BLOOD PRESSURE: 62 MMHG | RESPIRATION RATE: 18 BRPM | HEIGHT: 65 IN | OXYGEN SATURATION: 98 % | WEIGHT: 214 LBS | BODY MASS INDEX: 35.65 KG/M2 | HEART RATE: 64 BPM

## 2019-04-08 DIAGNOSIS — M25.561 ACUTE PAIN OF BOTH KNEES: Primary | ICD-10-CM

## 2019-04-08 DIAGNOSIS — M25.562 ACUTE PAIN OF BOTH KNEES: Primary | ICD-10-CM

## 2019-04-08 PROCEDURE — 96372 THER/PROPH/DIAG INJ SC/IM: CPT

## 2019-04-08 PROCEDURE — 73562 X-RAY EXAM OF KNEE 3: CPT

## 2019-04-08 PROCEDURE — 6360000002 HC RX W HCPCS: Performed by: PERSONAL EMERGENCY RESPONSE ATTENDANT

## 2019-04-08 PROCEDURE — 99283 EMERGENCY DEPT VISIT LOW MDM: CPT

## 2019-04-08 RX ORDER — FENTANYL CITRATE 50 UG/ML
50 INJECTION, SOLUTION INTRAMUSCULAR; INTRAVENOUS ONCE
Status: COMPLETED | OUTPATIENT
Start: 2019-04-08 | End: 2019-04-08

## 2019-04-08 RX ADMIN — FENTANYL CITRATE 50 MCG: 50 INJECTION, SOLUTION INTRAMUSCULAR; INTRAVENOUS at 04:56

## 2019-04-08 ASSESSMENT — ENCOUNTER SYMPTOMS
DIARRHEA: 0
COUGH: 0
ABDOMINAL PAIN: 0
NAUSEA: 0
VOMITING: 0
COLOR CHANGE: 0
SORE THROAT: 0
BLOOD IN STOOL: 0
SHORTNESS OF BREATH: 0
RHINORRHEA: 0

## 2019-04-08 ASSESSMENT — PAIN SCALES - GENERAL
PAINLEVEL_OUTOF10: 9
PAINLEVEL_OUTOF10: 9

## 2019-04-08 ASSESSMENT — PAIN DESCRIPTION - ORIENTATION: ORIENTATION: RIGHT

## 2019-04-08 ASSESSMENT — PAIN DESCRIPTION - LOCATION: LOCATION: KNEE

## 2019-04-08 NOTE — ED PROVIDER NOTES
[address] [facility] ED  eMERGENCY dEPARTMENT eNCOUnter      Pt Name: Molly Lopez  MRN: [id number]  [place] 1975  Date of evaluation: 4/8/2019  Provider: KANNAN Fernandez      HISTORY OF PRESENT ILLNESS    Molly Lopez is a 40 y.o. female with PMHx of suprapubic catheter, asthma, seizures, CVA, kidney stone, depression presents to the emergency department with bilateral knee pain. Pt states 4 days ago her legs gave out on her and she fell with her legs landing in lateral position. She felt a pop. Pt is mostly nonambulatory and has been getting around in her wheelchair. She is taking Percocet at home with no relief. She denies numbness or paresthesias distally. She complains of pain in lateral aspects of bilateral knees with right knee swelling. HPI    Nursing Notes were reviewed. REVIEW OF SYSTEMS       Review of Systems   Constitutional: Negative for appetite change, chills and fever. HENT: Negative for congestion, rhinorrhea and sore throat. Respiratory: Negative for cough and shortness of breath. Cardiovascular: Negative for chest pain. Gastrointestinal: Negative for abdominal pain, blood in stool, diarrhea, nausea and vomiting. Genitourinary: Negative for difficulty urinating. Musculoskeletal: Positive for arthralgias, gait problem and joint swelling. Negative for neck stiffness. Skin: Negative for color change and rash. Neurological: Negative for dizziness, syncope, weakness, light-headedness, numbness and headaches. All other systems reviewed and are negative.             PAST MEDICAL HISTORY     Past Medical History:   Diagnosis Date    Asthma     Cerebral artery occlusion with cerebral infarction ([address]     Chronic back pain     Chronic kidney disease     Depression     Headache     Kidney disease     Kidney stone     Seizures ([address] 5/24/2016    Suprapubic catheter ([address] 06/2018         SURGICAL HISTORY       Past Surgical History:   Procedure Laterality Date  APPENDECTOMY      BACK SURGERY      CHOLECYSTECTOMY      HYSTERECTOMY      KNEE SURGERY Bilateral     OTHER SURGICAL HISTORY      sup/pub cath june 1, 2018         CURRENT MEDICATIONS       Previous Medications    ALPRAZOLAM (XANAX) 0.5 MG TABLET    Take 0.5 mg by mouth 2 times daily. Carlee Miramontes AMITRIPTYLINE (ELAVIL) 100 MG TABLET    Take 100 mg by mouth nightly    APIXABAN (ELIQUIS) 5 MG TABS TABLET    Take 10 mg by mouth 2 times daily    ARIPIPRAZOLE (ABILIFY) 15 MG TABLET    Take 15 mg by mouth daily    BUDESONIDE-FORMOTEROL (SYMBICORT) 80-4.5 MCG/ACT AERO    Inhale 2 puffs into the lungs 2 times daily as needed     CYCLOBENZAPRINE (FLEXERIL) 10 MG TABLET    Take 1 tablet by mouth 3 times daily as needed for Muscle spasms    DICYCLOMINE (BENTYL) 10 MG CAPSULE    Take 1 capsule by mouth every 6 hours as needed (cramps)    EPINEPHRINE 1 MG/ML INJECTION    Inject 0.2 mg into the skin as needed    ESLICARBAZEPINE ACETATE (APTIOM) 800 MG TABLET    Take 400 mg by mouth nightly    ESLICARBAZEPINE ACETATE 400 MG TABS    Take 400 mg by mouth nightly     FEXOFENADINE (ALLEGRA) 60 MG TABLET    Take 60 mg by mouth 2 times daily    LEVETIRACETAM (KEPPRA) 500 MG TABLET    Take 2 tablets by mouth 2 times daily    LORAZEPAM (ATIVAN) 0.5 MG TABLET    Take 0.5 mg by mouth daily as needed for Anxiety. Carlee Miramontes MENTHOL, TOPICAL ANALGESIC, (BIOFREEZE) 4 % GEL    Apply 1 applicator topically 4 times daily as needed (pain)    NITROGLYCERIN (NITROSTAT) 0.4 MG SL TABLET    Place 0.4 mg under the tongue every 5 minutes as needed for Chest pain Dissolve 1 tablet under tongue for chest pain and repeat every 5 min up to max of 3 total doses. If no relief after 3 doses call 911    ONDANSETRON (ZOFRAN ODT) 4 MG DISINTEGRATING TABLET    Take 1 tablet by mouth every 8 hours as needed for Nausea or Vomiting    OXYCODONE-ACETAMINOPHEN (PERCOCET)  MG PER TABLET    Take 1 tablet by mouth 2 times daily. Carlee Miramontes     PAROXETINE (PAXIL) 40 MG TABLET Take 40 mg by mouth every morning     PROPRANOLOL (INDERAL LA) 60 MG CR CAPSULE    Take 1 capsule by mouth daily    TOPIRAMATE (TOPAMAX) 200 MG TABLET    Take 200 mg by mouth 2 times daily    TRAZODONE (DESYREL) 50 MG TABLET    Take 300 mg by mouth nightly     ZOLPIDEM (AMBIEN) 10 MG TABLET    Take 10 mg by mouth nightly as needed for Sleep. .       ALLERGIES     Latex; Ciprofloxacin; Contrast [iodides]; Daypro [oxaprozin]; Estrogens; Iodine; Lamictal [lamotrigine]; Lyrica [pregabalin]; Macrobid [nitrofurantoin monohyd macro]; Macrolides and ketolides; Shellfish-derived products; Tape [adhesive tape]; Tegretol [carbamazepine]; Toradol [ketorolac tromethamine]; Tramadol; Tylenol [acetaminophen];  Vicodin [hydrocodone-acetaminophen]; and Zanaflex [tizanidine hcl]    FAMILY HISTORY       Family History   Problem Relation Age of Onset    Cancer Father     Cancer Paternal Aunt           SOCIAL HISTORY       Social History     Socioeconomic History    Marital status: Single     Spouse name: None    Number of children: None    Years of education: None    Highest education level: None   Occupational History    None   Social Needs    Financial resource strain: None    Food insecurity:     Worry: None     Inability: None    Transportation needs:     Medical: None     Non-medical: None   Tobacco Use    Smoking status: Never Smoker    Smokeless tobacco: Never Used   Substance and Sexual Activity    Alcohol use: No     Alcohol/week: 0.0 oz    Drug use: No    Sexual activity: Never   Lifestyle    Physical activity:     Days per week: None     Minutes per session: None    Stress: None   Relationships    Social connections:     Talks on phone: None     Gets together: None     Attends Taoist service: None     Active member of club or organization: None     Attends meetings of clubs or organizations: None     Relationship status: None    Intimate partner violence:     Fear of current or ex partner: None Emotionally abused: None     Physically abused: None     Forced sexual activity: None   Other Topics Concern    None   Social History Narrative    None         PHYSICAL EXAM         ED Triage Vitals [04/08/19 0405]   BP Temp Temp Source Pulse Resp SpO2 Height Weight   95/62 98.4 °F (36.9 °C) Oral 64 18 98 % 5' 5\" (1.651 m) 214 lb (97.1 kg)       Physical Exam   Constitutional: She is oriented to person, place, and time. She appears well-developed and well-nourished. HENT:   Head: Normocephalic and atraumatic. Mouth/Throat: Oropharynx is clear and moist.   Eyes: Pupils are equal, round, and reactive to light. Conjunctivae and EOM are normal.   Neck: Normal range of motion. Neck supple. No tracheal deviation present. Cardiovascular: Normal heart sounds and intact distal pulses. Pulmonary/Chest: Effort normal and breath sounds normal. No stridor. No respiratory distress. Abdominal: Soft. Bowel sounds are normal. She exhibits no distension and no mass. There is no tenderness. There is no rebound and no guarding. Musculoskeletal: Normal range of motion. She exhibits edema and tenderness. Patient has mild right knee generalized swelling with mild TTP in medial and lateral joint lines in bilateral knees. Able to lift legs off bed. MSP intact distally. Able to flex knees but it is painful    Neurological: She is alert and oriented to person, place, and time. She has normal reflexes. Skin: Skin is warm and dry. Capillary refill takes less than 2 seconds. No rash noted. Psychiatric: She has a normal mood and affect.  Her behavior is normal. Judgment and thought content normal.       DIAGNOSTIC RESULTS     EKG:All EKG's are interpreted by the Emergency Department Physician who either signs or Co-signs this chart in the absence of a cardiologist.        RADIOLOGY:   Non-plain film images such as CT, Ultrasound and MRI are read by theradiologist. Plain radiographic images are visualized and preliminarily interpreted by the emergency physician with the below findings:    Interpretation per theRadiologist below, if available at the time of this note:    XR KNEE LEFT (3 VIEWS)    (Results Pending)   XR KNEE RIGHT (3 VIEWS)    (Results Pending)           LABS:  Labs Reviewed - No data to display    All other labs were within normal range or not returned as of this dictation. EMERGENCY DEPARTMENT COURSE and DIFFERENTIAL DIAGNOSIS/MDM:   Vitals:    Vitals:    04/08/19 0405   BP: 95/62   Pulse: 64   Resp: 18   Temp: 98.4 °F (36.9 °C)   TempSrc: Oral   SpO2: 98%   Weight: 214 lb (97.1 kg)   Height: 5' 5\" (1.651 m)         MDM    X-ray showed no acute fractures. Patient is aware to follow up with orthopedic doctor for possible ligament injury and further evaluation. She appears nontoxic in no apparent distress. Standard anticipatory guidance given to patient upon discharge. Have given them a specific time frame in which to follow-up and who to follow-up with. I have also advised them that they should return to the emergency department if they get worse, or not getting better or develop any new or concerning symptoms. Patient demonstrates understanding. CRITICAL CARE TIME   Total Critical Caretime was 0 minutes, excluding separately reportable procedures. There was a high probability of clinically significant/life threatening deterioration in the patient's condition which required my urgent intervention. Procedures    FINAL IMPRESSION      1. Acute pain of both knees          DISPOSITION/PLAN   DISPOSITION Decision To Discharge 04/08/2019 05:02:40 AM      PATIENT REFERRED TO:  Call your orthopedic for follow up            DISCHARGE MEDICATIONS:  New Prescriptions    No medications on file          (Please notethat portions of this note were completed with a voice recognition program.  Efforts were made to edit the dictations but occasionally words are mis-transcribed. )    KANNAN Lin (electronically signed)  Emergency Physician Assistant          Jose Vick [place]  04/08/19 2550

## 2019-04-08 NOTE — ED TRIAGE NOTES
Patient arrived to ER via wheelchair with complaints of right knee injury. Patient states she fell, onto her knees 3 days ago. Patient states the pain is getting worse. Patient takes Percocet for chronic pain, last dose at 2100. Swelling noted to right knee, no obvious sign of injury or bruising.

## 2019-06-02 ENCOUNTER — APPOINTMENT (OUTPATIENT)
Dept: GENERAL RADIOLOGY | Age: 44
End: 2019-06-02
Payer: MEDICARE

## 2019-06-02 ENCOUNTER — HOSPITAL ENCOUNTER (EMERGENCY)
Age: 44
Discharge: HOME | End: 2019-06-02
Payer: MEDICARE

## 2019-06-02 VITALS
OXYGEN SATURATION: 97 % | HEART RATE: 67 BPM | WEIGHT: 220 LBS | TEMPERATURE: 97.7 F | DIASTOLIC BLOOD PRESSURE: 67 MMHG | SYSTOLIC BLOOD PRESSURE: 119 MMHG | RESPIRATION RATE: 18 BRPM | BODY MASS INDEX: 36.61 KG/M2

## 2019-06-02 DIAGNOSIS — R11.0 NAUSEA: ICD-10-CM

## 2019-06-02 DIAGNOSIS — R10.10 PAIN OF UPPER ABDOMEN: Primary | ICD-10-CM

## 2019-06-02 LAB
ALBUMIN SERPL-MCNC: 3 G/DL (ref 3.5–4.6)
ALP BLD-CCNC: 135 U/L (ref 40–130)
ALT SERPL-CCNC: 12 U/L (ref 0–33)
ANION GAP SERPL CALCULATED.3IONS-SCNC: 13 MEQ/L (ref 9–15)
AST SERPL-CCNC: 9 U/L (ref 0–35)
BACTERIA: NEGATIVE /HPF
BASOPHILS ABSOLUTE: 0.1 K/UL (ref 0–0.2)
BASOPHILS RELATIVE PERCENT: 0.8 %
BILIRUB SERPL-MCNC: <0.2 MG/DL (ref 0.2–0.7)
BILIRUBIN URINE: NEGATIVE
BLOOD, URINE: NEGATIVE
BUN BLDV-MCNC: 15 MG/DL (ref 6–20)
CALCIUM SERPL-MCNC: 7.8 MG/DL (ref 8.5–9.9)
CHLORIDE BLD-SCNC: 102 MEQ/L (ref 95–107)
CLARITY: CLEAR
CO2: 20 MEQ/L (ref 20–31)
COLOR: YELLOW
CREAT SERPL-MCNC: 0.89 MG/DL (ref 0.5–0.9)
EKG ATRIAL RATE: 81 BPM
EKG P AXIS: 54 DEGREES
EKG P-R INTERVAL: 180 MS
EKG Q-T INTERVAL: 394 MS
EKG QRS DURATION: 92 MS
EKG QTC CALCULATION (BAZETT): 457 MS
EKG R AXIS: 81 DEGREES
EKG T AXIS: 61 DEGREES
EKG VENTRICULAR RATE: 81 BPM
EOSINOPHILS ABSOLUTE: 0.4 K/UL (ref 0–0.7)
EOSINOPHILS RELATIVE PERCENT: 5.2 %
EPITHELIAL CELLS, UA: ABNORMAL /HPF (ref 0–5)
GFR AFRICAN AMERICAN: >60
GFR NON-AFRICAN AMERICAN: >60
GLOBULIN: 2.8 G/DL (ref 2.3–3.5)
GLUCOSE BLD-MCNC: 104 MG/DL (ref 70–99)
GLUCOSE URINE: NEGATIVE MG/DL
HCT VFR BLD CALC: 32.1 % (ref 37–47)
HEMOGLOBIN: 10.9 G/DL (ref 12–16)
HYALINE CASTS: ABNORMAL /HPF (ref 0–5)
INR BLD: 1
KETONES, URINE: NEGATIVE MG/DL
LEUKOCYTE ESTERASE, URINE: ABNORMAL
LIPASE: 18 U/L (ref 12–95)
LYMPHOCYTES ABSOLUTE: 1.8 K/UL (ref 1–4.8)
LYMPHOCYTES RELATIVE PERCENT: 23.4 %
MCH RBC QN AUTO: 29.1 PG (ref 27–31.3)
MCHC RBC AUTO-ENTMCNC: 33.9 % (ref 33–37)
MCV RBC AUTO: 85.8 FL (ref 82–100)
MONOCYTES ABSOLUTE: 0.4 K/UL (ref 0.2–0.8)
MONOCYTES RELATIVE PERCENT: 5.7 %
NEUTROPHILS ABSOLUTE: 5 K/UL (ref 1.4–6.5)
NEUTROPHILS RELATIVE PERCENT: 64.9 %
NITRITE, URINE: NEGATIVE
PDW BLD-RTO: 15.1 % (ref 11.5–14.5)
PH UA: 6.5 (ref 5–9)
PLATELET # BLD: 286 K/UL (ref 130–400)
POTASSIUM SERPL-SCNC: 3.8 MEQ/L (ref 3.4–4.9)
PROTEIN UA: NEGATIVE MG/DL
PROTHROMBIN TIME: 10.6 SEC (ref 9–11.5)
RBC # BLD: 3.75 M/UL (ref 4.2–5.4)
RBC UA: ABNORMAL /HPF (ref 0–2)
SODIUM BLD-SCNC: 135 MEQ/L (ref 135–144)
SPECIFIC GRAVITY UA: 1.02 (ref 1–1.03)
TOTAL CK: 20 U/L (ref 0–170)
TOTAL PROTEIN: 5.8 G/DL (ref 6.3–8)
TROPONIN: <0.01 NG/ML (ref 0–0.01)
URINE REFLEX TO CULTURE: YES
UROBILINOGEN, URINE: 0.2 E.U./DL
WBC # BLD: 7.7 K/UL (ref 4.8–10.8)
WBC UA: ABNORMAL /HPF (ref 0–5)

## 2019-06-02 PROCEDURE — 85610 PROTHROMBIN TIME: CPT

## 2019-06-02 PROCEDURE — 93005 ELECTROCARDIOGRAM TRACING: CPT | Performed by: EMERGENCY MEDICINE

## 2019-06-02 PROCEDURE — 99285 EMERGENCY DEPT VISIT HI MDM: CPT

## 2019-06-02 PROCEDURE — 96374 THER/PROPH/DIAG INJ IV PUSH: CPT

## 2019-06-02 PROCEDURE — 84484 ASSAY OF TROPONIN QUANT: CPT

## 2019-06-02 PROCEDURE — 87086 URINE CULTURE/COLONY COUNT: CPT

## 2019-06-02 PROCEDURE — 2580000003 HC RX 258: Performed by: NURSE PRACTITIONER

## 2019-06-02 PROCEDURE — 36415 COLL VENOUS BLD VENIPUNCTURE: CPT

## 2019-06-02 PROCEDURE — 80053 COMPREHEN METABOLIC PANEL: CPT

## 2019-06-02 PROCEDURE — 85025 COMPLETE CBC W/AUTO DIFF WBC: CPT

## 2019-06-02 PROCEDURE — 71045 X-RAY EXAM CHEST 1 VIEW: CPT

## 2019-06-02 PROCEDURE — 81001 URINALYSIS AUTO W/SCOPE: CPT

## 2019-06-02 PROCEDURE — 2500000003 HC RX 250 WO HCPCS: Performed by: NURSE PRACTITIONER

## 2019-06-02 PROCEDURE — 83690 ASSAY OF LIPASE: CPT

## 2019-06-02 PROCEDURE — 6360000002 HC RX W HCPCS: Performed by: NURSE PRACTITIONER

## 2019-06-02 PROCEDURE — 82550 ASSAY OF CK (CPK): CPT

## 2019-06-02 PROCEDURE — 96375 TX/PRO/DX INJ NEW DRUG ADDON: CPT

## 2019-06-02 RX ORDER — ONDANSETRON 4 MG/1
4 TABLET, ORALLY DISINTEGRATING ORAL EVERY 8 HOURS PRN
Qty: 21 TABLET | Refills: 0 | Status: SHIPPED | OUTPATIENT
Start: 2019-06-02 | End: 2019-06-09

## 2019-06-02 RX ORDER — ONDANSETRON 2 MG/ML
4 INJECTION INTRAMUSCULAR; INTRAVENOUS ONCE
Status: COMPLETED | OUTPATIENT
Start: 2019-06-02 | End: 2019-06-02

## 2019-06-02 RX ORDER — 0.9 % SODIUM CHLORIDE 0.9 %
1000 INTRAVENOUS SOLUTION INTRAVENOUS ONCE
Status: COMPLETED | OUTPATIENT
Start: 2019-06-02 | End: 2019-06-02

## 2019-06-02 RX ORDER — MORPHINE SULFATE 2 MG/ML
4 INJECTION, SOLUTION INTRAMUSCULAR; INTRAVENOUS
Status: DISCONTINUED | OUTPATIENT
Start: 2019-06-02 | End: 2019-06-02 | Stop reason: HOSPADM

## 2019-06-02 RX ORDER — FAMOTIDINE 20 MG/1
20 TABLET, FILM COATED ORAL 2 TIMES DAILY
Qty: 14 TABLET | Refills: 0 | Status: ON HOLD | OUTPATIENT
Start: 2019-06-02 | End: 2020-08-16

## 2019-06-02 RX ADMIN — SODIUM CHLORIDE 1000 ML: 9 INJECTION, SOLUTION INTRAVENOUS at 01:51

## 2019-06-02 RX ADMIN — MORPHINE SULFATE 4 MG: 2 INJECTION, SOLUTION INTRAMUSCULAR; INTRAVENOUS at 01:51

## 2019-06-02 RX ADMIN — FAMOTIDINE 20 MG: 10 INJECTION, SOLUTION INTRAVENOUS at 01:51

## 2019-06-02 RX ADMIN — ONDANSETRON 4 MG: 2 INJECTION INTRAMUSCULAR; INTRAVENOUS at 01:52

## 2019-06-02 ASSESSMENT — PAIN SCALES - GENERAL
PAINLEVEL_OUTOF10: 4
PAINLEVEL_OUTOF10: 8
PAINLEVEL_OUTOF10: 8

## 2019-06-02 ASSESSMENT — ENCOUNTER SYMPTOMS
CHEST TIGHTNESS: 0
NAUSEA: 1
COLOR CHANGE: 0
SORE THROAT: 0
SHORTNESS OF BREATH: 0
ABDOMINAL PAIN: 1
COUGH: 0
BACK PAIN: 0
DIARRHEA: 0
VOMITING: 0
TROUBLE SWALLOWING: 0
WHEEZING: 0
RHINORRHEA: 0
ABDOMINAL DISTENTION: 0
CONSTIPATION: 0

## 2019-06-02 ASSESSMENT — PAIN DESCRIPTION - PAIN TYPE: TYPE: ACUTE PAIN

## 2019-06-02 ASSESSMENT — PAIN DESCRIPTION - ORIENTATION: ORIENTATION: RIGHT

## 2019-06-02 ASSESSMENT — PAIN DESCRIPTION - LOCATION: LOCATION: FLANK;CHEST

## 2019-06-02 NOTE — ED PROVIDER NOTES
[address] [facility] ED  eMERGENCY dEPARTMENT eNCOUnter      Pt Name: Butch Quick  MRN: [id number]  [place] 1975  Date of evaluation: 6/2/2019  Provider: Richard Cedeño       Chief Complaint   Patient presents with    Chest Pain         HISTORY OF PRESENT ILLNESS   (Location/Symptom, Timing/Onset,Context/Setting, Quality, Duration, Modifying Factors, Severity)  Note limiting factors. Butch Quick is a 40 y.o. female who presents to the emergency department for complaint of onset of right upper quadrant abdominal pain and right lower chest pain starting around 11:30 PM last night. Patient states that the pain is steadily increased over the night and is currently an 8 out of 10 aching throbbing sharp with no modifying factors. She states she's been nauseated but has not vomited. She reports she's had similar pains in the past and no longer has her gallbladder. She states she's been nauseated overall for about 4 days but denies any vomiting or diarrhea during this time and denies abdominal pain in other areas. She has a suprapubic catheter in place but states that her urine has been draining well and does not appear to have infections and has an the past.  She denies any diarrhea or constipation. She denies any other symptoms of illness such as fever lightheadedness headache cough congestion runny nose. Nursing Notes were reviewed. REVIEW OF SYSTEMS    (2-9 systems for level 4, 10 or more for level 5)     Review of Systems   Constitutional: Negative for activity change, appetite change, chills, diaphoresis, fatigue and fever. HENT: Negative for congestion, ear pain, rhinorrhea, sore throat and trouble swallowing. Eyes: Negative for visual disturbance. Respiratory: Negative for cough, chest tightness, shortness of breath and wheezing. Cardiovascular: Positive for chest pain. Negative for palpitations.    Gastrointestinal: Positive for abdominal pain and nausea. Negative for abdominal distention, constipation, diarrhea and vomiting. Genitourinary: Negative for difficulty urinating, dysuria, flank pain, frequency, hematuria and urgency. Musculoskeletal: Negative for back pain (chronic no new pains), myalgias, neck pain and neck stiffness. Skin: Negative for color change and rash. Neurological: Negative for dizziness, tremors, seizures, syncope, speech difficulty, weakness, light-headedness, numbness and headaches. Except as noted above the remainder of the review of systems was reviewed and negative.        PAST MEDICAL HISTORY     Past Medical History:   Diagnosis Date    Asthma     Cerebral artery occlusion with cerebral infarction ([facility]     Chronic back pain     Chronic kidney disease     Depression     Headache     Kidney disease     Kidney stone     Seizures ([facility] 5/24/2016    [facility]2018     Past Surgical History:   Procedure Laterality Date    APPENDECTOMY      BACK SURGERY      CHOLECYSTECTOMY      HYSTERECTOMY      KNEE SURGERY Bilateral     OTHER SURGICAL HISTORY      sup/pub cath june 1, 2018     Social History     Socioeconomic History    Marital status: Single     Spouse name: None    Number of children: None    Years of education: None    Highest education level: None   Occupational History    None   Social Needs    Financial resource strain: None    Food insecurity:     Worry: None     Inability: None    Transportation needs:     Medical: None     Non-medical: None   Tobacco Use    Smoking status: Never Smoker    Smokeless tobacco: Never Used   Substance and Sexual Activity    Alcohol use: No     Alcohol/week: 0.0 oz    Drug use: No    Sexual activity: Never   Lifestyle    Physical activity:     Days per week: None     Minutes per session: None    Stress: None   Relationships    Social connections:     Talks on phone: None     Gets together: None     Attends [employer] service: None     Active member of club or organization: None     Attends meetings of clubs or organizations: None     Relationship status: None    Intimate partner violence:     Fear of current or ex partner: None     Emotionally abused: None     Physically abused: None     Forced sexual activity: None   Other Topics Concern    None   Social History Narrative    None       SCREENINGS      @FLOW([id number])@      PHYSICAL EXAM    (up to 7 for level 4, 8 or more for level 5)     ED Triage Vitals [06/02/19 0111]   BP Temp Temp Source Pulse Resp SpO2 Height Weight   118/64 97.7 °F (36.5 °C) Oral 81 18 95 % -- 220 lb (99.8 kg)       Physical Exam   Constitutional: She is oriented to person, place, and time. She appears well-developed and well-nourished. No distress. HENT:   Head: Normocephalic and atraumatic. Right Ear: External ear normal.   Left Ear: External ear normal.   Nose: Nose normal.   Eyes: Conjunctivae are normal. Right eye exhibits no discharge. Left eye exhibits no discharge. Neck: Normal range of motion. Cardiovascular: Normal rate, regular rhythm, normal heart sounds and intact distal pulses. Pulmonary/Chest: Effort normal and breath sounds normal. She exhibits tenderness. Abdominal: Soft. Bowel sounds are normal. She exhibits no distension and no mass. There is tenderness in the right upper quadrant. There is no rigidity, no rebound, no guarding, no tenderness at McBurney's point and negative Funk's sign. Musculoskeletal: She exhibits no tenderness. Neurological: She is alert and oriented to person, place, and time. Skin: Skin is warm and dry. Capillary refill takes less than 2 seconds. She is not diaphoretic.        DIAGNOSTIC RESULTS     EKG: All EKG's are interpreted by the Emergency Department Physician who either signs or Co-signsthis chart in the absence of a cardiologist.    Sinus rhythm 81 bpm no acute ST elevation nonspecific ST and T-wave abnormalities are present in previous EKGs no ectopy  ms    RADIOLOGY:   Non-plain filmimages such as CT, Ultrasound and MRI are read by the radiologist. Plain radiographic images are visualized and preliminarily interpreted by the emergency physician with the below findings:    Portal chest x-ray is negative for acute changes    Interpretation per the Radiologist below, if available at the time ofthis note:    XR CHEST PORTABLE    (Results Pending)         ED BEDSIDE ULTRASOUND:   Performed by ED Physician - none    LABS:  Labs Reviewed   CBC WITH AUTO DIFFERENTIAL - Abnormal; Notable for the following components:       Result Value    RBC 3.75 (*)     Hemoglobin 10.9 (*)     Hematocrit 32.1 (*)     RDW 15.1 (*)     All other components within normal limits   COMPREHENSIVE METABOLIC PANEL - Abnormal; Notable for the following components:    Glucose 104 (*)     Calcium 7.8 (*)     Total Protein 5.8 (*)     Alb 3.0 (*)     Alkaline Phosphatase 135 (*)     All other components within normal limits   URINE RT REFLEX TO CULTURE - Abnormal; Notable for the following components:    Leukocyte Esterase, Urine SMALL (*)     All other components within normal limits   URINE CULTURE   LIPASE   TROPONIN   CK   PROTIME-INR   MICROSCOPIC URINALYSIS       All other labs were within normal range or not returned as of this dictation. EMERGENCY DEPARTMENT COURSE and DIFFERENTIAL DIAGNOSIS/MDM:   Vitals:    Vitals:    06/02/19 0111 06/02/19 0233   BP: 118/64 114/74   Pulse: 81 69   Resp: 18 18   Temp: 97.7 °F (36.5 °C)    TempSrc: Oral    SpO2: 95% 98%   Weight: 220 lb (99.8 kg)             MDM patient presents afebrile nontoxic no acute distress hemodynamically stable. Patient resents with a recurrence of right upper quadrant right lower chest pain. She states she's had symptoms similar to this in the past but the pain is more intense than she is used to and she is additionally had nausea ×4 days.   Due to complaint of a possible chest pain workup evaluation as ordered. Chest x-ray and EKG showed no new acute changes are similar comparison to previous. Patient's lab work is grossly unremarkable. Patient demonstrates palpable reproducible tenderness the right upper quadrant right lower rib margins. She is given fluids and Zofran and morphine for pain and discomfort awaiting further lab evaluation. Troponin is negative CK shows no elevation of lipase is normal.  Patient has had multiple CT evaluations her abdomen is beginning of the year and after speaking with patient that she has declined further CT evaluation I do not believe another as necessary as the pain is recurrent and similar previous abdominal pains. Additionally, patient has multiple visits for abdominal pain and diagnosis of chronic abdominal pain. Patient's cardiac evaluation is grossly unremarkable. Awaiting final results of patient's urinalysis as she does have a suprapubic catheter and in the past has had urinary infections leading to her abdominal pain. On reevaluation patient is found to be sleeping comfortably and woke her up and she states she feels significantly better and would like to be discharged back to the nursing home. She states she believes that most of the discomfort is actually her stomach and I believe to be the patient's best interest to discharge her back with Zofran for nausea and Pepcid for coverage until she can follow up with her primary care provider. She states she is still on active pain management plan with her primary care provider. She states she does not want to have anything extra for the pain at this time she feels much better. Patient is encouraged to follow primary care providers as possible further evaluation return to the ER for any onset of new concerning symptoms or worsening condition. Patient verbalized understanding of all given instructions and education.     CRITICAL CARE TIME       CONSULTS:  None    PROCEDURES:  Unless otherwise noted below, none     Procedures    FINAL IMPRESSION      1. Pain of upper abdomen    2.  Nausea          DISPOSITION/PLAN   DISPOSITION        PATIENT REFERRED TO:  Reyna Marks  [address] [id number] [phone]    Call today        DISCHARGE MEDICATIONS:  New Prescriptions    FAMOTIDINE (PEPCID) 20 MG TABLET    Take 1 tablet by mouth 2 times daily for 7 days    ONDANSETRON (ZOFRAN ODT) 4 MG DISINTEGRATING TABLET    Take 1 tablet by mouth every 8 hours as needed for Nausea or Vomiting          (Please notethat portions of this note were completed with a voice recognition program.  Efforts were made to edit the dictations but occasionally words are mis-transcribed.)    SILKE De La Paz CNP (electronically signed)  Attending Emergency Physician         SILKE De La Paz CNP  06/02/19 5249

## 2019-06-02 NOTE — ED TRIAGE NOTES
Pt wheelchair bound arrived via [address] [place] [address],[address] from [facility] c/o mid to right epi gastric pain and right flank pain starting last night around 2345 and nausea x 4 days. Pt denies fever/diarrhea.

## 2019-06-02 NOTE — ED NOTES
Bed: 10  Expected date: 6/2/19  Expected time: 1:02 AM  Means of arrival:   Comments:  Nathan Kenney RN  06/02/19 9965

## 2019-06-03 LAB — URINE CULTURE, ROUTINE: NORMAL

## 2019-06-05 PROCEDURE — 93010 ELECTROCARDIOGRAM REPORT: CPT | Performed by: INTERNAL MEDICINE

## 2019-06-11 ENCOUNTER — HOSPITAL ENCOUNTER (INPATIENT)
Age: 44
LOS: 1 days | Discharge: NURSING, REHAB OR LONG-TERM CARE | DRG: 058 | End: 2019-06-13
Attending: INTERNAL MEDICINE | Admitting: INTERNAL MEDICINE
Payer: MEDICARE

## 2019-06-11 ENCOUNTER — APPOINTMENT (OUTPATIENT)
Dept: CT IMAGING | Age: 44
DRG: 058 | End: 2019-06-11
Payer: MEDICARE

## 2019-06-11 DIAGNOSIS — G45.9 TIA (TRANSIENT ISCHEMIC ATTACK): Primary | ICD-10-CM

## 2019-06-11 DIAGNOSIS — G43.809 OTHER MIGRAINE WITHOUT STATUS MIGRAINOSUS, NOT INTRACTABLE: ICD-10-CM

## 2019-06-11 LAB
ALBUMIN SERPL-MCNC: 3.4 G/DL (ref 3.5–4.6)
ALP BLD-CCNC: 133 U/L (ref 40–130)
ALT SERPL-CCNC: 13 U/L (ref 0–33)
ANION GAP SERPL CALCULATED.3IONS-SCNC: 12 MEQ/L (ref 9–15)
APTT: 29.4 SEC (ref 21.6–35.4)
AST SERPL-CCNC: 12 U/L (ref 0–35)
BASOPHILS ABSOLUTE: 0.1 K/UL (ref 0–0.2)
BASOPHILS RELATIVE PERCENT: 0.8 %
BILIRUB SERPL-MCNC: <0.2 MG/DL (ref 0.2–0.7)
BUN BLDV-MCNC: 20 MG/DL (ref 6–20)
CALCIUM SERPL-MCNC: 8.7 MG/DL (ref 8.5–9.9)
CHLORIDE BLD-SCNC: 98 MEQ/L (ref 95–107)
CO2: 24 MEQ/L (ref 20–31)
CREAT SERPL-MCNC: 0.99 MG/DL (ref 0.5–0.9)
EOSINOPHILS ABSOLUTE: 0.7 K/UL (ref 0–0.7)
EOSINOPHILS RELATIVE PERCENT: 8.9 %
GFR AFRICAN AMERICAN: >60
GFR NON-AFRICAN AMERICAN: >60
GLOBULIN: 2.9 G/DL (ref 2.3–3.5)
GLUCOSE BLD-MCNC: 94 MG/DL (ref 70–99)
HCT VFR BLD CALC: 32 % (ref 37–47)
HEMOGLOBIN: 11.4 G/DL (ref 12–16)
INR BLD: 1
LACTIC ACID: 0.7 MMOL/L (ref 0.5–2.2)
LYMPHOCYTES ABSOLUTE: 1.8 K/UL (ref 1–4.8)
LYMPHOCYTES RELATIVE PERCENT: 24.7 %
MCH RBC QN AUTO: 30.2 PG (ref 27–31.3)
MCHC RBC AUTO-ENTMCNC: 35.6 % (ref 33–37)
MCV RBC AUTO: 85 FL (ref 82–100)
MONOCYTES ABSOLUTE: 0.5 K/UL (ref 0.2–0.8)
MONOCYTES RELATIVE PERCENT: 6.8 %
NEUTROPHILS ABSOLUTE: 4.3 K/UL (ref 1.4–6.5)
NEUTROPHILS RELATIVE PERCENT: 58.8 %
PDW BLD-RTO: 14.8 % (ref 11.5–14.5)
PLATELET # BLD: 288 K/UL (ref 130–400)
POTASSIUM SERPL-SCNC: 4 MEQ/L (ref 3.4–4.9)
PROTHROMBIN TIME: 10.3 SEC (ref 9–11.5)
RBC # BLD: 3.76 M/UL (ref 4.2–5.4)
SODIUM BLD-SCNC: 134 MEQ/L (ref 135–144)
TOTAL PROTEIN: 6.3 G/DL (ref 6.3–8)
WBC # BLD: 7.4 K/UL (ref 4.8–10.8)

## 2019-06-11 PROCEDURE — 6360000002 HC RX W HCPCS: Performed by: PHYSICIAN ASSISTANT

## 2019-06-11 PROCEDURE — 96366 THER/PROPH/DIAG IV INF ADDON: CPT

## 2019-06-11 PROCEDURE — 6370000000 HC RX 637 (ALT 250 FOR IP): Performed by: PHYSICIAN ASSISTANT

## 2019-06-11 PROCEDURE — 85025 COMPLETE CBC W/AUTO DIFF WBC: CPT

## 2019-06-11 PROCEDURE — 96375 TX/PRO/DX INJ NEW DRUG ADDON: CPT

## 2019-06-11 PROCEDURE — 85610 PROTHROMBIN TIME: CPT

## 2019-06-11 PROCEDURE — 36415 COLL VENOUS BLD VENIPUNCTURE: CPT

## 2019-06-11 PROCEDURE — 80053 COMPREHEN METABOLIC PANEL: CPT

## 2019-06-11 PROCEDURE — 70450 CT HEAD/BRAIN W/O DYE: CPT

## 2019-06-11 PROCEDURE — 2580000003 HC RX 258: Performed by: PHYSICIAN ASSISTANT

## 2019-06-11 PROCEDURE — 85730 THROMBOPLASTIN TIME PARTIAL: CPT

## 2019-06-11 PROCEDURE — 96365 THER/PROPH/DIAG IV INF INIT: CPT

## 2019-06-11 PROCEDURE — 83605 ASSAY OF LACTIC ACID: CPT

## 2019-06-11 PROCEDURE — 99285 EMERGENCY DEPT VISIT HI MDM: CPT

## 2019-06-11 RX ORDER — 0.9 % SODIUM CHLORIDE 0.9 %
1000 INTRAVENOUS SOLUTION INTRAVENOUS ONCE
Status: COMPLETED | OUTPATIENT
Start: 2019-06-11 | End: 2019-06-12

## 2019-06-11 RX ORDER — DEXAMETHASONE SODIUM PHOSPHATE 4 MG/ML
4 INJECTION, SOLUTION INTRA-ARTICULAR; INTRALESIONAL; INTRAMUSCULAR; INTRAVENOUS; SOFT TISSUE ONCE
Status: COMPLETED | OUTPATIENT
Start: 2019-06-11 | End: 2019-06-11

## 2019-06-11 RX ORDER — DIPHENHYDRAMINE HYDROCHLORIDE 50 MG/ML
25 INJECTION INTRAMUSCULAR; INTRAVENOUS ONCE
Status: COMPLETED | OUTPATIENT
Start: 2019-06-11 | End: 2019-06-11

## 2019-06-11 RX ORDER — METOCLOPRAMIDE HYDROCHLORIDE 5 MG/ML
10 INJECTION INTRAMUSCULAR; INTRAVENOUS ONCE
Status: COMPLETED | OUTPATIENT
Start: 2019-06-11 | End: 2019-06-11

## 2019-06-11 RX ORDER — MAGNESIUM SULFATE IN WATER 40 MG/ML
2 INJECTION, SOLUTION INTRAVENOUS ONCE
Status: COMPLETED | OUTPATIENT
Start: 2019-06-11 | End: 2019-06-12

## 2019-06-11 RX ORDER — OXYCODONE HYDROCHLORIDE AND ACETAMINOPHEN 5; 325 MG/1; MG/1
1 TABLET ORAL ONCE
Status: COMPLETED | OUTPATIENT
Start: 2019-06-11 | End: 2019-06-11

## 2019-06-11 RX ADMIN — METOCLOPRAMIDE 10 MG: 5 INJECTION, SOLUTION INTRAMUSCULAR; INTRAVENOUS at 21:54

## 2019-06-11 RX ADMIN — SODIUM CHLORIDE 1000 ML: 9 INJECTION, SOLUTION INTRAVENOUS at 21:54

## 2019-06-11 RX ADMIN — MAGNESIUM SULFATE HEPTAHYDRATE 2 G: 40 INJECTION, SOLUTION INTRAVENOUS at 22:19

## 2019-06-11 RX ADMIN — DIPHENHYDRAMINE HYDROCHLORIDE 25 MG: 50 INJECTION, SOLUTION INTRAMUSCULAR; INTRAVENOUS at 22:04

## 2019-06-11 RX ADMIN — OXYCODONE HYDROCHLORIDE AND ACETAMINOPHEN 1 TABLET: 5; 325 TABLET ORAL at 23:44

## 2019-06-11 RX ADMIN — DEXAMETHASONE SODIUM PHOSPHATE 4 MG: 4 INJECTION, SOLUTION INTRAMUSCULAR; INTRAVENOUS at 22:19

## 2019-06-11 ASSESSMENT — PAIN SCALES - GENERAL
PAINLEVEL_OUTOF10: 7
PAINLEVEL_OUTOF10: 8

## 2019-06-11 ASSESSMENT — PAIN DESCRIPTION - PAIN TYPE: TYPE: ACUTE PAIN

## 2019-06-11 ASSESSMENT — PAIN DESCRIPTION - LOCATION: LOCATION: BACK;HEAD

## 2019-06-12 ENCOUNTER — APPOINTMENT (OUTPATIENT)
Dept: ULTRASOUND IMAGING | Age: 44
DRG: 058 | End: 2019-06-12
Payer: MEDICARE

## 2019-06-12 PROBLEM — G45.9 TIA (TRANSIENT ISCHEMIC ATTACK): Status: ACTIVE | Noted: 2019-06-12

## 2019-06-12 LAB
ANION GAP SERPL CALCULATED.3IONS-SCNC: 13 MEQ/L (ref 9–15)
BUN BLDV-MCNC: 18 MG/DL (ref 6–20)
CALCIUM SERPL-MCNC: 8.8 MG/DL (ref 8.5–9.9)
CHLORIDE BLD-SCNC: 107 MEQ/L (ref 95–107)
CO2: 22 MEQ/L (ref 20–31)
CREAT SERPL-MCNC: 1.14 MG/DL (ref 0.5–0.9)
EKG ATRIAL RATE: 60 BPM
EKG P AXIS: 22 DEGREES
EKG P-R INTERVAL: 214 MS
EKG Q-T INTERVAL: 440 MS
EKG QRS DURATION: 90 MS
EKG QTC CALCULATION (BAZETT): 440 MS
EKG R AXIS: 63 DEGREES
EKG T AXIS: 73 DEGREES
EKG VENTRICULAR RATE: 60 BPM
GFR AFRICAN AMERICAN: >60
GFR NON-AFRICAN AMERICAN: 51.7
GLUCOSE BLD-MCNC: 171 MG/DL (ref 70–99)
HCT VFR BLD CALC: 35 % (ref 37–47)
HEMOGLOBIN: 12 G/DL (ref 12–16)
LV EF: 65 %
LVEF MODALITY: NORMAL
MCH RBC QN AUTO: 28.9 PG (ref 27–31.3)
MCHC RBC AUTO-ENTMCNC: 34.3 % (ref 33–37)
MCV RBC AUTO: 84.1 FL (ref 82–100)
PDW BLD-RTO: 15 % (ref 11.5–14.5)
PLATELET # BLD: 273 K/UL (ref 130–400)
POTASSIUM REFLEX MAGNESIUM: 5.2 MEQ/L (ref 3.4–4.9)
RBC # BLD: 4.16 M/UL (ref 4.2–5.4)
SODIUM BLD-SCNC: 142 MEQ/L (ref 135–144)
TROPONIN: <0.01 NG/ML (ref 0–0.01)
TROPONIN: <0.01 NG/ML (ref 0–0.01)
WBC # BLD: 5.4 K/UL (ref 4.8–10.8)

## 2019-06-12 PROCEDURE — 97167 OT EVAL HIGH COMPLEX 60 MIN: CPT

## 2019-06-12 PROCEDURE — 6370000000 HC RX 637 (ALT 250 FOR IP): Performed by: INTERNAL MEDICINE

## 2019-06-12 PROCEDURE — 93880 EXTRACRANIAL BILAT STUDY: CPT

## 2019-06-12 PROCEDURE — 36415 COLL VENOUS BLD VENIPUNCTURE: CPT

## 2019-06-12 PROCEDURE — G0378 HOSPITAL OBSERVATION PER HR: HCPCS

## 2019-06-12 PROCEDURE — 85027 COMPLETE CBC AUTOMATED: CPT

## 2019-06-12 PROCEDURE — 84484 ASSAY OF TROPONIN QUANT: CPT

## 2019-06-12 PROCEDURE — 6360000002 HC RX W HCPCS: Performed by: INTERNAL MEDICINE

## 2019-06-12 PROCEDURE — 96372 THER/PROPH/DIAG INJ SC/IM: CPT

## 2019-06-12 PROCEDURE — 94640 AIRWAY INHALATION TREATMENT: CPT

## 2019-06-12 PROCEDURE — 97163 PT EVAL HIGH COMPLEX 45 MIN: CPT

## 2019-06-12 PROCEDURE — 6370000000 HC RX 637 (ALT 250 FOR IP): Performed by: PHYSICIAN ASSISTANT

## 2019-06-12 PROCEDURE — 93005 ELECTROCARDIOGRAM TRACING: CPT | Performed by: INTERNAL MEDICINE

## 2019-06-12 PROCEDURE — 80048 BASIC METABOLIC PNL TOTAL CA: CPT

## 2019-06-12 PROCEDURE — 93010 ELECTROCARDIOGRAM REPORT: CPT | Performed by: INTERNAL MEDICINE

## 2019-06-12 PROCEDURE — 96376 TX/PRO/DX INJ SAME DRUG ADON: CPT

## 2019-06-12 PROCEDURE — 93306 TTE W/DOPPLER COMPLETE: CPT

## 2019-06-12 RX ORDER — POLYETHYLENE GLYCOL 3350 17 G/17G
17 POWDER, FOR SOLUTION ORAL DAILY
Status: ON HOLD | COMMUNITY
End: 2020-09-02

## 2019-06-12 RX ORDER — CLOPIDOGREL BISULFATE 75 MG/1
75 TABLET ORAL DAILY
Status: DISCONTINUED | OUTPATIENT
Start: 2019-06-12 | End: 2019-06-12

## 2019-06-12 RX ORDER — FLUTICASONE PROPIONATE 50 MCG
2 SPRAY, SUSPENSION (ML) NASAL DAILY
COMMUNITY

## 2019-06-12 RX ORDER — HYDRALAZINE HYDROCHLORIDE 20 MG/ML
10 INJECTION INTRAMUSCULAR; INTRAVENOUS EVERY 4 HOURS PRN
Status: DISCONTINUED | OUTPATIENT
Start: 2019-06-12 | End: 2019-06-14 | Stop reason: HOSPADM

## 2019-06-12 RX ORDER — FAMOTIDINE 20 MG/1
20 TABLET, FILM COATED ORAL 2 TIMES DAILY
Status: DISCONTINUED | OUTPATIENT
Start: 2019-06-12 | End: 2019-06-14 | Stop reason: HOSPADM

## 2019-06-12 RX ORDER — HYDROCODONE BITARTRATE AND ACETAMINOPHEN 5; 325 MG/1; MG/1
0.5 TABLET ORAL 2 TIMES DAILY PRN
Status: DISCONTINUED | OUTPATIENT
Start: 2019-06-12 | End: 2019-06-12

## 2019-06-12 RX ORDER — ONDANSETRON 2 MG/ML
4 INJECTION INTRAMUSCULAR; INTRAVENOUS EVERY 6 HOURS PRN
Status: DISCONTINUED | OUTPATIENT
Start: 2019-06-12 | End: 2019-06-12

## 2019-06-12 RX ORDER — DIPHENHYDRAMINE HYDROCHLORIDE 50 MG/ML
25 INJECTION INTRAMUSCULAR; INTRAVENOUS EVERY 6 HOURS PRN
Status: DISCONTINUED | OUTPATIENT
Start: 2019-06-12 | End: 2019-06-14 | Stop reason: HOSPADM

## 2019-06-12 RX ORDER — PROPRANOLOL HYDROCHLORIDE 120 MG/1
120 CAPSULE, EXTENDED RELEASE ORAL DAILY
Status: DISCONTINUED | OUTPATIENT
Start: 2019-06-12 | End: 2019-06-14 | Stop reason: HOSPADM

## 2019-06-12 RX ORDER — TOPIRAMATE 100 MG/1
100 TABLET, FILM COATED ORAL DAILY
Status: DISCONTINUED | OUTPATIENT
Start: 2019-06-12 | End: 2019-06-14 | Stop reason: HOSPADM

## 2019-06-12 RX ORDER — OXYCODONE HYDROCHLORIDE AND ACETAMINOPHEN 5; 325 MG/1; MG/1
2 TABLET ORAL EVERY 6 HOURS PRN
Status: DISCONTINUED | OUTPATIENT
Start: 2019-06-12 | End: 2019-06-14 | Stop reason: HOSPADM

## 2019-06-12 RX ORDER — OXYCODONE HYDROCHLORIDE AND ACETAMINOPHEN 5; 325 MG/1; MG/1
1 TABLET ORAL EVERY 8 HOURS PRN
Status: DISCONTINUED | OUTPATIENT
Start: 2019-06-12 | End: 2019-06-12

## 2019-06-12 RX ORDER — ASPIRIN 81 MG/1
81 TABLET, CHEWABLE ORAL ONCE
Status: COMPLETED | OUTPATIENT
Start: 2019-06-12 | End: 2019-06-12

## 2019-06-12 RX ORDER — ATORVASTATIN CALCIUM 40 MG/1
40 TABLET, FILM COATED ORAL NIGHTLY
Status: DISCONTINUED | OUTPATIENT
Start: 2019-06-12 | End: 2019-06-14 | Stop reason: HOSPADM

## 2019-06-12 RX ORDER — AMITRIPTYLINE HYDROCHLORIDE 100 MG/1
100 TABLET, FILM COATED ORAL NIGHTLY
Status: DISCONTINUED | OUTPATIENT
Start: 2019-06-12 | End: 2019-06-14 | Stop reason: HOSPADM

## 2019-06-12 RX ORDER — FLUTICASONE PROPIONATE 50 MCG
2 SPRAY, SUSPENSION (ML) NASAL DAILY
Status: DISCONTINUED | OUTPATIENT
Start: 2019-06-12 | End: 2019-06-14 | Stop reason: HOSPADM

## 2019-06-12 RX ORDER — ARIPIPRAZOLE 15 MG/1
15 TABLET ORAL DAILY
Status: DISCONTINUED | OUTPATIENT
Start: 2019-06-12 | End: 2019-06-14 | Stop reason: HOSPADM

## 2019-06-12 RX ORDER — PAROXETINE HYDROCHLORIDE 20 MG/1
40 TABLET, FILM COATED ORAL EVERY MORNING
Status: DISCONTINUED | OUTPATIENT
Start: 2019-06-12 | End: 2019-06-14 | Stop reason: HOSPADM

## 2019-06-12 RX ORDER — PROMETHAZINE HYDROCHLORIDE 25 MG/ML
6.25 INJECTION, SOLUTION INTRAMUSCULAR; INTRAVENOUS EVERY 6 HOURS PRN
Status: DISCONTINUED | OUTPATIENT
Start: 2019-06-12 | End: 2019-06-14 | Stop reason: HOSPADM

## 2019-06-12 RX ORDER — TRAZODONE HYDROCHLORIDE 150 MG/1
150 TABLET ORAL NIGHTLY
Status: DISCONTINUED | OUTPATIENT
Start: 2019-06-12 | End: 2019-06-14 | Stop reason: HOSPADM

## 2019-06-12 RX ADMIN — MOMETASONE FUROATE AND FORMOTEROL FUMARATE DIHYDRATE 2 PUFF: 100; 5 AEROSOL RESPIRATORY (INHALATION) at 07:47

## 2019-06-12 RX ADMIN — PAROXETINE HYDROCHLORIDE 40 MG: 20 TABLET, FILM COATED ORAL at 09:19

## 2019-06-12 RX ADMIN — FLUTICASONE PROPIONATE 2 SPRAY: 50 SPRAY, METERED NASAL at 17:42

## 2019-06-12 RX ADMIN — LEVETIRACETAM 750 MG: 500 TABLET ORAL at 09:19

## 2019-06-12 RX ADMIN — CLOPIDOGREL BISULFATE 75 MG: 75 TABLET ORAL at 09:19

## 2019-06-12 RX ADMIN — FAMOTIDINE 20 MG: 20 TABLET, FILM COATED ORAL at 09:20

## 2019-06-12 RX ADMIN — ASPIRIN 81 MG 81 MG: 81 TABLET ORAL at 00:54

## 2019-06-12 RX ADMIN — APIXABAN 5 MG: 5 TABLET, FILM COATED ORAL at 09:20

## 2019-06-12 RX ADMIN — Medication 10 MG: at 21:36

## 2019-06-12 RX ADMIN — FAMOTIDINE 20 MG: 20 TABLET, FILM COATED ORAL at 21:36

## 2019-06-12 RX ADMIN — TRAZODONE HYDROCHLORIDE 150 MG: 150 TABLET ORAL at 21:35

## 2019-06-12 RX ADMIN — ATORVASTATIN CALCIUM 40 MG: 40 TABLET, FILM COATED ORAL at 21:35

## 2019-06-12 RX ADMIN — TOPIRAMATE 100 MG: 100 TABLET, FILM COATED ORAL at 09:20

## 2019-06-12 RX ADMIN — PROMETHAZINE HYDROCHLORIDE 6.25 MG: 25 INJECTION, SOLUTION INTRAMUSCULAR; INTRAVENOUS at 21:31

## 2019-06-12 RX ADMIN — APIXABAN 5 MG: 5 TABLET, FILM COATED ORAL at 21:35

## 2019-06-12 RX ADMIN — ATORVASTATIN CALCIUM 40 MG: 40 TABLET, FILM COATED ORAL at 03:43

## 2019-06-12 RX ADMIN — DIPHENHYDRAMINE HYDROCHLORIDE 25 MG: 50 INJECTION, SOLUTION INTRAMUSCULAR; INTRAVENOUS at 21:32

## 2019-06-12 RX ADMIN — LEVETIRACETAM 750 MG: 500 TABLET ORAL at 21:35

## 2019-06-12 RX ADMIN — ARIPIPRAZOLE 15 MG: 15 TABLET ORAL at 17:42

## 2019-06-12 RX ADMIN — OXYCODONE HYDROCHLORIDE AND ACETAMINOPHEN 1 TABLET: 5; 325 TABLET ORAL at 17:41

## 2019-06-12 ASSESSMENT — PAIN DESCRIPTION - ORIENTATION: ORIENTATION: RIGHT

## 2019-06-12 ASSESSMENT — ENCOUNTER SYMPTOMS
BACK PAIN: 0
SORE THROAT: 0
NAUSEA: 0
ABDOMINAL PAIN: 0
RHINORRHEA: 0
VOMITING: 0
DIARRHEA: 0
COUGH: 0
PHOTOPHOBIA: 0
EYE PAIN: 0
SHORTNESS OF BREATH: 0

## 2019-06-12 ASSESSMENT — PAIN SCALES - GENERAL
PAINLEVEL_OUTOF10: 5
PAINLEVEL_OUTOF10: 10
PAINLEVEL_OUTOF10: 10
PAINLEVEL_OUTOF10: 9

## 2019-06-12 ASSESSMENT — PAIN DESCRIPTION - LOCATION
LOCATION: HEAD
LOCATION: HEAD
LOCATION: ARM

## 2019-06-12 ASSESSMENT — PAIN DESCRIPTION - PAIN TYPE
TYPE: ACUTE PAIN
TYPE: CHRONIC PAIN

## 2019-06-12 NOTE — ED PROVIDER NOTES
[address] [facility] [address]  EMERGENCY DEPARTMENT ENCOUNTER      Pt Name: Yuliya Zayas  MRN: [id number]  [place] 1975  Date of evaluation: 6/11/2019  Provider: Gianna Metz PA-C      HISTORY OF PRESENT ILLNESS    Yuliya Zayas is a 40 y.o. female who presents to the Emergency Department with migraine. Patient states that she has had an intermittent migraine throughout the day but it has persisted for the last few hours. Patient states that she has had some increased weakness to her right arm and right leg. She does have a history of a prior stroke that she is on Eliquis for. Patient states that the weakness has resolved back to her baseline deficits. Patient denies any current weakness numbness tingling. Patient states that she does have a history of migraines and they do present with these neurological symptoms. She states that she does have a history of cluster headaches. She did take a Percocet tablet earlier in the day for the pain. She denies any vision changes. She denies sudden onset or worst headache of her life. Pain is frontal and superior. She denies any neck pain fevers chills. REVIEW OF SYSTEMS       Review of Systems   Constitutional: Negative for chills, diaphoresis, fatigue and fever. HENT: Negative for congestion, rhinorrhea and sore throat. Eyes: Negative for photophobia and pain. Respiratory: Negative for cough and shortness of breath. Cardiovascular: Negative for chest pain and palpitations. Gastrointestinal: Negative for abdominal pain, diarrhea, nausea and vomiting. Genitourinary: Negative for dysuria and flank pain. Musculoskeletal: Negative for back pain. Skin: Negative for rash. Neurological: Positive for weakness (right side, resolved back to baseline) and headaches. Negative for dizziness and light-headedness. Psychiatric/Behavioral: Negative. All other systems reviewed and are negative.         PAST MEDICAL HISTORY     Past Medical History: Diagnosis Date    Asthma     Cerebral artery occlusion with [facility] infarction ([facility] [address]     Chronic back pain     Chronic kidney disease     Depression     Headache     Kidney disease     Kidney stone     Seizures ([facility] [address] 5/24/2016    Suprapubic catheter ([facility] [address] [facility] 06/2018         SURGICAL HISTORY       Past Surgical History:   Procedure Laterality Date    APPENDECTOMY      BACK SURGERY      CHOLECYSTECTOMY      HYSTERECTOMY      KNEE SURGERY Bilateral     OTHER SURGICAL HISTORY      sup/pub cath june 1, 2018         CURRENT MEDICATIONS       Current Discharge Medication List      CONTINUE these medications which have NOT CHANGED    Details   famotidine (PEPCID) 20 MG tablet Take 1 tablet by mouth 2 times daily for 7 days  Qty: 14 tablet, Refills: 0      cyclobenzaprine (FLEXERIL) 10 MG tablet Take 1 tablet by mouth 3 times daily as needed for Muscle spasms  Qty: 10 tablet, Refills: 0      Menthol, Topical Analgesic, (BIOFREEZE) 4 % GEL Apply 1 applicator topically 4 times daily as needed (pain)  Qty: 1 Tube, Refills: 0      dicyclomine (BENTYL) 10 MG capsule Take 1 capsule by mouth every 6 hours as needed (cramps)  Qty: 10 capsule, Refills: 0      apixaban (ELIQUIS) 5 MG TABS tablet Take 10 mg by mouth 2 times daily      amitriptyline (ELAVIL) 100 MG tablet Take 100 mg by mouth nightly      oxyCODONE-acetaminophen (PERCOCET)  MG per tablet Take 1 tablet by mouth 2 times daily. Kathryn Leon levETIRAcetam (KEPPRA) 500 MG tablet Take 2 tablets by mouth 2 times daily  Qty: 60 tablet, Refills: 0      zolpidem (AMBIEN) 10 MG tablet Take 10 mg by mouth nightly as needed for Sleep. Kathryn Leon LORazepam (ATIVAN) 0.5 MG tablet Take 0.5 mg by mouth daily as needed for Anxiety. Kathryn Leon       EPINEPHrine 1 mg/mL injection Inject 0.2 mg into the skin as needed      nitroGLYCERIN (NITROSTAT) 0.4 MG SL tablet Place 0.4 mg under the tongue every 5 minutes as needed for Chest pain Dissolve 1 tablet under tongue for chest pain and repeat every 5 min up to max of 3 total doses. If no relief after 3 doses call 911      budesonide-formoterol (SYMBICORT) 80-4.5 MCG/ACT AERO Inhale 2 puffs into the lungs 2 times daily as needed       propranolol (INDERAL LA) 60 MG CR capsule Take 1 capsule by mouth daily  Qty: 30 capsule, Refills: 3      ARIPiprazole (ABILIFY) 15 MG tablet Take 15 mg by mouth daily      ALPRAZolam (XANAX) 0.5 MG tablet Take 0.5 mg by mouth 2 times daily. Daren Gu PARoxetine (PAXIL) 40 MG tablet Take 40 mg by mouth every morning       topiramate (TOPAMAX) 200 MG tablet Take 200 mg by mouth 2 times daily      Eslicarbazepine Acetate 400 MG TABS Take 400 mg by mouth nightly       traZODone (DESYREL) 50 MG tablet Take 300 mg by mouth nightly       fexofenadine (ALLEGRA) 60 MG tablet Take 60 mg by mouth 2 times daily             ALLERGIES     Latex; Ciprofloxacin; Shellfish-derived products; Contrast [iodides]; Daypro [oxaprozin]; Iodine; Macrobid [nitrofurantoin monohyd macro]; Macrolides and ketolides; Tape [adhesive tape]; Tegretol [carbamazepine]; Toradol [ketorolac tromethamine]; Tramadol; Zanaflex [tizanidine hcl]; Estrogens; Lamictal [lamotrigine]; Lyrica [pregabalin];  Tylenol [acetaminophen]; and Vicodin [hydrocodone-acetaminophen]    FAMILY HISTORY       Family History   Problem Relation Age of Onset    Cancer Father     Cancer Paternal Aunt           SOCIAL HISTORY       Social History     Socioeconomic History    Marital status: Single     Spouse name: None    Number of children: None    Years of education: None    Highest education level: None   Occupational History    None   Social Needs    Financial resource strain: None    Food insecurity:     Worry: None     Inability: None    Transportation needs:     Medical: None     Non-medical: None   Tobacco Use    Smoking status: Never Smoker    Smokeless tobacco: Never Used   Substance and Sexual Activity    Alcohol use: No     Alcohol/week: 0.0 oz    Drug use: No    and intact. Capillary refill takes less than 2 seconds. No rash noted. She is not diaphoretic. Psychiatric: Judgment and thought content normal.   Nursing note and vitals reviewed. All other labs were within normal range or not returned as of this dictation. EMERGENCY DEPARTMENT COURSE and DIFFERENTIALDIAGNOSIS/MDM:   Vitals:    Vitals:    06/11/19 2035 06/11/19 2345 06/12/19 0139   BP: 125/67 108/79    Pulse: 70 62    Resp: 18 16    Temp: 97.9 °F (36.6 °C)     TempSrc: Oral     SpO2: 96% 98%    Weight: 230 lb (104.3 kg)  222 lb 7.1 oz (100.9 kg)   Height: 5' 5\" (1.651 m)            Patient presents as described. She is back to baseline with strength on right side. She still has a headache though. Patient states she has been compliant with her Eliquis she is not on Plavix or aspirin. She denies any chest pain or shortness of breath. Patient was given a liter of fluids, Decadron, Reglan, Benadryl in the emergency department. Patient's headache did improve after this. She still has no new neurological or motor deficits. Patient is due for her Percocet, this was provided in the emergency department. CT shows old basal infarct but no new pathology. Labs are grossly within normal limits. At this point with a history of a prior stroke with strokelike symptoms that happened today and resolved the patient will need further treatment and evaluation however this could still just be 1 of her chronic migraines. Spoke to hospitalist Dr. Dwight Olguin who accepted the patient. Patient is stable ready for admission. PROCEDURES:  Unless otherwise noted below, none     Procedures      FINAL IMPRESSION      1. TIA (transient ischemic attack)    2.  Other migraine without status migrainosus, not intractable          DISPOSITION/PLAN   DISPOSITION Admitted 06/12/2019 12:12:52 AM          Sabrina Bradshaw PA-C (electronically signed)  Attending Emergency Physician       CHACHA Roberts PA-C  06/12/19 0154

## 2019-06-12 NOTE — ED NOTES
Report attempted; Floor unable to receive report at this time; floor to return ED call.      Ananya Xavier RN  06/12/19 1115
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
## 2019-06-12 NOTE — ED NOTES
Bed: 25  Expected date: 6/11/19  Expected time: 8:12 PM  Means of arrival:   Comments:  Treva Romero pt, not moving right foot and right hand, Percocet administration 1630, VSS, no injury, OT Shankar Aguirre, RN  06/11/19 2022

## 2019-06-12 NOTE — ED NOTES
Report called to Araceli Mcdaniel RN. All questions, concerns addressed.      Zak Meng RN  06/12/19 5029
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
## 2019-06-12 NOTE — CARE COORDINATION
PATIENT IS \"FLOOR TO SNF\" AND DOES NOT NEED A PRECERT TO GO BACK TO [address] [place] [address]. PATIENT IS OBS STATUS AND WILL BE DISCHARGED TOMORROW.   CARE TEAM FOLLOWING

## 2019-06-12 NOTE — PROGRESS NOTES
Hospitalist Progress Note      PCP: Ar Cavazos    Date of Admission: 6/11/2019    Chief Complaint:  No acute events overnight,afebrile,stable HD,asking for pain meds    Medications:  Reviewed    Infusion Medications   Scheduled Medications    atorvastatin  40 mg Oral Nightly    clopidogrel  75 mg Oral Daily    ARIPiprazole  15 mg Oral Daily    apixaban  5 mg Oral BID    amitriptyline  100 mg Oral Nightly    mometasone-formoterol  2 puff Inhalation BID    Eslicarbazepine Acetate  400 mg Oral Nightly    famotidine  20 mg Oral BID    fluticasone  2 spray Each Nostril Daily    levETIRAcetam  750 mg Oral BID    PARoxetine  40 mg Oral QAM    propranolol  120 mg Oral Daily    traZODone  150 mg Oral Nightly    topiramate  100 mg Oral Daily     PRN Meds: ondansetron, hydrALAZINE, HYDROcodone 5 mg - acetaminophen      Intake/Output Summary (Last 24 hours) at 6/12/2019 1219  Last data filed at 6/12/2019 0223  Gross per 24 hour   Intake --   Output 850 ml   Net -850 ml       Exam:    BP (!) 103/54   Pulse 73   Temp 97.9 °F (36.6 °C) (Oral)   Resp 16   Ht 5' 5\" (1.651 m)   Wt 222 lb 7.1 oz (100.9 kg)   SpO2 100%   BMI 37.02 kg/m²     General appearance: No apparent distress, appears stated age and cooperative. Respiratory:  Clear to auscultation, bilaterally without Rales/Wheezes/Rhonchi. Cardiovascular: Regular rate and rhythm with normal S1/S2 . Abdomen: Soft, non-tender, non-distended with normal bowel sounds. Musculoskeletal: No cyanosis or edema bilaterally.       Labs:   Recent Labs     06/11/19 2243 06/12/19  0504   WBC 7.4 5.4   HGB 11.4* 12.0   HCT 32.0* 35.0*    273     Recent Labs     06/11/19 2243 06/12/19  0504   * 142   K 4.0 5.2*   CL 98 107   CO2 24 22   BUN 20 18   CREATININE 0.99* 1.14*   CALCIUM 8.7 8.8     Recent Labs     06/11/19 2243   AST 12   ALT 13   BILITOT <0.2   ALKPHOS 133*     Recent Labs     06/11/19 2243   INR 1.0     Recent Labs     06/12/19  0504

## 2019-06-12 NOTE — PROGRESS NOTES
Speech Language Pathology      NAME:  Jared Mckeon  :  1975  DATE: 2019      This patient is currently in Observation/Out-Patient Status. Due to Medicare, other insurance and Hospital Guidelines, a written order with a therapy specific diagnosis (dysphagia, dysarthria, aphasia) must be attached to the order before we can initiate the evaluation. Re-order speech therapy with the appropriate diagnosis, as needed. Thank you,  Joce Sousa.  Kyle Aly, Date: 2019, Time: 8:14 AM

## 2019-06-12 NOTE — CARE COORDINATION
LSW spoke with [facility] and pt was with them for skilled so a new precert will be needed for her to return to them. LSW to follow.

## 2019-06-12 NOTE — CONSULTS
Consult to Neurology  Consult performed by: Lily Correa MD  Consult ordered by: Carrillo Patiño, DO      Left CVA  Old vs new,but new weakness as per pt  Details of cause of CVA unclear  Extensive [facility] w/u  Psychogenic seizures  New weakness. ?? Already on eliquis, will add asa  Bleeding risk discussed, Unable to do MRI as spinal stimulator  Repeat CT  Pt reports allergy to ASA and related products so unable to use ASA    Raul Jarvis MD, Tamela Peri, American Board of Psychiatry & Neurology  Board Certified in Vascular Neurology  Board Certified in Neuromuscular Medicine  Certified in . [address]
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
## 2019-06-12 NOTE — PROGRESS NOTES
[facility] OCCUPATIONAL THERAPY EVALUATION - ACUTE     Date: 2019  Patient Name: Ashleigh Martinez        MRN: [id number]  Account: [de-identified]   : 1975  (40 y.o.)  Room: [facility]    Chart Review:  Diagnosis:  The primary encounter diagnosis was TIA (transient ischemic attack). A diagnosis of Other migraine without status migrainosus, not intractable was also pertinent to this visit. Past Medical History:   Diagnosis Date    Asthma     Cerebral artery occlusion with cerebral infarction (HCC)     Chronic back pain     Chronic kidney disease     Depression     Headache     Kidney disease     Kidney stone     Seizures ([facility] 2016    Suprapubic catheter ([facility] 2018     Past Surgical History:   Procedure Laterality Date    APPENDECTOMY      BACK SURGERY      CHOLECYSTECTOMY      HYSTERECTOMY      KNEE SURGERY Bilateral     OTHER SURGICAL HISTORY      sup/pub cath 2018       Restrictions  Restrictions/Precautions: Fall Risk     Safety Devices: Safety Devices  Safety Devices in place: Yes  Type of devices: All fall risk precautions in place    Subjective       Pain Reassessment:   Pain Assessment  Pain Assessment: 0-10  Pain Level: 5  Pain Type: Chronic pain  Pain Location: Arm  Pain Orientation: Right       Orientation  Orientation  Overall Orientation Status: Within Functional Limits    Prior Level of Function:  Social/Functional History  Lives With: Significant other(has been in [place] for 3 weeks for Rehab)  Type of Home: Apartment  Home Layout: One level  Home Access: Stairs to enter without rails  Entrance Stairs - Number of Steps: 1 curb style  Home Equipment: [employer] [address], Wheelchair-electric, Hospital bed  ADL Assistance: Needs assistance  Ambulation Assistance: Needs assistance  Transfer Assistance: Needs assistance  Additional Comments:  S.O assisted pt for the past 3 years with transfers and self care; pt was able to prope w/c indep in her apt    OBJECTIVE: pain    Bed Mobility  Bed mobility  Rolling to Left: Stand by assistance  Rolling to Right: Stand by assistance  Comment: with bed rail    Seated and Standing Balance:  Balance  Sitting Balance:  Moderate assistance  Standing Balance: Unable to assess(comment)    Functional Endurance:  Activity Tolerance  Activity Tolerance: Patient limited by fatigue, Patient limited by pain    D/C Recommendations:  therapy    Equipment Recommendations:  TBD      OT Follow Up:  OT D/C RECOMMENDATIONS  REQUIRES OT FOLLOW UP: Yes       Assessment/Discharge Disposition:  Assessment: Pt is a 41 y/o F admitted with TIA  Performance deficits / Impairments: Decreased functional mobility , Decreased ROM, Decreased endurance, Decreased balance, Decreased high-level IADLs, Decreased coordination, Decreased ADL status, Decreased strength, Decreased sensation, Decreased fine motor control  Prognosis: Fair  Discharge Recommendations: Continue to assess pending progress  History: multi comorb  Exam: 10 deficits  Assistance / Modification: Max A/Dep    Six Click Score   How much help for putting on and taking off regular lower body clothing?: Total  How much help for Bathing?: A Lot  How much help for Toileting?: A Lot  How much help for putting on and taking off regular upper body clothing?: A Lot  How much help for taking care of personal grooming?: A Lot  How much help for eating meals?: A Little  AM-[facility] Inpatient Daily Activity Raw Score: 12  AM-PAC Inpatient ADL T-Scale Score : 30.6  ADL Inpatient CMS 0-100% Score: 66.57    Plan:  Plan  Times per week: 1-3x  Plan weeks: acute LOS  Current Treatment Recommendations: Strengthening, Balance Training, Endurance Training, Neuromuscular Re-education, Patient/Caregiver Education & Training, ROM, Safety Education & Training, Self-Care / ADL    Goals:   Patient will:    - Improve functional endurance to tolerate/complete 30 mins of ADL's  - Be Min A in UB ADLs   - Be Mod A in LB ADLs  - Be Min A in ADL transfers without LOB  - Be Min A in toileting tasks  - Improve B hand fine motor coordination to [facility] in order to manage clothing fasteners/self-care containers in a timely manner  - Improve B UE Function (AROM, strength, motor control, tone normalization) to complete ADLs as projected. - Improve B UE strength and endurance to [facility] in order to participate in self-care activities as projected. - Access appropriate D/C site with as few architectural barriers as possible.     Patient Goal:    Get more functional   Discussed and agreed upon: Yes Comments:     Therapy Time:   OT Individual Minutes  Time In: 2437  Time Out: 7710  Minutes: 15    Electronically signed by:    Raj Boxer Drotos, OTR/L  6/12/2019, 2:41 PM

## 2019-06-13 ENCOUNTER — APPOINTMENT (OUTPATIENT)
Dept: CT IMAGING | Age: 44
DRG: 058 | End: 2019-06-13
Payer: MEDICARE

## 2019-06-13 VITALS
SYSTOLIC BLOOD PRESSURE: 104 MMHG | BODY MASS INDEX: 37.06 KG/M2 | HEART RATE: 63 BPM | HEIGHT: 65 IN | TEMPERATURE: 97.9 F | RESPIRATION RATE: 16 BRPM | WEIGHT: 222.44 LBS | DIASTOLIC BLOOD PRESSURE: 56 MMHG | OXYGEN SATURATION: 96 %

## 2019-06-13 LAB
ANION GAP SERPL CALCULATED.3IONS-SCNC: 13 MEQ/L (ref 9–15)
BUN BLDV-MCNC: 17 MG/DL (ref 6–20)
C-REACTIVE PROTEIN, HIGH SENSITIVITY: 9.2 MG/L (ref 0–5)
CALCIUM SERPL-MCNC: 8.7 MG/DL (ref 8.5–9.9)
CHLORIDE BLD-SCNC: 108 MEQ/L (ref 95–107)
CHOLESTEROL, TOTAL: 169 MG/DL (ref 0–199)
CO2: 21 MEQ/L (ref 20–31)
CREAT SERPL-MCNC: 0.98 MG/DL (ref 0.5–0.9)
GFR AFRICAN AMERICAN: >60
GFR NON-AFRICAN AMERICAN: >60
GLUCOSE BLD-MCNC: 99 MG/DL (ref 70–99)
HBA1C MFR BLD: 5.1 % (ref 4.8–5.9)
HCT VFR BLD CALC: 34.9 % (ref 37–47)
HDLC SERPL-MCNC: 45 MG/DL (ref 40–59)
HEMOGLOBIN: 11.7 G/DL (ref 12–16)
LDL CHOLESTEROL CALCULATED: 104 MG/DL (ref 0–129)
MCH RBC QN AUTO: 28.7 PG (ref 27–31.3)
MCHC RBC AUTO-ENTMCNC: 33.6 % (ref 33–37)
MCV RBC AUTO: 85.5 FL (ref 82–100)
PDW BLD-RTO: 14.9 % (ref 11.5–14.5)
PLATELET # BLD: 298 K/UL (ref 130–400)
POTASSIUM REFLEX MAGNESIUM: 4.5 MEQ/L (ref 3.4–4.9)
RBC # BLD: 4.08 M/UL (ref 4.2–5.4)
SEDIMENTATION RATE, ERYTHROCYTE: 43 MM (ref 0–20)
SODIUM BLD-SCNC: 142 MEQ/L (ref 135–144)
TRIGL SERPL-MCNC: 102 MG/DL (ref 0–150)
WBC # BLD: 6.2 K/UL (ref 4.8–10.8)

## 2019-06-13 PROCEDURE — 80061 LIPID PANEL: CPT

## 2019-06-13 PROCEDURE — 80048 BASIC METABOLIC PNL TOTAL CA: CPT

## 2019-06-13 PROCEDURE — 96372 THER/PROPH/DIAG INJ SC/IM: CPT

## 2019-06-13 PROCEDURE — 97535 SELF CARE MNGMENT TRAINING: CPT

## 2019-06-13 PROCEDURE — 86141 C-REACTIVE PROTEIN HS: CPT

## 2019-06-13 PROCEDURE — 94640 AIRWAY INHALATION TREATMENT: CPT

## 2019-06-13 PROCEDURE — 6370000000 HC RX 637 (ALT 250 FOR IP): Performed by: INTERNAL MEDICINE

## 2019-06-13 PROCEDURE — 6370000000 HC RX 637 (ALT 250 FOR IP): Performed by: NURSE PRACTITIONER

## 2019-06-13 PROCEDURE — 70450 CT HEAD/BRAIN W/O DYE: CPT

## 2019-06-13 PROCEDURE — 1210000000 HC MED SURG R&B

## 2019-06-13 PROCEDURE — 96376 TX/PRO/DX INJ SAME DRUG ADON: CPT

## 2019-06-13 PROCEDURE — 72125 CT NECK SPINE W/O DYE: CPT

## 2019-06-13 PROCEDURE — 83036 HEMOGLOBIN GLYCOSYLATED A1C: CPT

## 2019-06-13 PROCEDURE — 6360000002 HC RX W HCPCS: Performed by: INTERNAL MEDICINE

## 2019-06-13 PROCEDURE — 36415 COLL VENOUS BLD VENIPUNCTURE: CPT

## 2019-06-13 PROCEDURE — 85027 COMPLETE CBC AUTOMATED: CPT

## 2019-06-13 PROCEDURE — 85652 RBC SED RATE AUTOMATED: CPT

## 2019-06-13 RX ORDER — BUTALBITAL, ACETAMINOPHEN AND CAFFEINE 300; 40; 50 MG/1; MG/1; MG/1
1 CAPSULE ORAL ONCE
Status: COMPLETED | OUTPATIENT
Start: 2019-06-13 | End: 2019-06-13

## 2019-06-13 RX ORDER — ATORVASTATIN CALCIUM 40 MG/1
40 TABLET, FILM COATED ORAL NIGHTLY
Qty: 30 TABLET | Refills: 3 | Status: SHIPPED | OUTPATIENT
Start: 2019-06-13

## 2019-06-13 RX ADMIN — LEVETIRACETAM 750 MG: 500 TABLET ORAL at 10:54

## 2019-06-13 RX ADMIN — DIPHENHYDRAMINE HYDROCHLORIDE 25 MG: 50 INJECTION, SOLUTION INTRAMUSCULAR; INTRAVENOUS at 16:56

## 2019-06-13 RX ADMIN — DIPHENHYDRAMINE HYDROCHLORIDE 25 MG: 50 INJECTION, SOLUTION INTRAMUSCULAR; INTRAVENOUS at 10:53

## 2019-06-13 RX ADMIN — OXYCODONE HYDROCHLORIDE AND ACETAMINOPHEN 2 TABLET: 5; 325 TABLET ORAL at 16:57

## 2019-06-13 RX ADMIN — BUTALBITAL, ACETAMINOPHEN AND CAFFEINE 1 CAPSULE: 300; 40; 50 CAPSULE ORAL at 12:54

## 2019-06-13 RX ADMIN — OXYCODONE HYDROCHLORIDE AND ACETAMINOPHEN 2 TABLET: 5; 325 TABLET ORAL at 04:05

## 2019-06-13 RX ADMIN — APIXABAN 5 MG: 5 TABLET, FILM COATED ORAL at 12:54

## 2019-06-13 RX ADMIN — TOPIRAMATE 100 MG: 100 TABLET, FILM COATED ORAL at 10:54

## 2019-06-13 RX ADMIN — ARIPIPRAZOLE 15 MG: 15 TABLET ORAL at 10:54

## 2019-06-13 RX ADMIN — MOMETASONE FUROATE AND FORMOTEROL FUMARATE DIHYDRATE 2 PUFF: 100; 5 AEROSOL RESPIRATORY (INHALATION) at 08:10

## 2019-06-13 RX ADMIN — FAMOTIDINE 20 MG: 20 TABLET, FILM COATED ORAL at 10:54

## 2019-06-13 RX ADMIN — PAROXETINE HYDROCHLORIDE 40 MG: 20 TABLET, FILM COATED ORAL at 10:54

## 2019-06-13 RX ADMIN — DIPHENHYDRAMINE HYDROCHLORIDE 25 MG: 50 INJECTION, SOLUTION INTRAMUSCULAR; INTRAVENOUS at 04:05

## 2019-06-13 RX ADMIN — PROMETHAZINE HYDROCHLORIDE 6.25 MG: 25 INJECTION, SOLUTION INTRAMUSCULAR; INTRAVENOUS at 16:56

## 2019-06-13 RX ADMIN — PROMETHAZINE HYDROCHLORIDE 6.25 MG: 25 INJECTION, SOLUTION INTRAMUSCULAR; INTRAVENOUS at 04:05

## 2019-06-13 RX ADMIN — MOMETASONE FUROATE AND FORMOTEROL FUMARATE DIHYDRATE 2 PUFF: 100; 5 AEROSOL RESPIRATORY (INHALATION) at 19:59

## 2019-06-13 RX ADMIN — OXYCODONE HYDROCHLORIDE AND ACETAMINOPHEN 2 TABLET: 5; 325 TABLET ORAL at 10:54

## 2019-06-13 ASSESSMENT — PAIN SCALES - GENERAL
PAINLEVEL_OUTOF10: 10
PAINLEVEL_OUTOF10: 9
PAINLEVEL_OUTOF10: 10

## 2019-06-13 ASSESSMENT — ENCOUNTER SYMPTOMS
NAUSEA: 0
COLOR CHANGE: 0
DIARRHEA: 0
SHORTNESS OF BREATH: 0
COUGH: 0
VOMITING: 0
WHEEZING: 0
CONSTIPATION: 0
ABDOMINAL PAIN: 0
ABDOMINAL DISTENTION: 0
CHEST TIGHTNESS: 0
TROUBLE SWALLOWING: 0

## 2019-06-13 ASSESSMENT — PAIN DESCRIPTION - LOCATION
LOCATION: HEAD
LOCATION: HEAD

## 2019-06-13 ASSESSMENT — PAIN DESCRIPTION - PAIN TYPE
TYPE: ACUTE PAIN
TYPE: ACUTE PAIN
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
## 2019-06-13 NOTE — CARE COORDINATION
LSW spoke with pt and her spouse regarding her DC plan. Pt wants to return to [facility] when medically stable. LSW spoke with Jetta Primrose at [facility] and she will have pt ready for admission when medically cleared. Awaiting results of CT for transfer per Dr. Ilene Barton. Pt to be DC to [facility] via ambulance tonight.

## 2019-06-13 NOTE — PROGRESS NOTES
Hospitalist Progress Note      PCP: Emelia Cali    Date of Admission: 6/11/2019    Chief Complaint:  No acute events overnight,afebrile,stable HD, complaining of severe headache    Medications:  Reviewed    Infusion Medications   Scheduled Medications    butalbital-APAP-caffeine  1 capsule Oral Once    atorvastatin  40 mg Oral Nightly    ARIPiprazole  15 mg Oral Daily    apixaban  5 mg Oral BID    amitriptyline  100 mg Oral Nightly    mometasone-formoterol  2 puff Inhalation BID    Eslicarbazepine Acetate  400 mg Oral Nightly    famotidine  20 mg Oral BID    fluticasone  2 spray Each Nostril Daily    levETIRAcetam  750 mg Oral BID    PARoxetine  40 mg Oral QAM    propranolol  120 mg Oral Daily    traZODone  150 mg Oral Nightly    topiramate  100 mg Oral Daily     PRN Meds: hydrALAZINE, promethazine, diphenhydrAMINE, oxyCODONE-acetaminophen, melatonin      Intake/Output Summary (Last 24 hours) at 6/13/2019 1149  Last data filed at 6/13/2019 0418  Gross per 24 hour   Intake 150 ml   Output 1500 ml   Net -1350 ml       Exam:    BP (!) 104/56   Pulse 63   Temp 97.9 °F (36.6 °C)   Resp 16   Ht 5' 5\" (1.651 m)   Wt 222 lb 7.1 oz (100.9 kg)   SpO2 94%   BMI 37.02 kg/m²     General appearance: No apparent distress, appears stated age and cooperative. Respiratory:  Clear to auscultation, bilaterally without Rales/Wheezes/Rhonchi. Cardiovascular: Regular rate and rhythm with normal S1/S2 . Abdomen: Soft, non-tender, non-distended with normal bowel sounds. Musculoskeletal: No cyanosis or edema bilaterally.       Labs:   Recent Labs     06/11/19 2243 06/12/19  0504 06/13/19  0524   WBC 7.4 5.4 6.2   HGB 11.4* 12.0 11.7*   HCT 32.0* 35.0* 34.9*    273 298     Recent Labs     06/11/19 2243 06/12/19  0504 06/13/19  0524   * 142 142   K 4.0 5.2* 4.5   CL 98 107 108*   CO2 24 22 21   BUN 20 18 17   CREATININE 0.99* 1.14* 0.98*   CALCIUM 8.7 8.8 8.7     Recent Labs     06/11/19  2243   AST 12 ALT 13   BILITOT <0.2   ALKPHOS 133*     Recent Labs     06/11/19  2243   INR 1.0     Recent Labs     06/12/19  0504 06/12/19  1143   TROPONINI <0.010 <0.010       Urinalysis:      Lab Results   Component Value Date    NITRU Negative 06/02/2019    WBCUA 6-10 06/02/2019    BACTERIA Negative 06/02/2019    RBCUA 0-2 06/02/2019    BLOODU Negative 06/02/2019    SPECGRAV 1.022 06/02/2019    GLUCOSEU Negative 06/02/2019       Radiology:  CT HEAD WO CONTRAST   Final Result   NO ACUTE PROCESS. REMOTE, SMALL RIGHT BASAL GANGLIA LACUNAR INFARCTS. All CT scans at this facility use dose modulation, iterative reconstruction, and/or weight based dosing when appropriate to reduce radiation dose to as low as reasonably achievable. US CAROTID ARTERY BILATERAL   Final Result      NO EVIDENCE OF A FLOW-LIMITING STENOSIS. RADIOLOGY REPORT   Final Result      CT Head WO Contrast   Final Result      CT CERVICAL SPINE WO CONTRAST    (Results Pending)           Assessment/Plan:    40 y.o. female with history of chronic suprapubic catheter, pseudoseizures/epilepsy, CKD3,  CVA with residual right-sided hemiparesis, DVT on Eliquis, chronic back pain s/p spinal stimulator who presented from the nursing home where she stays for acute rehab with complaints of worsening strokelike symptoms.       Right sided weakness  - concerning for TIA/CVA  - unable to obtain MRI due to spinal stimulator  - repeat CT head negative, CT neck was negative for acute findings  - continue Eliquis per neurology   - PT/OT    Headache  - started on Fioricet by neurology     History of PNES and focal epilepsy  - continue home regimen    Bipolar disorder  - Continue home meds    Chronic back pain, opioid induced hyperalgesia  - seen by pain management at [facility] on her last admission there in 5/2019 who recommended to stop opiate use and continue non-opiate therapy including Tylenol, NSAIDS,Flexeril, PT,spinal cord stimulator    Chronic urinary retention   - s/p suprapubic catheter placement    Hyperkalemia   - improved    Disposition - back to SNF when OK with neurology        Electronically signed by Jay Ballard MD on 6/13/2019 at 11:49 AM

## 2019-06-13 NOTE — DISCHARGE SUMMARY
Hospital Medicine Discharge Summary    Checo Guallpa  :  1975  MRN:  [id number]    Admit date:  2019  Discharge date:  2019    Admitting Physician:  Jordana Berry DO  Primary Care Physician:  Braxton Vallejo      Discharge Diagnoses: Active Problems:    TIA (transient ischemic attack)  Resolved Problems:    * No resolved hospital problems. [facility] Course: Checo Guallpa is a 40 y.o. female that was admitted and treated at [facility] for the following medical issues:     Right sided weakness  - concerning for TIA/CVA  - unable to obtain MRI due to spinal stimulator  - repeat CT head negative, CT neck was negative for acute findings  - continue Eliquis, OK to d/c per neurology       Patient was seen by the following consultants while admitted to [facility]:   Consults:  [address] TO CASE MANAGEMENT  IP CONSULT TO DIETITIAN  IP CONSULT TO SOCIAL WORK    Significant Diagnostic Studies:    Ct Head Wo Contrast    Result Date: 2019  CT HEAD WO CONTRAST CLINICAL HISTORY:  right side weakness with migraine. Previous stroke but h/o migraines as well Comparison: January 3, 2019 TECHNIQUE: Multiple unenhanced serial axial images of the brain from the vertex of the skull to the base of the skull were performed. FINDINGS: The ventricles are dilated. This is compensatory to the surrounding moderate generalized parenchymal volume loss. No mass. No midline shift. The cisterns are patent. Old right basal ganglia lacunar infarct No acute intra-axial or extra-axial findings The visualized osseous structures are unremarkable. The visualized portion of the paranasal sinuses, and mastoids are unremarkable. IMPRESSION NO ACUTE INTRA-AXIAL OR EXTRA-AXIAL FINDINGS.  IF SIGNS OR SYMPTOMS PERSIST THEN CONSIDER REPEAT CT SCAN IN 12 TO 24 HOURS OR MRI TO FURTHER EVALUATE IF THERE ARE NO CONTRAINDICATIONS All CT scans at this facility use dose modulation, iterative reconstruction, and/or weight based dosing when appropriate to reduce radiation dose to as low as reasonably achievable. Us Carotid Artery Bilateral    Result Date: 6/12/2019  US CAROTID ARTERY BILATERAL: 6/12/2019 CLINICAL HISTORY: Right-sided weakness. COMPARISON: 6/8/2018. Grayscale, color and waveform Doppler analysis of the cervical carotid and vertebral arteries was performed. Validated velocity measurements with angiographic measurements, velocity criteria are extrapolated from diameter data as defined by the Society of Radiologist in [address] [place] [address] Radiology 2003; 923;[id number]. FINDINGS: There is no significant atherosclerotic plaquing, elevated velocities, spectral waveform broadening, or incidental findings of concern identified. Maximum systolic velocity within the right internal and mid common carotid arteries are 77 and 127 cm/s, with an ICA/CCA ratio of approximately 0.61 , which indicates less than 50% by velocity criteria. Maximum systolic velocity within the left internal and mid common carotid arteries are 92 and 118 cm/s, with an ICA/CCA ratio of approximately 0.78, which indicates less than 50% by velocity criteria. Maximum velocities within the right and left external carotid arteries are 82 and 74 cm/sec respectively. There is antegrade flow in both vertebral arteries. NO EVIDENCE OF A FLOW-LIMITING STENOSIS. Discharge Medications:       [address] Medication Instructions MBD:[id number]    Printed on:06/13/19 9920   Medication Information                      ALPRAZolam (XANAX) 0.5 MG tablet  Take 0.5 mg by mouth 2 times daily. Carlee Miramontes              amitriptyline (ELAVIL) 100 MG tablet  Take 100 mg by mouth nightly             apixaban (ELIQUIS) 5 MG TABS tablet  Take 10 mg by mouth 2 times daily             ARIPiprazole (ABILIFY) 15 MG tablet  Take 15 mg by mouth daily             atorvastatin (LIPITOR) 40 MG tablet  Take 1 tablet by and/or future hospitalizations. **    Your medical team at [facility] appreciates the opportunity to work with you to get well!     Sincerely,  Chapo Lock

## 2019-06-13 NOTE — CONSULTS
[address]                      [address], [address] [facility]                                  CONSULTATION    PATIENT NAME: Padmini Livingston                     :        1975  MED REC NO:   [id number]                            ROOM:       R180  ACCOUNT NO:   [de-identified]                           ADMIT DATE: 2019  PROVIDER:     Devan Mederos MD    CONSULT DATE:  2019    ATTENDING:  Mariangel Alvarez DO.    HISTORY OF PRESENT ILLNESS:  This is a 66-year-old right-handed female  who was admitted with a history of increasing right-sided weakness. The  patient sustained a stroke in  and was seen at [facility].   She did not go for rehab and took care of herself at home. She became  weaker. She continued to improve. She then had some medical illness  and was seen at [facility]. At that time, she was reported to  [facility] and was there one and a half months in January. She then  becomes more sicker and was seen again at [facility] and was  referred for rehab. She was continuing rehab till yesterday when she  had some sharp shooting pains in her head with pain radiating down her  complete right side. Since then, she reports she has become weaker. She has a spinal stimulator and MRI could not be done. Her Care  Everywhere notes were reviewed and the patient was seen by Dr. Lyndsey Lujan for epilepsy and she was admitted to [facility] for the  same. The patient's last interictal EEG showed sharp waves, regional  left and right temporal lobe with some slowing. She has had some _____  events. The video EEG was completed. I did not see the video EEG  recording. The patient carries a diagnosis of nonepileptic psychogenic  seizures which are well documented at [facility], Neurology. Her image studies from the initial stroke are not available to me _____  pseudoseizures.   Further, video EEG recordings are obtained and I have  reviewed the notes and this appears to be showing nonepileptic seizures. The initial MRI's are not available to me. CT scans have not shown  anything significant. She denies any headache now. She does not complain of any pain now. She denies any nausea, vomiting, chest pain, or shortness of breath. Not complaining of any bleeding, bruising, choking, or drooling. PAST MEDICAL HISTORY:  Therefore reviewed. History of a left cerebral  stroke with left-sided weakness. The patient also has history of PE and  is on Eliquis, history of chronic low back pain, headache, kidney  disease, kidney stones, nonepileptic seizures, history of appendectomy,  back surgery, cholecystectomy, and hysterectomy. MEDICATION:  Her medication list is reviewed and she is on Apixaban,  Abilify, Lipitor, Plavix, Pepcid, Flonase, and Apresoline. She is on  Keppra 200 mg twice a day, Paroxetine, Inderal, Topamax, and Desyrel. PHYSICAL EXAMINATION:  GENERAL:  She is in no acute distress. General examination reveals no  skin lesions or skin marks. There is no pedal edema. There is no  cyanosis or clubbing. NECK:  Supple. There are no meningeal signs. CARDIOVASCULAR SYSTEM:  S1 and S2 are normal.  No murmurs appreciated. No carotid bruits. RESPIRATORY SYSTEM:  Clear to auscultation. CNS EXAMINATION:  She is awake, alert, and oriented. Pupils are equal  and reactive. Eye movements are conjugate. Speech is normal.  Tongue  is midline. Palate moves symmetrically. EXTREMITIES:  Examination of her right upper extremity reveals a  strength of 3/5. Leg strength is 3/5. Rest of the strength is 5/5. Reflexes are 2+. She is not hyperreflexic. There is no asymmetry of  reflexes. LABORATORY EXAMINATION:  Therefore reviewed. WBC count of 7.4,  hemoglobin 11.4, hematocrit 32.0, and platelets of 756,065. Sodium 134,  potassium 4.0, BUN of 20, and creatinine 0.99.     IMPRESSION:  A right basal ganglia stroke with extension or truly a  Lan's paralysis from seizures. The patient does have a history of  seizure disorder, but most of the seizures documented with a video EEG  recording are nonepileptic as seen in clinic notes. Her initial MRI  results are not available and we are not quite sure of the extent of  this stroke. Unfortunately, a repeat MRI cannot be done as the patient  has a spinal stimulator. At this time, the patient is already on  Eliquis and Plavix. There is nothing more that can be added except for  continuation of physical therapy. Of note, the patient's etiology of  stroke was not determined. There is no evidence that she had a HAN. We  will discontinue the Plavix and continue on aspirin and Eliquis for now. We will arrange for a HAN if not done. Further management will depend on the patient's response and findings. Thank you, Dr. Marcus Mcallister for asking us to assist in the care of this  patient.         Tena Hickman MD    D: 06/12/2019 12:46:23       T: 06/12/2019 15:35:32     [email or web address]  Job#: [id number]     Doc#: [id number]    CC:

## 2019-06-13 NOTE — PROGRESS NOTES
Neurology Daily Progress Note  Name: Pablo Le  Age: 40 y.o. Gender: female  CodeStatus: Full Code  Allergies: Latex  Ciprofloxacin  Shellfish-Derived Products  Contrast [Iodides]  Daypro [Oxaprozin]  Iodine  Macrobid [Nitrofurantoin Monohyd Macro]  Macrolides And Ketolides  Tape Elza [place] Tape]  Tegretol [Carbamazepine]  Toradol [Ketorolac Tromethamine]  Tramadol  Zanaflex [Tizanidine Hcl]  Estrogens  Lamictal [Lamotrigine]  Lyrica [Pregabalin]  Tylenol [Acetaminophen]  Vicodin [Hydrocodone-Acetaminophen]    Chief Complaint:Migraine    Primary Care Provider: Juni Lieberman  InpatientTreatment Team: Treatment Team: Attending Provider: Jaylan Moses MD; Consulting Physician: Courtney Hernandez MD; Care Coordinator: Chilton Essex, RN; Registered Nurse: David Persaud RN; Patient Care Tech: Ann Marie Chaney; Registered Nurse: Donnell Swift RN  Admission Date: 6/11/2019      HPI   Pt seen and examined for neuro follow up for severe headache with right sided weakness. Pt is A&O x3, NAD, cooperative. Pt continues to have severe headache located at the top of her head. She rates it 10/10. Describes it as sharp. She reprots headache started 2 days PTA. She has hx of \"cluster migraine headaches\". Pt reports that she fell at NH 2 days PTA and hit her head and neck and since then has had headache, neck pain and weakness and pain on right side. She currently c/o double vision bilat eyes. No ptosis. No tearing of eyes. C/o photophobia. No n/v. Pt with hx of CVA with right sided weakness but states her weakness to right side is worse. Denies difficulty with speech, difficulty with swallowing, numbness, pain, nausea, vomiting, choking, neck pain, dizziness  Pt c/o severe tenderness with palpation over scalp and bilat temporal regions.    Vitals:    06/13/19 0813   BP:    Pulse:    Resp: 16   Temp:    SpO2: 94%        Physical Exam  Mental Status Exam:             Level of Alertness:   awake Orientation:   person, place, time            Memory:   normal            Attention/Concentration:  normal            Language:  normal    Cranial Nerves         Cranial nerve III           Pupils:  equal, round, reactive to light      Cranial nerves III, IV, VI           Extraocular Movements: intact      Cranial nerve V           Facial sensation:  intact      Cranial nerve VII           Facial strength: intact      Cranial nerve VIII           Hearing:  intact      Cranial nerve IX           Palate:  intact      Cranial nerve XI         Shoulder shrug:  intact      Cranial nerve XII          Tongue movement:  normal    Motor:    Drift:  absent  Motor exam is symmetrical 3/5 right nad 5/5 left  Abnormal Movements:  absent            Sensory:                         Touch              Right Upper Extremity:  normal              Left Upper Extremity:  normal              Right Lower Extremity:  normal              Left Lower Extremity:  normal                   Coordination:           Finger/Nose   Right:  slow              Left:  normal                 Rapid Alternating Movements              Right:  slow              Left:  normal                   Reflexes:             Deep Tendon Reflexes:             Reflexes are 2 + left patella   absent right patella             Plantar response:                Right:  absent               Left:  upward      Review of Systems   Constitutional: Negative for appetite change, chills, fatigue and fever. HENT: Negative for hearing loss and trouble swallowing. Eyes: Positive for visual disturbance (diplopia). Respiratory: Negative for cough, chest tightness, shortness of breath and wheezing. Cardiovascular: Negative for chest pain, palpitations and leg swelling. Gastrointestinal: Negative for abdominal distention, abdominal pain, constipation, diarrhea, nausea and vomiting. Genitourinary: Negative for difficulty urinating, dysuria and frequency.    Musculoskeletal: Positive for gait problem. Skin: Negative for color change and rash. Neurological: Positive for weakness (right). Negative for dizziness, tremors, seizures, syncope, facial asymmetry, speech difficulty, light-headedness, numbness and headaches. Psychiatric/Behavioral: Negative for agitation, confusion and hallucinations. The patient is not nervous/anxious.         Medications:  Reviewed    Infusion Medications:   Scheduled Medications:    atorvastatin  40 mg Oral Nightly    ARIPiprazole  15 mg Oral Daily    apixaban  5 mg Oral BID    amitriptyline  100 mg Oral Nightly    mometasone-formoterol  2 puff Inhalation BID    Eslicarbazepine Acetate  400 mg Oral Nightly    famotidine  20 mg Oral BID    fluticasone  2 spray Each Nostril Daily    levETIRAcetam  750 mg Oral BID    PARoxetine  40 mg Oral QAM    propranolol  120 mg Oral Daily    traZODone  150 mg Oral Nightly    topiramate  100 mg Oral Daily     PRN Meds: hydrALAZINE, promethazine, diphenhydrAMINE, oxyCODONE-acetaminophen, melatonin    Labs:   Recent Labs     06/11/19  2243 06/12/19  0504 06/13/19  0524   WBC 7.4 5.4 6.2   HGB 11.4* 12.0 11.7*   HCT 32.0* 35.0* 34.9*    273 298     Recent Labs     06/11/19  2243 06/12/19  0504 06/13/19  0524   * 142 142   K 4.0 5.2* 4.5   CL 98 107 108*   CO2 24 22 21   BUN 20 18 17   CREATININE 0.99* 1.14* 0.98*   CALCIUM 8.7 8.8 8.7     Recent Labs     06/11/19  2243   AST 12   ALT 13   BILITOT <0.2   ALKPHOS 133*     Recent Labs     06/11/19  2243   INR 1.0     Recent Labs     06/12/19  0504 06/12/19  1143   TROPONINI <0.010 <0.010       Urinalysis:   Lab Results   Component Value Date    NITRU Negative 06/02/2019    WBCUA 6-10 06/02/2019    BACTERIA Negative 06/02/2019    RBCUA 0-2 06/02/2019    BLOODU Negative 06/02/2019    SPECGRAV 1.022 06/02/2019    GLUCOSEU Negative 06/02/2019       Radiology:   Most recent    Chest CT      WITH CONTRAST:No results found for this or any previous visit.     WITHOUT CONTRAST: No results found for this or any previous visit. CXR      2-view:   Results for orders placed during the hospital encounter of 03/09/19   XR CHEST STANDARD (2 VW)    Narrative EXAMINATION: XR CHEST (2 VW)    CLINICAL HISTORY: Chest soreness    COMPARISONS: 1/30/2019    FINDINGS: Cardiac size is borderline. Pulmonary vascularity is normal. The lungs are clear. There is no evidence of adenopathy. The bones are unremarkable. Neurostimulator electrodes are in the spinal canal.      Impression NEGATIVE CHEST RADIOGRAPHS        Portable:   Results for orders placed during the hospital encounter of 06/02/19   XR CHEST PORTABLE    Narrative EXAMINATION: CHEST PORTABLE VIEW     CLINICAL HISTORY: Epigastric and right flank pain    COMPARISONS: None     FINDINGS:    Single  views of the chest is submitted. Spinal stimulator lead is seen overlying the lower thoracic vertebra. The cardiac silhouette is enlarged. Unchanged The mediastinum is unremarkable. Pulmonary vascular unremarkable. Right sided trachea. No focal infiltrates. No Pneumothoraces. Impression NO ACUTE ACTIVE CARDIOPULMONARY PROCESS       Echo No results found for this or any previous visit.           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    Intractable Headache  Cervicalgia  CT neck neg   Sed rate and CRP  Allergy to toradol and zanaflex so cannot prescribe  Fioricet given, pt reports it did not help  Ok to DC to [facility]  Follow up with neurology [facility]     [facility] Indigo Pacheco MD, Anderson Montes, American Board of Psychiatry & Neurology  Board Certified in Vascular Neurology  Board Certified in Neuromuscular Medicine  Certified in Neurorehabilitation         Collaborating physicians: Dr Carter Ferguson, Dr ANN Meade, Dr Abdoul Leggett    Electronically signed by SILKE Donnelly - CNP on 6/13/2019 at 10:55 AM

## 2019-06-14 NOTE — PROGRESS NOTES
Physical Therapy  Facility/Department: [facility] MED SURG I055/[id number]  Physical Therapy Discharge      NAME: Teressa Jenkins    : 1975 (40 y.o.)  MRN: [id number]    Account: [de-identified]  Gender: female      Patient has been discharged from acute care hospital. DC patient from current PT program.      Electronically signed by Alisa Mandujano, PT on 19 at 3:55 PM

## 2019-06-20 ENCOUNTER — APPOINTMENT (OUTPATIENT)
Dept: CT IMAGING | Age: 44
End: 2019-06-20
Payer: MEDICARE

## 2019-06-20 ENCOUNTER — HOSPITAL ENCOUNTER (EMERGENCY)
Age: 44
Discharge: HOME | End: 2019-06-21
Payer: MEDICARE

## 2019-06-20 DIAGNOSIS — R56.9 SEIZURE (HCC): Primary | ICD-10-CM

## 2019-06-20 LAB
ALBUMIN SERPL-MCNC: 3.5 G/DL (ref 3.5–4.6)
ALP BLD-CCNC: 159 U/L (ref 40–130)
ALT SERPL-CCNC: 25 U/L (ref 0–33)
ANION GAP SERPL CALCULATED.3IONS-SCNC: 13 MEQ/L (ref 9–15)
AST SERPL-CCNC: 36 U/L (ref 0–35)
BASOPHILS ABSOLUTE: 0.1 K/UL (ref 0–0.2)
BASOPHILS RELATIVE PERCENT: 1 %
BILIRUB SERPL-MCNC: <0.2 MG/DL (ref 0.2–0.7)
BUN BLDV-MCNC: 17 MG/DL (ref 6–20)
CALCIUM SERPL-MCNC: 8.7 MG/DL (ref 8.5–9.9)
CHLORIDE BLD-SCNC: 102 MEQ/L (ref 95–107)
CO2: 23 MEQ/L (ref 20–31)
CREAT SERPL-MCNC: 1.05 MG/DL (ref 0.5–0.9)
EOSINOPHILS ABSOLUTE: 0.3 K/UL (ref 0–0.7)
EOSINOPHILS RELATIVE PERCENT: 4.9 %
GFR AFRICAN AMERICAN: >60
GFR NON-AFRICAN AMERICAN: 56.9
GLOBULIN: 3.2 G/DL (ref 2.3–3.5)
GLUCOSE BLD-MCNC: 109 MG/DL (ref 70–99)
HCT VFR BLD CALC: 34.2 % (ref 37–47)
HEMOGLOBIN: 11.9 G/DL (ref 12–16)
LYMPHOCYTES ABSOLUTE: 1.7 K/UL (ref 1–4.8)
LYMPHOCYTES RELATIVE PERCENT: 25.6 %
MCH RBC QN AUTO: 29.1 PG (ref 27–31.3)
MCHC RBC AUTO-ENTMCNC: 34.7 % (ref 33–37)
MCV RBC AUTO: 84 FL (ref 82–100)
MONOCYTES ABSOLUTE: 0.4 K/UL (ref 0.2–0.8)
MONOCYTES RELATIVE PERCENT: 6.6 %
NEUTROPHILS ABSOLUTE: 4.2 K/UL (ref 1.4–6.5)
NEUTROPHILS RELATIVE PERCENT: 61.9 %
PDW BLD-RTO: 15.1 % (ref 11.5–14.5)
PLATELET # BLD: 320 K/UL (ref 130–400)
POTASSIUM SERPL-SCNC: 4.8 MEQ/L (ref 3.4–4.9)
RBC # BLD: 4.07 M/UL (ref 4.2–5.4)
SODIUM BLD-SCNC: 138 MEQ/L (ref 135–144)
TOTAL PROTEIN: 6.7 G/DL (ref 6.3–8)
WBC # BLD: 6.8 K/UL (ref 4.8–10.8)

## 2019-06-20 PROCEDURE — 99285 EMERGENCY DEPT VISIT HI MDM: CPT

## 2019-06-20 PROCEDURE — 96375 TX/PRO/DX INJ NEW DRUG ADDON: CPT

## 2019-06-20 PROCEDURE — 2580000003 HC RX 258: Performed by: NURSE PRACTITIONER

## 2019-06-20 PROCEDURE — 85025 COMPLETE CBC W/AUTO DIFF WBC: CPT

## 2019-06-20 PROCEDURE — 70450 CT HEAD/BRAIN W/O DYE: CPT

## 2019-06-20 PROCEDURE — 36415 COLL VENOUS BLD VENIPUNCTURE: CPT

## 2019-06-20 PROCEDURE — 6360000002 HC RX W HCPCS: Performed by: NURSE PRACTITIONER

## 2019-06-20 PROCEDURE — 84146 ASSAY OF PROLACTIN: CPT

## 2019-06-20 PROCEDURE — 80053 COMPREHEN METABOLIC PANEL: CPT

## 2019-06-20 PROCEDURE — 96365 THER/PROPH/DIAG IV INF INIT: CPT

## 2019-06-20 RX ORDER — ACETAMINOPHEN 500 MG
1000 TABLET ORAL ONCE
Status: DISCONTINUED | OUTPATIENT
Start: 2019-06-20 | End: 2019-06-20

## 2019-06-20 RX ORDER — IBUPROFEN 800 MG/1
800 TABLET ORAL ONCE
Status: COMPLETED | OUTPATIENT
Start: 2019-06-20 | End: 2019-06-21

## 2019-06-20 RX ORDER — ONDANSETRON 2 MG/ML
4 INJECTION INTRAMUSCULAR; INTRAVENOUS ONCE
Status: COMPLETED | OUTPATIENT
Start: 2019-06-20 | End: 2019-06-20

## 2019-06-20 RX ADMIN — ONDANSETRON 4 MG: 2 INJECTION INTRAMUSCULAR; INTRAVENOUS at 23:57

## 2019-06-20 RX ADMIN — LEVETIRACETAM 500 MG: 100 INJECTION, SOLUTION INTRAVENOUS at 23:51

## 2019-06-20 ASSESSMENT — PAIN DESCRIPTION - DESCRIPTORS: DESCRIPTORS: ACHING

## 2019-06-20 ASSESSMENT — PAIN DESCRIPTION - PAIN TYPE: TYPE: ACUTE PAIN

## 2019-06-20 ASSESSMENT — ENCOUNTER SYMPTOMS
COLOR CHANGE: 0
VOMITING: 0
SHORTNESS OF BREATH: 0
EYE PAIN: 0
DIARRHEA: 0
VOICE CHANGE: 0
NAUSEA: 0
TROUBLE SWALLOWING: 0
BACK PAIN: 0
ABDOMINAL PAIN: 0
RHINORRHEA: 0
EYE ITCHING: 0
SORE THROAT: 0
COUGH: 0
WHEEZING: 0
EYE REDNESS: 0

## 2019-06-20 ASSESSMENT — PAIN DESCRIPTION - LOCATION: LOCATION: HEAD

## 2019-06-20 ASSESSMENT — PAIN DESCRIPTION - FREQUENCY: FREQUENCY: CONTINUOUS

## 2019-06-20 ASSESSMENT — PAIN SCALES - GENERAL: PAINLEVEL_OUTOF10: 8

## 2019-06-21 VITALS
BODY MASS INDEX: 36.49 KG/M2 | WEIGHT: 219 LBS | HEART RATE: 57 BPM | RESPIRATION RATE: 16 BRPM | DIASTOLIC BLOOD PRESSURE: 83 MMHG | TEMPERATURE: 98.8 F | SYSTOLIC BLOOD PRESSURE: 126 MMHG | OXYGEN SATURATION: 95 % | HEIGHT: 65 IN

## 2019-06-21 LAB
HCT VFR BLD CALC: 33.8 % (ref 37–47)
HEMOGLOBIN: 11.4 G/DL (ref 12–16)
MCH RBC QN AUTO: 28.9 PG (ref 27–31.3)
MCHC RBC AUTO-ENTMCNC: 33.7 % (ref 33–37)
MCV RBC AUTO: 85.9 FL (ref 82–100)
PDW BLD-RTO: 14.8 % (ref 11.5–14.5)
PLATELET # BLD: 303 K/UL (ref 130–400)
PROLACTIN: 11.8 NG/ML
RBC # BLD: 3.93 M/UL (ref 4.2–5.4)
WBC # BLD: 6.1 K/UL (ref 4.8–10.8)

## 2019-06-21 PROCEDURE — 6370000000 HC RX 637 (ALT 250 FOR IP): Performed by: NURSE PRACTITIONER

## 2019-06-21 RX ADMIN — IBUPROFEN 800 MG: 800 TABLET, FILM COATED ORAL at 00:05

## 2019-06-21 ASSESSMENT — PAIN DESCRIPTION - PAIN TYPE: TYPE: ACUTE PAIN

## 2019-06-21 ASSESSMENT — PAIN SCALES - GENERAL: PAINLEVEL_OUTOF10: 5

## 2019-06-21 ASSESSMENT — PAIN DESCRIPTION - LOCATION: LOCATION: HEAD

## 2019-06-21 NOTE — ED PROVIDER NOTES
[address] [facility] ED  eMERGENCY dEPARTMENT eNCOUnter      Pt Name: Deanna Cade  MRN: [id number]  [place] 1975  Date of evaluation: 6/20/2019  Provider: Richard Mayer       Chief Complaint   Patient presents with    Seizures         HISTORY OF PRESENT ILLNESS   (Location/Symptom, Timing/Onset,Context/Setting, Quality, Duration, Modifying Factors, Severity)  Note limiting factors. Deanna Cade is a 40 y.o. female who presents to the emergency department after being sent from [facility] for having seizures x2 this evening. She reports she was sent to make sure she was safe to stay with them at the nursing home this evening giving her seizures. Patient was recently admitted for this at clinic facility in [facility]. She had a thorough neurological exam and had EEG that confirmed diagnosis of nonepileptic events and recommended her to continue with her Topamax and her aptiom. She reports that she does have headache in all of her body aches since having the seizures earlier today. Advised she would like something for headache. Denies any dizziness or lightheadedness. No confusion. No modifying or relieving factors. No other associated symptoms. No reported head injury during seizure activity. Patient reports that she has been taking her medications as prescribed as the nursing home is been giving them to her      Nursing Notes were reviewed. REVIEW OF SYSTEMS    (2-9 systems for level 4, 10 or more for level 5)     Review of Systems   Constitutional: Negative for activity change, appetite change, diaphoresis, fatigue and fever. HENT: Negative for congestion, ear discharge, ear pain, rhinorrhea, sore throat, trouble swallowing and voice change. Eyes: Negative for pain, redness and itching. Respiratory: Negative for cough, shortness of breath and wheezing. Cardiovascular: Negative for chest pain and palpitations. Gastrointestinal: Negative for abdominal pain, diarrhea, nausea and vomiting. Genitourinary: Negative for dysuria, flank pain and hematuria. Musculoskeletal: Positive for myalgias. Negative for back pain. Skin: Negative for color change and rash. Neurological: Positive for seizures and headaches. Negative for dizziness, light-headedness and numbness. Psychiatric/Behavioral: Negative for confusion and decreased concentration. All other systems reviewed and are negative. Except as noted above the remainder of the review of systems was reviewed and negative.        PAST MEDICAL HISTORY     Past Medical History:   Diagnosis Date    Asthma     [facility] [address] [facility]     Chronic back pain     Chronic kidney disease     Depression     Headache     Kidney disease     Kidney stone     Seizures ([facility] [address] 5/24/2016    Suprapubic catheter ([facility] [address] 06/2018     Past Surgical History:   Procedure Laterality Date    APPENDECTOMY      BACK SURGERY      CHOLECYSTECTOMY      HYSTERECTOMY      KNEE SURGERY Bilateral     OTHER SURGICAL HISTORY      sup/pub cath june 1, 2018     Social History     Socioeconomic History    Marital status: Single     Spouse name: None    Number of children: None    Years of education: None    Highest education level: None   Occupational History    None   Social Needs    Financial resource strain: None    Food insecurity:     Worry: None     Inability: None    Transportation needs:     Medical: None     Non-medical: None   Tobacco Use    Smoking status: Never Smoker    Smokeless tobacco: Never Used   Substance and Sexual Activity    Alcohol use: No     Alcohol/week: 0.0 oz    Drug use: No    Sexual activity: Never   Lifestyle    Physical activity:     Days per week: None     Minutes per session: None    Stress: None   Relationships    Social connections:     Talks on phone: None     Gets together: None     Attends [employer] witness service: None     Active member of club or organization: None     Attends meetings of clubs or organizations: None     Relationship status: None    Intimate partner violence:     Fear of current or ex partner: None     Emotionally abused: None     Physically abused: None     Forced sexual activity: None   Other Topics Concern    None   Social History Narrative    None       SCREENINGS    [place] Coma Scale  Eye Opening: Spontaneous  Best Verbal Response: Oriented  Best Motor Response: Obeys commands  Alex Coma Scale Score: 15 @FLOW([id number])@      PHYSICAL EXAM    (up to 7 for level 4, 8 or more for level 5)     ED Triage Vitals [06/20/19 2133]   BP Temp Temp Source Pulse Resp SpO2 Height Weight   122/62 98.8 °F (37.1 °C) Oral 76 16 94 % 5' 5\" (1.651 m) 219 lb (99.3 kg)       Physical Exam   Constitutional: She is oriented to person, place, and time. She appears well-developed and well-nourished. No distress. HENT:   Head: Normocephalic and atraumatic. Eyes: Pupils are equal, round, and reactive to light. Conjunctivae and EOM are normal. Right eye exhibits no discharge. Left eye exhibits no discharge. No scleral icterus. Neck: Normal range of motion. Neck supple. Cardiovascular: Normal rate, regular rhythm, normal heart sounds and intact distal pulses. Exam reveals no gallop and no friction rub. No murmur heard. Pulmonary/Chest: Effort normal and breath sounds normal. No stridor. No respiratory distress. She has no decreased breath sounds. She has no wheezes. She has no rhonchi. She has no rales. Neurological: She is alert and oriented to person, place, and time. She has normal strength. She is not disoriented. No cranial nerve deficit or sensory deficit. GCS eye subscore is 4. GCS verbal subscore is 5. GCS motor subscore is 6. Answering questions appropriately. No gaze deficit. . Moving all extremities. Skin: Skin is warm and dry. Capillary refill takes less than 2 seconds.  She is not diaphoretic. Psychiatric: She has a normal mood and affect. Her behavior is normal. Judgment and thought content normal.   Nursing note and vitals reviewed. DIAGNOSTIC RESULTS     EKG: All EKG's are interpreted by the Emergency Department Physician who either signs or Co-signsthis chart in the absence of a cardiologist.      RADIOLOGY:   Francella Kill such as CT, Ultrasound and MRI are read by the radiologist. [facility] radiographic images are visualized and preliminarily interpreted by the emergency physician with the below findings:    CT scan of the head shows no acute intracranial hemorrhage, mass-effect, midline shift, hydrocephalus or acute infarct. Old right basal ganglia lacunar infarct. Bony structures are intact. Soft tissues are unremarkable    Interpretation per the Radiologist below, if available at the time ofthis note:    CT Head WO Contrast    (Results Pending)         ED BEDSIDE ULTRASOUND:   Performed by ED Physician - none    LABS:  Labs Reviewed   COMPREHENSIVE METABOLIC PANEL - Abnormal; Notable for the following components:       Result Value    Glucose 109 (*)     CREATININE 1.05 (*)     GFR Non- 56.9 (*)     Alkaline Phosphatase 159 (*)     AST 36 (*)     All other components within normal limits   CBC WITH AUTO DIFFERENTIAL - Abnormal; Notable for the following components:    RBC 4.07 (*)     Hemoglobin 11.9 (*)     Hematocrit 34.2 (*)     RDW 15.1 (*)     All other components within normal limits   PROLACTIN       All other labs were within normal range or not returned as of this dictation. EMERGENCY DEPARTMENT COURSE and DIFFERENTIAL DIAGNOSIS/MDM:   Vitals:    Vitals:    06/20/19 2133 06/21/19 0000   BP: 122/62 102/61   Pulse: 76 60   Resp: 16 16   Temp: 98.8 °F (37.1 °C)    TempSrc: Oral    SpO2: 94% 95%   Weight: 219 lb (99.3 kg)    Height: 5' 5\" (1.651 m)          ED Course as of Jun 21 0057   Thu Jun 20, 2019   2355 Patient complaining of nausea.   Will

## 2019-06-21 NOTE — ED NOTES
Bed: 10  Expected date:   Expected time:   Means of arrival:   Comments:  36yr female - seizure from [facility], [address] [facility]  06/20/19 4639

## 2020-02-07 ENCOUNTER — APPOINTMENT (OUTPATIENT)
Dept: CT IMAGING | Age: 45
End: 2020-02-07
Payer: MEDICARE

## 2020-02-07 ENCOUNTER — HOSPITAL ENCOUNTER (EMERGENCY)
Age: 45
Discharge: HOME | End: 2020-02-07
Payer: MEDICARE

## 2020-02-07 VITALS
BODY MASS INDEX: 35.65 KG/M2 | HEART RATE: 68 BPM | OXYGEN SATURATION: 98 % | WEIGHT: 214 LBS | SYSTOLIC BLOOD PRESSURE: 102 MMHG | HEIGHT: 65 IN | RESPIRATION RATE: 17 BRPM | DIASTOLIC BLOOD PRESSURE: 52 MMHG | TEMPERATURE: 98 F

## 2020-02-07 LAB
ALBUMIN SERPL-MCNC: 3.5 G/DL (ref 3.5–4.6)
ALP BLD-CCNC: 271 U/L (ref 40–130)
ALT SERPL-CCNC: 49 U/L (ref 0–33)
ANION GAP SERPL CALCULATED.3IONS-SCNC: 15 MEQ/L (ref 9–15)
AST SERPL-CCNC: 54 U/L (ref 0–35)
BACTERIA: ABNORMAL /HPF
BASOPHILS ABSOLUTE: 0.1 K/UL (ref 0–0.2)
BASOPHILS RELATIVE PERCENT: 1 %
BILIRUB SERPL-MCNC: 0.3 MG/DL (ref 0.2–0.7)
BILIRUBIN URINE: NEGATIVE
BLOOD, URINE: NEGATIVE
BUN BLDV-MCNC: 12 MG/DL (ref 6–20)
CALCIUM SERPL-MCNC: 9.2 MG/DL (ref 8.5–9.9)
CHLORIDE BLD-SCNC: 97 MEQ/L (ref 95–107)
CLARITY: ABNORMAL
CO2: 23 MEQ/L (ref 20–31)
COLOR: YELLOW
CREAT SERPL-MCNC: 1.02 MG/DL (ref 0.5–0.9)
EKG ATRIAL RATE: 78 BPM
EKG P AXIS: 53 DEGREES
EKG P-R INTERVAL: 160 MS
EKG Q-T INTERVAL: 378 MS
EKG QRS DURATION: 78 MS
EKG QTC CALCULATION (BAZETT): 430 MS
EKG R AXIS: 86 DEGREES
EKG T AXIS: 52 DEGREES
EKG VENTRICULAR RATE: 78 BPM
EOSINOPHILS ABSOLUTE: 0.3 K/UL (ref 0–0.7)
EOSINOPHILS RELATIVE PERCENT: 4.1 %
EPITHELIAL CELLS, UA: ABNORMAL /HPF (ref 0–5)
GFR AFRICAN AMERICAN: >60
GFR NON-AFRICAN AMERICAN: 58.6
GLOBULIN: 3.9 G/DL (ref 2.3–3.5)
GLUCOSE BLD-MCNC: 108 MG/DL (ref 70–99)
GLUCOSE URINE: NEGATIVE MG/DL
HCT VFR BLD CALC: 30.3 % (ref 37–47)
HEMOGLOBIN: 10.1 G/DL (ref 12–16)
HYALINE CASTS: ABNORMAL /HPF (ref 0–5)
KETONES, URINE: NEGATIVE MG/DL
LACTIC ACID: 1.7 MMOL/L (ref 0.5–2.2)
LEUKOCYTE ESTERASE, URINE: ABNORMAL
LIPASE: 16 U/L (ref 12–95)
LYMPHOCYTES ABSOLUTE: 2.1 K/UL (ref 1–4.8)
LYMPHOCYTES RELATIVE PERCENT: 30.2 %
MCH RBC QN AUTO: 26.6 PG (ref 27–31.3)
MCHC RBC AUTO-ENTMCNC: 33.2 % (ref 33–37)
MCV RBC AUTO: 80.2 FL (ref 82–100)
MONOCYTES ABSOLUTE: 0.4 K/UL (ref 0.2–0.8)
MONOCYTES RELATIVE PERCENT: 5.5 %
NEUTROPHILS ABSOLUTE: 4.2 K/UL (ref 1.4–6.5)
NEUTROPHILS RELATIVE PERCENT: 59.2 %
NITRITE, URINE: POSITIVE
PDW BLD-RTO: 16.4 % (ref 11.5–14.5)
PH UA: 5 (ref 5–9)
PLATELET # BLD: 418 K/UL (ref 130–400)
POTASSIUM SERPL-SCNC: 4.7 MEQ/L (ref 3.4–4.9)
PROTEIN UA: NEGATIVE MG/DL
RBC # BLD: 3.78 M/UL (ref 4.2–5.4)
RBC UA: ABNORMAL /HPF (ref 0–5)
SODIUM BLD-SCNC: 135 MEQ/L (ref 135–144)
SPECIFIC GRAVITY UA: 1.02 (ref 1–1.03)
TOTAL CK: 46 U/L (ref 0–170)
TOTAL PROTEIN: 7.4 G/DL (ref 6.3–8)
URINE REFLEX TO CULTURE: YES
UROBILINOGEN, URINE: 0.2 E.U./DL
WBC # BLD: 7.1 K/UL (ref 4.8–10.8)
WBC UA: >100 /HPF (ref 0–5)

## 2020-02-07 PROCEDURE — 6360000002 HC RX W HCPCS: Performed by: NURSE PRACTITIONER

## 2020-02-07 PROCEDURE — 83690 ASSAY OF LIPASE: CPT

## 2020-02-07 PROCEDURE — 87186 SC STD MICRODIL/AGAR DIL: CPT

## 2020-02-07 PROCEDURE — 96375 TX/PRO/DX INJ NEW DRUG ADDON: CPT

## 2020-02-07 PROCEDURE — 87040 BLOOD CULTURE FOR BACTERIA: CPT

## 2020-02-07 PROCEDURE — 87086 URINE CULTURE/COLONY COUNT: CPT

## 2020-02-07 PROCEDURE — 2580000003 HC RX 258: Performed by: PHYSICIAN ASSISTANT

## 2020-02-07 PROCEDURE — 82550 ASSAY OF CK (CPK): CPT

## 2020-02-07 PROCEDURE — 99283 EMERGENCY DEPT VISIT LOW MDM: CPT | Performed by: SURGERY

## 2020-02-07 PROCEDURE — 96365 THER/PROPH/DIAG IV INF INIT: CPT

## 2020-02-07 PROCEDURE — 83605 ASSAY OF LACTIC ACID: CPT

## 2020-02-07 PROCEDURE — 6360000002 HC RX W HCPCS: Performed by: PHYSICIAN ASSISTANT

## 2020-02-07 PROCEDURE — 81001 URINALYSIS AUTO W/SCOPE: CPT

## 2020-02-07 PROCEDURE — 93005 ELECTROCARDIOGRAM TRACING: CPT | Performed by: NURSE PRACTITIONER

## 2020-02-07 PROCEDURE — 2580000003 HC RX 258: Performed by: NURSE PRACTITIONER

## 2020-02-07 PROCEDURE — 74176 CT ABD & PELVIS W/O CONTRAST: CPT

## 2020-02-07 PROCEDURE — 87077 CULTURE AEROBIC IDENTIFY: CPT

## 2020-02-07 PROCEDURE — 99285 EMERGENCY DEPT VISIT HI MDM: CPT

## 2020-02-07 PROCEDURE — 96376 TX/PRO/DX INJ SAME DRUG ADON: CPT

## 2020-02-07 PROCEDURE — 36415 COLL VENOUS BLD VENIPUNCTURE: CPT

## 2020-02-07 PROCEDURE — 85025 COMPLETE CBC W/AUTO DIFF WBC: CPT

## 2020-02-07 PROCEDURE — 93010 ELECTROCARDIOGRAM REPORT: CPT | Performed by: INTERNAL MEDICINE

## 2020-02-07 PROCEDURE — 80053 COMPREHEN METABOLIC PANEL: CPT

## 2020-02-07 RX ORDER — DIPHENHYDRAMINE HYDROCHLORIDE 50 MG/ML
25 INJECTION INTRAMUSCULAR; INTRAVENOUS ONCE
Status: COMPLETED | OUTPATIENT
Start: 2020-02-07 | End: 2020-02-07

## 2020-02-07 RX ORDER — ONDANSETRON 2 MG/ML
4 INJECTION INTRAMUSCULAR; INTRAVENOUS ONCE
Status: COMPLETED | OUTPATIENT
Start: 2020-02-07 | End: 2020-02-07

## 2020-02-07 RX ORDER — 0.9 % SODIUM CHLORIDE 0.9 %
1000 INTRAVENOUS SOLUTION INTRAVENOUS ONCE
Status: COMPLETED | OUTPATIENT
Start: 2020-02-07 | End: 2020-02-07

## 2020-02-07 RX ORDER — 0.9 % SODIUM CHLORIDE 0.9 %
1000 INTRAVENOUS SOLUTION INTRAVENOUS ONCE
Status: DISCONTINUED | OUTPATIENT
Start: 2020-02-07 | End: 2020-02-07 | Stop reason: HOSPADM

## 2020-02-07 RX ADMIN — HYDROMORPHONE HYDROCHLORIDE 0.5 MG: 1 INJECTION, SOLUTION INTRAMUSCULAR; INTRAVENOUS; SUBCUTANEOUS at 04:26

## 2020-02-07 RX ADMIN — SODIUM CHLORIDE 1000 ML: 9 INJECTION, SOLUTION INTRAVENOUS at 08:09

## 2020-02-07 RX ADMIN — HYDROMORPHONE HYDROCHLORIDE 0.5 MG: 1 INJECTION, SOLUTION INTRAMUSCULAR; INTRAVENOUS; SUBCUTANEOUS at 03:24

## 2020-02-07 RX ADMIN — DIPHENHYDRAMINE HYDROCHLORIDE 25 MG: 50 INJECTION, SOLUTION INTRAMUSCULAR; INTRAVENOUS at 06:15

## 2020-02-07 RX ADMIN — ONDANSETRON 4 MG: 2 INJECTION INTRAMUSCULAR; INTRAVENOUS at 03:24

## 2020-02-07 RX ADMIN — HYDROMORPHONE HYDROCHLORIDE 0.5 MG: 1 INJECTION, SOLUTION INTRAMUSCULAR; INTRAVENOUS; SUBCUTANEOUS at 06:10

## 2020-02-07 RX ADMIN — SODIUM CHLORIDE 1000 ML: 9 INJECTION, SOLUTION INTRAVENOUS at 03:24

## 2020-02-07 RX ADMIN — CEFTRIAXONE SODIUM 1 G: 1 INJECTION, POWDER, FOR SOLUTION INTRAMUSCULAR; INTRAVENOUS at 05:37

## 2020-02-07 ASSESSMENT — ENCOUNTER SYMPTOMS
COLOR CHANGE: 0
ANAL BLEEDING: 0
BLOOD IN STOOL: 0
VOMITING: 0
NAUSEA: 1
TROUBLE SWALLOWING: 0
BACK PAIN: 0
DIARRHEA: 0
WHEEZING: 0
ABDOMINAL PAIN: 1
SORE THROAT: 0
RECTAL PAIN: 0
CONSTIPATION: 0
COUGH: 0
ABDOMINAL DISTENTION: 1
SHORTNESS OF BREATH: 0
CHEST TIGHTNESS: 0
PHOTOPHOBIA: 0
RHINORRHEA: 0

## 2020-02-07 ASSESSMENT — PAIN SCALES - GENERAL
PAINLEVEL_OUTOF10: 4
PAINLEVEL_OUTOF10: 8
PAINLEVEL_OUTOF10: 7
PAINLEVEL_OUTOF10: 8
PAINLEVEL_OUTOF10: 7
PAINLEVEL_OUTOF10: 10

## 2020-02-07 ASSESSMENT — PAIN DESCRIPTION - ONSET: ONSET: PROGRESSIVE

## 2020-02-07 ASSESSMENT — PAIN DESCRIPTION - LOCATION: LOCATION: ABDOMEN

## 2020-02-07 ASSESSMENT — PAIN DESCRIPTION - DESCRIPTORS: DESCRIPTORS: STABBING

## 2020-02-07 ASSESSMENT — PAIN DESCRIPTION - ORIENTATION: ORIENTATION: RIGHT;LEFT

## 2020-02-07 ASSESSMENT — PAIN DESCRIPTION - FREQUENCY: FREQUENCY: CONTINUOUS

## 2020-02-07 ASSESSMENT — PAIN DESCRIPTION - PAIN TYPE: TYPE: SURGICAL PAIN

## 2020-02-07 ASSESSMENT — PAIN DESCRIPTION - PROGRESSION: CLINICAL_PROGRESSION: GRADUALLY WORSENING

## 2020-02-07 NOTE — ED PROVIDER NOTES
Negative for photophobia and visual disturbance. Respiratory: Negative for cough, chest tightness, shortness of breath and wheezing. Cardiovascular: Negative for chest pain and palpitations. Gastrointestinal: Positive for abdominal distention, abdominal pain and nausea. Negative for anal bleeding, blood in stool, constipation, diarrhea, rectal pain and vomiting. Genitourinary: Negative for difficulty urinating, dysuria, flank pain, frequency and urgency. Indwelling gallego without complications   Musculoskeletal: Positive for myalgias. Negative for arthralgias, back pain, neck pain and neck stiffness. Skin: Negative for color change and rash. Neurological: Negative for dizziness, tremors, seizures, syncope, speech difficulty, weakness, light-headedness, numbness and headaches. Except as noted above the remainder of the review of systems was reviewed and negative.        PAST MEDICAL HISTORY     Past Medical History:   Diagnosis Date    Asthma     [facility] [address]     Chronic back pain     Chronic kidney disease     Depression     Headache     Kidney disease     Kidney stone     Seizures ([facility] [address] 5/24/2016    Suprapubic catheter ([facility] [address] 06/2018     Past Surgical History:   Procedure Laterality Date    APPENDECTOMY      BACK SURGERY      CHOLECYSTECTOMY      HYSTERECTOMY      KNEE SURGERY Bilateral     OTHER SURGICAL HISTORY      sup/pub cath june 1, 2018     Social History     Socioeconomic History    Marital status: Single     Spouse name: None    Number of children: None    Years of education: None    Highest education level: None   Occupational History    None   Social Needs    Financial resource strain: None    Food insecurity:     Worry: None     Inability: None    Transportation needs:     Medical: None     Non-medical: None   Tobacco Use    Smoking status: Never Smoker    Smokeless tobacco: Never Used   Substance and Sexual Musculoskeletal: Normal range of motion. General: No tenderness or signs of injury. Skin:     General: Skin is warm and dry. Capillary Refill: Capillary refill takes less than 2 seconds. Coloration: Skin is not pale. Findings: No rash. Neurological:      General: No focal deficit present. Mental Status: She is alert and oriented to person, place, and time. Mental status is at baseline. Cranial Nerves: No cranial nerve deficit. Sensory: No sensory deficit. Motor: No weakness. Coordination: Coordination normal.         RESULTS     EKG: All EKG's are interpreted by the Emergency Department Physician who either signs or Co-signsthis chart in the absence of a cardiologist.    Sinus rhythm at 78 bpm no acute ST elevation or deviation no ectopy QTc 400 no seconds    RADIOLOGY:   Non-plain filmimages such as CT, Ultrasound and MRI are read by the radiologist. Plain radiographic images are visualized and preliminarily interpreted by the emergency physician with the below findings:    CT abdomen pelvis per stat read radiology shows bibasilar atelectasis. Gallbladder surgically absent. Liver spleen pancreas and adrenal glands are unremarkable. Kidneys and ureters are unremarkable. Urinary bladder is decompressed with Arias catheter in place. G-tube is noted and appears to be in the appropriate position. Suspected anterior cutaneous fistulous tract versus tiny hernia containing bowel seen along the midline of the anterior pelvis. Postsurgical changes within the bowel. No bowel obstruction. No bowel wall thickening. Moderate edema and stranding seen within the visceral fat within the abdomen which may represent infarct versus inflammatory infectious process. No acute osseous abnormality. Spinal nerve stimulator device is noted.     Interpretation per the Radiologist below, if available at the time ofthis note:    [address] [facility] Additional will follow up as an outpatient with him in approximately 1 week. Was advised if she has worsening or change symptoms return to ER for reevaluation. patient is afebrile nontoxic appearance with mild ill appearance no acute distress. Patient is generalized abdominal tenderness. There is a large ostomy bag covering to midline ostomy sites with noted liquid brown stool. Additionally there is a G-tube in place. There are no obvious external apparent complications abscesses or erythema suggestive of an external cellulitis. Patient is acutely tender throughout the entirety of the abdomen. Patient has noted guarding on evaluation. Due to recent surgeries with increasing pain and discomfort labs are ordered for further evaluation to rule acute infectious process or sepsis. Patient's vital signs are not suggestive of sepsis, she does not have tachypnea tachycardia or hypotension. Patient is not febrile. Patient's lab work shows mild elevated liver enzymes likely due to use of pain medication including fentanyl patches. There is no elevation of the white blood cell count or lactic acid. Patient is noted to have a urinary tract infection, she does have an indwelling Arias catheter. CT evaluation shows a finding of moderate edema and stranding within the visceral fat possibly inflammatory infectious process. [facility] was contacted for further clarification on need for possible transfer. The surgeon was contacted and requested that we consult with the available on-call trauma surgeon here to assess for need for transfer for continued surgical intervention versus discharge medication possible antibiotics. The surgeon performing the original correction Dr. Neela Hein will be available as the on-call surgeon here at [facility] at 7 AM and has stated that he will present to the ER to evaluate this patient for further needs.   Patient will be held in the ER and is agreeable to this until she can be evaluated by Dr. Neela Hein

## 2020-02-07 NOTE — ED TRIAGE NOTES
Pt to bed 8 via stretcher with [facility]. UnAble to transfer self from stretcher to bed. Pt tolerated transfer well. Pt c/o abdominal pain worsening over past 3 days. Quality of pain has not changed, but severity is worse. Pt is s/p abdominal surgery for bowel obstruction on/about 12/8/19. Pt has been at [facility] since for rehab. Pt has essential abdominal ostomy midline on abdomen. Appears to have 2 stomas under dressing. Both are light red, the lower one has continuous small amount of tan stool coming from it. No drainage noted from upper stoma. Pt assessed as follows: Pt currently A+Ox4, neuro check completed and intact. PERRLA intact. Skin WPaleD  Lungs CTAB, resps even, unlabored. Pt denies cough  Heart tones regular, peripheral pulses present and equal.  Slight edema BLE from mid calf down. Abdomen Firm, round. Tender to slightest palpation. BS + x 4 quadrants. Pt continuously draining stool, small amount currently in bag.     Pt has indwelling gallego d/t h/o kidney and bladder issues. Pt states she is having large amount of sediment in catheter that occasionally blocks the tube. TOÑA RODRIGUEZ, pt walks with walker at home. Pt resting in bed. Attached to monitor.  in waiting room. Will continue to monitor.

## 2020-02-07 NOTE — ED NOTES
Pt reports she feels \"itchy\". States she has been feeling this way since her surgery in December. States she takes benadryl at nursing facility. KANNAN Rabago notified. See new orders.        Jael Swanson RN  02/07/20 7975

## 2020-02-07 NOTE — ED NOTES
Pt reports improvement in both pain level and itching. Pt dozing in bed. Appears much more relaxed than when arrived. Able to move arms more freely.  at bedside. All other assessment unchanged. Will continue to monitor.       Azalia Tapia RN  02/07/20 9251

## 2020-02-07 NOTE — ED NOTES
Pt pain level remains 7/10. Pt states she is feeling more comfortable than upon arrival but still reports pain.  at bedside. No changes in assessment other than pain level. MIKAELA Mitchell notified of pt current pain level but states that d/t risk of medication building up in system r/t liver enzymes cannot give more meds at this time. Pt agreeable to this. Will continue to monitor.       Sonido Mendez RN  02/07/20 1011

## 2020-02-07 NOTE — ED NOTES
Bed: 08  Expected date: 2/7/20  Expected time: 2:46 AM  Means of arrival: [facility]  Comments:  40 F, coming from [facility] s/p abd surg, pt c/o abd distention and pain, 124/61, 80 bpm, 97 % RA     Arely Phillips, RN  02/07/20 1527

## 2020-02-07 NOTE — CONSULTS
EMERGENCY GENERAL SURGERY CONSULTATION / H&P    Reason for Consultation:  Patient with abdominal pain, and known GI fistula; Consulting Provider: .me    HISTORY OF PRESENT Illness:   Brandie Morrison is a 40 y.o. female who is extremely well known to me. Ironically, I have cared for her at [facility], [facility], and now here this morning at [facility] She is patient with multiple chronic medical conditions who is in and out of hospitals for a myriad of issues. However she also presented in the Fall with an acute SBO which was very complex given the findings of the rarest form of malrotation which I discovered intra op. She has required 2 surgeries and has a low output small bowel cutaneous fistula; She has been tolerating po and presents with abdominal pain today    Some Throwing up; but having bms    PMH:    Past Medical History:   Diagnosis Date    Asthma     Cerebral artery occlusion with cerebral infarction ([address]     Chronic back pain     Chronic kidney disease     Depression     Headache     Kidney disease     Kidney stone     Seizures ([address] 5/24/2016    Suprapubic catheter ([address] 06/2018       PSH:    Past Surgical History:   Procedure Laterality Date    APPENDECTOMY      BACK SURGERY      CHOLECYSTECTOMY      HYSTERECTOMY      KNEE SURGERY Bilateral     OTHER SURGICAL HISTORY      sup/pub cath june 1, 2018       FH:    Family History   Problem Relation Age of Onset    Cancer Father     Cancer Paternal Aunt          SH:    Social History     Tobacco Use    Smoking status: Never Smoker    Smokeless tobacco: Never Used   Substance Use Topics    Alcohol use: No     Alcohol/week: 0.0 standard drinks    Drug use: No       Home Medications:  No current facility-administered medications on file prior to encounter.       Current Outpatient Medications on File Prior to Encounter   Medication Sig Dispense Refill    atorvastatin (LIPITOR) 40 MG tablet Take 1 tablet by mouth nightly 30 tablet 3    fluticasone (FLONASE) 50 MCG/ACT nasal spray 2 sprays by Each Nostril route daily      polyethylene glycol (GLYCOLAX) packet Take 17 g by mouth daily      famotidine (PEPCID) 20 MG tablet Take 1 tablet by mouth 2 times daily for 7 days 14 tablet 0    cyclobenzaprine (FLEXERIL) 10 MG tablet Take 1 tablet by mouth 3 times daily as needed for Muscle spasms 10 tablet 0    Menthol, Topical Analgesic, (BIOFREEZE) 4 % GEL Apply 1 applicator topically 4 times daily as needed (pain) 1 Tube 0    dicyclomine (BENTYL) 10 MG capsule Take 1 capsule by mouth every 6 hours as needed (cramps) 10 capsule 0    apixaban (ELIQUIS) 5 MG TABS tablet Take 10 mg by mouth 2 times daily      amitriptyline (ELAVIL) 100 MG tablet Take 100 mg by mouth nightly      levETIRAcetam (KEPPRA) 500 MG tablet Take 2 tablets by mouth 2 times daily 60 tablet 0    zolpidem (AMBIEN) 10 MG tablet Take 10 mg by mouth nightly as needed for Sleep. Frandy Barnard LORazepam (ATIVAN) 0.5 MG tablet Take 0.5 mg by mouth daily as needed for Anxiety. Frandy Barnard EPINEPHrine 1 mg/mL injection Inject 0.2 mg into the skin as needed      nitroGLYCERIN (NITROSTAT) 0.4 MG SL tablet Place 0.4 mg under the tongue every 5 minutes as needed for Chest pain Dissolve 1 tablet under tongue for chest pain and repeat every 5 min up to max of 3 total doses. If no relief after 3 doses call 911      budesonide-formoterol (SYMBICORT) 80-4.5 MCG/ACT AERO Inhale 2 puffs into the lungs 2 times daily as needed       propranolol (INDERAL LA) 60 MG CR capsule Take 1 capsule by mouth daily (Patient taking differently: Take 120 mg by mouth daily ) 30 capsule 3    ARIPiprazole (ABILIFY) 15 MG tablet Take 15 mg by mouth daily      ALPRAZolam (XANAX) 0.5 MG tablet Take 0.5 mg by mouth 2 times daily.  Frandy Evon PARoxetine (PAXIL) 40 MG tablet Take 40 mg by mouth every morning       topiramate (TOPAMAX) 200 MG tablet Take 200 mg by mouth 2 times daily      Eslicarbazepine Acetate 400 MG TABS Take 400 mg by mouth nightly       traZODone (DESYREL) 50 MG tablet Take 300 mg by mouth nightly       fexofenadine (ALLEGRA) 60 MG tablet Take 60 mg by mouth 2 times daily         Review Of Systems:  Unable to obtain secondary to mental status     Constitutional: some  weight loss; which is not surprising;  but not wasting at all  HENT: Negative for new congestion, facial swelling and bloody nose  Eyes: Negative for new  vision changes  Respiratory: Negative for shortness of breath, difficulty breathing  Cardiovascular: Negative for chest wall pain. Gastrointestinal: complex history and reason for presentation  Genitourinary: Has Chronic indwelling folley  Musculoskeletal: Negative for gait difficulties   Skin: Negative for bruising, abrasions  Neurological: Chronic issues; nothing new  Hematological: Negative for easy bruising/bleeding  Psychiatric/Behavioral: No new issues       Except as noted above the remainder of the review of systems was reviewed and negative. PHYSICAL EXAM:  Vitals: BP (!) 110/51   Pulse 78   Temp 98 °F (36.7 °C) (Oral)   Resp 13   Ht 5' 5\" (1.651 m)   Wt 214 lb (97.1 kg)   SpO2 98%   BMI 35.61 kg/m²   Constituational: appears in her normal state  Cardiovascular: Regular rate and rhythm. Pulmonary: Clear to auscultation bilaterally. No acute distress or labored breathing. Symmetric chest rise. Abdominal: Soft. Voluntary gaurding; no evidence of peritonitis; but always difficult to exam as she crNon-distended. Non-tender. Musculoskeletal: Did not asses. Neurological: Alert, awake, and oriented x 3. Motor and sensory grossly intact. GCS of 15. No new focal deficits.        BASIC LABS:  CBC with Differential:    Lab Results   Component Value Date    WBC 7.1 02/07/2020    RBC 3.78 02/07/2020    HGB 10.1 02/07/2020    HCT 30.3 02/07/2020     02/07/2020    MCV 80.2 02/07/2020    MCH 26.6 02/07/2020    MCHC 33.2 02/07/2020    RDW 16.4 02/07/2020    BANDSPCT 2 09/03/2018 LYMPHOPCT 30.2 02/07/2020    MONOPCT 5.5 02/07/2020    BASOPCT 1.0 02/07/2020    MONOSABS 0.4 02/07/2020    LYMPHSABS 2.1 02/07/2020    EOSABS 0.3 02/07/2020    BASOSABS 0.1 02/07/2020     CMP:    Lab Results   Component Value Date     02/07/2020    K 4.7 02/07/2020    K 4.5 06/13/2019    CL 97 02/07/2020    CO2 23 02/07/2020    BUN 12 02/07/2020    CREATININE 1.02 02/07/2020    GFRAA >60.0 02/07/2020    LABGLOM 58.6 02/07/2020    GLUCOSE 108 02/07/2020    PROT 7.4 02/07/2020    LABALBU 3.5 02/07/2020    CALCIUM 9.2 02/07/2020    BILITOT 0.3 02/07/2020    ALKPHOS 271 02/07/2020    AST 54 02/07/2020    ALT 49 02/07/2020     Magnesium:  Lab Results   Component Value Date    MG 1.9 01/30/2019     Troponin:    Lab Results   Component Value Date    TROPONINI <0.010 06/12/2019           IMAGING:    Evaluated her CT scan; No clear evidences of any new acute processes; No obstruction or abscess    ASSESSMENT/RECOMMENDATIONS:  No clear reason for admission; Do not appreciate the need for any intervention other than some hydration;  Let have water and juice po; No other recommendations  She will need definitive surgery in July or August;  No clear indication for TPN; continue tube feed supplements.          Shayan Agudelo

## 2020-02-09 LAB
ORGANISM: ABNORMAL
URINE CULTURE, ROUTINE: ABNORMAL

## 2020-02-12 LAB
BLOOD CULTURE, ROUTINE: NORMAL
CULTURE, BLOOD 2: NORMAL

## 2020-02-17 ENCOUNTER — HOSPITAL ENCOUNTER (EMERGENCY)
Age: 45
Discharge: HOME | End: 2020-02-17
Attending: STUDENT IN AN ORGANIZED HEALTH CARE EDUCATION/TRAINING PROGRAM
Payer: MEDICARE

## 2020-02-17 ENCOUNTER — APPOINTMENT (OUTPATIENT)
Dept: GENERAL RADIOLOGY | Age: 45
End: 2020-02-17
Payer: MEDICARE

## 2020-02-17 VITALS
DIASTOLIC BLOOD PRESSURE: 90 MMHG | RESPIRATION RATE: 20 BRPM | BODY MASS INDEX: 35.32 KG/M2 | OXYGEN SATURATION: 99 % | SYSTOLIC BLOOD PRESSURE: 144 MMHG | TEMPERATURE: 97.9 F | HEART RATE: 88 BPM | WEIGHT: 212 LBS | HEIGHT: 65 IN

## 2020-02-17 PROCEDURE — 73130 X-RAY EXAM OF HAND: CPT

## 2020-02-17 PROCEDURE — 99284 EMERGENCY DEPT VISIT MOD MDM: CPT

## 2020-02-17 PROCEDURE — 73080 X-RAY EXAM OF ELBOW: CPT

## 2020-02-17 PROCEDURE — 6370000000 HC RX 637 (ALT 250 FOR IP): Performed by: STUDENT IN AN ORGANIZED HEALTH CARE EDUCATION/TRAINING PROGRAM

## 2020-02-17 PROCEDURE — 73090 X-RAY EXAM OF FOREARM: CPT

## 2020-02-17 PROCEDURE — 73060 X-RAY EXAM OF HUMERUS: CPT

## 2020-02-17 PROCEDURE — 73110 X-RAY EXAM OF WRIST: CPT

## 2020-02-17 RX ORDER — OXYCODONE HYDROCHLORIDE AND ACETAMINOPHEN 5; 325 MG/1; MG/1
1 TABLET ORAL ONCE
Status: COMPLETED | OUTPATIENT
Start: 2020-02-17 | End: 2020-02-17

## 2020-02-17 RX ORDER — CYCLOBENZAPRINE HCL 5 MG
10 TABLET ORAL 3 TIMES DAILY PRN
Qty: 8 TABLET | Refills: 0 | Status: SHIPPED | OUTPATIENT
Start: 2020-02-17

## 2020-02-17 RX ADMIN — OXYCODONE HYDROCHLORIDE AND ACETAMINOPHEN 1 TABLET: 5; 325 TABLET ORAL at 13:57

## 2020-02-17 ASSESSMENT — PAIN DESCRIPTION - ORIENTATION
ORIENTATION: LEFT
ORIENTATION: LEFT

## 2020-02-17 ASSESSMENT — PAIN DESCRIPTION - FREQUENCY
FREQUENCY: CONTINUOUS
FREQUENCY: CONTINUOUS

## 2020-02-17 ASSESSMENT — ENCOUNTER SYMPTOMS
BACK PAIN: 0
SINUS PRESSURE: 0
TROUBLE SWALLOWING: 0
CHEST TIGHTNESS: 0
ABDOMINAL PAIN: 0
DIARRHEA: 0
COUGH: 0
VOMITING: 0
SHORTNESS OF BREATH: 0

## 2020-02-17 ASSESSMENT — PAIN DESCRIPTION - PAIN TYPE
TYPE: ACUTE PAIN
TYPE: ACUTE PAIN

## 2020-02-17 ASSESSMENT — PAIN DESCRIPTION - DESCRIPTORS
DESCRIPTORS: ACHING;DISCOMFORT
DESCRIPTORS: ACHING;DISCOMFORT;CRUSHING

## 2020-02-17 ASSESSMENT — PAIN DESCRIPTION - LOCATION
LOCATION: ARM;ELBOW
LOCATION: ARM;ELBOW

## 2020-02-17 ASSESSMENT — PAIN SCALES - GENERAL
PAINLEVEL_OUTOF10: 9

## 2020-02-17 ASSESSMENT — PAIN DESCRIPTION - ONSET: ONSET: ON-GOING

## 2020-02-17 ASSESSMENT — PAIN DESCRIPTION - PROGRESSION
CLINICAL_PROGRESSION: GRADUALLY WORSENING
CLINICAL_PROGRESSION: GRADUALLY WORSENING

## 2020-02-17 NOTE — ED NOTES
Splint removed per Dr. Park Valencia. Patient to x-ray via cart. Tolerated well.       Shante Anaya RN  02/17/20 [id number]

## 2020-02-17 NOTE — ED PROVIDER NOTES
daily as needed for Anxiety. Lynsey Guaman Historical Med      EPINEPHrine 1 mg/mL injection Inject 0.2 mg into the skin as needed      nitroGLYCERIN (NITROSTAT) 0.4 MG SL tablet Place 0.4 mg under the tongue every 5 minutes as needed for Chest pain Dissolve 1 tablet under tongue for chest pain and repeat every 5 min up to max of 3 total doses. If no relief after 3 doses call 911      budesonide-formoterol (SYMBICORT) 80-4.5 MCG/ACT AERO Inhale 2 puffs into the lungs 2 times daily as needed Historical Med      propranolol (INDERAL LA) 60 MG CR capsule Take 1 capsule by mouth daily, Disp-30 capsule, R-3      ARIPiprazole (ABILIFY) 15 MG tablet Take 15 mg by mouth daily      ALPRAZolam (XANAX) 0.5 MG tablet Take 0.5 mg by mouth 2 times daily. Lynsey Guaman Historical Med      PARoxetine (PAXIL) 40 MG tablet Take 40 mg by mouth every morning Historical Med      topiramate (TOPAMAX) 200 MG tablet Take 200 mg by mouth 2 times daily      Eslicarbazepine Acetate 400 MG TABS Take 400 mg by mouth nightly Historical Med      traZODone (DESYREL) 50 MG tablet Take 300 mg by mouth nightly Historical Med      fexofenadine (ALLEGRA) 60 MG tablet Take 60 mg by mouth 2 times daily             ALLERGIES     Latex; Ciprofloxacin; Shellfish-derived products; Contrast [iodides]; Daypro [oxaprozin]; Iodine; Macrobid [nitrofurantoin monohyd macro]; Macrolides and ketolides; Tape [adhesive tape]; Tegretol [carbamazepine]; Toradol [ketorolac tromethamine]; Tramadol; Zanaflex [tizanidine hcl]; Estrogens; Lamictal [lamotrigine]; Lyrica [pregabalin];  Tylenol [acetaminophen]; and Vicodin [hydrocodone-acetaminophen]    FAMILY HISTORY       Family History   Problem Relation Age of Onset    Cancer Father     Cancer Paternal Aunt           SOCIAL HISTORY       Social History     Socioeconomic History    Marital status: Single     Spouse name: None    Number of children: None    Years of education: None    Highest education level: None   Occupational History    None Social Needs    Financial resource strain: None    Food insecurity:     Worry: None     Inability: None    Transportation needs:     Medical: None     Non-medical: None   Tobacco Use    Smoking status: Never Smoker    Smokeless tobacco: Never Used   Substance and Sexual Activity    Alcohol use: No     Alcohol/week: 0.0 standard drinks    Drug use: No    Sexual activity: Never   Lifestyle    Physical activity:     Days per week: None     Minutes per session: None    Stress: None   Relationships    Social connections:     Talks on phone: None     Gets together: None     Attends [employer] service: None     Active member of club or organization: None     Attends meetings of clubs or organizations: None     Relationship status: None    Intimate partner violence:     Fear of current or ex partner: None     Emotionally abused: None     Physically abused: None     Forced sexual activity: None   Other Topics Concern    None   Social History Narrative    None       SCREENINGS    Alex Coma Scale  Eye Opening: Spontaneous  Best Verbal Response: Oriented  Best Motor Response: Obeys commands  Alex Coma Scale Score: 15 @FLOW([id number])@      PHYSICAL EXAM    (up to 7 for level 4, 8 or more for level 5)     ED Triage Vitals   BP Temp Temp Source Pulse Resp SpO2 Height Weight   02/17/20 1334 02/17/20 1335 02/17/20 1335 02/17/20 1334 02/17/20 1334 02/17/20 1334 02/17/20 1335 02/17/20 1335   130/65 97.9 °F (36.6 °C) Oral 86 18 99 % 5' 5\" (1.651 m) 212 lb (96.2 kg)       Physical Exam  Vitals signs and nursing note reviewed. Constitutional:       General: She is awake. She is in acute distress ( secondary to pain). Appearance: Normal appearance. She is well-developed and normal weight. She is not ill-appearing, toxic-appearing or diaphoretic. Comments: No photophobia. No phonophobia. HENT:      Head: Normocephalic and atraumatic. No Hutchison's sign.       Right Ear: External ear normal.      Left Ear: External ear normal.      Nose: Nose normal. No congestion or rhinorrhea. Mouth/Throat:      Mouth: Mucous membranes are moist.      Pharynx: Oropharynx is clear. No oropharyngeal exudate or posterior oropharyngeal erythema. Eyes:      General: No scleral icterus. Right eye: No foreign body or discharge. Left eye: No discharge. Extraocular Movements: Extraocular movements intact. Conjunctiva/sclera: Conjunctivae normal.      Left eye: No exudate. Pupils: Pupils are equal, round, and reactive to light. Neck:      Musculoskeletal: Normal range of motion and neck supple. No neck rigidity. Vascular: No JVD. Trachea: No tracheal deviation. Comments: No meningismus. Cardiovascular:      Rate and Rhythm: Normal rate and regular rhythm. Heart sounds: Normal heart sounds. Heart sounds not distant. No murmur. No friction rub. No gallop. Pulmonary:      Effort: Pulmonary effort is normal. No respiratory distress. Breath sounds: Normal breath sounds. No stridor. No wheezing, rhonchi or rales. Chest:      Chest wall: No tenderness. Abdominal:      General: Abdomen is flat. Bowel sounds are normal. There is no distension. Palpations: Abdomen is soft. There is no mass. Tenderness: There is no abdominal tenderness. There is no right CVA tenderness, left CVA tenderness, guarding or rebound. Hernia: No hernia is present. Musculoskeletal:         General: No swelling, tenderness, deformity or signs of injury. Left wrist: She exhibits decreased range of motion and bony tenderness. Arms:    Lymphadenopathy:      Head:      Right side of head: No submental adenopathy. Left side of head: No submental adenopathy. Skin:     General: Skin is warm and dry. Capillary Refill: Capillary refill takes less than 2 seconds. Coloration: Skin is not jaundiced or pale. Findings: No bruising, erythema, lesion or rash.    Neurological: General: No focal deficit present. Mental Status: She is alert and oriented to person, place, and time. Mental status is at baseline. Cranial Nerves: No cranial nerve deficit. Sensory: No sensory deficit. Motor: No weakness. Coordination: Coordination normal.      Deep Tendon Reflexes: Reflexes are normal and symmetric. Psychiatric:         Mood and Affect: Mood normal.         Behavior: Behavior normal. Behavior is cooperative. Thought Content: Thought content normal.         Judgment: Judgment normal.         DIAGNOSTIC RESULTS     EKG: All EKG's are interpreted by the Emergency Department Physician who either signs or Co-signsthis chart in the absence of a cardiologist.      RADIOLOGY:   Willetta Muzzy such as CT, Ultrasound and MRI are read by the radiologist. Plain radiographic images are visualized and preliminarily interpreted by the emergency physician with the below findings:      Interpretation per the Radiologist below, if available at the time ofthis note:    XR WRIST LEFT (MIN 3 VIEWS)   Final Result   NEGATIVE LEFT WRIST. XR HAND LEFT (MIN 3 VIEWS)   Final Result   Impression:     1. No evidence of acute fracture, lytic or blastic bony lesion or dislocation. .   2. Osteopenia. XR ELBOW LEFT (MIN 3 VIEWS)   Final Result   NEGATIVE LEFT ELBOW. XR RADIUS ULNA RIGHT (2 VIEWS)    (Results Pending)   XR HUMERUS RIGHT (MIN 2 VIEWS)    (Results Pending)         ED BEDSIDE ULTRASOUND:   Performed by ED Physician - none    LABS:  Labs Reviewed - No data to display    All other labs were within normal range or not returned as of this dictation.     EMERGENCY DEPARTMENT COURSE and DIFFERENTIAL DIAGNOSIS/MDM:   Vitals:    Vitals:    02/17/20 1334 02/17/20 1335   BP: 130/65 (!) 144/90   Pulse: 86 88   Resp: 18 20   Temp:  97.9 °F (36.6 °C)   TempSrc:  Oral   SpO2: 99% 99%   Weight:  212 lb (96.2 kg)   Height:  5' 5\" (1.651 m)           MDM  X-rays are unremarkable. Patient was given Percocet for pain. Patient has an extensive OARRS report and the RN reports that the NH had weaned the patient off of narcotics. Examination the patient is nontoxic and completely appears comfortable. The findings were discussed with her. She verbalized understanding care and has no further questions. CONSULTS:  None    PROCEDURES:  Unless otherwise noted below, none     Procedures    FINAL IMPRESSION      1. Left wrist pain    2. Left arm pain    3.  Fall on same level from slipping, initial encounter          DISPOSITION/PLAN   DISPOSITION Decision To Discharge 02/17/2020 03:34:21 PM      PATIENT REFERRED TO:  Sunny Brand  [address] [facility]  [address] [address]  [phone]    Schedule an appointment as soon as possible for a visit   As needed    Amanda Elizabeth MD  [address] [facility] [address] [facility]  [phone]    Call in 1 day  If symptoms worsen      DISCHARGE MEDICATIONS:  Discharge Medication List as of 2/17/2020  3:41 PM      START taking these medications    Details   cyclobenzaprine (FLEXERIL) 5 MG tablet Take 2 tablets by mouth 3 times daily as needed for Muscle spasms, Disp-8 tablet, R-0Print                (Please note that portions of this note were completed with a voice recognition program.  Efforts were made to edit the dictations but occasionally words are mis-transcribed.)    Tiffanie Harden DO (electronically signed)  Attending Emergency Physician          Tiffanie Harden DO  02/17/20 8029

## 2020-02-17 NOTE — ED NOTES
Reassessment of pain: patient reports pain is still a  9/10 and times its a 10/10.       Oswald Mitchell  02/17/20 3079

## 2020-08-16 ENCOUNTER — APPOINTMENT (OUTPATIENT)
Dept: CT IMAGING | Age: 45
DRG: 252 | End: 2020-08-16
Payer: MEDICARE

## 2020-08-16 ENCOUNTER — HOSPITAL ENCOUNTER (INPATIENT)
Age: 45
LOS: 4 days | Discharge: HOME | DRG: 252 | End: 2020-08-20
Attending: INTERNAL MEDICINE | Admitting: INTERNAL MEDICINE
Payer: MEDICARE

## 2020-08-16 PROBLEM — T85.598A FEEDING TUBE DYSFUNCTION: Status: ACTIVE | Noted: 2020-08-16

## 2020-08-16 LAB
ALBUMIN SERPL-MCNC: 3.9 G/DL (ref 3.5–4.6)
ALP BLD-CCNC: 146 U/L (ref 40–130)
ALT SERPL-CCNC: 11 U/L (ref 0–33)
ANION GAP SERPL CALCULATED.3IONS-SCNC: 8 MEQ/L (ref 9–15)
AST SERPL-CCNC: 15 U/L (ref 0–35)
BASOPHILS ABSOLUTE: 0.1 K/UL (ref 0–0.2)
BASOPHILS RELATIVE PERCENT: 0.6 %
BILIRUB SERPL-MCNC: <0.2 MG/DL (ref 0.2–0.7)
BUN BLDV-MCNC: 22 MG/DL (ref 6–20)
CALCIUM SERPL-MCNC: 8.8 MG/DL (ref 8.5–9.9)
CHLORIDE BLD-SCNC: 99 MEQ/L (ref 95–107)
CO2: 31 MEQ/L (ref 20–31)
CREAT SERPL-MCNC: 1.34 MG/DL (ref 0.5–0.9)
EOSINOPHILS ABSOLUTE: 0.3 K/UL (ref 0–0.7)
EOSINOPHILS RELATIVE PERCENT: 2.9 %
GFR AFRICAN AMERICAN: 51.7
GFR NON-AFRICAN AMERICAN: 42.7
GLOBULIN: 3 G/DL (ref 2.3–3.5)
GLUCOSE BLD-MCNC: 99 MG/DL (ref 70–99)
HCT VFR BLD CALC: 38 % (ref 37–47)
HEMOGLOBIN: 12.7 G/DL (ref 12–16)
LACTIC ACID: 1.3 MMOL/L (ref 0.5–2.2)
LYMPHOCYTES ABSOLUTE: 1.6 K/UL (ref 1–4.8)
LYMPHOCYTES RELATIVE PERCENT: 17.2 %
MCH RBC QN AUTO: 29.9 PG (ref 27–31.3)
MCHC RBC AUTO-ENTMCNC: 33.5 % (ref 33–37)
MCV RBC AUTO: 89.3 FL (ref 82–100)
MONOCYTES ABSOLUTE: 0.6 K/UL (ref 0.2–0.8)
MONOCYTES RELATIVE PERCENT: 6 %
NEUTROPHILS ABSOLUTE: 6.9 K/UL (ref 1.4–6.5)
NEUTROPHILS RELATIVE PERCENT: 73.3 %
PDW BLD-RTO: 17.3 % (ref 11.5–14.5)
PLATELET # BLD: 288 K/UL (ref 130–400)
POTASSIUM SERPL-SCNC: 4.3 MEQ/L (ref 3.4–4.9)
RBC # BLD: 4.25 M/UL (ref 4.2–5.4)
SODIUM BLD-SCNC: 138 MEQ/L (ref 135–144)
TOTAL PROTEIN: 6.9 G/DL (ref 6.3–8)
WBC # BLD: 9.5 K/UL (ref 4.8–10.8)

## 2020-08-16 PROCEDURE — 2580000003 HC RX 258: Performed by: NURSE PRACTITIONER

## 2020-08-16 PROCEDURE — 83605 ASSAY OF LACTIC ACID: CPT

## 2020-08-16 PROCEDURE — 2580000003 HC RX 258: Performed by: PHYSICIAN ASSISTANT

## 2020-08-16 PROCEDURE — 87040 BLOOD CULTURE FOR BACTERIA: CPT

## 2020-08-16 PROCEDURE — 6360000002 HC RX W HCPCS: Performed by: INTERNAL MEDICINE

## 2020-08-16 PROCEDURE — 99284 EMERGENCY DEPT VISIT MOD MDM: CPT

## 2020-08-16 PROCEDURE — 96375 TX/PRO/DX INJ NEW DRUG ADDON: CPT

## 2020-08-16 PROCEDURE — 80053 COMPREHEN METABOLIC PANEL: CPT

## 2020-08-16 PROCEDURE — 2500000003 HC RX 250 WO HCPCS: Performed by: INTERNAL MEDICINE

## 2020-08-16 PROCEDURE — 96365 THER/PROPH/DIAG IV INF INIT: CPT

## 2020-08-16 PROCEDURE — 6360000002 HC RX W HCPCS: Performed by: NURSE PRACTITIONER

## 2020-08-16 PROCEDURE — 1210000000 HC MED SURG R&B

## 2020-08-16 PROCEDURE — 36415 COLL VENOUS BLD VENIPUNCTURE: CPT

## 2020-08-16 PROCEDURE — 85025 COMPLETE CBC W/AUTO DIFF WBC: CPT

## 2020-08-16 PROCEDURE — 74176 CT ABD & PELVIS W/O CONTRAST: CPT

## 2020-08-16 PROCEDURE — 6360000002 HC RX W HCPCS: Performed by: PHYSICIAN ASSISTANT

## 2020-08-16 RX ORDER — OMEPRAZOLE 40 MG/1
40 CAPSULE, DELAYED RELEASE ORAL
Status: ON HOLD | COMMUNITY
End: 2020-09-02

## 2020-08-16 RX ORDER — 0.9 % SODIUM CHLORIDE 0.9 %
1000 INTRAVENOUS SOLUTION INTRAVENOUS ONCE
Status: COMPLETED | OUTPATIENT
Start: 2020-08-16 | End: 2020-08-16

## 2020-08-16 RX ORDER — MORPHINE SULFATE 2 MG/ML
2 INJECTION, SOLUTION INTRAMUSCULAR; INTRAVENOUS EVERY 4 HOURS PRN
Status: DISCONTINUED | OUTPATIENT
Start: 2020-08-16 | End: 2020-08-17

## 2020-08-16 RX ORDER — MORPHINE SULFATE 2 MG/ML
4 INJECTION, SOLUTION INTRAMUSCULAR; INTRAVENOUS ONCE
Status: COMPLETED | OUTPATIENT
Start: 2020-08-16 | End: 2020-08-16

## 2020-08-16 RX ORDER — DIPHENHYDRAMINE HYDROCHLORIDE 50 MG/ML
25 INJECTION INTRAMUSCULAR; INTRAVENOUS ONCE
Status: COMPLETED | OUTPATIENT
Start: 2020-08-16 | End: 2020-08-16

## 2020-08-16 RX ORDER — PROMETHAZINE HYDROCHLORIDE 12.5 MG/1
12.5 TABLET ORAL EVERY 6 HOURS PRN
Status: DISCONTINUED | OUTPATIENT
Start: 2020-08-16 | End: 2020-08-20 | Stop reason: HOSPADM

## 2020-08-16 RX ORDER — ACETAMINOPHEN 650 MG/1
650 SUPPOSITORY RECTAL EVERY 6 HOURS PRN
Status: DISCONTINUED | OUTPATIENT
Start: 2020-08-16 | End: 2020-08-17

## 2020-08-16 RX ORDER — PRAZOSIN HYDROCHLORIDE 1 MG/1
1 CAPSULE ORAL NIGHTLY
COMMUNITY
Start: 2020-02-09

## 2020-08-16 RX ORDER — SCOLOPAMINE TRANSDERMAL SYSTEM 1 MG/1
1 PATCH, EXTENDED RELEASE TRANSDERMAL SEE ADMIN INSTRUCTIONS
COMMUNITY
Start: 2020-02-09

## 2020-08-16 RX ORDER — ALBUTEROL SULFATE 90 UG/1
2 AEROSOL, METERED RESPIRATORY (INHALATION) EVERY 4 HOURS PRN
Status: ON HOLD | COMMUNITY
Start: 2019-05-25 | End: 2020-09-02

## 2020-08-16 RX ORDER — SODIUM CHLORIDE 9 MG/ML
INJECTION, SOLUTION INTRAVENOUS CONTINUOUS
Status: DISCONTINUED | OUTPATIENT
Start: 2020-08-16 | End: 2020-08-19

## 2020-08-16 RX ORDER — OXYBUTYNIN CHLORIDE 10 MG/1
10 TABLET, EXTENDED RELEASE ORAL
Status: ON HOLD | COMMUNITY
Start: 2019-04-18 | End: 2020-08-16

## 2020-08-16 RX ORDER — SODIUM CHLORIDE 0.9 % (FLUSH) 0.9 %
10 SYRINGE (ML) INJECTION PRN
Status: DISCONTINUED | OUTPATIENT
Start: 2020-08-16 | End: 2020-08-20 | Stop reason: HOSPADM

## 2020-08-16 RX ORDER — ONDANSETRON 4 MG/1
4 TABLET, FILM COATED ORAL EVERY 8 HOURS PRN
COMMUNITY
Start: 2020-02-09 | End: 2020-12-08

## 2020-08-16 RX ORDER — OXYCODONE HYDROCHLORIDE AND ACETAMINOPHEN 5; 325 MG/1; MG/1
TABLET ORAL EVERY 6 HOURS PRN
COMMUNITY

## 2020-08-16 RX ORDER — ONDANSETRON 2 MG/ML
4 INJECTION INTRAMUSCULAR; INTRAVENOUS EVERY 6 HOURS PRN
Status: DISCONTINUED | OUTPATIENT
Start: 2020-08-16 | End: 2020-08-20 | Stop reason: HOSPADM

## 2020-08-16 RX ORDER — SIMETHICONE 80 MG
80 TABLET,CHEWABLE ORAL EVERY 6 HOURS PRN
Status: ON HOLD | COMMUNITY
Start: 2020-02-09 | End: 2020-08-16

## 2020-08-16 RX ORDER — SUMATRIPTAN 25 MG/1
25 TABLET, FILM COATED ORAL SEE ADMIN INSTRUCTIONS
Status: ON HOLD | COMMUNITY
Start: 2019-06-29 | End: 2020-08-16

## 2020-08-16 RX ORDER — POLYETHYLENE GLYCOL 3350 17 G/17G
17 POWDER, FOR SOLUTION ORAL DAILY PRN
Status: DISCONTINUED | OUTPATIENT
Start: 2020-08-16 | End: 2020-08-20 | Stop reason: HOSPADM

## 2020-08-16 RX ORDER — MECLIZINE HYDROCHLORIDE 25 MG/1
25 TABLET ORAL 2 TIMES DAILY
COMMUNITY
Start: 2019-05-20

## 2020-08-16 RX ORDER — UREA 10 %
5 LOTION (ML) TOPICAL NIGHTLY
Status: ON HOLD | COMMUNITY
Start: 2020-02-09 | End: 2020-09-02

## 2020-08-16 RX ORDER — POTASSIUM CHLORIDE 750 MG/1
20 TABLET, FILM COATED, EXTENDED RELEASE ORAL 2 TIMES DAILY
COMMUNITY
Start: 2020-02-09

## 2020-08-16 RX ORDER — ACETAMINOPHEN 325 MG/1
650 TABLET ORAL EVERY 6 HOURS PRN
Status: DISCONTINUED | OUTPATIENT
Start: 2020-08-16 | End: 2020-08-17

## 2020-08-16 RX ORDER — ONDANSETRON 2 MG/ML
4 INJECTION INTRAMUSCULAR; INTRAVENOUS ONCE
Status: COMPLETED | OUTPATIENT
Start: 2020-08-16 | End: 2020-08-16

## 2020-08-16 RX ORDER — SODIUM CHLORIDE 0.9 % (FLUSH) 0.9 %
10 SYRINGE (ML) INJECTION EVERY 12 HOURS SCHEDULED
Status: DISCONTINUED | OUTPATIENT
Start: 2020-08-16 | End: 2020-08-20 | Stop reason: HOSPADM

## 2020-08-16 RX ORDER — DOCUSATE SODIUM 100 MG/1
100 CAPSULE, LIQUID FILLED ORAL 2 TIMES DAILY
COMMUNITY
Start: 2019-05-25

## 2020-08-16 RX ORDER — FENTANYL CITRATE 50 UG/ML
25 INJECTION, SOLUTION INTRAMUSCULAR; INTRAVENOUS ONCE
Status: COMPLETED | OUTPATIENT
Start: 2020-08-16 | End: 2020-08-16

## 2020-08-16 RX ADMIN — MORPHINE SULFATE 2 MG: 2 INJECTION, SOLUTION INTRAMUSCULAR; INTRAVENOUS at 22:03

## 2020-08-16 RX ADMIN — SODIUM CHLORIDE 1000 ML: 9 INJECTION, SOLUTION INTRAVENOUS at 18:01

## 2020-08-16 RX ADMIN — PIPERACILLIN AND TAZOBACTAM 3.38 G: 3; .375 INJECTION, POWDER, LYOPHILIZED, FOR SOLUTION INTRAVENOUS at 20:14

## 2020-08-16 RX ADMIN — Medication 10 ML: at 22:03

## 2020-08-16 RX ADMIN — ONDANSETRON 4 MG: 2 INJECTION INTRAMUSCULAR; INTRAVENOUS at 18:01

## 2020-08-16 RX ADMIN — DIPHENHYDRAMINE HYDROCHLORIDE 25 MG: 50 INJECTION, SOLUTION INTRAMUSCULAR; INTRAVENOUS at 23:56

## 2020-08-16 RX ADMIN — DIPHENHYDRAMINE HYDROCHLORIDE 25 MG: 50 INJECTION, SOLUTION INTRAMUSCULAR; INTRAVENOUS at 18:09

## 2020-08-16 RX ADMIN — SODIUM CHLORIDE: 9 INJECTION, SOLUTION INTRAVENOUS at 22:03

## 2020-08-16 RX ADMIN — MICONAZOLE NITRATE: 2 POWDER TOPICAL at 23:56

## 2020-08-16 RX ADMIN — MORPHINE SULFATE 4 MG: 2 INJECTION, SOLUTION INTRAMUSCULAR; INTRAVENOUS at 18:01

## 2020-08-16 RX ADMIN — FENTANYL CITRATE 25 MCG: 50 INJECTION, SOLUTION INTRAMUSCULAR; INTRAVENOUS at 20:15

## 2020-08-16 RX ADMIN — LEVETIRACETAM 500 MG: 100 INJECTION, SOLUTION INTRAVENOUS at 23:56

## 2020-08-16 ASSESSMENT — PAIN DESCRIPTION - PAIN TYPE: TYPE: ACUTE PAIN

## 2020-08-16 ASSESSMENT — PAIN SCALES - GENERAL
PAINLEVEL_OUTOF10: 10
PAINLEVEL_OUTOF10: 8
PAINLEVEL_OUTOF10: 0
PAINLEVEL_OUTOF10: 8

## 2020-08-16 ASSESSMENT — ENCOUNTER SYMPTOMS
ALLERGIC/IMMUNOLOGIC NEGATIVE: 1
VOMITING: 1
SHORTNESS OF BREATH: 0
EYE PAIN: 0
ABDOMINAL PAIN: 1
TROUBLE SWALLOWING: 0
COLOR CHANGE: 0
APNEA: 0
NAUSEA: 1

## 2020-08-16 ASSESSMENT — PAIN DESCRIPTION - LOCATION: LOCATION: ABDOMEN

## 2020-08-16 ASSESSMENT — PAIN DESCRIPTION - DESCRIPTORS: DESCRIPTORS: ACHING

## 2020-08-16 ASSESSMENT — PAIN DESCRIPTION - FREQUENCY: FREQUENCY: CONTINUOUS

## 2020-08-16 NOTE — ED TRIAGE NOTES
Pt c/o her G tube not working right , redness and pain at insertion site, and vomiting for the past 2 days, Pt is A&OX3, calm, ambulatory, afebrile, breathes are equal and unlabored.

## 2020-08-16 NOTE — ED PROVIDER NOTES
[address] [facility] ED  eMERGENCYdEPARTMENT eNCOUnter      Pt Name: Christi Barlow  MRN: [id number]  [place] 1975  Date of evaluation: 8/16/2020  Provider:Horacio Willett PA-C    CHIEF COMPLAINT       Chief Complaint   Patient presents with    Feeding Tube Problem     pt c/o her G tube inst working, has a rash at insertion site, and vomiting for the past 2 days         HISTORY OF PRESENT ILLNESS  (Location/Symptom, Timing/Onset, Context/Setting, Quality, Duration, Modifying Factors, Severity.)   Christi Barlow is a 39 y.o. female who presents to the emergency department with complaints of pain, swelling and drainage around G-tube site that is been present for the past 3 days. Patient also states that when she uses the feeding tube she gets a large amount of regurgitation out through the skin of content. Patient rates his pain at a 10 out of 10. Patient also states that for the past 2 days she has been having nausea and vomiting with secondary mid abdominal pain. There is been no fevers. No diarrhea. HPI    Nursing Notes were reviewed and I agree. REVIEW OF SYSTEMS    (2-9 systems for level 4, 10 or more for level 5)     Review of Systems   Constitutional: Negative for diaphoresis and fever. HENT: Negative for hearing loss and trouble swallowing. Eyes: Negative for pain. Respiratory: Negative for apnea and shortness of breath. Cardiovascular: Negative for chest pain. Gastrointestinal: Positive for abdominal pain, nausea and vomiting. Endocrine: Negative. Genitourinary: Negative for hematuria. Musculoskeletal: Negative for neck pain and neck stiffness. Skin: Negative for color change. Allergic/Immunologic: Negative. Neurological: Negative for dizziness and numbness. Hematological: Negative. Psychiatric/Behavioral: Negative. All other systems reviewed and are negative. Except as noted above the remainder of the review of systems was reviewed and negative. PAST MEDICAL HISTORY     Past Medical History:   Diagnosis Date    Asthma     [facility]erebral artery occlusion [facility] [facility]     Chronic back pain     Chronic kidney disease     Depression     Headache     Kidney disease     Kidney stone     Seizures ([facility] [address] 5/24/2016    [facility]         SURGICAL HISTORY       Past Surgical History:   Procedure Laterality Date    APPENDECTOMY      BACK SURGERY      CHOLECYSTECTOMY      HYSTERECTOMY      KNEE SURGERY Bilateral     OTHER SURGICAL HISTORY      sup/pub cath june 1, 2018         CURRENT MEDICATIONS       Previous Medications    ALPRAZOLAM (XANAX) 0.5 MG TABLET    Take 0.5 mg by mouth 2 times daily. Naif Pichardo AMITRIPTYLINE (ELAVIL) 100 MG TABLET    Take 100 mg by mouth nightly    APIXABAN (ELIQUIS) 5 MG TABS TABLET    Take 10 mg by mouth 2 times daily    ARIPIPRAZOLE (ABILIFY) 15 MG TABLET    Take 15 mg by mouth daily    ATORVASTATIN (LIPITOR) 40 MG TABLET    Take 1 tablet by mouth nightly    BUDESONIDE-FORMOTEROL (SYMBICORT) 80-4.5 MCG/ACT AERO    Inhale 2 puffs into the lungs 2 times daily as needed     CYCLOBENZAPRINE (FLEXERIL) 5 MG TABLET    Take 2 tablets by mouth 3 times daily as needed for Muscle spasms    DICYCLOMINE (BENTYL) 10 MG CAPSULE    Take 1 capsule by mouth every 6 hours as needed (cramps)    EPINEPHRINE 1 MG/ML INJECTION    Inject 0.2 mg into the skin as needed    ESLICARBAZEPINE ACETATE 400 MG TABS    Take 400 mg by mouth nightly     FAMOTIDINE (PEPCID) 20 MG TABLET    Take 1 tablet by mouth 2 times daily for 7 days    FEXOFENADINE (ALLEGRA) 60 MG TABLET    Take 60 mg by mouth 2 times daily    FLUTICASONE (FLONASE) 50 MCG/ACT NASAL SPRAY    2 sprays by Each Nostril route daily    LEVETIRACETAM (KEPPRA) 500 MG TABLET    Take 2 tablets by mouth 2 times daily    LORAZEPAM (ATIVAN) 0.5 MG TABLET    Take 0.5 mg by mouth daily as needed for Anxiety. Naif Pichardo     MENTHOL, TOPICAL ANALGESIC, (BIOFREEZE) 4 % GEL    Apply Physical activity     Days per week: None     Minutes per session: None    Stress: None   Relationships    Social connections     Talks on phone: None     Gets together: None     Attends [employer] service: None     Active member of club or organization: None     Attends meetings of clubs or organizations: None     Relationship status: None    Intimate partner violence     Fear of current or ex partner: None     Emotionally abused: None     Physically abused: None     Forced sexual activity: None   Other Topics Concern    None   Social History Narrative    None       SCREENINGS           PHYSICAL EXAM    (up to 7 forlevel 4, 8 or more for level 5)     ED Triage Vitals [08/16/20 1736]   BP Temp Temp Source Pulse Resp SpO2 Height Weight   117/78 97.8 °F (36.6 °C) Oral 75 16 99 % 5' 5\" (1.651 m) 220 lb (99.8 kg)       Physical Exam  Vitals signs and nursing note reviewed. Constitutional:       General: She is not in acute distress. Appearance: She is well-developed. She is not diaphoretic. HENT:      Head: Normocephalic and atraumatic. Mouth/Throat:      Pharynx: No oropharyngeal exudate. Eyes:      General: No scleral icterus. Conjunctiva/sclera: Conjunctivae normal.      Pupils: Pupils are equal, round, and reactive to light. Neck:      Musculoskeletal: Normal range of motion and neck supple. Trachea: No tracheal deviation. Cardiovascular:      Rate and Rhythm: Normal rate. Heart sounds: Normal heart sounds. Pulmonary:      Effort: Pulmonary effort is normal. No respiratory distress. Breath sounds: Normal breath sounds. Abdominal:      General: Bowel sounds are normal. There is no distension. Palpations: Abdomen is soft. Musculoskeletal: Normal range of motion. Skin:     General: Skin is warm and dry. Findings: No erythema or rash. Neurological:      Mental Status: She is alert and oriented to person, place, and time.       Cranial Nerves: No cranial secondary area of focal colitis for his diverticulitis. Patient continues to have pain and will be admitted in the hospital for IV antibiotics, further evaluation and care    MDM    PROCEDURES:    Procedures      FINAL IMPRESSION      1. Colitis    2. Local infection of skin and subcutaneous tissue    3. Problem with gastrostomy tube [facility])          DISPOSITION/PLAN   DISPOSITION Decision To Admit 08/16/2020 06:47:30 PM      PATIENT REFERRED TO:  No follow-up provider specified.     DISCHARGE MEDICATIONS:  New Prescriptions    No medications on file       (Please note that portions of this note were completed with a voice recognition program.  Efforts were made to edit the dictations but occasionally words are mis-transcribed.)    JUANITA Palmer PA-C  08/16/20 [id number]

## 2020-08-16 NOTE — H&P
Hospital Medicine History & Physical      PCP: Ayse Turcios    Date of Admission: 8/16/2020    Date of Service: Pt seen/examined on 8/16/2020  Admitted to Inpatient with expected LOS greater than two midnights due to medical therapy. Chief Complaint:  Feeding tube dysfunction       History Of Present Illness:   39 y.o. female with Pmhx of seizures, CKD, CVA, Asthma,  who presented to [facility] with above chief complaint. Pt states she had g- tube replaced about 4 days ago when it broke but since then she has been having back flow of feed from the tube and around the tube with associated abdominal pain, nausea and vomitting and  increased redness around G-tube site. She get TF twice a day and had last feed today @ 4pm.     Past Medical History:          Diagnosis Date    Asthma     Cerebral artery occlusion with cerebral infarction ([address]     Chronic back pain     Chronic kidney disease     Depression     Headache     Kidney disease     Kidney stone     Seizures ([address] 5/24/2016    Suprapubic catheter ([facility] [address] [facility] 06/2018       Past Surgical History:          Procedure Laterality Date    APPENDECTOMY      BACK SURGERY      CHOLECYSTECTOMY      HYSTERECTOMY      KNEE SURGERY Bilateral     OTHER SURGICAL HISTORY      sup/pub cath june 1, 2018       Medications Prior to Admission:      Prior to Admission medications    Medication Sig Start Date End Date Taking?  Authorizing Provider   cyclobenzaprine (FLEXERIL) 5 MG tablet Take 2 tablets by mouth 3 times daily as needed for Muscle spasms 2/17/20   Ella Elliott,    atorvastatin (LIPITOR) 40 MG tablet Take 1 tablet by mouth nightly 6/13/19   Kindra Corcoran MD   fluticasone (FLONASE) 50 MCG/ACT nasal spray 2 sprays by Each Nostril route daily    Historical Provider, MD   polyethylene glycol (GLYCOLAX) packet Take 17 g by mouth daily    Historical Provider, MD   famotidine (PEPCID) 20 MG tablet Take 1 tablet by mouth 2 times daily for 7 days 6/2/19 6/9/19  SILKE Burnham CNP   Menthol, Topical Analgesic, (BIOFREEZE) 4 % GEL Apply 1 applicator topically 4 times daily as needed (pain) 3/9/19   SILKE Rockwell CNP   dicyclomine (BENTYL) 10 MG capsule Take 1 capsule by mouth every 6 hours as needed (cramps) 2/15/19   Roland Elliott,    apixaban (ELIQUIS) 5 MG TABS tablet Take 10 mg by mouth 2 times daily    Historical Provider, MD   amitriptyline (ELAVIL) 100 MG tablet Take 100 mg by mouth nightly    Historical Provider, MD   levETIRAcetam (KEPPRA) 500 MG tablet Take 2 tablets by mouth 2 times daily 9/6/18   Roland Elliott DO   zolpidem (AMBIEN) 10 MG tablet Take 10 mg by mouth nightly as needed for Sleep. Dayna Anderson Historical Provider, MD   LORazepam (ATIVAN) 0.5 MG tablet Take 0.5 mg by mouth daily as needed for Anxiety. Dayna Anderson Historical Provider, MD   EPINEPHrine 1 mg/mL injection Inject 0.2 mg into the skin as needed    Historical Provider, MD   nitroGLYCERIN (NITROSTAT) 0.4 MG SL tablet Place 0.4 mg under the tongue every 5 minutes as needed for Chest pain Dissolve 1 tablet under tongue for chest pain and repeat every 5 min up to max of 3 total doses. If no relief after 3 doses call 911    Historical Provider, MD   budesonide-formoterol (SYMBICORT) 80-4.5 MCG/ACT AERO Inhale 2 puffs into the lungs 2 times daily as needed     Historical Provider, MD   propranolol (INDERAL LA) 60 MG CR capsule Take 1 capsule by mouth daily  Patient taking differently: Take 120 mg by mouth daily  7/8/16   Pia Gonzalez MD   ARIPiprazole (ABILIFY) 15 MG tablet Take 15 mg by mouth daily    Historical Provider, MD   ALPRAZolam Meenu Huerta) 0.5 MG tablet Take 0.5 mg by mouth 2 times daily. Dayna Anderson     Historical Provider, MD   PARoxetine (PAXIL) 40 MG tablet Take 40 mg by mouth every morning     Historical Provider, MD   topiramate (TOPAMAX) 200 MG tablet Take 200 mg by mouth 2 times daily    Historical Provider, MD   Eslicarbazepine Acetate 400 MG TABS Take 400 mg by mouth nightly     Historical Provider, MD   traZODone (DESYREL) 50 MG tablet Take 300 mg by mouth nightly     Historical Provider, MD   fexofenadine (ALLEGRA) 60 MG tablet Take 60 mg by mouth 2 times daily    Historical Provider, MD       Allergies:  Latex; Ciprofloxacin; Shellfish-derived products; Contrast [iodides]; Daypro [oxaprozin]; Iodine; Macrobid [nitrofurantoin monohyd macro]; Macrolides and ketolides; Tape [adhesive tape]; Tegretol [carbamazepine]; Toradol [ketorolac tromethamine]; Tramadol; Zanaflex [tizanidine hcl]; Estrogens; Lamictal [lamotrigine]; Lyrica [pregabalin]; Tylenol [acetaminophen]; and Vicodin [hydrocodone-acetaminophen]    Social History:      The patient currently lives @ home     TOBACCO:   reports that she has never smoked. She has never used smokeless tobacco.  ETOH:   reports no history of alcohol use. Family History:       Reviewed in detail and negative for DM, CAD, Cancer, CVA. Positive as follows:        Problem Relation Age of Onset    Cancer Father     Cancer Paternal Aunt        REVIEW OF SYSTEMS:   Pertinent positives as noted in the HPI. All other systems reviewed and negative. PHYSICAL EXAM:    /78   Pulse 75   Temp 97.8 °F (36.6 °C) (Oral)   Resp 16   Ht 5' 5\" (1.651 m)   Wt 220 lb (99.8 kg)   SpO2 99%   BMI 36.61 kg/m²     General appearance:  No apparent distress, appears stated age and cooperative. HEENT:  Normal cephalic, atraumatic without obvious deformity. Pupils equal, round, and reactive to light. Extra ocular muscles intact. Conjunctivae/corneas clear. Neck: Supple, with full range of motion. No jugular venous distention. Trachea midline. Respiratory:  Normal respiratory effort. Clear to auscultation, bilaterally without Rales/Wheezes/Rhonchi. Cardiovascular:  Regular rate and rhythm with normal S1/S2 without murmurs, rubs or gallops. Abdomen: Soft, tender, obese, and distended with normal bowel sounds.  G-tube in LUQ with peripheral  erythema and digested food content on skin  Musculoskeletal:  No clubbing, cyanosis or edema bilaterally. Skin: Skin color, texture, turgor normal.  No rashes or lesions. Neurologic:   Alert and oriented, thought content appropriate, normal insight  Capillary Refill: Brisk,< 3 seconds   Peripheral Pulses: +2 palpable, equal bilaterally       Labs:     Recent Labs     08/16/20  1800   WBC 9.5   HGB 12.7   HCT 38.0        Recent Labs     08/16/20  1800      K 4.3   CL 99   CO2 31   BUN 22*   CREATININE 1.34*   CALCIUM 8.8     Recent Labs     08/16/20  1800   AST 15   ALT 11   BILITOT <0.2   ALKPHOS 146*     No results for input(s): INR in the last 72 hours. No results for input(s): Alyssia Ke in the last 72 hours. Urinalysis:      Lab Results   Component Value Date    NITRU POSITIVE 02/07/2020    WBCUA >100 02/07/2020    BACTERIA MODERATE 02/07/2020    RBCUA 3-5 02/07/2020    BLOODU Negative 02/07/2020    SPECGRAV 1.018 02/07/2020    GLUCOSEU Negative 02/07/2020       Radiology:     CXR: I have reviewed the CXR with the following interpretation:   EKG:  I have reviewed the EKG with the following interpretation:     CT ABDOMEN PELVIS WO CONTRAST Additional Contrast? None    (Results Pending)       Preliminary CT result  Abdomen and pelvis -showed mild soft tissue swelling along G-tube  and mild surrounding soft tissue stranding which could represent colitis or uncomplicated diverticulitis.      ASSESSMENT/plan:    Feeding tube dysfunction : keep NPO, IV fluid , consult general surgery     Abdominal wall cellulitis d/t 1  and possible colitis: continue IV zosyn, antiemetics and and pain control    ARF: hydrate with IV fluid and monitor daily renal function, avoid nephrotoxins    Hx seizure: start on IV keppra as pt NPO and peg tube non functional         DVT Prophylaxis: SCD for now for anticipated new G-tube placement   Diet:NPO  Code Status:full      Dispo - inpatient

## 2020-08-17 PROBLEM — K94.29 IRRITATION AROUND PERCUTANEOUS ENDOSCOPIC GASTROSTOMY (PEG) TUBE SITE (HCC): Status: ACTIVE | Noted: 2020-08-17

## 2020-08-17 PROBLEM — Z87.19 HISTORY OF SMALL BOWEL OBSTRUCTION: Status: ACTIVE | Noted: 2020-08-17

## 2020-08-17 PROBLEM — N18.9 CHRONIC KIDNEY DISEASE: Status: ACTIVE | Noted: 2020-08-17

## 2020-08-17 PROBLEM — G89.18 PAIN FOLLOWING SURGERY OR PROCEDURE: Status: ACTIVE | Noted: 2020-08-17

## 2020-08-17 PROBLEM — K94.19 PAIN OF JEJUNOSTOMY TUBE SITE (HCC): Status: ACTIVE | Noted: 2020-08-17

## 2020-08-17 LAB
ANION GAP SERPL CALCULATED.3IONS-SCNC: 9 MEQ/L (ref 9–15)
BUN BLDV-MCNC: 19 MG/DL (ref 6–20)
CALCIUM SERPL-MCNC: 8.6 MG/DL (ref 8.5–9.9)
CHLORIDE BLD-SCNC: 104 MEQ/L (ref 95–107)
CO2: 26 MEQ/L (ref 20–31)
CREAT SERPL-MCNC: 1.24 MG/DL (ref 0.5–0.9)
GFR AFRICAN AMERICAN: 56.5
GFR NON-AFRICAN AMERICAN: 46.7
GLUCOSE BLD-MCNC: 97 MG/DL (ref 70–99)
HCT VFR BLD CALC: 34.5 % (ref 37–47)
HEMOGLOBIN: 11.5 G/DL (ref 12–16)
MCH RBC QN AUTO: 30.1 PG (ref 27–31.3)
MCHC RBC AUTO-ENTMCNC: 33.2 % (ref 33–37)
MCV RBC AUTO: 90.7 FL (ref 82–100)
PDW BLD-RTO: 17.5 % (ref 11.5–14.5)
PLATELET # BLD: 280 K/UL (ref 130–400)
POTASSIUM REFLEX MAGNESIUM: 5.4 MEQ/L (ref 3.4–4.9)
RBC # BLD: 3.81 M/UL (ref 4.2–5.4)
SODIUM BLD-SCNC: 139 MEQ/L (ref 135–144)
WBC # BLD: 8.8 K/UL (ref 4.8–10.8)

## 2020-08-17 PROCEDURE — 87070 CULTURE OTHR SPECIMN AEROBIC: CPT

## 2020-08-17 PROCEDURE — 99254 IP/OBS CNSLTJ NEW/EST MOD 60: CPT | Performed by: INTERNAL MEDICINE

## 2020-08-17 PROCEDURE — 6360000002 HC RX W HCPCS: Performed by: ANESTHESIOLOGY

## 2020-08-17 PROCEDURE — 36415 COLL VENOUS BLD VENIPUNCTURE: CPT

## 2020-08-17 PROCEDURE — 6370000000 HC RX 637 (ALT 250 FOR IP): Performed by: ANESTHESIOLOGY

## 2020-08-17 PROCEDURE — 6360000002 HC RX W HCPCS: Performed by: NURSE PRACTITIONER

## 2020-08-17 PROCEDURE — 2500000003 HC RX 250 WO HCPCS: Performed by: INTERNAL MEDICINE

## 2020-08-17 PROCEDURE — 6360000002 HC RX W HCPCS: Performed by: INTERNAL MEDICINE

## 2020-08-17 PROCEDURE — 2580000003 HC RX 258: Performed by: NURSE PRACTITIONER

## 2020-08-17 PROCEDURE — 99253 IP/OBS CNSLTJ NEW/EST LOW 45: CPT | Performed by: SURGERY

## 2020-08-17 PROCEDURE — 85027 COMPLETE CBC AUTOMATED: CPT

## 2020-08-17 PROCEDURE — 87205 SMEAR GRAM STAIN: CPT

## 2020-08-17 PROCEDURE — 1210000000 HC MED SURG R&B

## 2020-08-17 PROCEDURE — 99213 OFFICE O/P EST LOW 20 MIN: CPT

## 2020-08-17 PROCEDURE — 80048 BASIC METABOLIC PNL TOTAL CA: CPT

## 2020-08-17 RX ORDER — ACETAMINOPHEN 160 MG/5ML
500 SOLUTION ORAL 4 TIMES DAILY
Status: DISCONTINUED | OUTPATIENT
Start: 2020-08-17 | End: 2020-08-20 | Stop reason: HOSPADM

## 2020-08-17 RX ORDER — OXYCODONE HYDROCHLORIDE 5 MG/1
5 TABLET ORAL EVERY 6 HOURS PRN
Status: DISCONTINUED | OUTPATIENT
Start: 2020-08-17 | End: 2020-08-20 | Stop reason: HOSPADM

## 2020-08-17 RX ORDER — DIPHENHYDRAMINE HYDROCHLORIDE 50 MG/ML
10 INJECTION INTRAMUSCULAR; INTRAVENOUS ONCE
Status: COMPLETED | OUTPATIENT
Start: 2020-08-17 | End: 2020-08-17

## 2020-08-17 RX ORDER — ORPHENADRINE CITRATE 30 MG/ML
60 INJECTION INTRAMUSCULAR; INTRAVENOUS EVERY 12 HOURS
Status: DISCONTINUED | OUTPATIENT
Start: 2020-08-17 | End: 2020-08-20 | Stop reason: HOSPADM

## 2020-08-17 RX ORDER — LIDOCAINE 40 MG/G
CREAM TOPICAL 4 TIMES DAILY
Status: DISCONTINUED | OUTPATIENT
Start: 2020-08-17 | End: 2020-08-20 | Stop reason: HOSPADM

## 2020-08-17 RX ORDER — HYDROXYZINE HYDROCHLORIDE 50 MG/ML
25 INJECTION, SOLUTION INTRAMUSCULAR EVERY 6 HOURS PRN
Status: DISCONTINUED | OUTPATIENT
Start: 2020-08-17 | End: 2020-08-18

## 2020-08-17 RX ORDER — ACETAMINOPHEN 325 MG/1
650 TABLET ORAL EVERY 8 HOURS SCHEDULED
Status: DISCONTINUED | OUTPATIENT
Start: 2020-08-17 | End: 2020-08-17

## 2020-08-17 RX ORDER — OXYCODONE HCL 20 MG/ML
5 CONCENTRATE, ORAL ORAL EVERY 4 HOURS PRN
Status: DISCONTINUED | OUTPATIENT
Start: 2020-08-17 | End: 2020-08-17

## 2020-08-17 RX ADMIN — ONDANSETRON 4 MG: 2 INJECTION INTRAMUSCULAR; INTRAVENOUS at 14:24

## 2020-08-17 RX ADMIN — MORPHINE SULFATE 2 MG: 2 INJECTION, SOLUTION INTRAMUSCULAR; INTRAVENOUS at 14:24

## 2020-08-17 RX ADMIN — LEVETIRACETAM 500 MG: 100 INJECTION, SOLUTION INTRAVENOUS at 14:24

## 2020-08-17 RX ADMIN — Medication: at 22:56

## 2020-08-17 RX ADMIN — MICONAZOLE NITRATE: 2 POWDER TOPICAL at 08:52

## 2020-08-17 RX ADMIN — SODIUM CHLORIDE: 9 INJECTION, SOLUTION INTRAVENOUS at 22:50

## 2020-08-17 RX ADMIN — DIPHENHYDRAMINE HYDROCHLORIDE 10 MG: 50 INJECTION INTRAMUSCULAR; INTRAVENOUS at 15:51

## 2020-08-17 RX ADMIN — LIDOCAINE 4%: 4 CREAM TOPICAL at 22:49

## 2020-08-17 RX ADMIN — MORPHINE SULFATE 2 MG: 2 INJECTION, SOLUTION INTRAMUSCULAR; INTRAVENOUS at 06:15

## 2020-08-17 RX ADMIN — ONDANSETRON 4 MG: 2 INJECTION INTRAMUSCULAR; INTRAVENOUS at 07:24

## 2020-08-17 RX ADMIN — ONDANSETRON 4 MG: 2 INJECTION INTRAMUSCULAR; INTRAVENOUS at 01:16

## 2020-08-17 RX ADMIN — MORPHINE SULFATE 2 MG: 2 INJECTION, SOLUTION INTRAMUSCULAR; INTRAVENOUS at 02:04

## 2020-08-17 RX ADMIN — PIPERACILLIN AND TAZOBACTAM 3.38 G: 3; .375 INJECTION, POWDER, FOR SOLUTION INTRAVENOUS at 17:33

## 2020-08-17 RX ADMIN — ORPHENADRINE CITRATE 60 MG: 30 INJECTION INTRAMUSCULAR; INTRAVENOUS at 22:52

## 2020-08-17 RX ADMIN — HYDROXYZINE HYDROCHLORIDE 25 MG: 50 INJECTION, SOLUTION INTRAMUSCULAR at 22:51

## 2020-08-17 RX ADMIN — MICONAZOLE NITRATE: 2 POWDER TOPICAL at 22:52

## 2020-08-17 RX ADMIN — MORPHINE SULFATE 2 MG: 2 INJECTION, SOLUTION INTRAMUSCULAR; INTRAVENOUS at 10:22

## 2020-08-17 RX ADMIN — HYDROMORPHONE HYDROCHLORIDE 1 MG: 1 INJECTION, SOLUTION INTRAMUSCULAR; INTRAVENOUS; SUBCUTANEOUS at 22:50

## 2020-08-17 RX ADMIN — Medication 10 ML: at 22:50

## 2020-08-17 RX ADMIN — Medication: at 15:52

## 2020-08-17 RX ADMIN — PIPERACILLIN AND TAZOBACTAM 3.38 G: 3; .375 INJECTION, POWDER, FOR SOLUTION INTRAVENOUS at 05:18

## 2020-08-17 ASSESSMENT — PAIN SCALES - GENERAL
PAINLEVEL_OUTOF10: 0
PAINLEVEL_OUTOF10: 0
PAINLEVEL_OUTOF10: 10
PAINLEVEL_OUTOF10: 0
PAINLEVEL_OUTOF10: 9
PAINLEVEL_OUTOF10: 10
PAINLEVEL_OUTOF10: 5
PAINLEVEL_OUTOF10: 0
PAINLEVEL_OUTOF10: 9
PAINLEVEL_OUTOF10: 10

## 2020-08-17 ASSESSMENT — ENCOUNTER SYMPTOMS
ABDOMINAL PAIN: 1
ANAL BLEEDING: 0
BLOOD IN STOOL: 0
NAUSEA: 1
ALLERGIC/IMMUNOLOGIC NEGATIVE: 1
RESPIRATORY NEGATIVE: 1
BACK PAIN: 0
CONSTIPATION: 1
RECTAL PAIN: 0
COLOR CHANGE: 0
EYES NEGATIVE: 1
ABDOMINAL DISTENTION: 1
VOMITING: 0
DIARRHEA: 0

## 2020-08-17 NOTE — FLOWSHEET NOTE
6132- Patient AOx4, 5/10 abdomen pain, patient aware morphine due at 1015am. Lungs are clear, no cough. Abdomen is round, large pink/ red scar midline abdomen. G tube in LUQ is very red with moderate amount of serosanguinous drainage, 2 blisters present surrounding stoma. Split 4x4, abd applied this morning. Bowel sounds are present, last BM 8/15. Patient reports she was eating mainly by mouth at home, with one supplement through the tube daily. Patient was taking all medications through the G tube also. Patient up to [facility] to urinate, no lower abdominal pain noted. Right side weakness/ numbness from previous CVA. Miconazole applied under breast bilaterally for redness. Zosyn was scanned into system but was still clamped as the secondary. Zosyn started at this time. 1600- Dressing to LUQ changed. Wound was cleansed with NS, ET mix applied, split 4x4, abd, and tape. Moderate amount of yellow drainage present on old dressing.

## 2020-08-17 NOTE — PROGRESS NOTES
Comprehensive Nutrition Assessment    Type and Reason for Visit:  Initial, Positive Nutrition Screen(home TF, N/V)    Nutrition Recommendations/Plan: Await surgery consult regarding POC    Nutrition Assessment:  Pt is at nutritional risk due to history of EN via PEG plus po diet. Await plans regarding PEG/po diet initiation. Malnutrition Assessment:  Malnutrition Status:  No malnutrition    Context:  Chronic Illness     Findings of the 6 clinical characteristics of malnutrition:  Energy Intake:  No significant decrease in energy intake  Weight Loss:  No significant weight loss     Body Fat Loss:  No significant body fat loss     Muscle Mass Loss:  No significant muscle mass loss    Fluid Accumulation:  No significant fluid accumulation     Strength:  Not Performed    Estimated Daily Nutrient Needs:  Energy (kcal):  9857-1614 (kg x 14-16); Weight Used for Energy Requirements:  Current     Protein (g):  68-80 (kg x 1.2-1.4), monitor renal function; Weight Used for Protein Requirements:  Ideal        Fluid (ml/day):  ~1760; Weight Used for Fluid Requirements:  Current      Nutrition Related Findings:  PMH-CVA, CKD, Sz, PEG placed 12/2019 per nsg. Pt stated that since May, she takes 1 Boost Breeze daily via PEG plus medications, and also eats a dental soft diet well.       Wounds:  None       Current Nutrition Therapies:    Diet NPO Effective Now    Anthropometric Measures:  · Height: 5' 5\" (165.1 cm)  · Current Body Weight: 243 lb 12 oz (110.6 kg)(8/17)   · Admission Body Weight: 214 lb (97.1 kg)(stated)    · Usual Body Weight: 286 lb (129.7 kg)(10/2019 stated; 214lb (noted 2/7 stated); 207lb (lowest wt per pt 4/2020))     · Ideal Body Weight: 125 lbs; % Ideal Body Weight   >100%  · BMI: 40.6  · BMI Categories: Obese Class 3 (BMI 40.0 or greater)       Nutrition Diagnosis:   · Altered GI function related to (PEG malfunction) as evidenced by NPO or clear liquid status due to medical condition, nausea, vomiting    Nutrition Interventions:   Food and/or Nutrient Delivery:  (Await surgery consult regarding POC)  Nutrition Education/Counseling:  No recommendation at this time   Coordination of Nutrition Care:  Continued Inpatient Monitoring    Goals:  Await plans regarding PEG. To monitor po intake after diet initiated.        Nutrition Monitoring and Evaluation:   Food/Nutrient Intake Outcomes:  Diet Advancement/Tolerance, Enteral Nutrition Intake/Tolerance  Physical Signs/Symptoms Outcomes:  Weight, Biochemical Data, Nausea or Vomiting     Electronically signed by Falguni Garber RD, PAULINE on 8/17/20 at 3:52 PM EDT

## 2020-08-17 NOTE — ACP (ADVANCE CARE PLANNING)
Advance Care Planning     Advance Care Planning Activator (Inpatient)  Conversation Note      Date of ACP Conversation: 8/16/2020    Conversation Conducted with: Patient with Decision Making Capacity    ACP Activator: [address] [facility], [facility] makes decisions on behalf of the incapacitated patient: Decision Maker is asked to consider and make decisions based on patient values, known preferences, or best interests. Current Designated Health Care Decision Maker:   (If there is a valid [address] named in the [address] [place] [address] Makers\" box in the ACP activity, but it is not visible above, be sure to open that field and then select the health care decision maker relationship (ie \"primary\") in the blank space to the right of the name.) Validate  this information as still accurate & up-to-date; edit [address] field as needed.)    Note: Assess and validate information in current ACP documents, as indicated. If no Decision Maker listed above or available through scanned documents, then:    If no Authorized Decision Maker has previously been identified, then patient chooses [address]:  \"Who would you like to name as your primary health care decision-maker? \"               Name: Galina Desir        Relationship:           Phone number: [phone]  Conyngham Wally this person be reached easily? \" Yes  \"Who would you like to name as your back-up decision maker? \"   Name: Maria Esther Rosales        Relationship: cousin          Phone number: [phone]  Deborah Wally this person be reached easily? \" Yes    Note: If the relationship of these Decision-Makers to the patient does NOT follow your state's Next of Kin hierarchy, recommend that patient complete ACP document that meets state-specific requirements to allow them to act on the patient's behalf when appropriate. Care Preferences    Ventilation:   \"If you were in your present state of health and suddenly became discussion completed  [x] Existing advance directive reviewed with patient; no changes to patient's previously recorded wishes  [] New Advance Directive completed  [] Portable Do Not Rescitate prepared for Provider review and signature  [] POLST/POST/MOLST/MOST prepared for Provider review and signature      Follow-up plan:    [] Schedule follow-up conversation to continue planning  [] Referred individual to Provider for additional questions/concerns   [] Advised patient/agent/surrogate to review completed ACP document and update if needed with changes in condition, patient preferences or care setting    [x] This note routed to one or more involved healthcare providers

## 2020-08-17 NOTE — PROGRESS NOTES
Hospitalist Progress Note      PCP: Nik Berry    Date of Admission: 8/16/2020    Chief Complaint:    Chief Complaint   Patient presents with    Feeding Tube Problem     pt c/o her G tube inst working, has a rash at insertion site, and vomiting for the past 2 days     Subjective:  Patient is not feeling well; denies fevers, chills, sweats; pain is not well controlled; has nausea. 12 point ROS negative other than mentioned above     Medications:  Reviewed    Infusion Medications    sodium chloride 100 mL/hr at 08/16/20 2203     Scheduled Medications    acetaminophen  650 mg Oral 3 times per day    nystatin, stomahesive in petrolatum   Topical BID    sodium chloride flush  10 mL Intravenous 2 times per day    piperacillin-tazobactam  3.375 g Intravenous Q8H    levetiracetam  500 mg Intravenous Q12H    miconazole   Topical BID     PRN Meds: sodium chloride flush, acetaminophen **OR** acetaminophen, polyethylene glycol, promethazine **OR** ondansetron, morphine      Intake/Output Summary (Last 24 hours) at 8/17/2020 1403  Last data filed at 8/16/2020 2203  Gross per 24 hour   Intake 1010 ml   Output --   Net 1010 ml     Exam:    BP (!) 92/43   Pulse 66   Temp 97.5 °F (36.4 °C) (Oral)   Resp 17   Ht 5' 5\" (1.651 m)   Wt 243 lb 12.8 oz (110.6 kg)   SpO2 96%   BMI 40.57 kg/m²     General appearance: No apparent distress, appears stated age and cooperative. HEENT:  Conjunctivae/corneas clear. Neck:  Trachea midline. Respiratory:  Normal respiratory effort. Clear to auscultation  Cardiovascular: Regular rate and rhythm   Abdomen: Erythema across midline incision; erythema surrounding PEG site; some exudation. Musculoskeletal: No clubbing, cyanosis or edema bilaterally.    Neuro: Non Focal  Capillary Refill: Brisk,< 3 seconds   Peripheral Pulses: +2 palpable, equal bilaterally     Labs:   Recent Labs     08/16/20  1800 08/17/20  0558   WBC 9.5 8.8   HGB 12.7 11.5*   HCT 38.0 34.5*    280 Recent Labs     08/16/20  1800 08/17/20  0558    139   K 4.3 5.4*   CL 99 104   CO2 31 26   BUN 22* 19   CREATININE 1.34* 1.24*   CALCIUM 8.8 8.6     Recent Labs     08/16/20  1800   AST 15   ALT 11   BILITOT <0.2   ALKPHOS 146*     No results for input(s): INR in the last 72 hours. No results for input(s): Neli Formosa in the last 72 hours. Urinalysis:      Lab Results   Component Value Date    NITRU POSITIVE 02/07/2020    WBCUA >100 02/07/2020    BACTERIA MODERATE 02/07/2020    RBCUA 3-5 02/07/2020    BLOODU Negative 02/07/2020    SPECGRAV 1.018 02/07/2020    GLUCOSEU Negative 02/07/2020     Radiology:  RADIOLOGY REPORT   Final Result      CT ABDOMEN PELVIS WO CONTRAST Additional Contrast? None   Final Result   1 THERE IS A BOWEL MALROTATION  AGAIN NOTED. IN THE REGION OF THE PROXIMAL TO MIDPORTION OF THE TRANSVERSE COLON THERE IS SOME SURROUNDING PERICOLONIC INFLAMMATORY STRANDING. THERE IS A SMALL PUNCTATE AREA OF EXTRALUMINAL AIR. FINDINGS ARE THAT OF A    ACUTE FOCAL DIVERTICULITIS.      MR. ACEVEDO  OF THE EMERGENCY ROOM WAS NOTIFIED IMMEDIATELY OF THE ABOVE FINDINGS UPON AUGUST 16, 2020 AT 1 [facility] there         All CT scans at this facility use dose modulation, iterative reconstruction, and/or weight based dosing when appropriate to reduce radiation dose to as low as reasonably achievable.         Assessment/Plan:    Feeding tube dysfunction:  Surgery consulted; both trauma and gen surgery were consulted; discussed with trauma team and they will look into to see which is more appropriate      Abdominal wall cellulitis d/t 1  and acute diverticulitis:  Continue IV Zosyn; per patient the midline incision erythema has improved with IV Abx; erythema surrounding PEG site still appears poor  ARF: hydrate with IV fluid and monitor daily renal function, avoid nephrotoxins  Hx seizure: start on IV keppra as pt NPO and peg tube non functional    DVT Prophylaxis: SCD for now for anticipated new G-tube placement   Diet:NPO  Code Status:full    Active Hospital Problems    Diagnosis Date Noted    Feeding tube dysfunction [T85.598A] 08/16/2020     Additional work up or/and treatment plan may be added today or then after based on clinical progression. I am managing a portion of pt care. Some medical issues are handled by other specialists. Additional work up and treatment should be done in out pt setting by pt PCP and other out pt providers. In addition to examining and evaluating pt, I spent additional time explaining care, normal and abnormal findings, and treatment plan. All of pt questions were answered. Counseling, diet and education were  provided. Case will be discussed with nursing staff when appropriate. Family will be updated if and when appropriate.       Diet: Diet NPO Effective Now    Code Status: Full Code    PT/OT Eval     Electronically signed by Everette Dudley MD on 8/17/2020 at 2:03 PM

## 2020-08-17 NOTE — CONSULTS
INITIAL CONSULT -PAIN MANAGEMENT     SERVICE DATE:  8/17/2020   SERVICE TIME:  4:55 PM  Admission date 8/16/2020  REASON FORCONSULT: Abdominal pain  REQUESTING PHYSICIAN:  Akilah Paez MD  PRIMARY CARE PHYSICIAN:Wil Tierney    Chief Complaint   Patient presents with    Feeding Tube Problem     pt c/o her G tube inst working, has a rash at insertion site, and vomiting for the past 2 days         HISTORY OF PRESENTILLNESS:  Ms. Delmar Salomon is a 39 y.o. female who presents for [facility] for problem related to the G-tube site, patient complained of nausea, rash and vomiting for past couple of days after surgery he has a G-tube replaced secondary to impact/accidental movement as patient stated that she had it able. Patient has G tube replaced prior to admission, 4 days prior due to backflow complaining of sudden rotation increasing pain around the area,. Patient has tube replaced by [facility] surgical trauma team.    She has been having a lot of problem with generalized pain, she is on oxycodone at home, also has sleep disorders take temazepam for sleep. Patient is also cardiovascular disease and chronic kidney disease. She is taking oxycodone for pain at home, she is taking it through the G-tube and taking it 3 times daily as needed for pain. Prescribed by her PCP at [facility]. She described the pain as painful around the area more intense not managed by oxycodone. Patient has history of GI /intussusception obstruction, she has G-tube placed back in December 2019, she ended up with G-tube, she had no problem for the past 7 months until she reported as she was leaning forward and caused an impact to the place G-tube and developed problems since.         PAIN  ASSESSMENT:    constant, waxing and waning, severe    shooting, stabbing and throbbing    pain is excruciating , incapacitating and unbearable (9-10 pain scale)    PAST MEDICAL HISTORY:    Past Medical History:   Diagnosis Date    Asthma  Cerebral artery occlusion with cerebral infarction (HCC)     Chronic back pain     Chronic kidney disease     Depression     Headache     Kidney disease     Kidney stone     Seizures ([facility] [address] 5/24/2016    Suprapubic catheter ([facility] [address] 06/2018     PAST SURGICAL HISTORY:    Past Surgical History:   Procedure Laterality Date    APPENDECTOMY      BACK SURGERY      CHOLECYSTECTOMY      [facility]  12/09/2019    [facility]    HYSTERECTOMY      KNEE SURGERY Bilateral     OTHER SURGICAL HISTORY      sup/pub cath june 1, 2018     FAMILY HISTORY:    Family History   Problem Relation Age of Onset    Cancer Father     Cancer Paternal Aunt      SOCIALHISTORY:    Social History     Socioeconomic History    Marital status: Single     Spouse name: Not on file    Number of children: Not on file    Years of education: Not on file    Highest education level: Not on file   Occupational History    Not on file   Social Needs    Financial resource strain: Not on file    Food insecurity     Worry: Not on file     Inability: Not on file    Transportation needs     Medical: Not on file     Non-medical: Not on file   Tobacco Use    Smoking status: Never Smoker    Smokeless tobacco: Never Used   Substance and Sexual Activity    Alcohol use: No     Alcohol/week: 0.0 standard drinks    Drug use: No    Sexual activity: Never   Lifestyle    Physical activity     Days per week: Not on file     Minutes per session: Not on file    Stress: Not on file   Relationships    Social connections     Talks on phone: Not on file     Gets together: Not on file     Attends Synagogue service: Not on file     Active member of club or organization: Not on file     Attends meetings of clubs or organizations: Not on file     Relationship status: Not on file    Intimate partner violence     Fear of current or ex partner: Not on file     Emotionally abused: Not on file     Physically abused: Not on file     Forced sexual activity: Not on file Other Topics Concern    Not on file   Social History Narrative    Not on file     PSYCHOLOGICAL HISTORY: No history of alcohol drug abuse no history of opioid dependence patient has history of anxiety, been on Xanax, also with mild depression and trazodone and Abilify, also has sleep disorder being on Keppra, Elavil and temazepam.    MEDICATIONS:  Medications Prior to Admission: scopolamine (TRANSDERM-SCOP) transdermal patch, Place 1 patch onto the skin See Admin Instructions  prazosin (MINIPRESS) 1 MG capsule, Take 1 mg by mouth nightly  oxyCODONE-acetaminophen (PERCOCET) 5-325 MG per tablet, Take 1 tablet by mouth every 8 hours as needed.   ondansetron (ZOFRAN) 4 MG tablet, Take 4 mg by mouth every 8 hours as needed  omeprazole (PRILOSEC) 40 MG delayed release capsule, Take 40 mg by mouth  metoprolol tartrate (LOPRESSOR) 25 MG tablet, Take 12.5 mg by mouth 2 times daily  meclizine (ANTIVERT) 25 MG tablet, Take 25 mg by mouth 2 times daily  albuterol sulfate  (90 Base) MCG/ACT inhaler, Inhale 2 puffs into the lungs every 4 hours as needed  potassium chloride (KLOR-CON) 10 MEQ extended release tablet, Take 20 mEq by mouth 2 times daily Once in morning and once at night  melatonin 1 MG tablet, Take 5 mg by mouth nightly  Magnesium Oxide (MAG- PO), Take 400 mg by mouth daily  docusate sodium (COLACE) 100 MG capsule, Take 100 mg by mouth 2 times daily  cyclobenzaprine (FLEXERIL) 5 MG tablet, Take 2 tablets by mouth 3 times daily as needed for Muscle spasms  atorvastatin (LIPITOR) 40 MG tablet, Take 1 tablet by mouth nightly  fluticasone (FLONASE) 50 MCG/ACT nasal spray, 2 sprays by Each Nostril route daily  polyethylene glycol (GLYCOLAX) packet, Take 17 g by mouth daily  dicyclomine (BENTYL) 10 MG capsule, Take 1 capsule by mouth every 6 hours as needed (cramps)  apixaban (ELIQUIS) 5 MG TABS tablet, Take 10 mg by mouth 2 times daily  amitriptyline (ELAVIL) 100 MG tablet, Take 100 mg by mouth MG TABS, Take 400 mg by mouth nightly   [unfilled]    ALLERGIES:  Latex; Ciprofloxacin; Shellfish-derived products; Daypro [oxaprozin]; Iodides; Iodine; Lidocaine; Macrobid [nitrofurantoin monohyd macro]; Macrolides and ketolides; Other; Penicillins; Pollen extract; Propranolol; Propranolol hcl; Tape [adhesive tape]; Tegretol [carbamazepine]; Tizanidine; Toradol [ketorolac tromethamine]; Tramadol; Zanaflex [tizanidine hcl]; Estrogens; Lamictal [lamotrigine]; Lyrica [pregabalin]; Tylenol [acetaminophen]; and Vicodin [hydrocodone-acetaminophen]    COMPLETE REVIEW OF SYSTEMS:  As noted in HPI, 12 point ROS reviewed and otherwise negative. Review of Systems   Constitutional: Positive for activity change, appetite change and fatigue. Negative for chills, fever and unexpected weight change. HENT: Negative. Eyes: Negative. Respiratory: Negative. Cardiovascular: Negative. Gastrointestinal: Positive for abdominal distention, abdominal pain, constipation and nausea. Negative for anal bleeding, blood in stool, diarrhea, rectal pain and vomiting. Endocrine: Negative. Genitourinary: Negative. Musculoskeletal: Positive for myalgias. Negative for arthralgias, back pain, gait problem, joint swelling and neck pain. Skin: Positive for rash. Negative for color change, pallor and wound. She has itching secondary to the morphine, skin irritation around the abdominal region. Allergic/Immunologic: Negative. Neurological: Negative. Hematological: Negative. Psychiatric/Behavioral: Negative. OBJECTIVE  PHYSICAL EXAM:  BP (!) 92/43   Pulse 66   Temp 97.5 °F (36.4 °C) (Oral)   Resp 17   Ht 5' 5\" (1.651 m)   Wt 243 lb 12.8 oz (110.6 kg)   SpO2 96%   BMI 40.57 kg/m²   Body mass index is 40.57 kg/m².   CONSTITUTIONAL: Alert awake cooperative, moderate distress due to pain  EYES:  vision intact  ENT:  normocepalic, without obvious abnormality, atraumatic  NECK:  supple, symmetrical, trachea midline, skin normal and no stridor  BACK:  symmetric and no curvature  LUNGS:  no increased work of breathing  CARDIOVASCULAR:  regular rate and rhythm  ABDOMEN: Abdomen exam seem to be obese, she has long scar in midline for previous surgery, she has moderate tenderness across left upper quadrant around the G-tube site. MUSCULOSKELETAL:  there is no redness, warmth, or swelling of the joints  full range of motion noted  motor strength is 5 out of 5 all extremities bilaterally  tone is normal  NEUROLOGIC: Mental's exam seem to be intact, motor strength seems to be symmetrical upper and lower no neurological deficit, history of TIA in the past.  SKIN: Skin irritation around the G-tube site, mild to moderate tenderness. DATA:   Diagnostic tests reviewed for today's visit:    All labs and imaging results reviewed. Lab Results   Component Value Date    WBC 8.8 08/17/2020    HGB 11.5 08/17/2020    HCT 34.5 08/17/2020    MCV 90.7 08/17/2020     08/17/2020     08/17/2020    K 5.4 08/17/2020     08/17/2020    CO2 26 08/17/2020    BUN 19 08/17/2020    CREATININE 1.24 08/17/2020    CALCIUM 8.6 08/17/2020    PHOS 3.9 05/24/2016    ALKPHOS 146 08/16/2020    ALT 11 08/16/2020    AST 15 08/16/2020    BILITOT <0.2 08/16/2020    BILIDIR 0.0 08/10/2016    LABALBU 3.9 08/16/2020     No results for input(s): LABAMPH, BARBSCNU, LABBENZ, CANSU, COCAIMETSCRU, OPIATESCREENURINE, OXYCODONEUR, DSCOMMENT in the last 72 hours. Invalid input(s): PHENCYCLIDINESCREENURINE. LABMETH. PROPOX   Ct Abdomen Pelvis Wo Contrast Additional Contrast? None    Result Date: 8/16/2020  Examination: CT ABDOMEN PELVIS WO CONTRAST Indication:   redness/pain surrounding G tube site with Vomiting Technique: Multiple serial axial images was performed through the abdomen and pelvis. .   Images were reconstructed in the axial and coronal and sagittal planes.  Comparison: February 7, 2020 Findings: The visualized basal lungs show no focal Diagnosis Date Noted    Pain following surgery or procedure [G89.18] 08/17/2020    Pain of jejunostomy tube site [facility]) [K94.19] 08/17/2020    Irritation around percutaneous endoscopic gastrostomy (PEG) tube site [facility]) [K94.29] 08/17/2020    Chronic kidney disease [N18.9] 08/17/2020    History of small bowel obstruction [Z87.19] 08/17/2020    Feeding tube dysfunction [T85.598A] 08/16/2020    Morbid obesity due to excess calories ([facility] [address] [E66.01] 07/06/2016       Spoke to surgical trauma team, suggesting for wound care, no plan for re-changing the G-tube, watchful observation is the plan. She has chronic abdominal pain/visceral hyperalgesia due to possible underlying gastroparesis , probable diverticulitis, since her issue with her GI obstruction several months ago but seem to be stable, taking low-dose oxycodone for other chronic pain. She also has chronic psychological disease with anxiety and sleep disorders and takes temazepam and Xanax as needed. It seemed that she has more acute wound/skin irritation pain, exacerbation of underlying visceral hyperalgesia due to possibly underlying bile inflammation and diverticulitis findings and the underlying cellulitis. .  She has also a lot of muscle spasm in that areas. Unfortunately she is not candidate for Toradol due to her kidney disease, she is also developed a lot of itching to morphine. I suggested for the below treatment plan and reevaluate response to the current plan next 24 hours    She was seen by infectious disease for her underlying probable cellulitis who continued on Zosyn, she has also probable diverticulitis noted on the CAT scan above, receiving antibiotic as well which also will be helpful treating underlying inflammation caused by the infection. Jann Pond       RECOMMENDATION:  SEE ORDERS    We will start Dilaudid 1 mg every 4 hours for severe pain around the area  Start scheduled liquid Tylenol 500 mg every 6 hours scheduled  We will start her

## 2020-08-17 NOTE — CONSULTS
Admin Instructions      prazosin (MINIPRESS) 1 MG capsule Take 1 mg by mouth nightly      oxyCODONE-acetaminophen (PERCOCET) 5-325 MG per tablet Take 1 tablet by mouth every 8 hours as needed.  ondansetron (ZOFRAN) 4 MG tablet Take 4 mg by mouth every 8 hours as needed      omeprazole (PRILOSEC) 40 MG delayed release capsule Take 40 mg by mouth      metoprolol tartrate (LOPRESSOR) 25 MG tablet Take 12.5 mg by mouth 2 times daily      meclizine (ANTIVERT) 25 MG tablet Take 25 mg by mouth 2 times daily      albuterol sulfate  (90 Base) MCG/ACT inhaler Inhale 2 puffs into the lungs every 4 hours as needed      potassium chloride (KLOR-CON) 10 MEQ extended release tablet Take 20 mEq by mouth 2 times daily Once in morning and once at night      melatonin 1 MG tablet Take 5 mg by mouth nightly      Magnesium Oxide (MAG- PO) Take 400 mg by mouth daily      docusate sodium (COLACE) 100 MG capsule Take 100 mg by mouth 2 times daily      cyclobenzaprine (FLEXERIL) 5 MG tablet Take 2 tablets by mouth 3 times daily as needed for Muscle spasms 8 tablet 0    atorvastatin (LIPITOR) 40 MG tablet Take 1 tablet by mouth nightly 30 tablet 3    fluticasone (FLONASE) 50 MCG/ACT nasal spray 2 sprays by Each Nostril route daily      polyethylene glycol (GLYCOLAX) packet Take 17 g by mouth daily      dicyclomine (BENTYL) 10 MG capsule Take 1 capsule by mouth every 6 hours as needed (cramps) 10 capsule 0    apixaban (ELIQUIS) 5 MG TABS tablet Take 10 mg by mouth 2 times daily      amitriptyline (ELAVIL) 100 MG tablet Take 100 mg by mouth nightly      levETIRAcetam (KEPPRA) 500 MG tablet Take 2 tablets by mouth 2 times daily 60 tablet 0    zolpidem (AMBIEN) 10 MG tablet Take 10 mg by mouth nightly as needed for Sleep. Sree Sarabia LORazepam (ATIVAN) 0.5 MG tablet Take 0.5 mg by mouth daily as needed for Anxiety. Sree Sarabia budesonide-formoterol (SYMBICORT) 80-4.5 MCG/ACT AERO Inhale 2 puffs into the lungs 2 times daily as needed       propranolol (INDERAL LA) 60 MG CR capsule Take 1 capsule by mouth daily (Patient taking differently: Take 120 mg by mouth daily ) 30 capsule 3    ARIPiprazole (ABILIFY) 15 MG tablet Take 15 mg by mouth daily      ALPRAZolam (XANAX) 0.5 MG tablet Take 0.5 mg by mouth 2 times daily. Luiz Chen PARoxetine (PAXIL) 40 MG tablet Take 40 mg by mouth every morning       topiramate (TOPAMAX) 200 MG tablet Take 200 mg by mouth 2 times daily      traZODone (DESYREL) 50 MG tablet Take 300 mg by mouth nightly       fexofenadine (ALLEGRA) 60 MG tablet Take 60 mg by mouth 2 times daily      EPINEPHrine 1 mg/mL injection Inject 0.2 mg into the skin as needed      nitroGLYCERIN (NITROSTAT) 0.4 MG SL tablet Place 0.4 mg under the tongue every 5 minutes as needed for Chest pain Dissolve 1 tablet under tongue for chest pain and repeat every 5 min up to max of 3 total doses. If no relief after 3 doses call 911         Review Of Systems:      Constitutional: Negative for  weight loss  HENT: Negative for congestion, facial swelling and bloody nose  Eyes: Negative for  vision changes  Respiratory: Negative for shortness of breath, difficulty breathing  Cardiovascular: Negative for chest wall pain. Gastrointestinal: Negative for abdominal distention, abdominal pain and vomiting. Positive for pain, redness, and purulent drainage from PEG site. Abdominal fistula w/ no complaints, well healing per patient. Genitourinary: Negative for  hematuria  Musculoskeletal: Negative for gait difficulties   Skin: Negative for bruising, abrasions  Neurological: Negative for dizziness, weakness and light-headedness. Hematological: Negative for easy bruising/bleeding  Psychiatric/Behavioral: Negative for behavioral problems. Except as noted above the remainder of the review of systems was reviewed and negative.      PHYSICAL EXAM:  Vitals: BP (!) 92/43   Pulse 66   Temp 97.5 °F (36.4 °C) (Oral)   Resp 17   Ht 5' 5\" (1.651 m)   Wt 243 lb 12.8 oz (110.6 kg)   SpO2 96%   BMI 40.57 kg/m²   Constituational: awake, alert, resting comfortably in bed. Cardiovascular: Regular rate and rhythm. Pulmonary: Clear to auscultation bilaterally. No acute distress or labored breathing. Symmetric chest rise. Abdominal: Soft. Non-distended. Non-tender. PEG tube site with erythema and edema surrounding site. Purulent drainage noted from site as well. History of suprapubic/colonic fistula through the skin. Appears to be well healing. Patient denies complaints about it at this time. Musculoskeletal: Good ROM in all extremities. Neurological: Alert, awake, and oriented x 3. Motor and sensory grossly intact. GCS of 15. No focal deficits.        BASIC LABS:  CBC with Differential:    Lab Results   Component Value Date    WBC 8.8 08/17/2020    RBC 3.81 08/17/2020    HGB 11.5 08/17/2020    HCT 34.5 08/17/2020     08/17/2020    MCV 90.7 08/17/2020    MCH 30.1 08/17/2020    MCHC 33.2 08/17/2020    RDW 17.5 08/17/2020    BANDSPCT 2 09/03/2018    LYMPHOPCT 17.2 08/16/2020    MONOPCT 6.0 08/16/2020    BASOPCT 0.6 08/16/2020    MONOSABS 0.6 08/16/2020    LYMPHSABS 1.6 08/16/2020    EOSABS 0.3 08/16/2020    BASOSABS 0.1 08/16/2020     CMP:    Lab Results   Component Value Date     08/17/2020    K 5.4 08/17/2020     08/17/2020    CO2 26 08/17/2020    BUN 19 08/17/2020    CREATININE 1.24 08/17/2020    GFRAA 56.5 08/17/2020    LABGLOM 46.7 08/17/2020    GLUCOSE 97 08/17/2020    PROT 6.9 08/16/2020    LABALBU 3.9 08/16/2020    CALCIUM 8.6 08/17/2020    BILITOT <0.2 08/16/2020    ALKPHOS 146 08/16/2020    AST 15 08/16/2020    ALT 11 08/16/2020     Magnesium:  Lab Results   Component Value Date    MG 1.9 01/30/2019     Troponin:    Lab Results   Component Value Date    TROPONINI <0.010 06/12/2019         IMAGING:    N/A    ASSESSMENT/RECOMMENDATIONS:  Patient is a 40 y/o F w/ PMH asthma CVA CKD depression seizures bipolar (full remission), who presented to [facility] ([facility]) with complaints of erythema, edema, and purulent drainage from PEG site x4 days. Reports fever and chills. 1. Recommend local wound care for irritation caused by bile   2. Make sure bumper of PEG remains tight in position to skin   3. Apply recommended parveen around PEG site with loose split gauze over top. 4. Ok to use PEG if necessary. 5. Would recommend PO intake over PEG tube use if able. 6. Will continue to follow. 7. No acute surgical intervention. Francisco Santa PA-C  Emergency General Surgery  [phone] ([phone])  [phone]      Teaching Physician Note:  I have personally performed a face to face diagnostic  evaluation on this patient. I have reviewed and agree with the care plan. History and exam by me demonstrates:  Functional PEG tube, flushes without back flow  Erythema around peg does not look cellulitis to be, but dermal irritation from bile leakage from tube  Unclear regarding diveritculitis? But does not appear ill. Perhaps periprocedureal air from recent PEG. Was done at [facility], reports unavail    Plan/MDM:  1. Agree with wound care, patient needs wound barrier cream applied liberally until skin can heal.  This is equivalent to diaper rash on baby from wet skin/caustic drainage  2. Her new G tube is balloon/bumper type. It must be kept tight to the skin or will leak. It is counterintuitive, because as the skin gets irritated, caregivers naturally loosen for her comfort, however this leads to more leakage. Bumper should be kept snug.    Will follow for improvement  No surgical intervention    Shereen Ormond MD  Trauma Surgery/Critical Care  [phone]

## 2020-08-17 NOTE — PROGRESS NOTES
Admission assessment completed. Pt is alert and oriented x4, calm, cooperative. PERRLA, Neuro assessment only showed numbness to the RLE and RUE. Heart and Lung sounds WDL. Bowel sounds hypoactive x4 quadrants. She is up to the commode with a standby because she said  Pt has redness to midline abdomen where there is a surgical scar. She also has redness under her right breast which I was able to ask the doctor for some miconazole powder for that which he did order. She also has excoriation and redness around her g tube site which is leaking brown drainage. Pt had a c/o 8/10 pain and also itchiness and was medicated with PRN morphine and benadryl. Pt has no requests at this time, resting comfortably in bed, will continue to monitor. 0530- pt g-tube dressing changed for second time, foul smelling yellow/brown drainage saturated her dressing. Replaced dressing with ABD, Split and hypoallergenic tape.

## 2020-08-17 NOTE — PROGRESS NOTES
Wound Ostomy Continence Nurse  Consult Note       NAME:  Rambo Dallas  MEDICAL RECORD NUMBER:  [id number]  AGE: 39 y.o. GENDER: female  : 1975  TODAY'S DATE:  2020    Subjective   Reason for [address] Nurse Evaluation and Assessment: Rash around peg site      Rambo Dallas is a 39 y.o. female referred by:   [x] Physician  [] Nursing  [] Other:     Wound Identification:  Wound Type: Erosion / fungal rash  Contributing Factors: obesity and leaking at peg site    Wound History: Per patient, had peg tube replaced last week. Since the peg tube replacement, patient reports the peg tube feels loose and contents have been leaking at the insertion site.   Current Wound Care Treatment:  Recommendin) surgical consulation to evaluate the anchor/securement of the peg 2) ET Mix with nystatin to the erosion/fungal rash at peg insertion site    Patient Goal of Care:  [x] Wound Healing  [] Odor Control  [] Palliative Care  [] Pain Control   [] Other:         PAST MEDICAL HISTORY        Diagnosis Date    Asthma     Cerebral artery occlusion with cerebral infarction (HCC)     Chronic back pain     Chronic kidney disease     Depression     Headache     Kidney disease     Kidney stone     Seizures ([address] 2016    Suprapubic catheter ([address] 2018       PAST SURGICAL HISTORY    Past Surgical History:   Procedure Laterality Date    APPENDECTOMY      BACK SURGERY      CHOLECYSTECTOMY      [facility]  2019    [facility]    HYSTERECTOMY      KNEE SURGERY Bilateral     OTHER SURGICAL HISTORY      sup/pub cath 2018       FAMILY HISTORY    Family History   Problem Relation Age of Onset    Cancer Father     Cancer Paternal Aunt        SOCIAL HISTORY    Social History     Tobacco Use    Smoking status: Never Smoker    Smokeless tobacco: Never Used   Substance Use Topics    Alcohol use: No     Alcohol/week: 0.0 standard drinks    Drug use: No       ALLERGIES    Allergies   Allergen Reactions    Latex Anaphylaxis    Ciprofloxacin Anaphylaxis    Shellfish-Derived Products Anaphylaxis    Daypro [Oxaprozin] Hives    Iodides Hives     Other reaction(s): Swelling of Lip/Tongue/Throat  Other reaction(s): Swelling of Lip/Tongue/Throat    Iodine     Lidocaine      Other reaction(s): itchy, swelling    Macrobid [Nitrofurantoin Monohyd Macro] Nausea Only    Macrolides And Ketolides      Rash    Other Other (See Comments)     Irritates skin    Penicillins     Pollen Extract     Propranolol Other (See Comments)     Bumps     Propranolol Hcl Other (See Comments)     Bumps     Tape [Adhesive Tape] Other (See Comments)     Irritates skin      Tegretol [Carbamazepine] Hives    Tizanidine Hives    Toradol [Ketorolac Tromethamine] Swelling     Ok to give with benadryl     Tramadol Swelling     Per patient    Zanaflex [Tizanidine Hcl] Hives    Estrogens Nausea And Vomiting    Lamictal [Lamotrigine] Anxiety and Other (See Comments)    Lyrica [Pregabalin] Other (See Comments)    Tylenol [Acetaminophen] Nausea And Vomiting    Vicodin [Hydrocodone-Acetaminophen] Nausea And Vomiting       MEDICATIONS    No current facility-administered medications on file prior to encounter. Current Outpatient Medications on File Prior to Encounter   Medication Sig Dispense Refill    scopolamine (TRANSDERM-SCOP) transdermal patch Place 1 patch onto the skin See Admin Instructions      prazosin (MINIPRESS) 1 MG capsule Take 1 mg by mouth nightly      oxyCODONE-acetaminophen (PERCOCET) 5-325 MG per tablet Take 1 tablet by mouth every 8 hours as needed.       ondansetron (ZOFRAN) 4 MG tablet Take 4 mg by mouth every 8 hours as needed      omeprazole (PRILOSEC) 40 MG delayed release capsule Take 40 mg by mouth      metoprolol tartrate (LOPRESSOR) 25 MG tablet Take 12.5 mg by mouth 2 times daily      meclizine (ANTIVERT) 25 MG tablet Take 25 mg by mouth 2 times daily      albuterol sulfate  (90 Base) MCG/ACT inhaler Inhale 2 puffs into the lungs every 4 hours as needed      potassium chloride (KLOR-CON) 10 MEQ extended release tablet Take 20 mEq by mouth 2 times daily Once in morning and once at night      melatonin 1 MG tablet Take 5 mg by mouth nightly      Magnesium Oxide (MAG- PO) Take 400 mg by mouth daily      docusate sodium (COLACE) 100 MG capsule Take 100 mg by mouth 2 times daily      cyclobenzaprine (FLEXERIL) 5 MG tablet Take 2 tablets by mouth 3 times daily as needed for Muscle spasms 8 tablet 0    atorvastatin (LIPITOR) 40 MG tablet Take 1 tablet by mouth nightly 30 tablet 3    fluticasone (FLONASE) 50 MCG/ACT nasal spray 2 sprays by Each Nostril route daily      polyethylene glycol (GLYCOLAX) packet Take 17 g by mouth daily      dicyclomine (BENTYL) 10 MG capsule Take 1 capsule by mouth every 6 hours as needed (cramps) 10 capsule 0    apixaban (ELIQUIS) 5 MG TABS tablet Take 10 mg by mouth 2 times daily      amitriptyline (ELAVIL) 100 MG tablet Take 100 mg by mouth nightly      levETIRAcetam (KEPPRA) 500 MG tablet Take 2 tablets by mouth 2 times daily 60 tablet 0    zolpidem (AMBIEN) 10 MG tablet Take 10 mg by mouth nightly as needed for Sleep. Farheen Regan LORazepam (ATIVAN) 0.5 MG tablet Take 0.5 mg by mouth daily as needed for Anxiety. Farheen Regan budesonide-formoterol (SYMBICORT) 80-4.5 MCG/ACT AERO Inhale 2 puffs into the lungs 2 times daily as needed       propranolol (INDERAL LA) 60 MG CR capsule Take 1 capsule by mouth daily (Patient taking differently: Take 120 mg by mouth daily ) 30 capsule 3    ARIPiprazole (ABILIFY) 15 MG tablet Take 15 mg by mouth daily      ALPRAZolam (XANAX) 0.5 MG tablet Take 0.5 mg by mouth 2 times daily.  Farheen Regan PARoxetine (PAXIL) 40 MG tablet Take 40 mg by mouth every morning       topiramate (TOPAMAX) 200 MG tablet Take 200 mg by mouth 2 times daily      traZODone (DESYREL) 50 MG tablet Take 300 mg by mouth nightly       fexofenadine (ALLEGRA) 60 MG of Care:  See above    Specialty Bed Required : N/A   [] Low Air Loss   [] Pressure Redistribution  [] Fluid Immersion  [] Bariatric  [] Other:     Current Diet: Diet NPO Effective Now  Dietician consult:  N/A    Discharge Plan:  Placement for patient upon discharge: home with support    Patient appropriate for Outpatient [address] [place] [address] Road: N/A    Referrals:  []   [] [address] [facility]  [] Supplies  [] Other    Patient/Caregiver Teaching:  Level of patient/caregiver understanding able to:   [] Indicates understanding       [] Needs reinforcement  [] Unsuccessful      [] Verbal Understanding  [] Demonstrated understanding       [] No evidence of learning  [] Refused teaching         [x] N/A       Electronically signed by Theopolis Schlatter, BSN, RN, Prem Leroy on 8/17/2020 at 2:44 PM
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
## 2020-08-17 NOTE — PROGRESS NOTES
Physician Progress Note      PATIENT:               Garland Villatoro  CSN #:                  [id number]  :                       1975  ADMIT DATE:       2020 5:40 PM  [address] [place] DATE:  RESPONDING  PROVIDER #:        Saud Escobedo MD          QUERY TEXT:    Patient admitted with abdominal wall cellulitis, noted to have VANESSA with CKD,   Stage unspecified If possible, please document in progress notes and discharge   summary if you are evaluating and/or treating any of the following: The medical record reflects the following:  Risk Factors: CVA, asthma, G-tube, Seizures  Clinical Indicators: admission sCr/GFR 1.34/42.7 & 1.24/46.7  Treatment: IVFB 1L, IVF, monitor renal function, avoid nephrotoxins  Options provided:  -- VANESSA on CKD Stage 2 GFR 60-90  -- VANESSA on CKD Stage 3 GFR 30-59  -- VANESSA ruled out, CKD Stage, please specify 1-5  -- CKD ruled out, VANESSA only  -- Other - I will add my own diagnosis  -- Disagree - Not applicable / Not valid  -- Disagree - Clinically unable to determine / Unknown  -- Refer to Clinical Documentation Reviewer    PROVIDER RESPONSE TEXT:    This patient has VANESSA on CKD Stage 2.     Query created by: Susan Zuniga on 2020 8:39 AM      Electronically signed by:  Saud Escobedo MD 2020 1:09 PM

## 2020-08-18 ENCOUNTER — ANESTHESIA (OUTPATIENT)
Dept: ONCOLOGY | Age: 45
DRG: 252 | End: 2020-08-18
Payer: MEDICARE

## 2020-08-18 ENCOUNTER — APPOINTMENT (OUTPATIENT)
Dept: GENERAL RADIOLOGY | Age: 45
DRG: 252 | End: 2020-08-18
Payer: MEDICARE

## 2020-08-18 ENCOUNTER — APPOINTMENT (OUTPATIENT)
Dept: CT IMAGING | Age: 45
DRG: 252 | End: 2020-08-18
Payer: MEDICARE

## 2020-08-18 ENCOUNTER — ANESTHESIA EVENT (OUTPATIENT)
Dept: ONCOLOGY | Age: 45
DRG: 252 | End: 2020-08-18
Payer: MEDICARE

## 2020-08-18 LAB
ALBUMIN SERPL-MCNC: 3.1 G/DL (ref 3.5–4.6)
ALP BLD-CCNC: 139 U/L (ref 40–130)
ALT SERPL-CCNC: 17 U/L (ref 0–33)
ANION GAP SERPL CALCULATED.3IONS-SCNC: 7 MEQ/L (ref 9–15)
AST SERPL-CCNC: 20 U/L (ref 0–35)
BASE EXCESS ARTERIAL: 3 (ref -3–3)
BILIRUB SERPL-MCNC: 0.4 MG/DL (ref 0.2–0.7)
BILIRUBIN DIRECT: <0.2 MG/DL (ref 0–0.4)
BILIRUBIN, INDIRECT: ABNORMAL MG/DL (ref 0–0.6)
BUN BLDV-MCNC: 15 MG/DL (ref 6–20)
CALCIUM IONIZED: 1.14 MMOL/L (ref 1.12–1.32)
CALCIUM SERPL-MCNC: 7.8 MG/DL (ref 8.5–9.9)
CHLORIDE BLD-SCNC: 102 MEQ/L (ref 95–107)
CO2: 27 MEQ/L (ref 20–31)
CREAT SERPL-MCNC: 1.25 MG/DL (ref 0.5–0.9)
D DIMER: 2.22 MG/L FEU (ref 0–0.5)
EKG ATRIAL RATE: 88 BPM
EKG P AXIS: 59 DEGREES
EKG P-R INTERVAL: 164 MS
EKG Q-T INTERVAL: 392 MS
EKG QRS DURATION: 88 MS
EKG QTC CALCULATION (BAZETT): 474 MS
EKG R AXIS: 90 DEGREES
EKG T AXIS: 85 DEGREES
EKG VENTRICULAR RATE: 88 BPM
GFR AFRICAN AMERICAN: 56
GFR AFRICAN AMERICAN: >60
GFR NON-AFRICAN AMERICAN: 46.3
GFR NON-AFRICAN AMERICAN: 60
GLUCOSE BLD-MCNC: 102 MG/DL (ref 60–115)
GLUCOSE BLD-MCNC: 143 MG/DL (ref 70–99)
HCO3 ARTERIAL: 27.5 MMOL/L (ref 21–29)
HCT VFR BLD CALC: 32 % (ref 37–47)
HEMOGLOBIN: 10.2 GM/DL (ref 12–16)
HEMOGLOBIN: 10.7 G/DL (ref 12–16)
LACTATE: 1.05 MMOL/L (ref 0.4–2)
LIPASE: 17 U/L (ref 12–95)
MCH RBC QN AUTO: 30.4 PG (ref 27–31.3)
MCHC RBC AUTO-ENTMCNC: 33.5 % (ref 33–37)
MCV RBC AUTO: 90.8 FL (ref 82–100)
O2 SAT, ARTERIAL: 100 % (ref 93–100)
PCO2 ARTERIAL: 40 MM HG (ref 35–45)
PDW BLD-RTO: 17.4 % (ref 11.5–14.5)
PERFORMED ON: ABNORMAL
PH ARTERIAL: 7.45 (ref 7.35–7.45)
PLATELET # BLD: 255 K/UL (ref 130–400)
PO2 ARTERIAL: 496 MM HG (ref 75–108)
POC CHLORIDE: 103 MEQ/L (ref 99–110)
POC CREATININE: 1 MG/DL (ref 0.6–1.1)
POC FIO2: 100
POC HEMATOCRIT: 30 % (ref 36–48)
POC POTASSIUM: 4 MEQ/L (ref 3.5–5.1)
POC SAMPLE TYPE: ABNORMAL
POC SODIUM: 139 MEQ/L (ref 136–145)
POTASSIUM REFLEX MAGNESIUM: 4.3 MEQ/L (ref 3.4–4.9)
PROCALCITONIN: 0.06 NG/ML (ref 0–0.15)
RBC # BLD: 3.52 M/UL (ref 4.2–5.4)
SODIUM BLD-SCNC: 136 MEQ/L (ref 135–144)
TCO2 ARTERIAL: 29 (ref 22–29)
TOTAL PROTEIN: 6.1 G/DL (ref 6.3–8)
WBC # BLD: 7.3 K/UL (ref 4.8–10.8)

## 2020-08-18 PROCEDURE — 2500000003 HC RX 250 WO HCPCS: Performed by: INTERNAL MEDICINE

## 2020-08-18 PROCEDURE — 0BH17EZ INSERTION OF ENDOTRACHEAL AIRWAY INTO TRACHEA, VIA NATURAL OR ARTIFICIAL OPENING: ICD-10-PCS | Performed by: INTERNAL MEDICINE

## 2020-08-18 PROCEDURE — 82565 ASSAY OF CREATININE: CPT

## 2020-08-18 PROCEDURE — 83690 ASSAY OF LIPASE: CPT

## 2020-08-18 PROCEDURE — 6360000002 HC RX W HCPCS: Performed by: NURSE PRACTITIONER

## 2020-08-18 PROCEDURE — 2000000000 HC ICU R&B

## 2020-08-18 PROCEDURE — 82803 BLOOD GASES ANY COMBINATION: CPT

## 2020-08-18 PROCEDURE — 84295 ASSAY OF SERUM SODIUM: CPT

## 2020-08-18 PROCEDURE — 82330 ASSAY OF CALCIUM: CPT

## 2020-08-18 PROCEDURE — 2580000003 HC RX 258: Performed by: INTERNAL MEDICINE

## 2020-08-18 PROCEDURE — 6360000002 HC RX W HCPCS: Performed by: ANESTHESIOLOGY

## 2020-08-18 PROCEDURE — 85027 COMPLETE CBC AUTOMATED: CPT

## 2020-08-18 PROCEDURE — 71275 CT ANGIOGRAPHY CHEST: CPT

## 2020-08-18 PROCEDURE — 94002 VENT MGMT INPAT INIT DAY: CPT

## 2020-08-18 PROCEDURE — 31500 INSERT EMERGENCY AIRWAY: CPT

## 2020-08-18 PROCEDURE — 82435 ASSAY OF BLOOD CHLORIDE: CPT

## 2020-08-18 PROCEDURE — 71045 X-RAY EXAM CHEST 1 VIEW: CPT

## 2020-08-18 PROCEDURE — 85014 HEMATOCRIT: CPT

## 2020-08-18 PROCEDURE — 6360000004 HC RX CONTRAST MEDICATION: Performed by: INTERNAL MEDICINE

## 2020-08-18 PROCEDURE — 31500 INSERT EMERGENCY AIRWAY: CPT | Performed by: NURSE ANESTHETIST, CERTIFIED REGISTERED

## 2020-08-18 PROCEDURE — 2580000003 HC RX 258: Performed by: NURSE PRACTITIONER

## 2020-08-18 PROCEDURE — 80048 BASIC METABOLIC PNL TOTAL CA: CPT

## 2020-08-18 PROCEDURE — 6370000000 HC RX 637 (ALT 250 FOR IP): Performed by: INTERNAL MEDICINE

## 2020-08-18 PROCEDURE — 74150 CT ABDOMEN W/O CONTRAST: CPT

## 2020-08-18 PROCEDURE — 6360000002 HC RX W HCPCS: Performed by: INTERNAL MEDICINE

## 2020-08-18 PROCEDURE — 36600 WITHDRAWAL OF ARTERIAL BLOOD: CPT

## 2020-08-18 PROCEDURE — 99232 SBSQ HOSP IP/OBS MODERATE 35: CPT | Performed by: INTERNAL MEDICINE

## 2020-08-18 PROCEDURE — 5A1935Z RESPIRATORY VENTILATION, LESS THAN 24 CONSECUTIVE HOURS: ICD-10-PCS | Performed by: INTERNAL MEDICINE

## 2020-08-18 PROCEDURE — 84145 PROCALCITONIN (PCT): CPT

## 2020-08-18 PROCEDURE — 80076 HEPATIC FUNCTION PANEL: CPT

## 2020-08-18 PROCEDURE — 6370000000 HC RX 637 (ALT 250 FOR IP): Performed by: ANESTHESIOLOGY

## 2020-08-18 PROCEDURE — 93005 ELECTROCARDIOGRAM TRACING: CPT | Performed by: INTERNAL MEDICINE

## 2020-08-18 PROCEDURE — 36415 COLL VENOUS BLD VENIPUNCTURE: CPT

## 2020-08-18 PROCEDURE — 83605 ASSAY OF LACTIC ACID: CPT

## 2020-08-18 PROCEDURE — 99231 SBSQ HOSP IP/OBS SF/LOW 25: CPT | Performed by: SURGERY

## 2020-08-18 PROCEDURE — 85379 FIBRIN DEGRADATION QUANT: CPT

## 2020-08-18 PROCEDURE — 84132 ASSAY OF SERUM POTASSIUM: CPT

## 2020-08-18 RX ORDER — DIPHENHYDRAMINE HYDROCHLORIDE 50 MG/ML
10 INJECTION INTRAMUSCULAR; INTRAVENOUS EVERY 6 HOURS PRN
Status: DISCONTINUED | OUTPATIENT
Start: 2020-08-18 | End: 2020-08-20 | Stop reason: HOSPADM

## 2020-08-18 RX ORDER — DIPHENHYDRAMINE HYDROCHLORIDE 50 MG/ML
50 INJECTION INTRAMUSCULAR; INTRAVENOUS ONCE
Status: COMPLETED | OUTPATIENT
Start: 2020-08-18 | End: 2020-08-18

## 2020-08-18 RX ORDER — METHYLPREDNISOLONE SODIUM SUCCINATE 40 MG/ML
40 INJECTION, POWDER, LYOPHILIZED, FOR SOLUTION INTRAMUSCULAR; INTRAVENOUS ONCE
Status: COMPLETED | OUTPATIENT
Start: 2020-08-18 | End: 2020-08-18

## 2020-08-18 RX ORDER — POLYVINYL ALCOHOL 14 MG/ML
1 SOLUTION/ DROPS OPHTHALMIC EVERY 4 HOURS
Status: DISCONTINUED | OUTPATIENT
Start: 2020-08-18 | End: 2020-08-19

## 2020-08-18 RX ORDER — ETOMIDATE 2 MG/ML
INJECTION INTRAVENOUS
Status: COMPLETED | OUTPATIENT
Start: 2020-08-18 | End: 2020-08-18

## 2020-08-18 RX ORDER — PROPOFOL 10 MG/ML
10 INJECTION, EMULSION INTRAVENOUS CONTINUOUS
Status: DISCONTINUED | OUTPATIENT
Start: 2020-08-18 | End: 2020-08-19

## 2020-08-18 RX ORDER — CHLORHEXIDINE GLUCONATE 0.12 MG/ML
15 RINSE ORAL 2 TIMES DAILY
Status: DISCONTINUED | OUTPATIENT
Start: 2020-08-18 | End: 2020-08-19

## 2020-08-18 RX ORDER — MINERAL OIL AND WHITE PETROLATUM 150; 830 MG/G; MG/G
OINTMENT OPHTHALMIC EVERY 4 HOURS
Status: DISCONTINUED | OUTPATIENT
Start: 2020-08-18 | End: 2020-08-19

## 2020-08-18 RX ORDER — SUCCINYLCHOLINE/SOD CL,ISO/PF 100 MG/5ML
SYRINGE (ML) INTRAVENOUS
Status: COMPLETED | OUTPATIENT
Start: 2020-08-18 | End: 2020-08-18

## 2020-08-18 RX ORDER — DIPHENHYDRAMINE HCL 25 MG
25 TABLET ORAL EVERY 8 HOURS PRN
Status: DISCONTINUED | OUTPATIENT
Start: 2020-08-18 | End: 2020-08-18

## 2020-08-18 RX ADMIN — DIPHENHYDRAMINE HYDROCHLORIDE 10 MG: 50 INJECTION, SOLUTION INTRAMUSCULAR; INTRAVENOUS at 06:05

## 2020-08-18 RX ADMIN — FAMOTIDINE 20 MG: 10 INJECTION INTRAVENOUS at 20:59

## 2020-08-18 RX ADMIN — MINERAL OIL, PETROLATUM: 425; 573 OINTMENT OPHTHALMIC at 20:30

## 2020-08-18 RX ADMIN — HYDROMORPHONE HYDROCHLORIDE 1 MG: 1 INJECTION, SOLUTION INTRAMUSCULAR; INTRAVENOUS; SUBCUTANEOUS at 02:17

## 2020-08-18 RX ADMIN — Medication 10 ML: at 08:27

## 2020-08-18 RX ADMIN — HYDROMORPHONE HYDROCHLORIDE 1 MG: 1 INJECTION, SOLUTION INTRAMUSCULAR; INTRAVENOUS; SUBCUTANEOUS at 12:27

## 2020-08-18 RX ADMIN — IOPAMIDOL 100 ML: 612 INJECTION, SOLUTION INTRAVENOUS at 23:12

## 2020-08-18 RX ADMIN — LIDOCAINE 4%: 4 CREAM TOPICAL at 12:27

## 2020-08-18 RX ADMIN — POLYVINYL ALCOHOL 1 DROP: 14 SOLUTION/ DROPS OPHTHALMIC at 19:30

## 2020-08-18 RX ADMIN — PIPERACILLIN AND TAZOBACTAM 3.38 G: 3; .375 INJECTION, POWDER, FOR SOLUTION INTRAVENOUS at 02:17

## 2020-08-18 RX ADMIN — ENOXAPARIN SODIUM 40 MG: 40 INJECTION SUBCUTANEOUS at 22:39

## 2020-08-18 RX ADMIN — LIDOCAINE 4%: 4 CREAM TOPICAL at 23:39

## 2020-08-18 RX ADMIN — ORPHENADRINE CITRATE 60 MG: 30 INJECTION INTRAMUSCULAR; INTRAVENOUS at 06:05

## 2020-08-18 RX ADMIN — CHLORHEXIDINE GLUCONATE 15 ML: 1.2 RINSE ORAL at 20:59

## 2020-08-18 RX ADMIN — VANCOMYCIN HYDROCHLORIDE 1500 MG: 5 INJECTION, POWDER, LYOPHILIZED, FOR SOLUTION INTRAVENOUS at 22:39

## 2020-08-18 RX ADMIN — Medication 10 ML: at 23:40

## 2020-08-18 RX ADMIN — LIDOCAINE 4%: 4 CREAM TOPICAL at 08:27

## 2020-08-18 RX ADMIN — MICONAZOLE NITRATE: 2 POWDER TOPICAL at 23:38

## 2020-08-18 RX ADMIN — Medication 100 MG: at 18:28

## 2020-08-18 RX ADMIN — DIPHENHYDRAMINE HYDROCHLORIDE 50 MG: 50 INJECTION, SOLUTION INTRAMUSCULAR; INTRAVENOUS at 21:12

## 2020-08-18 RX ADMIN — ETOMIDATE 20 MG: 2 INJECTION, SOLUTION INTRAVENOUS at 18:27

## 2020-08-18 RX ADMIN — HYDROMORPHONE HYDROCHLORIDE 1 MG: 1 INJECTION, SOLUTION INTRAMUSCULAR; INTRAVENOUS; SUBCUTANEOUS at 08:26

## 2020-08-18 RX ADMIN — ACETAMINOPHEN ORAL SOLUTION 500 MG: 325 SOLUTION ORAL at 21:00

## 2020-08-18 RX ADMIN — LEVETIRACETAM 500 MG: 100 INJECTION, SOLUTION INTRAVENOUS at 15:01

## 2020-08-18 RX ADMIN — Medication: at 23:38

## 2020-08-18 RX ADMIN — SODIUM CHLORIDE: 9 INJECTION, SOLUTION INTRAVENOUS at 15:00

## 2020-08-18 RX ADMIN — LEVETIRACETAM 500 MG: 100 INJECTION, SOLUTION INTRAVENOUS at 00:30

## 2020-08-18 RX ADMIN — METHYLPREDNISOLONE SODIUM SUCCINATE 40 MG: 40 INJECTION, POWDER, FOR SOLUTION INTRAMUSCULAR; INTRAVENOUS at 21:11

## 2020-08-18 RX ADMIN — Medication: at 08:27

## 2020-08-18 RX ADMIN — MINERAL OIL, PETROLATUM: 425; 573 OINTMENT OPHTHALMIC at 23:39

## 2020-08-18 RX ADMIN — ONDANSETRON 4 MG: 2 INJECTION INTRAMUSCULAR; INTRAVENOUS at 15:26

## 2020-08-18 RX ADMIN — PIPERACILLIN AND TAZOBACTAM 3.38 G: 3; .375 INJECTION, POWDER, FOR SOLUTION INTRAVENOUS at 10:49

## 2020-08-18 RX ADMIN — POLYVINYL ALCOHOL 1 DROP: 14 SOLUTION/ DROPS OPHTHALMIC at 23:39

## 2020-08-18 RX ADMIN — HYDROMORPHONE HYDROCHLORIDE 1 MG: 1 INJECTION, SOLUTION INTRAMUSCULAR; INTRAVENOUS; SUBCUTANEOUS at 16:37

## 2020-08-18 RX ADMIN — PROPOFOL 10 MCG/KG/MIN: 10 INJECTION, EMULSION INTRAVENOUS at 18:59

## 2020-08-18 RX ADMIN — MICONAZOLE NITRATE: 2 POWDER TOPICAL at 08:28

## 2020-08-18 ASSESSMENT — ENCOUNTER SYMPTOMS
DIARRHEA: 0
VOMITING: 0
NAUSEA: 0
EYES NEGATIVE: 1
COLOR CHANGE: 1
ABDOMINAL PAIN: 1
RESPIRATORY NEGATIVE: 1

## 2020-08-18 ASSESSMENT — PULMONARY FUNCTION TESTS
PIF_VALUE: 2.5
PIF_VALUE: 26
PIF_VALUE: 28
PIF_VALUE: 28
PIF_VALUE: 33
PIF_VALUE: 30
PIF_VALUE: 27

## 2020-08-18 ASSESSMENT — PAIN SCALES - GENERAL
PAINLEVEL_OUTOF10: 8
PAINLEVEL_OUTOF10: 9
PAINLEVEL_OUTOF10: 10
PAINLEVEL_OUTOF10: 9
PAINLEVEL_OUTOF10: 8
PAINLEVEL_OUTOF10: 5

## 2020-08-18 NOTE — PROGRESS NOTES
Pt brought to ICU room 10. Pt placed on monitors. Vent set up to the wall O2. Second IV started for medications. Propofol started as pt is coughing, trying to sit up, restless  and appears uncomfortable. Arias placed.

## 2020-08-18 NOTE — FLOWSHEET NOTE
1820- I arrived to room to medicate patient. Patient stated she felt like she was going to throw up. I handed her the basin and she started to cough and gag. Patient stated that \"she can't get the stuff up\" then continued to cough. I went to go get suction for the room and when I returned the patient was unconscious, I could not feel a carotid pulse, so I called a code blue.

## 2020-08-18 NOTE — PROGRESS NOTES
Admission assessment completed. Pt is alert and oriented x4, calm, cooperative. PERRLA, Neuro assessment only showed numbness to the RLE and RUE. Heart and Lung sounds WDL. Bowel sounds hypoactive x4 quadrants. Pt does have constant brown drainage from her G tube site. I noticed a couple of times that the split gauze was saturated with blood. I have almost continuously every hour changed her 4x4 dressing because it was saturated. Also the tube has a green sediment at the top and her skin is very excoriated and blistering around the g tube site. She did have c/o of itching so I medicated with vistaril. Pt has no additional requests at this time, resting comfortably in bed, will continue to monitor.      0200- pt has c/o sudden onset 10/10 abdomen pain, she stated that its starting to feel hard and very very tender. She sat up and projectile vomited on her bed, brown emesis. She is crying d/t the pain and requesting a doctor come see her. Christiano Mooney NP notified via perfect serve. She is at bedside to assess. 0330- Pt went down to CT for a CT of her ABD. Also had hepatic function panel and lipase level drawn.

## 2020-08-18 NOTE — PROGRESS NOTES
Order Status: Completed  Specimen: G-Tube  Updated: 08/18/20 0807     Gram Stain Result  Rare WBC's   Few Budding yeast     Organism  YeastAbnormal       WOUND/ABSCESS  --     Light growth   No further workup    Narrative:      ORDER#: [id number]                          ORDERED BY: Toan Sears   SOURCE: G Tube Site                        COLLECTED:  08/17/20 13:35   ANTIBIOTICS AT [facility]                      RECEIVED :  08/17/20 13:35    Culture, Blood 2 [[id number]]   Collected: 08/16/20 1849    Order Status: Completed  Specimen: Blood  Updated: 08/17/20 2215     Culture, Blood 2  No Growth to date. Any change in status will be called. Narrative:      ORDER#: [id number]                          ORDERED BY: SHANTAL Green   SOURCE: Blood                              COLLECTED:  08/16/20 18:49   ANTIBIOTICS AT [facility]                      RECEIVED :  08/16/20 18:49    Culture, Blood 1 [[id number]]   Collected: 08/16/20 1849    Order Status: Completed  Specimen: Blood  Updated: 08/17/20 2215     Blood Culture, Routine  No Growth to date. Any change in status will be called. Narrative:      ORDER#: [id number]                          ORDERED BY: SHANTAL Green   SOURCE: Blood                              COLLECTED:  08/16/20 18:49   ANTIBIOTICS AT [facility]                      RECEIVED :  08/16/20 18:49    Culture, Anaerobic [[id number]]   Collected: 08/17/20 1335    Order Status: Canceled  Specimen: G Tube Site from G-Tube         Ht Readings from Last 1 Encounters:   08/16/20 5' 5\" (1.651 m)        Wt Readings from Last 1 Encounters:   08/16/20 243 lb 12.8 oz (110.6 kg)         Body mass index is 40.57 kg/m². Estimated Creatinine Clearance: 70 mL/min (A) (based on SCr of 1.25 mg/dL (H)). Goal Trough Level: 15-20 mcg/mL    Assessment/Plan:  Will initiate vancomycin 1,500 mg IV every 12 hours based on the patient's weight and renal function.     Obtain trough 30 minutes prior to the 4th dose, on 08/20/20 at 0730.    Thank you for the consult. Pharmacy will continue to follow.     Brianne Bueno, PharmD   8/18/2020 7:34 PM

## 2020-08-18 NOTE — PROGRESS NOTES
Infectious Disease     Patient Name: Bri Jones  Date: 8/18/2020  YOB: 1975  Medical Record Number: [id number]        Abdominal wall cellulitis  Diverticulitis      History of Present Illness:  Coronary disease cerebrovascular disease chronic kidney disease    G-tube was replaced 4 days prior to admission has been having backflow tube feeding abdominal pain nausea vomiting redness around G-tube site        Examination: CT ABDOMEN PELVIS WO CONTRAST         Indication:   redness/pain surrounding G tube site with Vomiting         Technique: Multiple serial axial images was performed through the abdomen and pelvis. .   Images were reconstructed in the axial and coronal and sagittal planes.         Comparison: February 7, 2020         Findings:         The visualized basal lungs show no focal parenchymal abnormalities.         The liver, spleen, pancreas, adrenals, kidneys are unremarkable.         The gallbladder surgically absent.         Large and small bowel show no sign of obstruction.         The appendix is surgically absent.          No diverticulitis.         Uterus is surgically absent.         There is some diastases of the anterior abdominal wall as well as some soft tissue changes about the umbilicus. May represent postsurgical changes. Unchanged.         There is a gastrostomy tube. The tip is within stomach. There is some soft tissue changes about the tube within the subcutaneous soft tissue. No focal fluid collection. No drainable fluid collection.         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 There is contrast seen scattered throughout the large bowel from the cecum to the rectum. In the region of the proximal to midportion of the transverse colon there is some surrounding pericolonic inflammatory stranding. There is a small punctate area of extraluminal air. Findings are that of a acute focal diverticulitis.  Series 2 image 56         No free air.  No free fluid.  The  08/18/2020    K 4.3 08/18/2020     08/18/2020    CO2 27 08/18/2020    BUN 15 08/18/2020    CREATININE 1.25 08/18/2020    GLUCOSE 143 08/18/2020    CALCIUM 7.8 08/18/2020          Culture, Wound [[id number]]   Collected: 08/17/20 1335    Order Status: No result  Specimen: G-Tube  Updated: 08/17/20 1429     Gram Stain Result  Rare WBC's   Few Budding yeast    Narrative:      ORDER#: [id number]                          ORDERED BY: Leslie Dolan   SOURCE: G Tube Site                        COLLECTED:  08/17/20 13:35   ANTIBIOTICS AT [facility] :                      RECEIVED :  08/17/20 13:35    Culture, Anaerobic [[id number]]   Collected: 08/17/20 1335    Order Status: Canceled  Specimen: G Tube Site from G-Tube     Culture, Blood 1 [[id number]]   Collected: 08/16/20 1849    Order Status: Sent  Specimen: Blood  Updated: 08/16/20 1849    Culture, Blood 2 [[id number]]   Collected: 08/16/20 1849    Order Status: Sent  Specimen: Blood           ASSESSMENT:  PLAN:    Abdominal wall cellulitis  Increased abdominal pain  Repeat CT scan shows no difference  Diverticulitis  Continue Zosyn

## 2020-08-18 NOTE — FLOWSHEET NOTE
0830- Patient in pain, refusing tylenol, dilaudid given at this time. Dressing to LUQ was saturated with small amount of blood and yellow drainage. Wound cleansed with NS. Lidocaine ointment, ET mix and split 4x4 applied. G-tube snug to skin. 1230-Dressing to LUQ saturated with moderate amount brown/ yellow drainage. Dressing replaced. G-tube snug to skin. 1530- Dressing to LUQ saturated again with moderate amount of yellow drainage. Dressing replaced. Left side of abdomen seems a little more distended that the right, painful to touch. G-tube snug to skin. Patient cleaned up, hair washed. 1645- Dressing to LUQ saturated and actively draining yellow/brown liquid, patient states that it burns her skin. Drainage appears to be stomach content. G-tube snug to skin.

## 2020-08-18 NOTE — FLOWSHEET NOTE
Spiritual Care Services     Summary of Visit:   responded to Code Blue.   had met with the patient earlier in the day, prayed with patient and patient was alert during prayer in earlier visit, ministry of presence, Parent of patient was contacted,     Spiritual Assessment/Intervention/Outcomes:    Encounter Summary  Services provided to[de-identified] (P) Patient  Referral/Consult From[de-identified] (P) Nurse  Support System: (P) Spouse  Place of Shinto: [facility] Visiting: (P) Yes  Complexity of Encounter: (P) High  Length of Encounter: (P) 30 minutes  Spiritual Assessment Completed: (P) Yes  Routine  Type: Initial  Assessment: Approachable, Calm, Hopeful  Intervention: Nurtured hope, Active listening, Sustaining presence/ Ministry of presence, Provided reading materials/devotional materials  Outcome: Receptive, Expressed gratitude, Comfort  Crisis  Type: (P) Code(Blue)  Assessment: (P) Unable to respond  Intervention: (P) Sustaining presence/ Ministry of presence     Sacraments  Communion: ([address] [place] [address])     Advance Directives (For Healthcare)  Pre-existing DNR Comfort Care/DNR Arrest/DNI Order: No  Healthcare Directive: Yes, patient has an advance directive for healthcare treatment  Type of Healthcare Directive: Durable power of  for health care, Living will  Copy in Chart: No, copy requested from family  Healthcare Agent Appointed: [address] [place] [address] Agent's Name: Luc Alston Agent's Phone Number: [phone]  If you are unable to speak for yourself, does your Healthcare Agent or Legal Spokesperson know your healthcare wishes?: Yes           Values / Beliefs  Do you have any ethnic, cultural, sacramental, or spiritual Anglican needs you would like us to be aware of while you are in the hospital?: No    Care Plan:        [id number] [address]   Electronically signed by Dipesh Warner on 8/18/20 at 6:39 PM EDT     To reach a  for emotional and spiritual support, place an [facility] consult request.   If a  is needed immediately, dial 0 and ask to page the on-call .

## 2020-08-18 NOTE — PROGRESS NOTES
Responded to code blue; patient is unresponsive to sternal rub but is attempting to breath (?Agonal) and has a palpable carotid pulse. Patient was unable to protect her airway; anesthesiology bedside and requested them to intubate asap. Patients HDS with a pre intubation spo2 of 100%. Will put in ventilator orders; consult intensivist; transfer to ICU. Prior to episode the patient was fine then told the RN she was nauseas and needs to throw up. Possibly related to aspiration? PE less likely given patient was not hypoxic or tachycardic or tachypneic. Currently intubated and stable; will continue to monitor. Addendum:  CXR reviewed; new large left sided infiltrate obliterating the cardiac border; likely health care associated pneumonia; already on zosyn; will add Vanc. Over 45 minutes of critical care time were spent on the care of this patient.     luiz alonso

## 2020-08-18 NOTE — PLAN OF CARE
Problem: Pain:  Goal: Pain level will decrease  Description: Pain level will decrease  Outcome: Ongoing  Goal: Control of acute pain  Description: Control of acute pain  Outcome: Ongoing  Goal: Control of chronic pain  Description: Control of chronic pain  Outcome: Ongoing     Problem: Skin Integrity:  Goal: Will show no infection signs and symptoms  Description: Will show no infection signs and symptoms  Outcome: Ongoing  Goal: Absence of new skin breakdown  Description: Absence of new skin breakdown  Outcome: Ongoing     Problem: Falls - Risk of:  Goal: Will remain free from falls  Description: Will remain free from falls  Outcome: Ongoing  Goal: Absence of physical injury  Description: Absence of physical injury  Outcome: Ongoing     Problem: Nutrition  Goal: Optimal nutrition therapy  Outcome: Ongoing

## 2020-08-18 NOTE — PROGRESS NOTES
Hospitalist Progress Note      PCP: Maron Litten    Date of Admission: 8/16/2020    Chief Complaint:    Chief Complaint   Patient presents with    Feeding Tube Problem     pt c/o her G tube inst working, has a rash at insertion site, and vomiting for the past 2 days     Subjective:  Patient is not feeling well; denies fevers, chills, sweats; had vomiting yesterday which she states occurs at home. 12 point ROS negative other than mentioned above     Medications:  Reviewed    Infusion Medications    sodium chloride 100 mL/hr at 08/17/20 2250     Scheduled Medications    nystatin, stomahesive in petrolatum   Topical BID    acetaminophen  500 mg Oral 4x Daily    lidocaine   Topical 4x Daily    orphenadrine  60 mg Intramuscular Q12H    sodium chloride flush  10 mL Intravenous 2 times per day    piperacillin-tazobactam  3.375 g Intravenous Q8H    levetiracetam  500 mg Intravenous Q12H    miconazole   Topical BID     PRN Meds: diphenhydrAMINE, HYDROmorphone, oxyCODONE, sodium chloride flush, polyethylene glycol, promethazine **OR** ondansetron      Intake/Output Summary (Last 24 hours) at 8/18/2020 1427  Last data filed at 8/18/2020 0953  Gross per 24 hour   Intake 360 ml   Output 350 ml   Net 10 ml     Exam:    /67   Pulse 73   Temp 97.9 °F (36.6 °C) (Oral)   Resp 18   Ht 5' 5\" (1.651 m)   Wt 243 lb 12.8 oz (110.6 kg)   SpO2 98%   BMI 40.57 kg/m²     General appearance: No apparent distress, appears stated age and cooperative. HEENT:  Conjunctivae/corneas clear. Neck:  Trachea midline. Respiratory:  Normal respiratory effort. Clear to auscultation  Cardiovascular: Regular rate and rhythm   Abdomen: Erythema across midline incision; erythema surrounding PEG site; some exudation. Musculoskeletal: No clubbing, cyanosis or edema bilaterally.    Neuro: Non Focal  Capillary Refill: Brisk,< 3 seconds   Peripheral Pulses: +2 palpable, equal bilaterally     Labs:   Recent Labs     08/16/20  1800 monitor daily renal function, avoid nephrotoxins  Hx seizure: Switch to liquid keppra   DVT Prophylaxis: Lovenox  Code Status:full    Active Hospital Problems    Diagnosis Date Noted    Pain following surgery or procedure [G89.18] 08/17/2020    Pain of jejunostomy tube site [facility]) [K94.19] 08/17/2020    Irritation around percutaneous endoscopic gastrostomy (PEG) tube site [facility]) [K94.29] 08/17/2020    Chronic kidney disease [N18.9] 08/17/2020    History of small bowel obstruction [Z87.19] 08/17/2020    Feeding tube dysfunction [T85.598A] 08/16/2020    Morbid obesity due to excess calories ([facility] [address] [E66.01] 07/06/2016     Additional work up or/and treatment plan may be added today or then after based on clinical progression. I am managing a portion of pt care. Some medical issues are handled by other specialists. Additional work up and treatment should be done in out pt setting by pt PCP and other out pt providers. In addition to examining and evaluating pt, I spent additional time explaining care, normal and abnormal findings, and treatment plan. All of pt questions were answered. Counseling, diet and education were  provided. Case will be discussed with nursing staff when appropriate. Family will be updated if and when appropriate.       Diet: DIET DENTAL SOFT;    Code Status: Full Code    PT/OT Eval     Electronically signed by Macario Alvarez MD on 8/18/2020 at 2:27 PM

## 2020-08-19 LAB
ANION GAP SERPL CALCULATED.3IONS-SCNC: 14 MEQ/L (ref 9–15)
BUN BLDV-MCNC: 9 MG/DL (ref 6–20)
CALCIUM SERPL-MCNC: 9 MG/DL (ref 8.5–9.9)
CHLORIDE BLD-SCNC: 105 MEQ/L (ref 95–107)
CO2: 21 MEQ/L (ref 20–31)
CREAT SERPL-MCNC: 1.01 MG/DL (ref 0.5–0.9)
GFR AFRICAN AMERICAN: >60
GFR NON-AFRICAN AMERICAN: 59.2
GLUCOSE BLD-MCNC: 178 MG/DL (ref 70–99)
GRAM STAIN RESULT: ABNORMAL
HCT VFR BLD CALC: 36.2 % (ref 37–47)
HEMOGLOBIN: 12.1 G/DL (ref 12–16)
MCH RBC QN AUTO: 29.9 PG (ref 27–31.3)
MCHC RBC AUTO-ENTMCNC: 33.3 % (ref 33–37)
MCV RBC AUTO: 89.7 FL (ref 82–100)
ORGANISM: ABNORMAL
PDW BLD-RTO: 17.1 % (ref 11.5–14.5)
PLATELET # BLD: 304 K/UL (ref 130–400)
POTASSIUM REFLEX MAGNESIUM: 4.1 MEQ/L (ref 3.4–4.9)
RBC # BLD: 4.03 M/UL (ref 4.2–5.4)
SODIUM BLD-SCNC: 140 MEQ/L (ref 135–144)
WBC # BLD: 14.1 K/UL (ref 4.8–10.8)
WOUND/ABSCESS: ABNORMAL

## 2020-08-19 PROCEDURE — 2500000003 HC RX 250 WO HCPCS: Performed by: INTERNAL MEDICINE

## 2020-08-19 PROCEDURE — 85027 COMPLETE CBC AUTOMATED: CPT

## 2020-08-19 PROCEDURE — 6360000002 HC RX W HCPCS: Performed by: INTERNAL MEDICINE

## 2020-08-19 PROCEDURE — 2580000003 HC RX 258: Performed by: INTERNAL MEDICINE

## 2020-08-19 PROCEDURE — 2700000000 HC OXYGEN THERAPY PER DAY

## 2020-08-19 PROCEDURE — 99254 IP/OBS CNSLTJ NEW/EST MOD 60: CPT | Performed by: PSYCHIATRY & NEUROLOGY

## 2020-08-19 PROCEDURE — 80048 BASIC METABOLIC PNL TOTAL CA: CPT

## 2020-08-19 PROCEDURE — 6360000002 HC RX W HCPCS: Performed by: NURSE PRACTITIONER

## 2020-08-19 PROCEDURE — 6370000000 HC RX 637 (ALT 250 FOR IP): Performed by: INTERNAL MEDICINE

## 2020-08-19 PROCEDURE — 99232 SBSQ HOSP IP/OBS MODERATE 35: CPT | Performed by: INTERNAL MEDICINE

## 2020-08-19 PROCEDURE — 6360000002 HC RX W HCPCS: Performed by: ANESTHESIOLOGY

## 2020-08-19 PROCEDURE — 92610 EVALUATE SWALLOWING FUNCTION: CPT

## 2020-08-19 PROCEDURE — 94003 VENT MGMT INPAT SUBQ DAY: CPT

## 2020-08-19 PROCEDURE — 2580000003 HC RX 258: Performed by: NURSE PRACTITIONER

## 2020-08-19 PROCEDURE — 1210000000 HC MED SURG R&B

## 2020-08-19 PROCEDURE — 6370000000 HC RX 637 (ALT 250 FOR IP): Performed by: ANESTHESIOLOGY

## 2020-08-19 PROCEDURE — 99223 1ST HOSP IP/OBS HIGH 75: CPT | Performed by: INTERNAL MEDICINE

## 2020-08-19 PROCEDURE — 99232 SBSQ HOSP IP/OBS MODERATE 35: CPT | Performed by: SURGERY

## 2020-08-19 PROCEDURE — 36415 COLL VENOUS BLD VENIPUNCTURE: CPT

## 2020-08-19 RX ORDER — POLYVINYL ALCOHOL 14 MG/ML
1 SOLUTION/ DROPS OPHTHALMIC PRN
Status: DISCONTINUED | OUTPATIENT
Start: 2020-08-19 | End: 2020-08-19

## 2020-08-19 RX ORDER — MINERAL OIL AND WHITE PETROLATUM 150; 830 MG/G; MG/G
OINTMENT OPHTHALMIC EVERY 4 HOURS
Status: DISCONTINUED | OUTPATIENT
Start: 2020-08-19 | End: 2020-08-19

## 2020-08-19 RX ADMIN — LEVETIRACETAM 500 MG: 100 INJECTION, SOLUTION INTRAVENOUS at 00:13

## 2020-08-19 RX ADMIN — ACETAMINOPHEN ORAL SOLUTION 500 MG: 325 SOLUTION ORAL at 08:05

## 2020-08-19 RX ADMIN — MICONAZOLE NITRATE: 2 POWDER TOPICAL at 08:47

## 2020-08-19 RX ADMIN — LEVETIRACETAM 500 MG: 100 INJECTION, SOLUTION INTRAVENOUS at 13:30

## 2020-08-19 RX ADMIN — PIPERACILLIN AND TAZOBACTAM 3.38 G: 3; .375 INJECTION, POWDER, FOR SOLUTION INTRAVENOUS at 02:20

## 2020-08-19 RX ADMIN — LIDOCAINE 4%: 4 CREAM TOPICAL at 21:21

## 2020-08-19 RX ADMIN — FAMOTIDINE 20 MG: 10 INJECTION INTRAVENOUS at 21:18

## 2020-08-19 RX ADMIN — VANCOMYCIN HYDROCHLORIDE 1500 MG: 5 INJECTION, POWDER, LYOPHILIZED, FOR SOLUTION INTRAVENOUS at 08:05

## 2020-08-19 RX ADMIN — MICONAZOLE NITRATE: 2 POWDER TOPICAL at 21:21

## 2020-08-19 RX ADMIN — FAMOTIDINE 20 MG: 10 INJECTION INTRAVENOUS at 08:04

## 2020-08-19 RX ADMIN — PROPOFOL 20 MCG/KG/MIN: 10 INJECTION, EMULSION INTRAVENOUS at 00:32

## 2020-08-19 RX ADMIN — CHLORHEXIDINE GLUCONATE 15 ML: 1.2 RINSE ORAL at 08:04

## 2020-08-19 RX ADMIN — VANCOMYCIN HYDROCHLORIDE 1500 MG: 5 INJECTION, POWDER, LYOPHILIZED, FOR SOLUTION INTRAVENOUS at 21:09

## 2020-08-19 RX ADMIN — Medication 10 ML: at 08:09

## 2020-08-19 RX ADMIN — LIDOCAINE 4%: 4 CREAM TOPICAL at 13:01

## 2020-08-19 RX ADMIN — SODIUM CHLORIDE: 9 INJECTION, SOLUTION INTRAVENOUS at 13:58

## 2020-08-19 RX ADMIN — OXYCODONE 5 MG: 5 TABLET ORAL at 21:18

## 2020-08-19 RX ADMIN — SODIUM CHLORIDE: 9 INJECTION, SOLUTION INTRAVENOUS at 02:55

## 2020-08-19 RX ADMIN — PIPERACILLIN AND TAZOBACTAM 3.38 G: 3; .375 INJECTION, POWDER, FOR SOLUTION INTRAVENOUS at 21:19

## 2020-08-19 RX ADMIN — Medication: at 21:20

## 2020-08-19 RX ADMIN — Medication: at 08:47

## 2020-08-19 RX ADMIN — Medication 10 ML: at 21:17

## 2020-08-19 RX ADMIN — ENOXAPARIN SODIUM 40 MG: 40 INJECTION SUBCUTANEOUS at 08:05

## 2020-08-19 RX ADMIN — HYDROMORPHONE HYDROCHLORIDE 1 MG: 1 INJECTION, SOLUTION INTRAMUSCULAR; INTRAVENOUS; SUBCUTANEOUS at 23:24

## 2020-08-19 RX ADMIN — DIPHENHYDRAMINE HYDROCHLORIDE 10 MG: 50 INJECTION, SOLUTION INTRAMUSCULAR; INTRAVENOUS at 23:37

## 2020-08-19 RX ADMIN — ACETAMINOPHEN ORAL SOLUTION 500 MG: 325 SOLUTION ORAL at 13:48

## 2020-08-19 RX ADMIN — ORPHENADRINE CITRATE 60 MG: 30 INJECTION INTRAMUSCULAR; INTRAVENOUS at 08:04

## 2020-08-19 RX ADMIN — ACETAMINOPHEN ORAL SOLUTION 500 MG: 325 SOLUTION ORAL at 21:18

## 2020-08-19 RX ADMIN — PIPERACILLIN AND TAZOBACTAM 3.38 G: 3; .375 INJECTION, POWDER, FOR SOLUTION INTRAVENOUS at 10:28

## 2020-08-19 RX ADMIN — LIDOCAINE 4%: 4 CREAM TOPICAL at 08:48

## 2020-08-19 RX ADMIN — OXYCODONE 5 MG: 5 TABLET ORAL at 13:48

## 2020-08-19 ASSESSMENT — PAIN SCALES - GENERAL
PAINLEVEL_OUTOF10: 6
PAINLEVEL_OUTOF10: 9
PAINLEVEL_OUTOF10: 9
PAINLEVEL_OUTOF10: 0
PAINLEVEL_OUTOF10: 2
PAINLEVEL_OUTOF10: 2
PAINLEVEL_OUTOF10: 0
PAINLEVEL_OUTOF10: 9
PAINLEVEL_OUTOF10: 0
PAINLEVEL_OUTOF10: 0
PAINLEVEL_OUTOF10: 8
PAINLEVEL_OUTOF10: 0
PAINLEVEL_OUTOF10: 0
PAINLEVEL_OUTOF10: 9
PAINLEVEL_OUTOF10: 0

## 2020-08-19 ASSESSMENT — PULMONARY FUNCTION TESTS
PIF_VALUE: 42
PIF_VALUE: 32
PIF_VALUE: 30
PIF_VALUE: 27
PIF_VALUE: 14
PIF_VALUE: 27
PIF_VALUE: 27
PIF_VALUE: 36

## 2020-08-19 ASSESSMENT — ENCOUNTER SYMPTOMS
CONSTIPATION: 0
BLOOD IN STOOL: 0
ANAL BLEEDING: 0
DIARRHEA: 0
VOMITING: 0
ABDOMINAL DISTENTION: 0
EYES NEGATIVE: 1
NAUSEA: 0
RESPIRATORY NEGATIVE: 1
BACK PAIN: 0
PHOTOPHOBIA: 0
ABDOMINAL PAIN: 1
CHOKING: 0
TROUBLE SWALLOWING: 0
ALLERGIC/IMMUNOLOGIC NEGATIVE: 1
RECTAL PAIN: 0
SHORTNESS OF BREATH: 0
COLOR CHANGE: 0

## 2020-08-19 ASSESSMENT — PAIN DESCRIPTION - LOCATION: LOCATION: ABDOMEN

## 2020-08-19 ASSESSMENT — PAIN DESCRIPTION - PAIN TYPE: TYPE: SURGICAL PAIN

## 2020-08-19 ASSESSMENT — PAIN DESCRIPTION - ORIENTATION: ORIENTATION: LEFT;MID

## 2020-08-19 NOTE — CONSULTS
Critical Care Medicine  Consult Note      Reason for consultation: Acute respiratory failure    HISTORY OF PRESENT ILLNESS:    This is a 27-year-old morbidly obese female with history of CVA, seizure disorders, extensive surgical history including a recent gastrostomy tube revision, patient presented with possible cellulitis at the site of her gastrostomy tube as well as diverticulitis. Last night during her hospital stay patient suddenly became unresponsive, was noted to have poor respiratory effort but maintained adequate pulse and oxygenation, assessed by hospitalist, Dr. Nicolas Tadeo, who noted that she had some spasms in her jaw made that difficult to open her mouth but she required urgent intubation due to inability to protect airway, this morning during critical care rounds patient was fully alert awake requesting ET tube to be removed. She had a CTA done which showed no pulmonary emboli but she had multiple segmental atelectasis bilaterally. She has dilated esophagus throughout the entire length, she has a history of gastroparesis. No pneumonic infiltrates noted. Her procalcitonin was normal.  After I extubated her she informed us that she has had frequent seizures, presentation apparently was similar to what she did last night. There were no witnessed tonic-clonic activities.   She was evaluated by neurology later today and case was discussed with Dr. Juan Gordon        Past Medical History:        Diagnosis Date    Asthma     Cerebral artery occlusion with cerebral infarction ([address]     Chronic back pain     Chronic kidney disease     Depression     Headache     Kidney disease     Kidney stone     Seizures ([address] 5/24/2016    Suprapubic catheter ([address] 06/2018       Past Surgical History:        Procedure Laterality Date    APPENDECTOMY      BACK SURGERY      CHOLECYSTECTOMY      [facility]  12/09/2019    [facility]    HYSTERECTOMY      KNEE SURGERY Bilateral     OTHER SURGICAL HISTORY      sup/pub cath june 1, 2018       Social History:     reports that she has never smoked. She has never used smokeless tobacco. She reports that she does not drink alcohol or use drugs. Family History:       Problem Relation Age of Onset    Cancer Father     Cancer Paternal Aunt        Allergies:  Latex; Ciprofloxacin; Shellfish-derived products; Daypro [oxaprozin]; Iodides; Iodine; Lidocaine; Macrobid [nitrofurantoin monohyd macro]; Macrolides and ketolides; Other; Penicillins; Pollen extract; Propranolol; Propranolol hcl; Tape [adhesive tape]; Tegretol [carbamazepine]; Tizanidine; Toradol [ketorolac tromethamine]; Tramadol; Zanaflex [tizanidine hcl]; Estrogens; Lamictal [lamotrigine]; Lyrica [pregabalin]; Tylenol [acetaminophen]; and Vicodin [hydrocodone-acetaminophen]        MEDICATIONS during current hospitalization:    Continuous Infusions:   sodium chloride 100 mL/hr at 08/19/20 1358       Scheduled Meds:   famotidine (PEPCID) injection  20 mg Intravenous BID    vancomycin (VANCOCIN) intermittent dosing (placeholder)   Other RX Placeholder    vancomycin  1,500 mg Intravenous Q12H    enoxaparin  40 mg Subcutaneous Daily    nystatin, stomahesive in petrolatum   Topical BID    acetaminophen  500 mg Oral 4x Daily    lidocaine   Topical 4x Daily    orphenadrine  60 mg Intramuscular Q12H    sodium chloride flush  10 mL Intravenous 2 times per day    piperacillin-tazobactam  3.375 g Intravenous Q8H    levetiracetam  500 mg Intravenous Q12H    miconazole   Topical BID       PRN Meds:diphenhydrAMINE, HYDROmorphone, oxyCODONE, sodium chloride flush, polyethylene glycol, promethazine **OR** ondansetron        REVIEW OF SYSTEMS:  As in history of present illness  Other 14 point review of system is negative.      PHYSICAL EXAM:    Vitals:  BP (!) 146/86   Pulse 90   Temp 98.4 °F (36.9 °C) (Axillary)   Resp 14   Ht 5' 5\" (1.651 m)   Wt 243 lb 12.8 oz (110.6 kg)   SpO2 97%   BMI 40.57 kg/m²   General: Patient is currently alert, awake . comfortable in bed, No distress. Head: Atraumatic , Normocephalic   Eyes: PERRL. No icteric sclera. No conjunctival injection. No discharge   ENT: No nasal  discharge. Pharynx clear. Neck:  Trachea midline. No thyromegaly, no JVD, No cervical adenopathy. Chest : Normal resting effort, symmetric bilateral excursions  Lung : Adequate breath sounds bilaterally, clear otherwise  Heart[de-identified] Regular rhythm and rate. No mumur ,  Rub or gallop  ABD: Benign. Non-tender. Non-distended. No masses or organmegaly. Normal bowel sounds. EXT: Mild pitting edema both lower extremities , No Cyanosis No clubbing  Neuro: no focal weakness  Skin: Warm and dry. No erythema or rash on exposed extremities. .      Data Review  Recent Labs     08/17/20 0558 08/18/20 0600 08/18/20 1948 08/19/20  0535   WBC 8.8 7.3  --  14.1*   HGB 11.5* 10.7* 10.2* 12.1   HCT 34.5* 32.0*  --  36.2*    255  --  304      Recent Labs     08/17/20 0558 08/18/20  0600 08/18/20 1948 08/19/20  0535    136  --  140   K 5.4* 4.3  --  4.1    102  --  105   CO2 26 27  --  21   BUN 19 15  --  9   CREATININE 1.24* 1.25* 1.0 1.01*   GLUCOSE 97 143*  --  178*       MV Settings:     Vent Mode: AC/VC  Vt Ordered: 500 mL  Rate Set: 14 bmp  PEEP/CPAP: 5  Peak Inspiratory Pressure: 36 cmH2O  Mean Airway Pressure: 12 cmH20  I:E Ratio: 1:3.30    ABGs:   Recent Labs     08/18/20 1948   PHART 7.445   JRN6VDM 40   PO2ART 496*   XET1ELY 27.5   BEART 3   P5EPNUOT 100*   MCU5XKZ 29     O2 Device: Ventilator  O2 Flow Rate (L/min): 0 L/min  Lab Results   Component Value Date    LACTA 1.3 08/16/2020    LACTA 1.7 02/07/2020    LACTA 0.7 06/11/2019       Radiology  Ct Abdomen Pelvis Wo Contrast Additional Contrast? None    Result Date: 8/16/2020  Examination: CT ABDOMEN PELVIS WO CONTRAST Indication:   redness/pain surrounding G tube site with Vomiting Technique: Multiple serial axial images was performed through the abdomen and pelvis. Paul Brar Images were reconstructed in the axial and coronal and sagittal planes. Comparison: February 7, 2020 Findings: The visualized basal lungs show no focal parenchymal abnormalities. The liver, spleen, pancreas, adrenals, kidneys are unremarkable. The gallbladder surgically absent. Large and small bowel show no sign of obstruction. The appendix is surgically absent. No diverticulitis. Uterus is surgically absent. There is some diastases of the anterior abdominal wall as well as some soft tissue changes about the umbilicus. May represent postsurgical changes. Unchanged. There is a gastrostomy tube. The tip is within stomach. There is some soft tissue changes about the tube within the subcutaneous soft tissue. No focal fluid collection. No drainable fluid collection. 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 There is contrast seen scattered throughout the large bowel from the cecum to the rectum. In the region of the proximal to midportion of the transverse colon there is some surrounding pericolonic inflammatory stranding. There is a small punctate area of extraluminal air. Findings are that of a acute focal diverticulitis. Series 2 image 56 No free air. No free fluid. The visualized abdominal aorta is of normal size and caliber. No significant retroperitoneal adenopathy. Visualized osseous structures are grossly unremarkable. A spinal stimulator within the right gluteal region its leads which enter the spinal canal are noted. 1 THERE IS A BOWEL MALROTATION  AGAIN NOTED. IN THE REGION OF THE PROXIMAL TO MIDPORTION OF THE TRANSVERSE COLON THERE IS SOME SURROUNDING PERICOLONIC INFLAMMATORY STRANDING. THERE IS A SMALL PUNCTATE AREA OF EXTRALUMINAL AIR.  FINDINGS ARE THAT OF A ACUTE FOCAL DIVERTICULITIS. MR. ACEVEDO  OF THE EMERGENCY ROOM WAS NOTIFIED IMMEDIATELY OF THE ABOVE FINDINGS UPON AUGUST 16, 2020 AT [address] [place] [address] All CT scans at this facility use dose modulation, iterative reconstruction, and/or acute osseous findings. Degenerative changes. Spinal stimulator with device in the right posterior subcutaneous fat with leads ascending cranially to the level of the mid to lower thoracic spine. Soft tissues: Ventral abdominal wall diastases with soft tissue stranding at midline, unchanged. Subcutaneous stranding along the course of the gastrostomy tube without loculated collection. Spinal stimulator. Lower thorax: Unremarkable. No significant interval change from recent CT 8/16/2020. ==========     Cta Chest W Wo Contrast    Result Date: 8/19/2020  EXAMINATION:  CHEST CTA WITH CONTRAST (PULMONARY EMBOLISM PROTOCOL) CLINICAL HISTORY:   R/o PE   T85.598A Feeding tube dysfunction, initial encounter ICD10. Technique:  Spiral axial CT acquisition of the chest from the thoracic inlet to the upper abdomen following IV contrast.  2-D images were reconstructed in the sagittal and coronal planes. 3-D MIPS images were generated in the coronal and axial planes. Images were reviewed on the PACS workstation. All images including the 3-D MIPS were personally archived. Contrast:  IV administration of 100 ml Isovue 300 All CT scans at this facility use dose modulation, iterative reconstruction, and/or weight based dosing when appropriate to reduce radiation dose to as low as reasonably achievable. Comparison:  Chest x-ray 8/18/2020  RESULT: Limitations: Motion artifact. Evaluation for thromboembolic disease:      - Right heart chambers:  No thromboembolic disease.      - Main pulmonary arteries:  No thromboembolic disease.      - Lobar pulmonary arteries:  No thromboembolic disease.        - Segmental pulmonary arteries:  No thromboembolic disease.        - Subsegmental pulmonary arteries:  No thromboembolic disease in visualized portions. Lines, tubes, and devices:  Endotracheal tube with tip above the rebel, similar to recent radiograph. Gastric decompression tube with tip extending below the diaphragm into the stomach. Partially imaged A gastrostomy tube. Partially imaged spinal stimulator. Lung parenchyma and pleura:  Central airways grossly patent. Streaky linear densities throughout both lungs, likely scarring/atelectasis. Dependent subsegmental atelectasis/opacity. Possible trace pleural effusions. No pneumothorax. Thoracic inlet, heart, and mediastinum: Visualized thyroid unremarkable. No axillary, mediastinal, or hilar lymphadenopathy. Normal thoracic aorta. Common origin of the brachiocephalic and left common carotid arteries, a normal anatomic variant. Normal pulmonary artery size. Cardiomegaly. No coronary artery calcifications. No pericardial effusion or thickening. Esophagus nondilated. Prominent pericardial fat pad. Bones and soft tissues:  No destructive bone lesion or acute osseous findings. Chest wall unremarkable. Upper abdomen:  No acute abnormality in the imaged upper abdomen. Cholecystectomy. Lower neck: Unremarkable. No CT evidence of pulmonary embolism. Areas of probable atelectasis/scarring within both lungs, accentuated by shallow inspiration. Superimposed infection should be excluded clinically. Xr Chest Portable    Result Date: 8/19/2020  EXAMINATION: XR CHEST PORTABLE CLINICAL HISTORY:  ET placement COMPARISONS: CTA chest 8/18/2020 FINDINGS: Endotracheal tube tip is at the level the clavicles 4 cm above the rebel. NG tube tip is in the stomach. Patient is rotated to the left. The radiograph was obtained in shallow inspiration. There is cardiomegaly. Pulmonary vascularity is within normal limits. The left lung base and costophrenic angle are obscured by the cardiac silhouette and epicardial fat on this rotated portable chest radiograph. There is no pneumothorax evident. There is elevation the right hemidiaphragm. Lower thoracic spinal canal electrodes are noted. There is no significant cardiopulmonary change compared to prior imaging study. SATISFACTORY LIFE SUPPORT LINE PLACEMENTS.

## 2020-08-19 NOTE — FLOWSHEET NOTE
Pt responds to commands. Pt able to communicate through pointing and nodding. Pt answers questions correctly and appropriately. Pt complains of RUQ abdominal pain. 2300 pt to CT 2325 Pt back in ICU from CT  Pt was able to communicate that earlier tonight she was eating vomited choked and was then unable to breath.

## 2020-08-19 NOTE — PROGRESS NOTES
Hospitalist Progress Note      PCP: Annabelle Obregon    Date of Admission: 8/16/2020    Chief Complaint:    Chief Complaint   Patient presents with    Feeding Tube Problem     pt c/o her G tube inst working, has a rash at insertion site, and vomiting for the past 2 days     Subjective:  Patient is not feeling well; denies fevers, chills, sweats. Informs me that she has 2 seizures a week; .  12 point ROS negative other than mentioned above     Medications:  Reviewed    Infusion Medications    sodium chloride 100 mL/hr at 08/19/20 1358     Scheduled Medications    famotidine (PEPCID) injection  20 mg Intravenous BID    vancomycin (VANCOCIN) intermittent dosing (placeholder)   Other RX Placeholder    vancomycin  1,500 mg Intravenous Q12H    enoxaparin  40 mg Subcutaneous Daily    nystatin, stomahesive in petrolatum   Topical BID    acetaminophen  500 mg Oral 4x Daily    lidocaine   Topical 4x Daily    orphenadrine  60 mg Intramuscular Q12H    sodium chloride flush  10 mL Intravenous 2 times per day    piperacillin-tazobactam  3.375 g Intravenous Q8H    levetiracetam  500 mg Intravenous Q12H    miconazole   Topical BID     PRN Meds: diphenhydrAMINE, HYDROmorphone, oxyCODONE, sodium chloride flush, polyethylene glycol, promethazine **OR** ondansetron      Intake/Output Summary (Last 24 hours) at 8/19/2020 1611  Last data filed at 8/19/2020 0559  Gross per 24 hour   Intake 1896 ml   Output 2700 ml   Net -804 ml     Exam:    BP (!) 124/53   Pulse 84   Temp 98.8 °F (37.1 °C) (Axillary)   Resp 19   Ht 5' 5\" (1.651 m)   Wt 243 lb 12.8 oz (110.6 kg)   SpO2 100%   BMI 40.57 kg/m²     General appearance: No apparent distress, appears stated age and cooperative. HEENT:  Conjunctivae/corneas clear. Neck:  Trachea midline. Respiratory:  Normal respiratory effort.  Clear to auscultation  Cardiovascular: Regular rate and rhythm   Abdomen: Erythema across midline incision; erythema surrounding PEG site  Musculoskeletal: No clubbing, cyanosis or edema bilaterally. Neuro: Non Focal  Capillary Refill: Brisk,< 3 seconds   Peripheral Pulses: +2 palpable, equal bilaterally     Labs:   Recent Labs     08/17/20  0558 08/18/20  0600 08/18/20 1948 08/19/20  0535   WBC 8.8 7.3  --  14.1*   HGB 11.5* 10.7* 10.2* 12.1   HCT 34.5* 32.0*  --  36.2*    255  --  304     Recent Labs     08/17/20  0558 08/18/20  0600 08/18/20 1948 08/19/20  0535    136  --  140   K 5.4* 4.3  --  4.1    102  --  105   CO2 26 27  --  21   BUN 19 15  --  9   CREATININE 1.24* 1.25* 1.0 1.01*   CALCIUM 8.6 7.8*  --  9.0     Recent Labs     08/16/20  1800 08/18/20  0255   AST 15 20   ALT 11 17   BILIDIR  --  <0.2   BILITOT <0.2 0.4   ALKPHOS 146* 139*     No results for input(s): INR in the last 72 hours. No results for input(s): Vane Grumet in the last 72 hours. Urinalysis:      Lab Results   Component Value Date    NITRU POSITIVE 02/07/2020    WBCUA >100 02/07/2020    BACTERIA MODERATE 02/07/2020    RBCUA 3-5 02/07/2020    BLOODU Negative 02/07/2020    SPECGRAV 1.018 02/07/2020    GLUCOSEU Negative 02/07/2020     Radiology:  RADIOLOGY REPORT   Final Result      CTA CHEST W WO CONTRAST   Final Result      No CT evidence of pulmonary embolism. Areas of probable atelectasis/scarring within both lungs, accentuated by shallow inspiration. Superimposed infection should be excluded clinically. XR CHEST PORTABLE   Final Result   SATISFACTORY LIFE SUPPORT LINE PLACEMENTS. CARDIOMEGALY. NO SIGNIFICANT CARDIOPULMONARY CHANGE COMPARED TO CHEST CTA OF 8/18/2020. CT ABDOMEN WO CONTRAST Additional Contrast? None   Final Result      No significant interval change from recent CT 8/16/2020.            ==========               CT ABDOMEN PELVIS WO CONTRAST Additional Contrast? None   Final Result   1 THERE IS A BOWEL MALROTATION  AGAIN NOTED.  IN THE REGION OF THE PROXIMAL TO MIDPORTION OF THE TRANSVERSE COLON THERE IS SOME SURROUNDING PERICOLONIC INFLAMMATORY STRANDING. THERE IS A SMALL PUNCTATE AREA OF EXTRALUMINAL AIR. FINDINGS ARE THAT OF A    ACUTE FOCAL DIVERTICULITIS.      MR. ACEVEDO  OF THE EMERGENCY ROOM WAS NOTIFIED IMMEDIATELY OF THE ABOVE FINDINGS UPON AUGUST 16, 2020 AT [address] [place] [address] there         All CT scans at this facility use dose modulation, iterative reconstruction, and/or weight based dosing when appropriate to reduce radiation dose to as low as reasonably achievable. Assessment/Plan:    Feeding tube dysfunction:  Evaluated by surgical team and is now functional    Abdominal wall cellulitis d/t 1  and acute diverticulitis:  Continue IV Zosyn per ID recommendations  ARF: Resolved  Seizure w breakthrough seizure:  Evaluated by neurology; plan to increase keppra  Acute respiratory failure due to seizure vs airway compromise:  resolved  DVT Prophylaxis: Lovenox  Code Status:full    Active Hospital Problems    Diagnosis Date Noted    Psychogenic nonepileptic seizure [F44.5]     Pharyngoesophageal dysphagia [R13.14]     Pain following surgery or procedure [G89.18] 08/17/2020    Pain of jejunostomy tube site [facility]) [K94.19] 08/17/2020    Irritation around percutaneous endoscopic gastrostomy (PEG) tube site [facility]) [K94.29] 08/17/2020    Chronic kidney disease [N18.9] 08/17/2020    History of small bowel obstruction [Z87.19] 08/17/2020    Feeding tube dysfunction [T85.598A] 08/16/2020    Morbid obesity due to excess calories ([facility] [address] [E66.01] 07/06/2016     Additional work up or/and treatment plan may be added today or then after based on clinical progression. I am managing a portion of pt care. Some medical issues are handled by other specialists. Additional work up and treatment should be done in out pt setting by pt PCP and other out pt providers. In addition to examining and evaluating pt, I spent additional time explaining care, normal and abnormal findings, and treatment plan.  All of pt questions were answered. Counseling, diet and education were  provided. Case will be discussed with nursing staff when appropriate. Family will be updated if and when appropriate.       Diet: DIET DENTAL SOFT;    Code Status: Full Code    PT/OT Eval     Electronically signed by Elizabeth Mckeon MD on 8/19/2020 at 4:11 PM

## 2020-08-19 NOTE — PROGRESS NOTES
Trauma/Gen Surg    Transferred to ICU overnight after episode of emesis/choking. Possible aspiration. Vitals:    08/19/20 0800   BP: (!) 146/86   Pulse: 90   Resp: 14   Temp: 98.4 °F (36.9 °C)   SpO2: 97%     On vent, awake and alert. Communicates with nods, and gestures. Abdomen soft, non distended. She is tender over the upper midline, which is expected given her hernia with transverse colon in the hernia, which is therefore easily palpated and expectedly TTP. There is no erythema over the midline, just thinning of the skin leaving jackeline hue. The erythema around the gtube is improving. As is the associated tenderness. Labs and imaging including CT chest overnight reviewed  On the CT chest the upper abdomen is again in view, the G tube is in place. There is a moderate amount of food in the stomach. This is normal for [employer] [employer] given her known chronic motility issues. No signs of infection (erythema represents caustic injury from bile drainage onto skin)  No evidence of intra-abdominal source of code blue yesterday. Salve applied this AM and tube tightened. Would avoid putting gauze under tube. 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 Miralax has worked in the past for her.      Will continue to follow - but already seeing great improvement in skin Norma EAGLE  Trauma Surgery/Critical Care  [phone]

## 2020-08-19 NOTE — PROGRESS NOTES
PROGRESS NOTE -PAIN MANAGEMENT   ICU bed 10  SERVICE DATE:  8/19/2020   SERVICE TIME:  8:22 AM    CHIEFCOMPLAINT: Abdominal pain/G-tube site pain      Patient had an event where she will become unresponsive  , Diagnosed by aspiration pneumonia to the left lung. She was transferred to the ICU, intubated. SUBJECTIVE:  Ms. Valdo Russell is a 39 y.o. female who presentedfor initial increasing pain at the G-tube site since was placed several days ago also complained of nausea and vomiting . Patient has a feeding through the G-tube but also she was eating, seen by surgical trauma team recommended for conservative treatment. Patient had an event where she become labored breathing, diagnosed with aspiration pneumonia, admitted to ICU, intubated, she was seen this morning seem to be much more awake, maintaining good strength with handgrip bilateral, looking forward to get the tube out today. She is on FiO2 40% assisted ventilation, awake, and seem to be more comfortable, still complaining of some pain at the G-tube site controlled with IV Dilaudid. Patient states  pain is well controlled on the present pain management protocol.     PAIN  ASSESSMENT:    intermittent    aching and stabbing    pain is perceived as moderate (4-6 pain scale), localized to the G-tube site      MEDICATIONS:    Current Facility-Administered Medications   Medication Dose Route Frequency Provider Last Rate Last Dose    polyvinyl alcohol (LIQUIFILM TEARS) 1.4 % ophthalmic solution 1 drop  1 drop Both Eyes PRN Allison Wharton MD        And    lubrifresh P.M. (artificial tears) ophthalmic ointment   Both Eyes Q4H [facility] Rolando Delarosa MD   Stopped at 08/19/20 0443    diphenhydrAMINE (BENADRYL) injection 10 mg  10 mg Intravenous Q6H PRN SILKE Christianson - CNP   10 mg at 08/18/20 0605    chlorhexidine (PERIDEX) 0.12 % solution 15 mL  15 mL Mouth/Throat BID Juan Rodriguez MD   15 mL at 08/19/20 0804    famotidine (PEPCID) injection 20 mg  20 mg Intravenous BID Duran Lee MD   20 mg at 08/19/20 0804    propofol injection  10 mcg/kg/min Intravenous Continuous Duran Lee MD 13.3 mL/hr at 08/19/20 0032 20 mcg/kg/min at 08/19/20 0032    vancomycin (VANCOCIN) intermittent dosing (placeholder)   Other RX Placeholder Duran Lee MD   Stopped at 08/18/20 2347    vancomycin (VANCOCIN) 1,500 mg in dextrose 5 % 500 mL IVPB  1,500 mg Intravenous Q12H Duran Lee  mL/hr at 08/19/20 0805 1,500 mg at 08/19/20 0805    enoxaparin (LOVENOX) injection 40 mg  40 mg Subcutaneous Daily Duran Lee MD   40 mg at 08/19/20 0805    nystatin, stomahesive in petrolatum (ET MIX)   Topical BID Duran Lee MD        HYDROmorphone (DILAUDID) injection 1 mg  1 mg Intravenous Q4H PRN Sriram Honeycutt MD   1 mg at 08/18/20 1637    acetaminophen (TYLENOL) 160 MG/5ML solution 500 mg  500 mg Oral 4x Daily Sriram Honeycutt MD   500 mg at 08/19/20 0805    lidocaine (LMX) 4 % cream   Topical 4x Daily Sriram Honeycutt MD        orphenadrine (NORFLEX) injection 60 mg  60 mg Intramuscular Q12H Sriram Honeycutt MD   60 mg at 08/19/20 0804    oxyCODONE (ROXICODONE) immediate release tablet 5 mg  5 mg Oral Q6H PRN Sriram Honeycutt MD        sodium chloride flush 0.9 % injection 10 mL  10 mL Intravenous 2 times per day SILKE Hair CNP   10 mL at 08/19/20 0809    sodium chloride flush 0.9 % injection 10 mL  10 mL Intravenous PRN SILKE Hair CNP        polyethylene glycol (GLYCOLAX) packet 17 g  17 g Oral Daily PRN SILKE Hair CNP        promethazine (PHENERGAN) tablet 12.5 mg  12.5 mg Oral Q6H PRN SILKE Hair CNP        Or    ondansetron (ZOFRAN) injection 4 mg  4 mg Intravenous Q6H PRN SILKE Hair CNP   4 mg at 08/18/20 1526    piperacillin-tazobactam (ZOSYN) 3.375 g in dextrose 5 % 50 mL IVPB extended infusion (mini-bag)  3.375 g Intravenous Q8H SILKE Hair CNP   Stopped at PHENCYCLIDINESCREENURINE. LABMETH. PROPOX     Ct Abdomen Pelvis Wo Contrast Additional Contrast? None    Result Date: 8/16/2020  Examination: CT ABDOMEN PELVIS WO CONTRAST Indication:   redness/pain surrounding G tube site with Vomiting Technique: Multiple serial axial images was performed through the abdomen and pelvis. .   Images were reconstructed in the axial and coronal and sagittal planes. Comparison: February 7, 2020 Findings: The visualized basal lungs show no focal parenchymal abnormalities. The liver, spleen, pancreas, adrenals, kidneys are unremarkable. The gallbladder surgically absent. Large and small bowel show no sign of obstruction. The appendix is surgically absent. No diverticulitis. Uterus is surgically absent. There is some diastases of the anterior abdominal wall as well as some soft tissue changes about the umbilicus. May represent postsurgical changes. Unchanged. There is a gastrostomy tube. The tip is within stomach. There is some soft tissue changes about the tube within the subcutaneous soft tissue. No focal fluid collection. No drainable fluid collection. 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 There is contrast seen scattered throughout the large bowel from the cecum to the rectum. In the region of the proximal to midportion of the transverse colon there is some surrounding pericolonic inflammatory stranding. There is a small punctate area of extraluminal air. Findings are that of a acute focal diverticulitis. Series 2 image 56 No free air. No free fluid. The visualized abdominal aorta is of normal size and caliber. No significant retroperitoneal adenopathy. Visualized osseous structures are grossly unremarkable. A spinal stimulator within the right gluteal region its leads which enter the spinal canal are noted. 1 THERE IS A BOWEL MALROTATION  AGAIN NOTED.  IN THE REGION OF THE PROXIMAL TO MIDPORTION OF THE TRANSVERSE COLON THERE IS SOME SURROUNDING PERICOLONIC INFLAMMATORY STRANDING. THERE IS A SMALL PUNCTATE AREA OF EXTRALUMINAL AIR. FINDINGS ARE THAT OF A ACUTE FOCAL DIVERTICULITIS. MR. ACEVEDO  OF THE EMERGENCY ROOM WAS NOTIFIED IMMEDIATELY OF THE ABOVE FINDINGS UPON AUGUST 16, 2020 AT [address] [place] [address] there All CT scans at this facility use dose modulation, iterative reconstruction, and/or weight based dosing when appropriate to reduce radiation dose to as low as reasonably achievable. ASSESSMENT & PLAN  Active Hospital Problems    Diagnosis Date Noted    Pain following surgery or procedure [G89.18] 08/17/2020    Pain of jejunostomy tube site [facility]) [K94.19] 08/17/2020    Irritation around percutaneous endoscopic gastrostomy (PEG) tube site [facility]) [K94.29] 08/17/2020    Chronic kidney disease [N18.9] 08/17/2020    History of small bowel obstruction [Z87.19] 08/17/2020    Feeding tube dysfunction [T85.598A] 08/16/2020    Morbid obesity due to excess calories ([facility] [address] [E66.01] 07/06/2016        39years old female with history of external obstruction, status post G-tube placement, she has developed some mechanical problem after the second G-tube replaced few days ago after she had an impact to initial G-tube cause dislocation. Patient was admitted with nausea, seen by surgical trauma team continue conservative treatment. She has an event where she possibly had aspiration pneumonia as she was also eating solid food. Patient was transferred to the ICU, intubated overnight, seem to be much more awake, I would expect extubation today and weaning of the ventilation. Discussed her residual pain at the site however better controlled when she was given the provided medication regimen. RECOMMENDATION:  SEE ORDERS    We will continue the current pain management protocol and follow-up as she been transferred to the floor.     She has her itching well controlled with Benadryl and Vistaril    SIGNATURE: Jacquie Ward MD PATIENT NAME: Jaspreet Bueno   DATE: August 19, 2020 MRN: [id number]   TIME: 8:22 AM PAGER/CONTACT #: [phone]

## 2020-08-19 NOTE — PROGRESS NOTES
Infectious Disease     Patient Name: Linette Mancuso  Date: 8/19/2020  YOB: 1975  Medical Record Number: [id number]        Abdominal wall cellulitis  Diverticulitis      History of Present Illness:  Coronary disease cerebrovascular disease chronic kidney disease    G-tube was replaced 4 days prior to admission has been having backflow tube feeding abdominal pain nausea vomiting redness around G-tube site    Transfer to ICU for seizure    Examination: CT ABDOMEN PELVIS WO CONTRAST         Indication:   redness/pain surrounding G tube site with Vomiting         Technique: Multiple serial axial images was performed through the abdomen and pelvis. .   Images were reconstructed in the axial and coronal and sagittal planes.         Comparison: February 7, 2020         Findings:         The visualized basal lungs show no focal parenchymal abnormalities.         The liver, spleen, pancreas, adrenals, kidneys are unremarkable.         The gallbladder surgically absent.         Large and small bowel show no sign of obstruction.         The appendix is surgically absent.          No diverticulitis.         Uterus is surgically absent.         There is some diastases of the anterior abdominal wall as well as some soft tissue changes about the umbilicus. May represent postsurgical changes. Unchanged.         There is a gastrostomy tube. The tip is within stomach. There is some soft tissue changes about the tube within the subcutaneous soft tissue. No focal fluid collection. No drainable fluid collection.         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 There is contrast seen scattered throughout the large bowel from the cecum to the rectum. In the region of the proximal to midportion of the transverse colon there is some surrounding pericolonic inflammatory stranding. There is a small punctate area of extraluminal air. Findings are that of a acute focal diverticulitis. Series 2 image 56         No free air.  No free fluid.  The visualized abdominal aorta is of normal size and caliber.  No significant retroperitoneal adenopathy.         Visualized osseous structures are grossly unremarkable. A spinal stimulator within the right gluteal region its leads which enter the spinal canal are noted.              Impression    1 THERE IS A BOWEL MALROTATION  AGAIN NOTED. IN THE REGION OF THE PROXIMAL TO MIDPORTION OF THE TRANSVERSE COLON THERE IS SOME SURROUNDING PERICOLONIC INFLAMMATORY STRANDING. THERE IS A SMALL PUNCTATE AREA OF EXTRALUMINAL AIR. FINDINGS ARE THAT OF A    ACUTE FOCAL DIVERTICULITIS.         MR. ACEVEDO  OF THE EMERGENCY ROOM WAS NOTIFIED IMMEDIATELY OF THE ABOVE FINDINGS UPON AUGUST 16, 2020 AT [address] [place] [address] there              All CT scans at this facility use dose modulation, iterative reconstruction, and/or weight based dosing when appropriate to reduce radiation dose to as low as reasonably achievable. Review of Systems   Respiratory: Negative. Cardiovascular: Negative. Gastrointestinal: Positive for abdominal pain. Negative for diarrhea, nausea and vomiting. Physical Exam   HENT:   Head: Normocephalic. Eyes: Pupils are equal, round, and reactive to light. Neck: Normal range of motion. Cardiovascular: Normal heart sounds. Pulmonary/Chest: Effort normal and breath sounds normal. No respiratory distress. She has no wheezes. She has no rales. Abdominal: Soft. Bowel sounds are normal. She exhibits no distension. There is abdominal tenderness. There is rebound. Neurological: She is alert. Skin: Skin is warm and dry. Blood pressure (!) 146/86, pulse 90, temperature 98.4 °F (36.9 °C), temperature source Axillary, resp. rate 14, height 5' 5\" (1.651 m), weight 243 lb 12.8 oz (110.6 kg), SpO2 97 %.       .   Lab Results   Component Value Date    WBC 14.1 (H) 08/19/2020    HGB 12.1 08/19/2020    HCT 36.2 (L) 08/19/2020    MCV 89.7 08/19/2020     08/19/2020     Lab Results   Component Value Date     08/19/2020    K 4.1 08/19/2020     08/19/2020    CO2 21 08/19/2020    BUN 9 08/19/2020    CREATININE 1.01 08/19/2020    GLUCOSE 178 08/19/2020    CALCIUM 9.0 08/19/2020          Culture, Wound [[id number]]   Collected: 08/17/20 1335    Order Status: No result  Specimen: G-Tube  Updated: 08/17/20 1429     Gram Stain Result  Rare WBC's   Few Budding yeast    Narrative:      ORDER#: [id number]                          ORDERED BY: Мария Reed   SOURCE: G Tube Site                        COLLECTED:  08/17/20 13:35   ANTIBIOTICS AT [facility]. :                      RECEIVED :  08/17/20 13:35    Culture, Anaerobic [[id number]]   Collected: 08/17/20 1335    Order Status: Canceled  Specimen: G Tube Site from G-Tube     Culture, Blood 1 [[id number]]   Collected: 08/16/20 1849    Order Status: Sent  Specimen: Blood  Updated: 08/16/20 1849    Culture, Blood 2 [[id number]]   Collected: 08/16/20 1849    Order Status: Sent  Specimen: Blood           ASSESSMENT:  PLAN:    Abdominal wall cellulitis  Increased abdominal pain  Repeat CT scan shows no difference  Diverticulitis  Continue Zosyn
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
## 2020-08-20 VITALS
TEMPERATURE: 97.7 F | HEART RATE: 82 BPM | SYSTOLIC BLOOD PRESSURE: 114 MMHG | DIASTOLIC BLOOD PRESSURE: 55 MMHG | HEIGHT: 65 IN | BODY MASS INDEX: 39.87 KG/M2 | RESPIRATION RATE: 17 BRPM | OXYGEN SATURATION: 99 % | WEIGHT: 239.3 LBS

## 2020-08-20 LAB
ANION GAP SERPL CALCULATED.3IONS-SCNC: 10 MEQ/L (ref 9–15)
BUN BLDV-MCNC: 7 MG/DL (ref 6–20)
CALCIUM SERPL-MCNC: 8.4 MG/DL (ref 8.5–9.9)
CHLORIDE BLD-SCNC: 105 MEQ/L (ref 95–107)
CO2: 24 MEQ/L (ref 20–31)
CREAT SERPL-MCNC: 1.08 MG/DL (ref 0.5–0.9)
GFR AFRICAN AMERICAN: >60
GFR NON-AFRICAN AMERICAN: 54.8
GLUCOSE BLD-MCNC: 101 MG/DL (ref 70–99)
HCT VFR BLD CALC: 34.4 % (ref 37–47)
HEMOGLOBIN: 11.5 G/DL (ref 12–16)
MCH RBC QN AUTO: 30.5 PG (ref 27–31.3)
MCHC RBC AUTO-ENTMCNC: 33.4 % (ref 33–37)
MCV RBC AUTO: 91.4 FL (ref 82–100)
PDW BLD-RTO: 17 % (ref 11.5–14.5)
PLATELET # BLD: 233 K/UL (ref 130–400)
POTASSIUM REFLEX MAGNESIUM: 4.2 MEQ/L (ref 3.4–4.9)
RBC # BLD: 3.76 M/UL (ref 4.2–5.4)
SODIUM BLD-SCNC: 139 MEQ/L (ref 135–144)
VANCOMYCIN TROUGH: 27.8 UG/ML (ref 10–20)
WBC # BLD: 7.3 K/UL (ref 4.8–10.8)

## 2020-08-20 PROCEDURE — 2580000003 HC RX 258: Performed by: NURSE PRACTITIONER

## 2020-08-20 PROCEDURE — 85027 COMPLETE CBC AUTOMATED: CPT

## 2020-08-20 PROCEDURE — 6360000002 HC RX W HCPCS: Performed by: NURSE PRACTITIONER

## 2020-08-20 PROCEDURE — 36415 COLL VENOUS BLD VENIPUNCTURE: CPT

## 2020-08-20 PROCEDURE — 99232 SBSQ HOSP IP/OBS MODERATE 35: CPT | Performed by: INTERNAL MEDICINE

## 2020-08-20 PROCEDURE — 6370000000 HC RX 637 (ALT 250 FOR IP): Performed by: PSYCHIATRY & NEUROLOGY

## 2020-08-20 PROCEDURE — 80202 ASSAY OF VANCOMYCIN: CPT

## 2020-08-20 PROCEDURE — 6360000002 HC RX W HCPCS: Performed by: ANESTHESIOLOGY

## 2020-08-20 PROCEDURE — 2500000003 HC RX 250 WO HCPCS: Performed by: INTERNAL MEDICINE

## 2020-08-20 PROCEDURE — 99231 SBSQ HOSP IP/OBS SF/LOW 25: CPT | Performed by: SURGERY

## 2020-08-20 PROCEDURE — 80048 BASIC METABOLIC PNL TOTAL CA: CPT

## 2020-08-20 PROCEDURE — 6370000000 HC RX 637 (ALT 250 FOR IP): Performed by: ANESTHESIOLOGY

## 2020-08-20 PROCEDURE — 6360000002 HC RX W HCPCS: Performed by: INTERNAL MEDICINE

## 2020-08-20 PROCEDURE — 99233 SBSQ HOSP IP/OBS HIGH 50: CPT | Performed by: PSYCHIATRY & NEUROLOGY

## 2020-08-20 PROCEDURE — APPSS15 APP SPLIT SHARED TIME 0-15 MINUTES: Performed by: NURSE PRACTITIONER

## 2020-08-20 PROCEDURE — 93010 ELECTROCARDIOGRAM REPORT: CPT | Performed by: INTERNAL MEDICINE

## 2020-08-20 PROCEDURE — 2700000000 HC OXYGEN THERAPY PER DAY

## 2020-08-20 RX ORDER — AMOXICILLIN AND CLAVULANATE POTASSIUM 250; 62.5 MG/5ML; MG/5ML
250 POWDER, FOR SUSPENSION ORAL 2 TIMES DAILY
Qty: 70 ML | Refills: 0 | Status: SHIPPED | OUTPATIENT
Start: 2020-08-20 | End: 2020-08-27

## 2020-08-20 RX ORDER — LEVETIRACETAM 500 MG/1
1000 TABLET ORAL 2 TIMES DAILY
Status: DISCONTINUED | OUTPATIENT
Start: 2020-08-20 | End: 2020-08-20 | Stop reason: HOSPADM

## 2020-08-20 RX ORDER — LIDOCAINE 40 MG/G
CREAM TOPICAL
Qty: 1 TUBE | Refills: 0 | Status: SHIPPED | OUTPATIENT
Start: 2020-08-20

## 2020-08-20 RX ORDER — LEVETIRACETAM 1000 MG/1
1000 TABLET ORAL 2 TIMES DAILY
Qty: 60 TABLET | Refills: 1 | Status: SHIPPED | OUTPATIENT
Start: 2020-08-20

## 2020-08-20 RX ADMIN — ONDANSETRON 4 MG: 2 INJECTION INTRAMUSCULAR; INTRAVENOUS at 11:44

## 2020-08-20 RX ADMIN — ORPHENADRINE CITRATE 60 MG: 30 INJECTION INTRAMUSCULAR; INTRAVENOUS at 00:30

## 2020-08-20 RX ADMIN — DIPHENHYDRAMINE HYDROCHLORIDE 10 MG: 50 INJECTION, SOLUTION INTRAMUSCULAR; INTRAVENOUS at 07:08

## 2020-08-20 RX ADMIN — LEVETIRACETAM 500 MG: 100 INJECTION, SOLUTION INTRAVENOUS at 01:40

## 2020-08-20 RX ADMIN — ENOXAPARIN SODIUM 40 MG: 40 INJECTION SUBCUTANEOUS at 11:45

## 2020-08-20 RX ADMIN — DIPHENHYDRAMINE HYDROCHLORIDE 10 MG: 50 INJECTION, SOLUTION INTRAMUSCULAR; INTRAVENOUS at 11:44

## 2020-08-20 RX ADMIN — ACETAMINOPHEN ORAL SOLUTION 500 MG: 325 SOLUTION ORAL at 11:42

## 2020-08-20 RX ADMIN — HYDROMORPHONE HYDROCHLORIDE 1 MG: 1 INJECTION, SOLUTION INTRAMUSCULAR; INTRAVENOUS; SUBCUTANEOUS at 16:24

## 2020-08-20 RX ADMIN — MICONAZOLE NITRATE: 2 POWDER TOPICAL at 11:55

## 2020-08-20 RX ADMIN — PIPERACILLIN AND TAZOBACTAM 3.38 G: 3; .375 INJECTION, POWDER, FOR SOLUTION INTRAVENOUS at 14:09

## 2020-08-20 RX ADMIN — HYDROMORPHONE HYDROCHLORIDE 1 MG: 1 INJECTION, SOLUTION INTRAMUSCULAR; INTRAVENOUS; SUBCUTANEOUS at 11:51

## 2020-08-20 RX ADMIN — LIDOCAINE 4%: 4 CREAM TOPICAL at 11:55

## 2020-08-20 RX ADMIN — Medication 10 ML: at 11:44

## 2020-08-20 RX ADMIN — LEVETIRACETAM 1000 MG: 500 TABLET ORAL at 14:09

## 2020-08-20 RX ADMIN — FAMOTIDINE 20 MG: 10 INJECTION INTRAVENOUS at 11:43

## 2020-08-20 RX ADMIN — PIPERACILLIN AND TAZOBACTAM 3.38 G: 3; .375 INJECTION, POWDER, FOR SOLUTION INTRAVENOUS at 07:04

## 2020-08-20 RX ADMIN — HYDROMORPHONE HYDROCHLORIDE 1 MG: 1 INJECTION, SOLUTION INTRAMUSCULAR; INTRAVENOUS; SUBCUTANEOUS at 07:04

## 2020-08-20 RX ADMIN — Medication: at 11:55

## 2020-08-20 ASSESSMENT — PAIN SCALES - GENERAL
PAINLEVEL_OUTOF10: 10
PAINLEVEL_OUTOF10: 8
PAINLEVEL_OUTOF10: 7
PAINLEVEL_OUTOF10: 9

## 2020-08-20 ASSESSMENT — ENCOUNTER SYMPTOMS
ABDOMINAL PAIN: 1
WHEEZING: 0
COUGH: 0
CHEST TIGHTNESS: 0
DIARRHEA: 0
TROUBLE SWALLOWING: 0
COLOR CHANGE: 0
RESPIRATORY NEGATIVE: 1
NAUSEA: 0
VOMITING: 0
SHORTNESS OF BREATH: 0

## 2020-08-20 NOTE — PROGRESS NOTES
Trauma/Gen Surg     Transferred from ICU overnight after successful extubation  Pt had two large BM in ICU and feels her abdominal pain is much better.     Vitals:    08/20/20 0746   BP: (!) 114/55   Pulse: 82   Resp: 17   Temp: 97.7 °F (36.5 °C)   SpO2: 99%       Awake, alert, conversive, happy   Abdomen soft, non distended. The erythema around the gtube is improving.         No signs of infection (erythema represents caustic injury from bile drainage onto skin)  Salve applied this AM and tube tightened. Would avoid putting gauze under tube. 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   Miralax has worked in the past for her.      Can follow up with Dr Delia Ny PRSANGEETHA for wound concerns     Judge Ailyn EAGLE  Trauma Surgery/Critical Care  [phone]

## 2020-08-20 NOTE — DISCHARGE SUMMARY
[facility]:   Consults:  IP CONSULT TO GENERAL SURGERY  IP CONSULT TO INFECTIOUS DISEASES  IP CONSULT TO PAIN MANAGEMENT  IP CONSULT TO NEUROLOGY    Significant Diagnostic Studies:    Refer to chart     Please refer to chart if no studies are shown here    Ct Abdomen Pelvis Wo Contrast Additional Contrast? None    Result Date: 8/16/2020  Examination: CT ABDOMEN PELVIS WO CONTRAST Indication:   redness/pain surrounding G tube site with Vomiting Technique: Multiple serial axial images was performed through the abdomen and pelvis. .   Images were reconstructed in the axial and coronal and sagittal planes. Comparison: February 7, 2020 Findings: The visualized basal lungs show no focal parenchymal abnormalities. The liver, spleen, pancreas, adrenals, kidneys are unremarkable. The gallbladder surgically absent. Large and small bowel show no sign of obstruction. The appendix is surgically absent. No diverticulitis. Uterus is surgically absent. There is some diastases of the anterior abdominal wall as well as some soft tissue changes about the umbilicus. May represent postsurgical changes. Unchanged. There is a gastrostomy tube. The tip is within stomach. There is some soft tissue changes about the tube within the subcutaneous soft tissue. No focal fluid collection. No drainable fluid collection. 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 There is contrast seen scattered throughout the large bowel from the cecum to the rectum. In the region of the proximal to midportion of the transverse colon there is some surrounding pericolonic inflammatory stranding. There is a small punctate area of extraluminal air. Findings are that of a acute focal diverticulitis. Series 2 image 56 No free air. No free fluid. The visualized abdominal aorta is of normal size and caliber. No significant retroperitoneal adenopathy. Visualized osseous structures are grossly unremarkable.  A spinal stimulator within the right gluteal region its leads which enter the spinal canal are noted. 1 THERE IS A BOWEL MALROTATION  AGAIN NOTED. IN THE REGION OF THE PROXIMAL TO MIDPORTION OF THE TRANSVERSE COLON THERE IS SOME SURROUNDING PERICOLONIC INFLAMMATORY STRANDING. THERE IS A SMALL PUNCTATE AREA OF EXTRALUMINAL AIR. FINDINGS ARE THAT OF A ACUTE FOCAL DIVERTICULITIS. MR. ACEVEDO  OF THE EMERGENCY ROOM WAS NOTIFIED IMMEDIATELY OF THE ABOVE FINDINGS UPON AUGUST 16, 2020 AT [address] [place] [address] there All CT scans at this facility use dose modulation, iterative reconstruction, and/or weight based dosing when appropriate to reduce radiation dose to as low as reasonably achievable. Discharge Medications:       Arleene Claude   Home Medication Instructions JPD:[id number]    Printed on:08/20/20 3454   Medication Information                      albuterol sulfate  (90 Base) MCG/ACT inhaler  Inhale 2 puffs into the lungs every 4 hours as needed             ALPRAZolam (XANAX) 0.5 MG tablet  Take 0.5 mg by mouth 2 times daily. Quentin Joshi              amitriptyline (ELAVIL) 100 MG tablet  Take 100 mg by mouth nightly             amoxicillin-clavulanate (AUGMENTIN) 250-62.5 MG/5ML suspension  5 mLs by Per G Tube route 2 times daily for 7 days             apixaban (ELIQUIS) 5 MG TABS tablet  Take 10 mg by mouth 2 times daily             ARIPiprazole (ABILIFY) 15 MG tablet  Take 15 mg by mouth daily             atorvastatin (LIPITOR) 40 MG tablet  Take 1 tablet by mouth nightly             budesonide-formoterol (SYMBICORT) 80-4.5 MCG/ACT AERO  Inhale 2 puffs into the lungs 2 times daily as needed              cyclobenzaprine (FLEXERIL) 5 MG tablet  Take 2 tablets by mouth 3 times daily as needed for Muscle spasms             dicyclomine (BENTYL) 10 MG capsule  Take 1 capsule by mouth every 6 hours as needed (cramps)             docusate sodium (COLACE) 100 MG capsule  Take 100 mg by mouth 2 times daily             EPINEPHrine 1 mg/mL injection  Inject 0.2 mg into the skin as needed             fexofenadine (ALLEGRA) 60 MG tablet  Take 60 mg by mouth 2 times daily             fluticasone (FLONASE) 50 MCG/ACT nasal spray  2 sprays by Each Nostril route daily             levETIRAcetam (KEPPRA) 1000 MG tablet  Take 1 tablet by mouth 2 times daily             lidocaine (LMX) 4 % cream  Apply topically as needed. LORazepam (ATIVAN) 0.5 MG tablet  Take 0.5 mg by mouth daily as needed for Anxiety. .             Magnesium Oxide (MAG- PO)  Take 400 mg by mouth daily             meclizine (ANTIVERT) 25 MG tablet  Take 25 mg by mouth 2 times daily             melatonin 1 MG tablet  Take 5 mg by mouth nightly             metoprolol tartrate (LOPRESSOR) 25 MG tablet  Take 12.5 mg by mouth 2 times daily             miconazole (MICOTIN) 2 % powder  Apply topically 2 times daily. nitroGLYCERIN (NITROSTAT) 0.4 MG SL tablet  Place 0.4 mg under the tongue every 5 minutes as needed for Chest pain Dissolve 1 tablet under tongue for chest pain and repeat every 5 min up to max of 3 total doses. If no relief after 3 doses call 911             omeprazole (PRILOSEC) 40 MG delayed release capsule  Take 40 mg by mouth             ondansetron (ZOFRAN) 4 MG tablet  Take 4 mg by mouth every 8 hours as needed             oxyCODONE-acetaminophen (PERCOCET) 5-325 MG per tablet  Take 1 tablet by mouth every 8 hours as needed.              PARoxetine (PAXIL) 40 MG tablet  Take 40 mg by mouth every morning              polyethylene glycol (GLYCOLAX) packet  Take 17 g by mouth daily             potassium chloride (KLOR-CON) 10 MEQ extended release tablet  Take 20 mEq by mouth 2 times daily Once in morning and once at night             prazosin (MINIPRESS) 1 MG capsule  Take 1 mg by mouth nightly             propranolol (INDERAL LA) 60 MG CR capsule  Take 1 capsule by mouth daily             scopolamine (TRANSDERM-SCOP) transdermal patch  Place 1 patch onto the skin See Admin Instructions             topiramate (TOPAMAX) 200 MG tablet  Take 200 mg by mouth 2 times daily             traZODone (DESYREL) 50 MG tablet  Take 300 mg by mouth nightly              zolpidem (AMBIEN) 10 MG tablet  Take 10 mg by mouth nightly as needed for Sleep. Agnes Brown Disposition:   If discharged to Home, Any [facility] needs that were indicated and/or required as been addressed and set up by Social Work. Condition at discharge: good     Activity: activity as tolerated    Total time taken for discharging this patient: 40 minutes. Greater than 70% of time was spent focused exclusively on this patient. Time was taken to review chart, discuss plans with consultants, reconciling medications, discussing plan answering questions with patient.      Juan Esparza  8/20/2020, 4:05 PM  ----------------------------------------------------------------------------------------------------------------------    Leonardo Meter,

## 2020-08-20 NOTE — PROGRESS NOTES
0000 - Dressing to J-tube changed per order. Lidocaine ointment applied and let sit per pt. Request.  Nystatin cream then applied with split dressing applied over top.    0400 -  Dressing changed at J-tube site again. Brown/ mucus like discharge noted. Pt. States she can notice when it is leaking because she feels a burning sensation on her skin. Delmar Meraz itself seems to be further extravastated than previously noted.

## 2020-08-20 NOTE — PROGRESS NOTES
Infectious Disease     Patient Name: Lia Tran  Date: 8/20/2020  YOB: 1975  Medical Record Number: [id number]        Abdominal wall cellulitis  Diverticulitis      History of Present Illness:  Coronary disease cerebrovascular disease chronic kidney disease    G-tube was replaced 4 days prior to admission has been having backflow tube feeding abdominal pain nausea vomiting redness around G-tube site    Transfer to ICU for seizure    Examination: CT ABDOMEN PELVIS WO CONTRAST         Indication:   redness/pain surrounding G tube site with Vomiting         Technique: Multiple serial axial images was performed through the abdomen and pelvis. .   Images were reconstructed in the axial and coronal and sagittal planes.         Comparison: February 7, 2020         Findings:         The visualized basal lungs show no focal parenchymal abnormalities.         The liver, spleen, pancreas, adrenals, kidneys are unremarkable.         The gallbladder surgically absent.         Large and small bowel show no sign of obstruction.         The appendix is surgically absent.          No diverticulitis.         Uterus is surgically absent.         There is some diastases of the anterior abdominal wall as well as some soft tissue changes about the umbilicus. May represent postsurgical changes. Unchanged.         There is a gastrostomy tube. The tip is within stomach. There is some soft tissue changes about the tube within the subcutaneous soft tissue. No focal fluid collection. No drainable fluid collection.         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 There is contrast seen scattered throughout the large bowel from the cecum to the rectum. In the region of the proximal to midportion of the transverse colon there is some surrounding pericolonic inflammatory stranding. There is a small punctate area of extraluminal air. Findings are that of a acute focal diverticulitis. Series 2 image 56         No free air.  No free fluid.  The visualized abdominal aorta is of normal size and caliber.  No significant retroperitoneal adenopathy.         Visualized osseous structures are grossly unremarkable. A spinal stimulator within the right gluteal region its leads which enter the spinal canal are noted.              Impression    1 THERE IS A BOWEL MALROTATION  AGAIN NOTED. IN THE REGION OF THE PROXIMAL TO MIDPORTION OF THE TRANSVERSE COLON THERE IS SOME SURROUNDING PERICOLONIC INFLAMMATORY STRANDING. THERE IS A SMALL PUNCTATE AREA OF EXTRALUMINAL AIR. FINDINGS ARE THAT OF A    ACUTE FOCAL DIVERTICULITIS.         MR. ACEVEDO  OF THE EMERGENCY ROOM WAS NOTIFIED IMMEDIATELY OF THE ABOVE FINDINGS UPON AUGUST 16, 2020 AT [address] [place] [address] there              All CT scans at this facility use dose modulation, iterative reconstruction, and/or weight based dosing when appropriate to reduce radiation dose to as low as reasonably achievable. Review of Systems   Respiratory: Negative. Cardiovascular: Negative. Gastrointestinal: Positive for abdominal pain. Negative for diarrhea, nausea and vomiting. Physical Exam   Cardiovascular: Normal heart sounds. Pulmonary/Chest: Effort normal and breath sounds normal. No respiratory distress. She has no wheezes. She has no rales. Abdominal: Soft. Bowel sounds are normal. She exhibits no distension. There is abdominal tenderness. There is rebound. Neurological: She is alert. Skin: Skin is warm and dry. Blood pressure (!) 114/55, pulse 82, temperature 97.7 °F (36.5 °C), temperature source Oral, resp. rate 17, height 5' 5\" (1.651 m), weight 239 lb 4.8 oz (108.5 kg), SpO2 99 %.       .   Lab Results   Component Value Date    WBC 7.3 08/20/2020    HGB 11.5 (L) 08/20/2020    HCT 34.4 (L) 08/20/2020    MCV 91.4 08/20/2020     08/20/2020     Lab Results   Component Value Date     08/20/2020    K 4.2 08/20/2020     08/20/2020    CO2 24 08/20/2020    BUN 7
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
## 2020-08-20 NOTE — PROGRESS NOTES
palpitations and leg swelling. Gastrointestinal: Negative for nausea and vomiting. Skin: Negative for color change and rash. Neurological: Negative for dizziness, tremors, seizures, syncope, facial asymmetry, speech difficulty, weakness, light-headedness, numbness and headaches. Psychiatric/Behavioral: Negative for agitation, confusion and hallucinations. The patient is not nervous/anxious. Physical Exam  Vitals signs and nursing note reviewed. Constitutional:       General: She is not in acute distress. Appearance: She is morbidly obese. She is not diaphoretic. HENT:      Head: Normocephalic and atraumatic. Eyes:      Extraocular Movements: Extraocular movements intact. Pupils: Pupils are equal, round, and reactive to light. Cardiovascular:      Rate and Rhythm: Normal rate and regular rhythm. Pulmonary:      Effort: Pulmonary effort is normal. No respiratory distress. Breath sounds: Normal breath sounds. Skin:     General: Skin is warm and dry. Neurological:      General: No focal deficit present. Mental Status: She is alert and oriented to person, place, and time. Mental status is at baseline. Cranial Nerves: No cranial nerve deficit. Motor: No tremor or seizure activity.                Medications:  Reviewed    Infusion Medications:   Scheduled Medications:    levETIRAcetam  1,000 mg Oral BID    famotidine (PEPCID) injection  20 mg Intravenous BID    enoxaparin  40 mg Subcutaneous Daily    nystatin, stomahesive in petrolatum   Topical BID    acetaminophen  500 mg Oral 4x Daily    lidocaine   Topical 4x Daily    orphenadrine  60 mg Intramuscular Q12H    sodium chloride flush  10 mL Intravenous 2 times per day    piperacillin-tazobactam  3.375 g Intravenous Q8H    miconazole   Topical BID     PRN Meds: diphenhydrAMINE, HYDROmorphone, oxyCODONE, sodium chloride flush, polyethylene glycol, promethazine **OR** ondansetron    Labs:   Recent Labs 08/18/20  0600 08/18/20 1948 08/19/20  0535 08/20/20  0658   WBC 7.3  --  14.1* 7.3   HGB 10.7* 10.2* 12.1 11.5*   HCT 32.0*  --  36.2* 34.4*     --  304 233     Recent Labs     08/18/20  0600 08/18/20 1948 08/19/20  0535 08/20/20  0658     --  140 139   K 4.3  --  4.1 4.2     --  105 105   CO2 27  --  21 24   BUN 15  --  9 7   CREATININE 1.25* 1.0 1.01* 1.08*   CALCIUM 7.8*  --  9.0 8.4*     Recent Labs     08/18/20  0255   AST 20   ALT 17   BILIDIR <0.2   BILITOT 0.4   ALKPHOS 139*     No results for input(s): INR in the last 72 hours. No results for input(s): Burnadette Lundborg in the last 72 hours. Urinalysis:   Lab Results   Component Value Date    NITRU POSITIVE 02/07/2020    WBCUA >100 02/07/2020    BACTERIA MODERATE 02/07/2020    RBCUA 3-5 02/07/2020    BLOODU Negative 02/07/2020    SPECGRAV 1.018 02/07/2020    GLUCOSEU Negative 02/07/2020       Radiology:   Most recent    EEG No procedure found. MRI of Brain No results found for this or any previous visit. No results found for this or any previous visit. MRA of the Head and Neck: No results found for this or any previous visit. No results found for this or any previous visit. No results found for this or any previous visit. CT of the Head:   Results for orders placed during the hospital encounter of 06/20/19   CT Head WO Contrast    Narrative EXAMINATION: CT of the brain without contrast    HISTORY: Seizure    COMPARISON: CT brain from June 13, 2019    TECHNIQUE: Multiple contiguous axial images were obtained of the brain from the skull base through the vertex. Multiplanar reformats were obtained. FINDINGS:    Prominence of the sulci and ventricles but with mild generalized parenchymal volume loss. Gray-white matter differentiation is preserved. Remote right basal ganglia lacunar infarcts are again identified. No acute hemorrhage or abnormal extra-axial fluid collection.  Basal cisterns are patent. No mass effect or midline shift. The visualized paranasal sinuses and mastoid air cells are clear. Calvarium is intact. Impression No acute intracranial process. All CT scans at this facility use dose modulation, iterative reconstruction, and/or weight based dosing when appropriate to reduce radiation dose to as low as reasonably achievable. No results found for this or any previous visit. No results found for this or any previous visit. Carotid duplex: No results found for this or any previous visit. No results found for this or any previous visit. Results for orders placed during the hospital encounter of 06/11/19   US CAROTID ARTERY BILATERAL    Narrative US CAROTID ARTERY BILATERAL: 6/12/2019    CLINICAL HISTORY: Right-sided weakness. COMPARISON: 6/8/2018. Grayscale, color and waveform Doppler analysis of the cervical carotid and vertebral arteries was performed. Validated velocity measurements with angiographic measurements, velocity criteria are extrapolated from diameter data as defined by the Society of Radiologist in [address] [place] [address] Radiology 2003; 366;012-572. FINDINGS:    There is no significant atherosclerotic plaquing, elevated velocities, spectral waveform broadening, or incidental findings of concern identified. Maximum systolic velocity within the right internal and mid common carotid arteries are 77 and 127 cm/s, with an ICA/CCA ratio of approximately 0.61 , which indicates less than 50% by velocity criteria. Maximum systolic velocity within the left internal and mid common carotid arteries are 92 and 118 cm/s, with an ICA/CCA ratio of approximately 0.78, which indicates less than 50% by velocity criteria. Maximum velocities within the right and left external carotid arteries are 82 and 74 cm/sec respectively. There is antegrade flow in both vertebral arteries.           Impression NO EVIDENCE OF A FLOW-LIMITING STENOSIS. Echo No results found for this or any previous visit. Assessment/Plan:  8/19/20:  Generalized seizures epileptic with nonepileptic seizures patient is aware that she has nonepileptic seizures he has generalized seizures with the treatment of seizures and many other types of seizures he is followed by Dr. Henry Iyer who is epileptic allergist in [facility]  she is on Keppra 500 milligrams twice a day recommended that we increase this to thousand milligrams twice a day with a follow-up with her epileptologist  Patient will be assessed with an EEG to see if he can catch anything that is epileptic to further guide us in her management while she is here. 8/20/20:  No recurrent seizure activity. Tolerating Keppra increase. Okay to DC from neurology standpoint. Patient to follow-up with her neurologist for EEG  Collaborating physicians: Dr Dagoberto Valentino    I independently performed an evaluation on this patient. I have reviewed the above documentation completed by the Nurse Practitioner. Please see my additional contributions to the HPI, physical exam, assessment/medical decision making. Raul Valentino MD, [id number] American Board of Psychiatry & Neurology  Board Certified in Vascular Neurology  Board Certified in Neuromuscular Medicine  Certified in Neurorehabilitation       Electronically signed by SILKE An CNP on 8/20/2020 at 3:28 PM

## 2020-08-21 LAB
BLOOD CULTURE, ROUTINE: NORMAL
CULTURE, BLOOD 2: NORMAL

## 2020-09-01 ENCOUNTER — HOSPITAL ENCOUNTER (OUTPATIENT)
Age: 45
Setting detail: OBSERVATION
Discharge: HOME | End: 2020-09-02
Attending: INTERNAL MEDICINE | Admitting: INTERNAL MEDICINE
Payer: MEDICARE

## 2020-09-01 ENCOUNTER — APPOINTMENT (OUTPATIENT)
Dept: CT IMAGING | Age: 45
End: 2020-09-01
Payer: MEDICARE

## 2020-09-01 ENCOUNTER — APPOINTMENT (OUTPATIENT)
Dept: GENERAL RADIOLOGY | Age: 45
End: 2020-09-01
Payer: MEDICARE

## 2020-09-01 PROBLEM — R07.9 CHEST PAIN: Status: ACTIVE | Noted: 2020-09-01

## 2020-09-01 LAB
ALBUMIN SERPL-MCNC: 3.7 G/DL (ref 3.5–4.6)
ALP BLD-CCNC: 171 U/L (ref 40–130)
ALT SERPL-CCNC: 28 U/L (ref 0–33)
ANION GAP SERPL CALCULATED.3IONS-SCNC: 12 MEQ/L (ref 9–15)
APTT: 37.7 SEC (ref 24.4–36.8)
AST SERPL-CCNC: 30 U/L (ref 0–35)
BASOPHILS ABSOLUTE: 0.1 K/UL (ref 0–0.2)
BASOPHILS RELATIVE PERCENT: 0.9 %
BILIRUB SERPL-MCNC: 0.3 MG/DL (ref 0.2–0.7)
BUN BLDV-MCNC: 15 MG/DL (ref 6–20)
CALCIUM SERPL-MCNC: 9 MG/DL (ref 8.5–9.9)
CHLORIDE BLD-SCNC: 101 MEQ/L (ref 95–107)
CO2: 26 MEQ/L (ref 20–31)
CREAT SERPL-MCNC: 0.95 MG/DL (ref 0.5–0.9)
D DIMER: 0.81 MG/L FEU (ref 0–0.5)
EKG ATRIAL RATE: 71 BPM
EKG P AXIS: 53 DEGREES
EKG P-R INTERVAL: 172 MS
EKG Q-T INTERVAL: 400 MS
EKG QRS DURATION: 82 MS
EKG QTC CALCULATION (BAZETT): 434 MS
EKG R AXIS: 79 DEGREES
EKG T AXIS: 95 DEGREES
EKG VENTRICULAR RATE: 71 BPM
EOSINOPHILS ABSOLUTE: 0.2 K/UL (ref 0–0.7)
EOSINOPHILS RELATIVE PERCENT: 2.6 %
ETHANOL PERCENT: NORMAL G/DL
ETHANOL: <10 MG/DL (ref 0–0.08)
GFR AFRICAN AMERICAN: >60
GFR NON-AFRICAN AMERICAN: >60
GLOBULIN: 3.1 G/DL (ref 2.3–3.5)
GLUCOSE BLD-MCNC: 92 MG/DL (ref 70–99)
HCT VFR BLD CALC: 36.2 % (ref 37–47)
HEMOGLOBIN: 12.4 G/DL (ref 12–16)
LYMPHOCYTES ABSOLUTE: 1.7 K/UL (ref 1–4.8)
LYMPHOCYTES RELATIVE PERCENT: 22.1 %
MAGNESIUM: 1.8 MG/DL (ref 1.7–2.4)
MCH RBC QN AUTO: 30.5 PG (ref 27–31.3)
MCHC RBC AUTO-ENTMCNC: 34.1 % (ref 33–37)
MCV RBC AUTO: 89.5 FL (ref 82–100)
MONOCYTES ABSOLUTE: 0.4 K/UL (ref 0.2–0.8)
MONOCYTES RELATIVE PERCENT: 5.4 %
NEUTROPHILS ABSOLUTE: 5.4 K/UL (ref 1.4–6.5)
NEUTROPHILS RELATIVE PERCENT: 69 %
PDW BLD-RTO: 15.6 % (ref 11.5–14.5)
PLATELET # BLD: 328 K/UL (ref 130–400)
POTASSIUM SERPL-SCNC: 4.3 MEQ/L (ref 3.4–4.9)
PRO-BNP: 77 PG/ML
RBC # BLD: 4.05 M/UL (ref 4.2–5.4)
SODIUM BLD-SCNC: 139 MEQ/L (ref 135–144)
TOTAL CK: 46 U/L (ref 0–170)
TOTAL PROTEIN: 6.8 G/DL (ref 6.3–8)
TROPONIN: <0.01 NG/ML (ref 0–0.01)
WBC # BLD: 7.8 K/UL (ref 4.8–10.8)

## 2020-09-01 PROCEDURE — 96375 TX/PRO/DX INJ NEW DRUG ADDON: CPT

## 2020-09-01 PROCEDURE — 6360000002 HC RX W HCPCS: Performed by: STUDENT IN AN ORGANIZED HEALTH CARE EDUCATION/TRAINING PROGRAM

## 2020-09-01 PROCEDURE — 83735 ASSAY OF MAGNESIUM: CPT

## 2020-09-01 PROCEDURE — 6360000004 HC RX CONTRAST MEDICATION: Performed by: STUDENT IN AN ORGANIZED HEALTH CARE EDUCATION/TRAINING PROGRAM

## 2020-09-01 PROCEDURE — 83880 ASSAY OF NATRIURETIC PEPTIDE: CPT

## 2020-09-01 PROCEDURE — 99285 EMERGENCY DEPT VISIT HI MDM: CPT

## 2020-09-01 PROCEDURE — G0378 HOSPITAL OBSERVATION PER HR: HCPCS

## 2020-09-01 PROCEDURE — G0480 DRUG TEST DEF 1-7 CLASSES: HCPCS

## 2020-09-01 PROCEDURE — 85379 FIBRIN DEGRADATION QUANT: CPT

## 2020-09-01 PROCEDURE — 80307 DRUG TEST PRSMV CHEM ANLYZR: CPT

## 2020-09-01 PROCEDURE — 6370000000 HC RX 637 (ALT 250 FOR IP): Performed by: STUDENT IN AN ORGANIZED HEALTH CARE EDUCATION/TRAINING PROGRAM

## 2020-09-01 PROCEDURE — 82550 ASSAY OF CK (CPK): CPT

## 2020-09-01 PROCEDURE — 93005 ELECTROCARDIOGRAM TRACING: CPT | Performed by: INTERNAL MEDICINE

## 2020-09-01 PROCEDURE — 71275 CT ANGIOGRAPHY CHEST: CPT

## 2020-09-01 PROCEDURE — 36415 COLL VENOUS BLD VENIPUNCTURE: CPT

## 2020-09-01 PROCEDURE — 84484 ASSAY OF TROPONIN QUANT: CPT

## 2020-09-01 PROCEDURE — 96374 THER/PROPH/DIAG INJ IV PUSH: CPT

## 2020-09-01 PROCEDURE — 80053 COMPREHEN METABOLIC PANEL: CPT

## 2020-09-01 PROCEDURE — 71045 X-RAY EXAM CHEST 1 VIEW: CPT

## 2020-09-01 PROCEDURE — 85730 THROMBOPLASTIN TIME PARTIAL: CPT

## 2020-09-01 PROCEDURE — 81003 URINALYSIS AUTO W/O SCOPE: CPT

## 2020-09-01 PROCEDURE — 85025 COMPLETE CBC W/AUTO DIFF WBC: CPT

## 2020-09-01 PROCEDURE — 2060000000 HC ICU INTERMEDIATE R&B

## 2020-09-01 RX ORDER — ONDANSETRON 2 MG/ML
4 INJECTION INTRAMUSCULAR; INTRAVENOUS ONCE
Status: COMPLETED | OUTPATIENT
Start: 2020-09-01 | End: 2020-09-01

## 2020-09-01 RX ORDER — DIPHENHYDRAMINE HYDROCHLORIDE 50 MG/ML
50 INJECTION INTRAMUSCULAR; INTRAVENOUS ONCE
Status: COMPLETED | OUTPATIENT
Start: 2020-09-01 | End: 2020-09-01

## 2020-09-01 RX ORDER — MORPHINE SULFATE 2 MG/ML
2 INJECTION, SOLUTION INTRAMUSCULAR; INTRAVENOUS ONCE
Status: DISCONTINUED | OUTPATIENT
Start: 2020-09-01 | End: 2020-09-01

## 2020-09-01 RX ORDER — MORPHINE SULFATE 2 MG/ML
2 INJECTION, SOLUTION INTRAMUSCULAR; INTRAVENOUS ONCE
Status: COMPLETED | OUTPATIENT
Start: 2020-09-01 | End: 2020-09-01

## 2020-09-01 RX ORDER — NITROGLYCERIN 0.4 MG/1
0.4 TABLET SUBLINGUAL EVERY 5 MIN PRN
Status: DISCONTINUED | OUTPATIENT
Start: 2020-09-01 | End: 2020-09-02 | Stop reason: HOSPADM

## 2020-09-01 RX ORDER — METHYLPREDNISOLONE SODIUM SUCCINATE 125 MG/2ML
125 INJECTION, POWDER, LYOPHILIZED, FOR SOLUTION INTRAMUSCULAR; INTRAVENOUS ONCE
Status: COMPLETED | OUTPATIENT
Start: 2020-09-01 | End: 2020-09-01

## 2020-09-01 RX ORDER — ASPIRIN 325 MG
325 TABLET ORAL DAILY
Status: DISCONTINUED | OUTPATIENT
Start: 2020-09-01 | End: 2020-09-02 | Stop reason: DRUGHIGH

## 2020-09-01 RX ADMIN — DIPHENHYDRAMINE HYDROCHLORIDE 50 MG: 50 INJECTION, SOLUTION INTRAMUSCULAR; INTRAVENOUS at 20:40

## 2020-09-01 RX ADMIN — ASPIRIN 325 MG: 325 TABLET, FILM COATED ORAL at 18:55

## 2020-09-01 RX ADMIN — MORPHINE SULFATE 2 MG: 2 INJECTION, SOLUTION INTRAMUSCULAR; INTRAVENOUS at 22:09

## 2020-09-01 RX ADMIN — NITROGLYCERIN 0.4 MG: 0.4 TABLET, ORALLY DISINTEGRATING SUBLINGUAL at 18:55

## 2020-09-01 RX ADMIN — ONDANSETRON 4 MG: 2 INJECTION INTRAMUSCULAR; INTRAVENOUS at 22:09

## 2020-09-01 RX ADMIN — METHYLPREDNISOLONE SODIUM SUCCINATE 125 MG: 125 INJECTION, POWDER, FOR SOLUTION INTRAMUSCULAR; INTRAVENOUS at 20:40

## 2020-09-01 RX ADMIN — IOPAMIDOL 100 ML: 755 INJECTION, SOLUTION INTRAVENOUS at 21:20

## 2020-09-01 ASSESSMENT — PAIN DESCRIPTION - PAIN TYPE: TYPE: SURGICAL PAIN

## 2020-09-01 ASSESSMENT — ENCOUNTER SYMPTOMS
SHORTNESS OF BREATH: 0
VOMITING: 0
PHOTOPHOBIA: 0
NAUSEA: 0
COUGH: 0
WHEEZING: 0
ABDOMINAL PAIN: 0

## 2020-09-01 ASSESSMENT — PAIN SCALES - GENERAL
PAINLEVEL_OUTOF10: 4
PAINLEVEL_OUTOF10: 9
PAINLEVEL_OUTOF10: 9

## 2020-09-01 ASSESSMENT — HEART SCORE: ECG: 0

## 2020-09-01 ASSESSMENT — PAIN DESCRIPTION - DESCRIPTORS: DESCRIPTORS: DISCOMFORT

## 2020-09-01 ASSESSMENT — PAIN DESCRIPTION - ONSET: ONSET: ON-GOING

## 2020-09-01 ASSESSMENT — PAIN DESCRIPTION - ORIENTATION: ORIENTATION: LEFT;UPPER

## 2020-09-01 ASSESSMENT — PAIN DESCRIPTION - LOCATION
LOCATION: CHEST
LOCATION: ABDOMEN

## 2020-09-01 ASSESSMENT — PAIN DESCRIPTION - PROGRESSION: CLINICAL_PROGRESSION: NOT CHANGED

## 2020-09-01 ASSESSMENT — PAIN DESCRIPTION - FREQUENCY: FREQUENCY: INTERMITTENT

## 2020-09-01 NOTE — ED PROVIDER NOTES
[address] [facility] ED  EMERGENCY DEPARTMENT ENCOUNTER      Pt Name: Erasto Lin  MRN: [id number]  [place] 1975  Date of evaluation: 9/1/2020  Provider: Carlos March, [address]       Chief Complaint   Patient presents with    Chest Pain     radiates down left arm         HISTORY OF PRESENT ILLNESS   (Location/Symptom, Timing/Onset, Context/Setting, Quality, Duration, Modifying Factors, Severity)  Note limiting factors. Erasto Lin is a 39 y.o. female who per chart review has pmhx of seizures, bipolar disorder, headaches, GIB, TIA, CKD, SBO with PEG tube, colitis presents to the emergency department from home with sudden onset, constant, moderate, substernal chest pain described as heaviness and pressure that radiates to the L arm that began 40 min pta. Worse with exertion and improved with rest. Associated with mild SOB and nausea. No diaphoresis. Pt states she took 2 nitro at home before she came in. Initially pain was 10/10, relieved to an 8/10 with 1 nitro and then a 6/10 with the second nitro however has increased back to a 9/10 on arrival. Pt states hx of PE on eliquis which she is compliant with. Pt was admitted here from 8/16-8/20 for abdominal cellulitis and colitis. While admitted, patient had what initially was a code blue. She did have pulses but with agonal breathing so with intubated. After discussing with pt the next day, she explained this is how her typical seizures present so her Keppra was increased and she was stable. She was discharged home with augmentin for 7 days and states that sx have improved greatly. Pt cardiologist is through [place]. She states she has not followed up in several years. She has had a cath requiring 1 stent placement several years ago but does not know where or exactly how many years ago. Pt denies fever, chills, cough, vomiting, abd pain, palpitations, syncope, leg pain/swelling/color change, hemoptysis.  No recent travel surgeries or periods of immobilization. HPI    Nursing Notes were reviewed. REVIEW OF SYSTEMS    (2-9 systems for level 4, 10 or more for level 5)     Review of Systems   Constitutional: Negative for chills and fever. HENT: Negative for congestion. Eyes: Negative for photophobia. Respiratory: Negative for cough, shortness of breath and wheezing. Cardiovascular: Positive for chest pain. Negative for palpitations. Gastrointestinal: Negative for abdominal pain, nausea and vomiting. Genitourinary: Negative for dysuria, frequency and hematuria. Musculoskeletal: Negative for myalgias. Allergic/Immunologic: Negative for immunocompromised state. Neurological: Negative for dizziness, weakness and headaches. All other systems reviewed and are negative. Except as noted above the remainder of the review of systems was reviewed and negative. PAST MEDICAL HISTORY     Past Medical History:   Diagnosis Date    Asthma     Cerebral artery occlusion [facility] infarction ([facility] [facility]     Chronic back pain     Chronic kidney disease     Depression     Headache     Kidney disease     Kidney stone     Seizures ([facility] [address] 5/24/2016    Suprapubic catheter ([facility] [address] 06/2018         SURGICAL HISTORY       Past Surgical History:   Procedure Laterality Date    APPENDECTOMY      BACK SURGERY      CHOLECYSTECTOMY      [facility]09[facility]2019    [facility]    HYSTERECTOMY      KNEE SURGERY Bilateral     OTHER SURGICAL HISTORY      sup/pub cath june 1, 2018         CURRENT MEDICATIONS       Previous Medications    ALBUTEROL SULFATE  (90 BASE) MCG/ACT INHALER    Inhale 2 puffs into the lungs every 4 hours as needed    ALPRAZOLAM (XANAX) 0.5 MG TABLET    Take 0.5 mg by mouth 2 times daily. Moris Garcia     AMITRIPTYLINE (ELAVIL) 100 MG TABLET    Take 100 mg by mouth nightly    APIXABAN (ELIQUIS) 5 MG TABS TABLET    Take 10 mg by mouth 2 times daily    ARIPIPRAZOLE (ABILIFY) 15 MG TABLET    Take 15 mg by mouth daily ATORVASTATIN (LIPITOR) 40 MG TABLET    Take 1 tablet by mouth nightly    BUDESONIDE-FORMOTEROL (SYMBICORT) 80-4.5 MCG/ACT AERO    Inhale 2 puffs into the lungs 2 times daily as needed     CYCLOBENZAPRINE (FLEXERIL) 5 MG TABLET    Take 2 tablets by mouth 3 times daily as needed for Muscle spasms    DICYCLOMINE (BENTYL) 10 MG CAPSULE    Take 1 capsule by mouth every 6 hours as needed (cramps)    DOCUSATE SODIUM (COLACE) 100 MG CAPSULE    Take 100 mg by mouth 2 times daily    EPINEPHRINE 1 MG/ML INJECTION    Inject 0.2 mg into the skin as needed    FEXOFENADINE (ALLEGRA) 60 MG TABLET    Take 60 mg by mouth 2 times daily    FLUTICASONE (FLONASE) 50 MCG/ACT NASAL SPRAY    2 sprays by Each Nostril route daily    LEVETIRACETAM (KEPPRA) 1000 MG TABLET    Take 1 tablet by mouth 2 times daily    LIDOCAINE (LMX) 4 % CREAM    Apply topically as needed. LORAZEPAM (ATIVAN) 0.5 MG TABLET    Take 0.5 mg by mouth daily as needed for Anxiety. Austin Hires MAGNESIUM OXIDE (MAG- PO)    Take 400 mg by mouth daily    MECLIZINE (ANTIVERT) 25 MG TABLET    Take 25 mg by mouth 2 times daily    MELATONIN 1 MG TABLET    Take 5 mg by mouth nightly    METOPROLOL TARTRATE (LOPRESSOR) 25 MG TABLET    Take 12.5 mg by mouth 2 times daily    MICONAZOLE (MICOTIN) 2 % POWDER    Apply topically 2 times daily. NITROGLYCERIN (NITROSTAT) 0.4 MG SL TABLET    Place 0.4 mg under the tongue every 5 minutes as needed for Chest pain Dissolve 1 tablet under tongue for chest pain and repeat every 5 min up to max of 3 total doses. If no relief after 3 doses call 911    OMEPRAZOLE (PRILOSEC) 40 MG DELAYED RELEASE CAPSULE    Take 40 mg by mouth    ONDANSETRON (ZOFRAN) 4 MG TABLET    Take 4 mg by mouth every 8 hours as needed    OXYCODONE-ACETAMINOPHEN (PERCOCET) 5-325 MG PER TABLET    Take 1 tablet by mouth every 8 hours as needed.     PAROXETINE (PAXIL) 40 MG TABLET    Take 40 mg by mouth every morning     POLYETHYLENE GLYCOL (GLYCOLAX) PACKET    Take 17 g by mouth daily    POTASSIUM CHLORIDE (KLOR-CON) 10 MEQ EXTENDED RELEASE TABLET    Take 20 mEq by mouth 2 times daily Once in morning and once at night    PRAZOSIN (MINIPRESS) 1 MG CAPSULE    Take 1 mg by mouth nightly    PROPRANOLOL (INDERAL LA) 60 MG CR CAPSULE    Take 1 capsule by mouth daily    SCOPOLAMINE (TRANSDERM-SCOP) TRANSDERMAL PATCH    Place 1 patch onto the skin See Admin Instructions    TOPIRAMATE (TOPAMAX) 200 MG TABLET    Take 200 mg by mouth 2 times daily    TRAZODONE (DESYREL) 50 MG TABLET    Take 300 mg by mouth nightly     ZOLPIDEM (AMBIEN) 10 MG TABLET    Take 10 mg by mouth nightly as needed for Sleep. .       ALLERGIES     Latex; Ciprofloxacin; Shellfish-derived products; Daypro [oxaprozin]; Iodides; Iodine; Lidocaine; Macrobid [nitrofurantoin monohyd macro]; Macrolides and ketolides; Other; Penicillins; Pollen extract; Propranolol; Propranolol hcl; Tape [adhesive tape]; Tegretol [carbamazepine]; Tizanidine; Toradol [ketorolac tromethamine]; Tramadol; Zanaflex [tizanidine hcl]; Estrogens; Lamictal [lamotrigine]; Lyrica [pregabalin];  Tylenol [acetaminophen]; and Vicodin [hydrocodone-acetaminophen]    FAMILY HISTORY       Family History   Problem Relation Age of Onset    Cancer Father     Cancer Paternal Aunt           SOCIAL HISTORY       Social History     Socioeconomic History    Marital status: Single     Spouse name: None    Number of children: None    Years of education: None    Highest education level: None   Occupational History    None   Social Needs    Financial resource strain: None    Food insecurity     Worry: None     Inability: None    Transportation needs     Medical: None     Non-medical: None   Tobacco Use    Smoking status: Never Smoker    Smokeless tobacco: Never Used   Substance and Sexual Activity    Alcohol use: No     Alcohol/week: 0.0 standard drinks    Drug use: No    Sexual activity: Never   Lifestyle    Physical activity     Days per week: None     Minutes per session: None    Stress: None   Relationships    Social connections     Talks on phone: None     Gets together: None     Attends Holiness service: None     Active member of club or organization: None     Attends meetings of clubs or organizations: None     Relationship status: None    Intimate partner violence     Fear of current or ex partner: None     Emotionally abused: None     Physically abused: None     Forced sexual activity: None   Other Topics Concern    None   Social History Narrative    None       SCREENINGS          Heart Score for chest pain patients  History: Slightly Suspicious  ECG: Normal  Patient Age: > 39 and < 65 years  *Risk factors for Atherosclerotic disease: Hypercholesterolemia, Obesity, Coronary Artery Disease  Risk Factors: > 3 Risk factors or history of atherosclerotic disease*  Troponin: < 1X normal limit  Heart Score Total: 3             PHYSICAL EXAM    (up to 7 for level 4, 8 or more for level 5)     ED Triage Vitals [09/01/20 1819]   BP Temp Temp Source Pulse Resp SpO2 Height Weight   132/79 97.9 °F (36.6 °C) Oral 84 19 100 % 5' 5\" (1.651 m) 220 lb (99.8 kg)       Physical Exam  Constitutional:       General: She is in acute distress (pt moaning in pain). Appearance: She is well-developed. She is not ill-appearing, toxic-appearing or diaphoretic. HENT:      Head: Normocephalic and atraumatic. Nose: Nose normal.      Mouth/Throat:      Mouth: Mucous membranes are moist.   Eyes:      Pupils: Pupils are equal, round, and reactive to light. Neck:      Musculoskeletal: Normal range of motion. Cardiovascular:      Rate and Rhythm: Normal rate and regular rhythm. Pulses: Normal pulses. Heart sounds: No murmur. No friction rub. No gallop. Pulmonary:      Effort: Pulmonary effort is normal.      Breath sounds: Normal breath sounds. No decreased breath sounds, wheezing, rhonchi or rales.    Abdominal:      General: Bowel sounds are normal. There is no called to and read back by [facility] ED, 09/01/2020 20:30, by [address]   TROPONIN   URINE DRUG SCREEN   URINE RT REFLEX TO CULTURE       All other labs were within normal range or not returned as of this dictation. EMERGENCY DEPARTMENT COURSE and DIFFERENTIAL DIAGNOSIS/MDM:   Vitals:    Vitals:    09/01/20 1900 09/01/20 2000 09/01/20 2100 09/01/20 2200   BP: (!) 109/59 122/76 96/69 (!) 123/58   Pulse: 68 71  65   Resp: 18   16   Temp:       TempSrc:       SpO2: 96% 97% 99% 100%   Weight:       Height:             MDM     Pt is a 39year old F who presents to the ED with chest pain. She is afebrile and hemodynamically stable. Pt had 2 doses of nitro pta. Pt was given 1 SL nitro, PO ASA in the ED. Chest pain relieved with the dose of nitro given in the ED. EKG shows NSR with HR 71, normal axis, normal intervals, no ST changes. New T wave inversion in lead I. CXR negative for acute abnormality. CMP remarkable for alk phos of 171. D-dimer elevated to 0.81. Remainder of labs unremarkable. Trop #1 negative. Pt pre medicated for CT PE due to reaction to contrast with IV solumedrol and IV benadryl. CT chest is negative for acute abnormality. No evidence of PE. Pt reassessed and states her PEG tube is giving her pain. Given IV morphine and IV zofran in the ED with relief of pain. Pt reassessed and continues to be chest pain free. Heart score is 3. Due to chest pain with evidence of new T wave inversions in lead I, patient will be admitted for further management and evaluation. Case discussed with Dr. Deniz Soto of cardiology who will accept the patient to his service. Pt agrees to and understands plan, all questions answered. REASSESSMENT          CRITICAL CARE TIME   Total Critical Care time was 0 minutes, excluding separately reportable procedures.   There was a high probability of clinically significant/life threatening deterioration in the patient's condition which required my urgent intervention. CONSULTS:  None    PROCEDURES:  Unless otherwise noted below, none     Procedures        FINAL IMPRESSION      1. Chest pain, unspecified type          DISPOSITION/PLAN   DISPOSITION Admitted 09/01/2020 10:25:01 PM      PATIENT REFERRED TO:  Lazaro Ely  [address] [address]  [phone]            DISCHARGE MEDICATIONS:  New Prescriptions    No medications on file     Controlled Substances Monitoring:     RX Monitoring 12/17/2018   Attestation The Prescription Monitoring Report for this patient was reviewed today.    Periodic Controlled Substance Monitoring -       (Please note that portions of this note were completed with a voice recognition program.  Efforts were made to edit the dictations but occasionally words are mis-transcribed.)    Carlos Hernandez PA-C (electronically signed)             Carlos Hernandez PA-C  09/01/20 1125

## 2020-09-02 ENCOUNTER — APPOINTMENT (OUTPATIENT)
Dept: CARDIAC CATH/INVASIVE PROCEDURES | Age: 45
End: 2020-09-02
Payer: MEDICARE

## 2020-09-02 VITALS
RESPIRATION RATE: 16 BRPM | BODY MASS INDEX: 40.04 KG/M2 | WEIGHT: 240.3 LBS | HEIGHT: 65 IN | DIASTOLIC BLOOD PRESSURE: 55 MMHG | TEMPERATURE: 98.2 F | OXYGEN SATURATION: 100 % | SYSTOLIC BLOOD PRESSURE: 119 MMHG | HEART RATE: 85 BPM

## 2020-09-02 PROBLEM — I20.8 ANGINA AT REST (HCC): Status: ACTIVE | Noted: 2020-09-02

## 2020-09-02 PROBLEM — I20.89 ANGINA AT REST: Status: ACTIVE | Noted: 2020-09-02

## 2020-09-02 LAB
AMPHETAMINE SCREEN, URINE: ABNORMAL
BARBITURATE SCREEN URINE: ABNORMAL
BENZODIAZEPINE SCREEN, URINE: ABNORMAL
BILIRUBIN URINE: NEGATIVE
BLOOD, URINE: NEGATIVE
CANNABINOID SCREEN URINE: ABNORMAL
CLARITY: CLEAR
COCAINE METABOLITE SCREEN URINE: ABNORMAL
COLOR: YELLOW
GLUCOSE URINE: NEGATIVE MG/DL
HCT VFR BLD CALC: 40.2 % (ref 37–47)
HEMOGLOBIN: 13.3 G/DL (ref 12–16)
KETONES, URINE: NEGATIVE MG/DL
LEUKOCYTE ESTERASE, URINE: NEGATIVE
Lab: ABNORMAL
MCH RBC QN AUTO: 30.1 PG (ref 27–31.3)
MCHC RBC AUTO-ENTMCNC: 33 % (ref 33–37)
MCV RBC AUTO: 91.1 FL (ref 82–100)
METHADONE SCREEN, URINE: ABNORMAL
NITRITE, URINE: NEGATIVE
OPIATE SCREEN URINE: POSITIVE
OXYCODONE URINE: POSITIVE
PDW BLD-RTO: 15.6 % (ref 11.5–14.5)
PH UA: 5.5 (ref 5–9)
PHENCYCLIDINE SCREEN URINE: ABNORMAL
PLATELET # BLD: 371 K/UL (ref 130–400)
PROPOXYPHENE SCREEN: ABNORMAL
PROTEIN UA: NEGATIVE MG/DL
RBC # BLD: 4.41 M/UL (ref 4.2–5.4)
SPECIFIC GRAVITY UA: 1.02 (ref 1–1.03)
TROPONIN: <0.01 NG/ML (ref 0–0.01)
TROPONIN: <0.01 NG/ML (ref 0–0.01)
URINE REFLEX TO CULTURE: NORMAL
UROBILINOGEN, URINE: 0.2 E.U./DL
WBC # BLD: 10.1 K/UL (ref 4.8–10.8)

## 2020-09-02 PROCEDURE — 2500000003 HC RX 250 WO HCPCS

## 2020-09-02 PROCEDURE — C1725 CATH, TRANSLUMIN NON-LASER: HCPCS

## 2020-09-02 PROCEDURE — 6360000004 HC RX CONTRAST MEDICATION: Performed by: INTERNAL MEDICINE

## 2020-09-02 PROCEDURE — 93458 L HRT ARTERY/VENTRICLE ANGIO: CPT | Performed by: INTERNAL MEDICINE

## 2020-09-02 PROCEDURE — 84484 ASSAY OF TROPONIN QUANT: CPT

## 2020-09-02 PROCEDURE — 2580000003 HC RX 258: Performed by: INTERNAL MEDICINE

## 2020-09-02 PROCEDURE — 6360000002 HC RX W HCPCS

## 2020-09-02 PROCEDURE — 6370000000 HC RX 637 (ALT 250 FOR IP): Performed by: INTERNAL MEDICINE

## 2020-09-02 PROCEDURE — 85027 COMPLETE CBC AUTOMATED: CPT

## 2020-09-02 PROCEDURE — 2709999900 HC NON-CHARGEABLE SUPPLY

## 2020-09-02 PROCEDURE — 6360000002 HC RX W HCPCS: Performed by: INTERNAL MEDICINE

## 2020-09-02 PROCEDURE — 96375 TX/PRO/DX INJ NEW DRUG ADDON: CPT

## 2020-09-02 PROCEDURE — 2500000003 HC RX 250 WO HCPCS: Performed by: INTERNAL MEDICINE

## 2020-09-02 PROCEDURE — 36415 COLL VENOUS BLD VENIPUNCTURE: CPT

## 2020-09-02 PROCEDURE — G0378 HOSPITAL OBSERVATION PER HR: HCPCS

## 2020-09-02 PROCEDURE — C1769 GUIDE WIRE: HCPCS

## 2020-09-02 PROCEDURE — C1894 INTRO/SHEATH, NON-LASER: HCPCS

## 2020-09-02 PROCEDURE — 96376 TX/PRO/DX INJ SAME DRUG ADON: CPT

## 2020-09-02 PROCEDURE — 99223 1ST HOSP IP/OBS HIGH 75: CPT | Performed by: INTERNAL MEDICINE

## 2020-09-02 RX ORDER — NITROGLYCERIN 0.4 MG/1
0.4 TABLET SUBLINGUAL EVERY 5 MIN PRN
Status: DISCONTINUED | OUTPATIENT
Start: 2020-09-02 | End: 2020-09-02 | Stop reason: HOSPADM

## 2020-09-02 RX ORDER — ONDANSETRON 4 MG/1
4 TABLET, FILM COATED ORAL EVERY 8 HOURS PRN
Status: DISCONTINUED | OUTPATIENT
Start: 2020-09-02 | End: 2020-09-02 | Stop reason: HOSPADM

## 2020-09-02 RX ORDER — ONDANSETRON 2 MG/ML
4 INJECTION INTRAMUSCULAR; INTRAVENOUS EVERY 6 HOURS PRN
Status: DISCONTINUED | OUTPATIENT
Start: 2020-09-02 | End: 2020-09-02 | Stop reason: HOSPADM

## 2020-09-02 RX ORDER — SODIUM CHLORIDE 9 MG/ML
INJECTION, SOLUTION INTRAVENOUS CONTINUOUS
Status: ACTIVE | OUTPATIENT
Start: 2020-09-02 | End: 2020-09-02

## 2020-09-02 RX ORDER — DICYCLOMINE HYDROCHLORIDE 10 MG/1
10 CAPSULE ORAL EVERY 6 HOURS PRN
Status: DISCONTINUED | OUTPATIENT
Start: 2020-09-02 | End: 2020-09-02 | Stop reason: HOSPADM

## 2020-09-02 RX ORDER — PREDNISONE 50 MG/1
50 TABLET ORAL ONCE
Status: DISCONTINUED | OUTPATIENT
Start: 2020-09-02 | End: 2020-09-02 | Stop reason: HOSPADM

## 2020-09-02 RX ORDER — PROMETHAZINE HYDROCHLORIDE 12.5 MG/1
12.5 TABLET ORAL EVERY 6 HOURS PRN
Status: DISCONTINUED | OUTPATIENT
Start: 2020-09-02 | End: 2020-09-02 | Stop reason: HOSPADM

## 2020-09-02 RX ORDER — ZOLPIDEM TARTRATE 5 MG/1
10 TABLET ORAL NIGHTLY PRN
Status: DISCONTINUED | OUTPATIENT
Start: 2020-09-02 | End: 2020-09-02 | Stop reason: HOSPADM

## 2020-09-02 RX ORDER — CYCLOBENZAPRINE HCL 10 MG
10 TABLET ORAL 3 TIMES DAILY PRN
Status: DISCONTINUED | OUTPATIENT
Start: 2020-09-02 | End: 2020-09-02 | Stop reason: HOSPADM

## 2020-09-02 RX ORDER — SCOLOPAMINE TRANSDERMAL SYSTEM 1 MG/1
1 PATCH, EXTENDED RELEASE TRANSDERMAL SEE ADMIN INSTRUCTIONS
Status: DISCONTINUED | OUTPATIENT
Start: 2020-09-02 | End: 2020-09-02 | Stop reason: HOSPADM

## 2020-09-02 RX ORDER — ALPRAZOLAM 0.5 MG/1
0.5 TABLET ORAL 2 TIMES DAILY
Status: DISCONTINUED | OUTPATIENT
Start: 2020-09-02 | End: 2020-09-02 | Stop reason: HOSPADM

## 2020-09-02 RX ORDER — PAROXETINE HYDROCHLORIDE 20 MG/1
40 TABLET, FILM COATED ORAL EVERY MORNING
Status: DISCONTINUED | OUTPATIENT
Start: 2020-09-02 | End: 2020-09-02 | Stop reason: HOSPADM

## 2020-09-02 RX ORDER — DIPHENHYDRAMINE HYDROCHLORIDE 50 MG/ML
50 INJECTION INTRAMUSCULAR; INTRAVENOUS ONCE
Status: COMPLETED | OUTPATIENT
Start: 2020-09-02 | End: 2020-09-02

## 2020-09-02 RX ORDER — ARIPIPRAZOLE 5 MG/1
15 TABLET ORAL DAILY
Status: DISCONTINUED | OUTPATIENT
Start: 2020-09-02 | End: 2020-09-02 | Stop reason: HOSPADM

## 2020-09-02 RX ORDER — SODIUM CHLORIDE 0.9 % (FLUSH) 0.9 %
10 SYRINGE (ML) INJECTION PRN
Status: DISCONTINUED | OUTPATIENT
Start: 2020-09-02 | End: 2020-09-02 | Stop reason: HOSPADM

## 2020-09-02 RX ORDER — LORAZEPAM 0.5 MG/1
0.5 TABLET ORAL DAILY PRN
Status: DISCONTINUED | OUTPATIENT
Start: 2020-09-02 | End: 2020-09-02 | Stop reason: HOSPADM

## 2020-09-02 RX ORDER — ACETAMINOPHEN 325 MG/1
650 TABLET ORAL EVERY 6 HOURS PRN
Status: DISCONTINUED | OUTPATIENT
Start: 2020-09-02 | End: 2020-09-02

## 2020-09-02 RX ORDER — PRAZOSIN HYDROCHLORIDE 1 MG/1
1 CAPSULE ORAL NIGHTLY
Status: DISCONTINUED | OUTPATIENT
Start: 2020-09-02 | End: 2020-09-02 | Stop reason: HOSPADM

## 2020-09-02 RX ORDER — DIPHENHYDRAMINE HCL 25 MG
50 TABLET ORAL ONCE
Status: DISCONTINUED | OUTPATIENT
Start: 2020-09-02 | End: 2020-09-02 | Stop reason: SDUPTHER

## 2020-09-02 RX ORDER — ASPIRIN 81 MG/1
81 TABLET, CHEWABLE ORAL DAILY
Status: DISCONTINUED | OUTPATIENT
Start: 2020-09-02 | End: 2020-09-02 | Stop reason: HOSPADM

## 2020-09-02 RX ORDER — DOCUSATE SODIUM 100 MG/1
100 CAPSULE, LIQUID FILLED ORAL 2 TIMES DAILY
Status: DISCONTINUED | OUTPATIENT
Start: 2020-09-02 | End: 2020-09-02 | Stop reason: HOSPADM

## 2020-09-02 RX ORDER — FLUTICASONE PROPIONATE 50 MCG
2 SPRAY, SUSPENSION (ML) NASAL DAILY
Status: DISCONTINUED | OUTPATIENT
Start: 2020-09-02 | End: 2020-09-02 | Stop reason: HOSPADM

## 2020-09-02 RX ORDER — AMITRIPTYLINE HYDROCHLORIDE 50 MG/1
100 TABLET, FILM COATED ORAL NIGHTLY
Status: DISCONTINUED | OUTPATIENT
Start: 2020-09-02 | End: 2020-09-02 | Stop reason: HOSPADM

## 2020-09-02 RX ORDER — POLYETHYLENE GLYCOL 3350 17 G/17G
17 POWDER, FOR SOLUTION ORAL DAILY PRN
Status: DISCONTINUED | OUTPATIENT
Start: 2020-09-02 | End: 2020-09-02 | Stop reason: HOSPADM

## 2020-09-02 RX ORDER — ACETAMINOPHEN 650 MG/1
650 SUPPOSITORY RECTAL EVERY 6 HOURS PRN
Status: DISCONTINUED | OUTPATIENT
Start: 2020-09-02 | End: 2020-09-02

## 2020-09-02 RX ORDER — POTASSIUM CHLORIDE 750 MG/1
20 TABLET, FILM COATED, EXTENDED RELEASE ORAL 2 TIMES DAILY
Status: DISCONTINUED | OUTPATIENT
Start: 2020-09-02 | End: 2020-09-02 | Stop reason: HOSPADM

## 2020-09-02 RX ORDER — ATORVASTATIN CALCIUM 40 MG/1
40 TABLET, FILM COATED ORAL NIGHTLY
Status: DISCONTINUED | OUTPATIENT
Start: 2020-09-02 | End: 2020-09-02 | Stop reason: HOSPADM

## 2020-09-02 RX ORDER — CETIRIZINE HYDROCHLORIDE 10 MG/1
10 TABLET ORAL DAILY
Status: DISCONTINUED | OUTPATIENT
Start: 2020-09-02 | End: 2020-09-02 | Stop reason: HOSPADM

## 2020-09-02 RX ORDER — SODIUM CHLORIDE 0.9 % (FLUSH) 0.9 %
10 SYRINGE (ML) INJECTION EVERY 12 HOURS SCHEDULED
Status: DISCONTINUED | OUTPATIENT
Start: 2020-09-02 | End: 2020-09-02 | Stop reason: HOSPADM

## 2020-09-02 RX ORDER — MECLIZINE HYDROCHLORIDE 25 MG/1
25 TABLET ORAL 2 TIMES DAILY
Status: DISCONTINUED | OUTPATIENT
Start: 2020-09-02 | End: 2020-09-02 | Stop reason: HOSPADM

## 2020-09-02 RX ORDER — SODIUM CHLORIDE 9 MG/ML
INJECTION, SOLUTION INTRAVENOUS CONTINUOUS
Status: DISCONTINUED | OUTPATIENT
Start: 2020-09-02 | End: 2020-09-02 | Stop reason: HOSPADM

## 2020-09-02 RX ORDER — TRAZODONE HYDROCHLORIDE 100 MG/1
300 TABLET ORAL NIGHTLY
Status: DISCONTINUED | OUTPATIENT
Start: 2020-09-02 | End: 2020-09-02 | Stop reason: HOSPADM

## 2020-09-02 RX ORDER — OXYCODONE HYDROCHLORIDE AND ACETAMINOPHEN 5; 325 MG/1; MG/1
1 TABLET ORAL EVERY 6 HOURS PRN
Status: DISCONTINUED | OUTPATIENT
Start: 2020-09-02 | End: 2020-09-02 | Stop reason: HOSPADM

## 2020-09-02 RX ORDER — LEVETIRACETAM 250 MG/1
1000 TABLET ORAL 2 TIMES DAILY
Status: DISCONTINUED | OUTPATIENT
Start: 2020-09-02 | End: 2020-09-02 | Stop reason: HOSPADM

## 2020-09-02 RX ADMIN — POTASSIUM CHLORIDE 20 MEQ: 750 TABLET, EXTENDED RELEASE ORAL at 10:26

## 2020-09-02 RX ADMIN — LEVETIRACETAM 1000 MG: 250 TABLET ORAL at 10:27

## 2020-09-02 RX ADMIN — DIPHENHYDRAMINE HYDROCHLORIDE 50 MG: 50 INJECTION, SOLUTION INTRAMUSCULAR; INTRAVENOUS at 14:58

## 2020-09-02 RX ADMIN — OXYCODONE HYDROCHLORIDE AND ACETAMINOPHEN 1 TABLET: 5; 325 TABLET ORAL at 10:27

## 2020-09-02 RX ADMIN — FAMOTIDINE 20 MG: 10 INJECTION INTRAVENOUS at 14:58

## 2020-09-02 RX ADMIN — HYDROCORTISONE SODIUM SUCCINATE 200 MG: 100 INJECTION, POWDER, FOR SOLUTION INTRAMUSCULAR; INTRAVENOUS at 14:58

## 2020-09-02 RX ADMIN — FLUTICASONE PROPIONATE 2 SPRAY: 50 SPRAY, METERED NASAL at 10:43

## 2020-09-02 RX ADMIN — ALPRAZOLAM 0.5 MG: 0.5 TABLET ORAL at 10:27

## 2020-09-02 RX ADMIN — MICONAZOLE NITRATE: 2 POWDER TOPICAL at 10:43

## 2020-09-02 RX ADMIN — SODIUM CHLORIDE: 9 INJECTION, SOLUTION INTRAVENOUS at 10:26

## 2020-09-02 RX ADMIN — METOPROLOL TARTRATE 12.5 MG: 25 TABLET, FILM COATED ORAL at 10:26

## 2020-09-02 RX ADMIN — MECLIZINE HYDROCHLORIDE 25 MG: 25 TABLET ORAL at 10:27

## 2020-09-02 RX ADMIN — IOPAMIDOL 25 ML: 612 INJECTION, SOLUTION INTRAVENOUS at 16:13

## 2020-09-02 RX ADMIN — TOPIRAMATE 200 MG: 200 TABLET, FILM COATED ORAL at 10:27

## 2020-09-02 RX ADMIN — DOCUSATE SODIUM 100 MG: 100 CAPSULE, LIQUID FILLED ORAL at 10:27

## 2020-09-02 RX ADMIN — Medication 10 ML: at 08:41

## 2020-09-02 RX ADMIN — ARIPIPRAZOLE 15 MG: 5 TABLET ORAL at 10:27

## 2020-09-02 RX ADMIN — PAROXETINE HYDROCHLORIDE 40 MG: 20 TABLET, FILM COATED ORAL at 10:27

## 2020-09-02 RX ADMIN — ONDANSETRON 4 MG: 2 INJECTION INTRAMUSCULAR; INTRAVENOUS at 17:00

## 2020-09-02 RX ADMIN — ASPIRIN 81 MG: 81 TABLET, CHEWABLE ORAL at 08:40

## 2020-09-02 RX ADMIN — CETIRIZINE HYDROCHLORIDE 10 MG: 10 TABLET, FILM COATED ORAL at 10:27

## 2020-09-02 ASSESSMENT — PAIN DESCRIPTION - PAIN TYPE
TYPE: SURGICAL PAIN
TYPE: SURGICAL PAIN

## 2020-09-02 ASSESSMENT — PAIN SCALES - GENERAL
PAINLEVEL_OUTOF10: 0
PAINLEVEL_OUTOF10: 8
PAINLEVEL_OUTOF10: 0
PAINLEVEL_OUTOF10: 8
PAINLEVEL_OUTOF10: 5

## 2020-09-02 ASSESSMENT — PAIN DESCRIPTION - DESCRIPTORS
DESCRIPTORS: SORE
DESCRIPTORS: SORE

## 2020-09-02 ASSESSMENT — ENCOUNTER SYMPTOMS
VOMITING: 0
EYES NEGATIVE: 1
WHEEZING: 0
ABDOMINAL PAIN: 0
NAUSEA: 1
SHORTNESS OF BREATH: 1
ALLERGIC/IMMUNOLOGIC NEGATIVE: 1

## 2020-09-02 ASSESSMENT — PAIN DESCRIPTION - ORIENTATION
ORIENTATION: LEFT;UPPER
ORIENTATION: LEFT;UPPER

## 2020-09-02 ASSESSMENT — PAIN DESCRIPTION - LOCATION
LOCATION: ABDOMEN
LOCATION: ABDOMEN

## 2020-09-02 NOTE — BRIEF OP NOTE
Section of Cardiology  Adult Brief Cardiac Cath Procedure Note        Procedure(s):  LHC, b/l coronary angio    Pre-operative Diagnosis:  Angina at rest    H&P Status: Completed and reviewed.      Post-operative Diagnosis:  Normal coronaries, normal LVF/EDP    Findings:  See full report    Complications:  none    Primary Proceduralist:   Dr.Wes Alba DO      Full procedure note to follow

## 2020-09-02 NOTE — H&P
 Pain following surgery or procedure    Pain of jejunostomy tube site (HCC)    Irritation around percutaneous endoscopic gastrostomy (PEG) tube site (HCC)    Chronic kidney disease    History of small bowel obstruction    Psychogenic nonepileptic seizure    Pharyngoesophageal dysphagia    Colitis    Chest pain       Past Surgical History:   Procedure Laterality Date    APPENDECTOMY      BACK SURGERY      CHOLECYSTECTOMY      CORONARY ANGIOPLASTY WITH STENT PLACEMENT  2007    [facility]    HYSTERECTOMY      HYSTERECTOMY, TOTAL ABDOMINAL      KNEE SURGERY Bilateral     OTHER SURGICAL HISTORY      sup/pub cath june 1, 2018    SPINE SURGERY         Social History     Socioeconomic History    Marital status: Single     Spouse name: None    Number of children: None    Years of education: None    Highest education level: None   Occupational History    None   Social Needs    Financial resource strain: None    Food insecurity     Worry: None     Inability: None    Transportation needs     Medical: None     Non-medical: None   Tobacco Use    Smoking status: Never Smoker    Smokeless tobacco: Never Used   Substance and Sexual Activity    Alcohol use: No     Alcohol/week: 0.0 standard drinks    Drug use: No    Sexual activity: Not Currently     Partners: Male   Lifestyle    Physical activity     Days per week: None     Minutes per session: None    Stress: None   Relationships    Social connections     Talks on phone: None     Gets together: None     Attends Amish service: None     Active member of club or organization: None     Attends meetings of clubs or organizations: None     Relationship status: None    Intimate partner violence     Fear of current or ex partner: None     Emotionally abused: None     Physically abused: None     Forced sexual activity: None   Other Topics Concern    None   Social History Narrative    None       Family History   Problem Relation Age of Onset    Cancer Father     Cancer Paternal Aunt        Current Facility-Administered Medications   Medication Dose Route Frequency Provider Last Rate Last Dose    sodium chloride flush 0.9 % injection 10 mL  10 mL Intravenous 2 times per day Wes J Holiday, DO   10 mL at 09/02/20 0841    sodium chloride flush 0.9 % injection 10 mL  10 mL Intravenous PRN [facility] Holiday, DO        acetaminophen (TYLENOL) tablet 650 mg  650 mg Oral Q6H PRN [facility] Holiday, DO        Or    acetaminophen (TYLENOL) suppository 650 mg  650 mg Rectal Q6H PRN [facility] Holiday, DO        polyethylene glycol (GLYCOLAX) packet 17 g  17 g Oral Daily PRN [facility] Holiday, DO        promethazine (PHENERGAN) tablet 12.5 mg  12.5 mg Oral Q6H PRN [facility] Holiday, DO        Or    ondansetron (ZOFRAN) injection 4 mg  4 mg Intravenous Q6H PRN [facility] Holiday, DO        aspirin chewable tablet 81 mg  81 mg Oral Daily [facility] Holiday, DO   81 mg at 09/02/20 0840    nitroGLYCERIN (NITROSTAT) SL tablet 0.4 mg  0.4 mg Sublingual Q5 Min PRN [facility], PA-   0.4 mg at 09/01/20 1855       ALLERGIES: Latex; Ciprofloxacin; Shellfish-derived products; Daypro [oxaprozin]; Iodides; Iodine; Lidocaine; Macrobid [nitrofurantoin monohyd macro]; Macrolides and ketolides; Other; Penicillins; Pollen extract; Propranolol; Propranolol hcl; Tape [adhesive tape]; Tegretol [carbamazepine]; Tizanidine; Toradol [ketorolac tromethamine]; Tramadol; Zanaflex [tizanidine hcl]; Estrogens; Lamictal [lamotrigine]; Lyrica [pregabalin]; Tylenol [acetaminophen]; and Vicodin [hydrocodone-acetaminophen]    Review of Systems   Constitutional: Negative. Negative for chills and fever. HENT: Negative. Eyes: Negative. Respiratory: Positive for shortness of breath. Negative for wheezing. Cardiovascular: Positive for chest pain. Negative for palpitations and leg swelling. Gastrointestinal: Positive for nausea. Negative for abdominal pain and vomiting.    Endocrine: Calculation (Bazett) 434 ms    P Axis 53 degrees    R Axis 79 degrees    T Axis 95 degrees   Brain Natriuretic Peptide    Collection Time: 09/01/20  6:45 PM   Result Value Ref Range    Pro-BNP 77 pg/mL   CK    Collection Time: 09/01/20  6:45 PM   Result Value Ref Range    Total CK 46 0 - 170 U/L   Comprehensive Metabolic Panel    Collection Time: 09/01/20  6:45 PM   Result Value Ref Range    Sodium 139 135 - 144 mEq/L    Potassium 4.3 3.4 - 4.9 mEq/L    Chloride 101 95 - 107 mEq/L    CO2 26 20 - 31 mEq/L    Anion Gap 12 9 - 15 mEq/L    Glucose 92 70 - 99 mg/dL    BUN 15 6 - 20 mg/dL    CREATININE 0.95 (H) 0.50 - 0.90 mg/dL    GFR Non-African American >60.0 >60    GFR  >60.0 >60    Calcium 9.0 8.5 - 9.9 mg/dL    Total Protein 6.8 6.3 - 8.0 g/dL    Alb 3.7 3.5 - 4.6 g/dL    Total Bilirubin 0.3 0.2 - 0.7 mg/dL    Alkaline Phosphatase 171 (H) 40 - 130 U/L    ALT 28 0 - 33 U/L    AST 30 0 - 35 U/L    Globulin 3.1 2.3 - 3.5 g/dL   Urine Drug Screen    Collection Time: 09/01/20  6:45 PM   Result Value Ref Range    Amphetamine Screen, Urine Neg Negative <1000 ng/mL    Barbiturate Screen, Ur Neg Negative < 200 ng/mL    Benzodiazepine Screen, Urine Neg Negative < 200 ng/mL    Cannabinoid Scrn, Ur Neg Negative < 50 ng/mL    Cocaine Metabolite Screen, Urine Neg Negative < 300 ng/mL    Opiate Scrn, Ur POSITIVE (A) Negative < 300 ng/mL    PCP Screen, Urine Neg Negative < 25 ng/mL    Methadone Screen, Urine Neg Negative <300 ng/mL    Propoxyphene Scrn, Ur Neg Negative <300 ng/mL    Oxycodone Urine POSITIVE (A) Negative <100 ng/mL    Drug Screen Comment: see below    Ethanol    Collection Time: 09/01/20  6:45 PM   Result Value Ref Range    Ethanol Lvl <10 mg/dL    Ethanol percent Not indicated G/dL   CBC Auto Differential    Collection Time: 09/01/20  6:45 PM   Result Value Ref Range    WBC 7.8 4.8 - 10.8 K/uL    RBC 4.05 (L) 4.20 - 5.40 M/uL    Hemoglobin 12.4 12.0 - 16.0 g/dL    Hematocrit 36.2 (L) 37.0 - 47.0 % MCV 89.5 82.0 - 100.0 fL    MCH 30.5 27.0 - 31.3 pg    MCHC 34.1 33.0 - 37.0 %    RDW 15.6 (H) 11.5 - 14.5 %    Platelets 045 878 - 420 K/uL    Neutrophils % 69.0 %    Lymphocytes % 22.1 %    Monocytes % 5.4 %    Eosinophils % 2.6 %    Basophils % 0.9 %    Neutrophils Absolute 5.4 1.4 - 6.5 K/uL    Lymphocytes Absolute 1.7 1.0 - 4.8 K/uL    Monocytes Absolute 0.4 0.2 - 0.8 K/uL    Eosinophils Absolute 0.2 0.0 - 0.7 K/uL    Basophils Absolute 0.1 0.0 - 0.2 K/uL   Magnesium    Collection Time: 09/01/20  6:45 PM   Result Value Ref Range    Magnesium 1.8 1.7 - 2.4 mg/dL   Troponin    Collection Time: 09/01/20  6:45 PM   Result Value Ref Range    Troponin <0.010 0.000 - 0.010 ng/mL   Urine Reflex to Culture    Collection Time: 09/01/20  6:45 PM    Specimen: Urine, clean catch   Result Value Ref Range    Color, UA Yellow Straw/Yellow    Clarity, UA Clear Clear    Glucose, Ur Negative Negative mg/dL    Bilirubin Urine Negative Negative    Ketones, Urine Negative Negative mg/dL    Specific Gravity, UA 1.021 1.005 - 1.030    Blood, Urine Negative Negative    pH, UA 5.5 5.0 - 9.0    Protein, UA Negative Negative mg/dL    Urobilinogen, Urine 0.2 <2.0 E.U./dL    Nitrite, Urine Negative Negative    Leukocyte Esterase, Urine Negative Negative    Urine Reflex to Culture Not Indicated    APTT    Collection Time: 09/01/20  7:00 PM   Result Value Ref Range    aPTT 37.7 (H) 24.4 - 36.8 sec   D-Dimer, Quantitative    Collection Time: 09/01/20  7:00 PM   Result Value Ref Range    D-Dimer, Quant 0.81 (HH) 0.00 - 0.50 mg/L FEU   Troponin    Collection Time: 09/02/20 12:19 AM   Result Value Ref Range    Troponin <0.010 0.000 - 0.010 ng/mL   CBC    Collection Time: 09/02/20  3:10 AM   Result Value Ref Range    WBC 10.1 4.8 - 10.8 K/uL    RBC 4.41 4.20 - 5.40 M/uL    Hemoglobin 13.3 12.0 - 16.0 g/dL    Hematocrit 40.2 37.0 - 47.0 %    MCV 91.1 82.0 - 100.0 fL    MCH 30.1 27.0 - 31.3 pg    MCHC 33.0 33.0 - 37.0 %    RDW 15.6 (H) 11.5 - 14.5 %    Platelets 182 496 - 004 K/uL   Troponin    Collection Time: 09/02/20  3:12 AM   Result Value Ref Range    Troponin <0.010 0.000 - 0.010 ng/mL     Troponin:   Lab Results   Component Value Date    TROPONINI <0.010 09/02/2020       EKG: normal sinus rhythm, nonspecific ST and T waves changes      ASSESSMENT:    Angina class IV  History of CAD status post PCI 2007  Morbid obesity  Abnormal EKG  Status post recent bowel obstruction  History of pulmonary embolism  Hx of CVA  CA  Hx of Seizures. PLAN:   1. As always, aggressive risk factor modification is strongly recommended. We should adhere to the JNC VIII guidelines for HTN management and the NCEPATP III guidelines for LDL-C management. 2. Had a long talk with the patient regarding their symptoms, test results and the different treatment options available which include cardiac cath, alternate imaging modality and medical therapy. Patient would like to proceed with cardiac catheterization and understands the risks and benefits of the procedure. 3. Hold eliquis for now  4. Max cardiac meds  5. Monitor on tele  6.  GI/DVT proph      Electronically signed by Tita Rodriguez DO on 9/2/2020 at 9:33 AM

## 2020-09-02 NOTE — PROGRESS NOTES
Patient sleeping peacefully. Right wrist stable. Report called to Twin Garcia RN on 1W. Transport put in.

## 2020-09-02 NOTE — PROGRESS NOTES
Pt returned from cath lab, alert and oriented. Vitals signs obtained. Right wrist remains stable, pressure dressing intact. Will continue to monitor.

## 2020-09-02 NOTE — PROGRESS NOTES
Patient feeling nauseas. Zofran 4mg IV PRN given. VSS. Pressure dressing removed. Right radial site is stable. Will continue to monitor.

## 2020-09-04 PROCEDURE — 93010 ELECTROCARDIOGRAM REPORT: CPT | Performed by: INTERNAL MEDICINE

## 2020-10-04 ENCOUNTER — HOSPITAL ENCOUNTER (EMERGENCY)
Age: 45
Discharge: HOME | End: 2020-10-04
Attending: EMERGENCY MEDICINE
Payer: MEDICARE

## 2020-10-04 ENCOUNTER — APPOINTMENT (OUTPATIENT)
Dept: GENERAL RADIOLOGY | Age: 45
End: 2020-10-04
Payer: MEDICARE

## 2020-10-04 VITALS
OXYGEN SATURATION: 99 % | SYSTOLIC BLOOD PRESSURE: 117 MMHG | HEART RATE: 66 BPM | BODY MASS INDEX: 39.94 KG/M2 | WEIGHT: 240 LBS | DIASTOLIC BLOOD PRESSURE: 56 MMHG | RESPIRATION RATE: 16 BRPM | TEMPERATURE: 97.7 F

## 2020-10-04 LAB
ANION GAP SERPL CALCULATED.3IONS-SCNC: 11 MEQ/L (ref 9–15)
BASOPHILS ABSOLUTE: 0.1 K/UL (ref 0–0.2)
BASOPHILS RELATIVE PERCENT: 1 %
BUN BLDV-MCNC: 11 MG/DL (ref 6–20)
CALCIUM SERPL-MCNC: 9.1 MG/DL (ref 8.5–9.9)
CHLORIDE BLD-SCNC: 103 MEQ/L (ref 95–107)
CO2: 27 MEQ/L (ref 20–31)
CREAT SERPL-MCNC: 1.01 MG/DL (ref 0.5–0.9)
EKG ATRIAL RATE: 69 BPM
EKG P AXIS: 52 DEGREES
EKG P-R INTERVAL: 176 MS
EKG Q-T INTERVAL: 418 MS
EKG QRS DURATION: 94 MS
EKG QTC CALCULATION (BAZETT): 447 MS
EKG R AXIS: 83 DEGREES
EKG T AXIS: 90 DEGREES
EKG VENTRICULAR RATE: 69 BPM
EOSINOPHILS ABSOLUTE: 0.2 K/UL (ref 0–0.7)
EOSINOPHILS RELATIVE PERCENT: 2.3 %
GFR AFRICAN AMERICAN: >60
GFR NON-AFRICAN AMERICAN: 59.1
GLUCOSE BLD-MCNC: 115 MG/DL (ref 70–99)
HCT VFR BLD CALC: 32.2 % (ref 37–47)
HEMOGLOBIN: 11.2 G/DL (ref 12–16)
LACTIC ACID: 1.5 MMOL/L (ref 0.5–2.2)
LYMPHOCYTES ABSOLUTE: 1.6 K/UL (ref 1–4.8)
LYMPHOCYTES RELATIVE PERCENT: 22.3 %
MCH RBC QN AUTO: 31.1 PG (ref 27–31.3)
MCHC RBC AUTO-ENTMCNC: 34.6 % (ref 33–37)
MCV RBC AUTO: 89.7 FL (ref 82–100)
MONOCYTES ABSOLUTE: 0.5 K/UL (ref 0.2–0.8)
MONOCYTES RELATIVE PERCENT: 7.3 %
NEUTROPHILS ABSOLUTE: 4.9 K/UL (ref 1.4–6.5)
NEUTROPHILS RELATIVE PERCENT: 67.1 %
PDW BLD-RTO: 14.7 % (ref 11.5–14.5)
PLATELET # BLD: 227 K/UL (ref 130–400)
POTASSIUM SERPL-SCNC: 3.8 MEQ/L (ref 3.4–4.9)
RBC # BLD: 3.59 M/UL (ref 4.2–5.4)
SARS-COV-2, NAAT: NOT DETECTED
SODIUM BLD-SCNC: 141 MEQ/L (ref 135–144)
TOTAL CK: 41 U/L (ref 0–170)
TROPONIN: <0.01 NG/ML (ref 0–0.01)
WBC # BLD: 7.3 K/UL (ref 4.8–10.8)

## 2020-10-04 PROCEDURE — 85025 COMPLETE CBC W/AUTO DIFF WBC: CPT

## 2020-10-04 PROCEDURE — 99283 EMERGENCY DEPT VISIT LOW MDM: CPT

## 2020-10-04 PROCEDURE — 71046 X-RAY EXAM CHEST 2 VIEWS: CPT

## 2020-10-04 PROCEDURE — 82550 ASSAY OF CK (CPK): CPT

## 2020-10-04 PROCEDURE — 93005 ELECTROCARDIOGRAM TRACING: CPT | Performed by: EMERGENCY MEDICINE

## 2020-10-04 PROCEDURE — 6370000000 HC RX 637 (ALT 250 FOR IP): Performed by: EMERGENCY MEDICINE

## 2020-10-04 PROCEDURE — 83605 ASSAY OF LACTIC ACID: CPT

## 2020-10-04 PROCEDURE — U0002 COVID-19 LAB TEST NON-CDC: HCPCS

## 2020-10-04 PROCEDURE — 36415 COLL VENOUS BLD VENIPUNCTURE: CPT

## 2020-10-04 PROCEDURE — 80048 BASIC METABOLIC PNL TOTAL CA: CPT

## 2020-10-04 PROCEDURE — 2580000003 HC RX 258: Performed by: EMERGENCY MEDICINE

## 2020-10-04 PROCEDURE — 84484 ASSAY OF TROPONIN QUANT: CPT

## 2020-10-04 RX ORDER — ONDANSETRON 4 MG/1
4 TABLET, ORALLY DISINTEGRATING ORAL ONCE
Status: COMPLETED | OUTPATIENT
Start: 2020-10-04 | End: 2020-10-04

## 2020-10-04 RX ORDER — SODIUM CHLORIDE 0.9 % (FLUSH) 0.9 %
3 SYRINGE (ML) INJECTION EVERY 8 HOURS
Status: DISCONTINUED | OUTPATIENT
Start: 2020-10-04 | End: 2020-10-04 | Stop reason: HOSPADM

## 2020-10-04 RX ORDER — 0.9 % SODIUM CHLORIDE 0.9 %
1000 INTRAVENOUS SOLUTION INTRAVENOUS ONCE
Status: COMPLETED | OUTPATIENT
Start: 2020-10-04 | End: 2020-10-04

## 2020-10-04 RX ADMIN — SODIUM CHLORIDE 1000 ML: 9 INJECTION, SOLUTION INTRAVENOUS at 05:25

## 2020-10-04 RX ADMIN — ONDANSETRON 4 MG: 4 TABLET, ORALLY DISINTEGRATING ORAL at 07:05

## 2020-10-04 ASSESSMENT — PAIN DESCRIPTION - DESCRIPTORS: DESCRIPTORS: ACHING

## 2020-10-04 ASSESSMENT — ENCOUNTER SYMPTOMS
EYE REDNESS: 0
NAUSEA: 0
BACK PAIN: 0
ABDOMINAL PAIN: 0
SORE THROAT: 0
SHORTNESS OF BREATH: 0
COUGH: 1
EYE DISCHARGE: 0
VOMITING: 0

## 2020-10-04 ASSESSMENT — PAIN DESCRIPTION - FREQUENCY: FREQUENCY: CONTINUOUS

## 2020-10-04 ASSESSMENT — PAIN SCALES - GENERAL: PAINLEVEL_OUTOF10: 8

## 2020-10-04 ASSESSMENT — PAIN DESCRIPTION - PAIN TYPE: TYPE: ACUTE PAIN

## 2020-10-04 NOTE — ED TRIAGE NOTES
Pt presents from home with c/o fever, generalized body aches, n/v/d. Onset x2 days. Pt a&o x4. resp even, ls clear bl. Skin p/w/d. Pt afebrile at present. Last motrin approx 1am. Pt reports nausea, no active emesis.  Reports multiple episodes of diarrhea and vomiting prior to arrival.

## 2020-10-04 NOTE — ED NOTES
Patient asking for pain and nausea medication prior to discharge, patient has been resting comfortably without obvious signs of discomfort and without emesis during ED visit. Info to Dr Catherine Pedro, patient medicated with zofran po and discharged waiting for transportation by spouse.      [facility]  10/04/20 [id number]

## 2020-10-04 NOTE — ED NOTES
Additional note at discharge, patient was unable to provide urine sample for ED workup. Patient is discouraged that she is unable to get pain relief during ED visit, this RN informed patient that due to multiple allergies listed that provider is not comfortable medicating at discharge, patient was further instructed to take her pain medications she has at home.      [facility]  10/04/20 [id number]

## 2020-10-04 NOTE — ED PROVIDER NOTES
[address] [facility] ED  EMERGENCY DEPARTMENT ENCOUNTER      Pt Name: Juan M Allen  MRN: [id number]  [place] 1975  Date of evaluation: 10/4/2020  Provider: Lottie Mehta DO        HISTORY OF PRESENT ILLNESS    Juan M Allen is a 39 y.o. female per chart review has ah/o angina. The patient states she felt like she had a fever but did not take her temperature. She is afebrile here in the ER. The history is provided by the patient. Cough   Cough characteristics:  Non-productive  Sputum characteristics:  Nondescript  Severity:  Moderate  Onset quality:  Gradual  Duration:  4 days  Timing:  Constant  Progression:  Worsening  Chronicity:  New  Context: sick contacts    Relieved by:  Nothing  Worsened by: Activity and deep breathing  Ineffective treatments:  None tried  Associated symptoms: chest pain, chills and fever    Associated symptoms: no ear pain, no eye discharge, no rash, no shortness of breath and no sore throat             REVIEW OF SYSTEMS       Review of Systems   Constitutional: Positive for chills and fever. HENT: Negative for ear pain and sore throat. Eyes: Negative for discharge and redness. Respiratory: Positive for cough. Negative for shortness of breath. Cardiovascular: Positive for chest pain. Negative for palpitations. Gastrointestinal: Negative for abdominal pain, nausea and vomiting. Genitourinary: Negative for difficulty urinating and dysuria. Musculoskeletal: Negative for back pain and neck pain. Skin: Negative for rash and wound. Neurological: Negative for dizziness and syncope. Psychiatric/Behavioral: Negative for confusion. The patient is not nervous/anxious. All other systems reviewed and are negative. Except as noted above the remainder of the review of systems was reviewed and negative.        PAST MEDICAL HISTORY     Past Medical History:   Diagnosis Date    Asthma     Cerebral artery occlusion with cerebral infarction ([facility] [address]     Chronic back pain     Chronic kidney disease     Depression     Headache     Kidney disease     Kidney stone     Seizures ([facility] [address] 5/24/2016    Suprapubic catheter ([facility] [address] 06/2018         SURGICAL HISTORY       Past Surgical History:   Procedure Laterality Date    APPENDECTOMY      BACK SURGERY      CHOLECYSTECTOMY      CORONARY ANGIOPLASTY WITH STENT PLACEMENT  2007    [place]  12/09/2019    [facility]    HYSTERECTOMY      HYSTERECTOMY, TOTAL ABDOMINAL      KNEE SURGERY Bilateral     OTHER SURGICAL HISTORY      sup/pub cath june 1, 2018    SPINE SURGERY           CURRENT MEDICATIONS       Discharge Medication List as of 10/4/2020  6:36 AM      CONTINUE these medications which have NOT CHANGED    Details   lidocaine (LMX) 4 % cream Apply topically as needed. , Disp-1 Tube,R-0, Normal      miconazole (MICOTIN) 2 % powder Apply topically 2 times daily. , Disp-45 g,R-1, Normal      levETIRAcetam (KEPPRA) 1000 MG tablet Take 1 tablet by mouth 2 times daily, Disp-60 tablet,R-1Print      scopolamine (TRANSDERM-SCOP) transdermal patch Place 1 patch onto the skin See Admin InstructionsHistorical Med      prazosin (MINIPRESS) 1 MG capsule Take 1 mg by mouth nightlyHistorical Med      oxyCODONE-acetaminophen (PERCOCET) 5-325 MG per tablet every 6 hours as needed (7.5 ml. every 6 hours as needed). 7.5 ml.  Every 6 hours prn via pg tubeHistorical Med      ondansetron (ZOFRAN) 4 MG tablet Take 4 mg by mouth every 8 hours as neededHistorical Med      metoprolol tartrate (LOPRESSOR) 25 MG tablet Take 12.5 mg by mouth 2 times dailyHistorical Med      meclizine (ANTIVERT) 25 MG tablet Take 25 mg by mouth 2 times dailyHistorical Med      potassium chloride (KLOR-CON) 10 MEQ extended release tablet Take 20 mEq by mouth 2 times daily Once in morning and once at nightHistorical Med      docusate sodium (COLACE) 100 MG capsule Take 100 mg by mouth 2 times dailyHistorical Med      cyclobenzaprine (FLEXERIL) 5 MG tablet Take 2 tablets by mouth [lamotrigine]; Lyrica [pregabalin]; Tylenol [acetaminophen]; and Vicodin [hydrocodone-acetaminophen]    FAMILY HISTORY       Family History   Problem Relation Age of Onset    Cancer Father     Cancer Paternal Aunt           SOCIAL HISTORY       Social History     Socioeconomic History    Marital status: Single     Spouse name: None    Number of children: None    Years of education: None    Highest education level: None   Occupational History    None   Social Needs    Financial resource strain: None    Food insecurity     Worry: None     Inability: None    Transportation needs     Medical: None     Non-medical: None   Tobacco Use    Smoking status: Never Smoker    Smokeless tobacco: Never Used   Substance and Sexual Activity    Alcohol use: No     Alcohol/week: 0.0 standard drinks    Drug use: No    Sexual activity: Not Currently     Partners: Male   Lifestyle    Physical activity     Days per week: None     Minutes per session: None    Stress: None   Relationships    Social connections     Talks on phone: None     Gets together: None     Attends [employer] service: None     Active member of club or organization: None     Attends meetings of clubs or organizations: None     Relationship status: None    Intimate partner violence     Fear of current or ex partner: None     Emotionally abused: None     Physically abused: None     Forced sexual activity: None   Other Topics Concern    None   Social History Narrative    None         PHYSICAL EXAM       ED Triage Vitals   BP Temp Temp Source Pulse Resp SpO2 Height Weight   10/04/20 0437 10/04/20 0430 10/04/20 0430 10/04/20 0430 10/04/20 0430 10/04/20 0430 -- 10/04/20 0430   (!) 150/67 97.7 °F (36.5 °C) Oral 79 20 99 %  240 lb (108.9 kg)       Physical Exam  Vitals signs and nursing note reviewed. Constitutional:       Appearance: Normal appearance. HENT:      Head: Normocephalic and atraumatic.       Right Ear: Tympanic membrane normal.      Left Ear: Tympanic membrane normal.      Nose: Nose normal.      Mouth/Throat:      Mouth: Mucous membranes are moist.      Pharynx: Oropharynx is clear. Eyes:      General: Lids are normal.      Extraocular Movements: Extraocular movements intact. Conjunctiva/sclera: Conjunctivae normal.      Pupils: Pupils are equal, round, and reactive to light. Neck:      Musculoskeletal: Full passive range of motion without pain, normal range of motion and neck supple. Cardiovascular:      Rate and Rhythm: Normal rate and regular rhythm. Pulses: Normal pulses. Heart sounds: Normal heart sounds. Pulmonary:      Effort: Pulmonary effort is normal.      Breath sounds: Normal breath sounds. Abdominal:      General: Abdomen is flat. Bowel sounds are normal.      Palpations: Abdomen is soft. Comments: Feeding tube present   Musculoskeletal: Normal range of motion. Skin:     General: Skin is warm. Capillary Refill: Capillary refill takes less than 2 seconds. Neurological:      General: No focal deficit present. Mental Status: She is alert and oriented to person, place, and time. Deep Tendon Reflexes: Reflexes are normal and symmetric. Psychiatric:         Attention and Perception: Attention and perception normal.         Mood and Affect: Mood normal.         Behavior: Behavior normal. Behavior is cooperative.            LABS:  Labs Reviewed   BASIC METABOLIC PANEL - Abnormal; Notable for the following components:       Result Value    Glucose 115 (*)     CREATININE 1.01 (*)     GFR Non- 59.1 (*)     All other components within normal limits   CBC WITH AUTO DIFFERENTIAL - Abnormal; Notable for the following components:    RBC 3.59 (*)     Hemoglobin 11.2 (*)     Hematocrit 32.2 (*)     RDW 14.7 (*)     All other components within normal limits   LACTIC ACID, PLASMA   TROPONIN   CK   COVID-19   URINALYSIS         MDM:   Vitals:    Vitals:    10/04/20 0430 10/04/20 0437 10/04/20 0712   BP:  (!) 150/67 (!) 117/56   Pulse: 79  66   Resp: 20  16   Temp: 97.7 °F (36.5 °C)     TempSrc: Oral     SpO2: 99%  99%   Weight: 240 lb (108.9 kg)         MDM     Patient presents with cough after being exposed to COVID-19. Labs, CXR, IVF 1 liter NS, EKG ordered. EKG: NSR rate 69 non specific ST and T wave changes  Right axis. CXR shows no acute changes. I rechecked on the patient and she feels better. Her COVID test is negative. She will be discharged home with her . The lab results, radiology and test results were reviewed with the patient and family. The patient will follow up in 2 days with their primary care doctor. If their symptoms change or get worse they will return to the ER. CRITICAL CARE TIME   Total CriticalCare time was 0 minutes, excluding separately reportable procedures. There was a high probability of clinically significant/life threatening deterioration in the patient's condition which required my urgent intervention. PROCEDURES:  Unlessotherwise noted below, none     Procedures      FINAL IMPRESSION      1.  Viral syndrome          DISPOSITION/PLAN   DISPOSITION Decision To Discharge 10/04/2020 06:34:11 AM          TRIPP Lanier DO (electronically signed)  Attending Emergency Physician          Amanda Deal DO  10/04/20 DO Juwan  10/13/20 3539

## 2020-10-05 PROCEDURE — 93010 ELECTROCARDIOGRAM REPORT: CPT | Performed by: INTERNAL MEDICINE

## 2020-11-03 ENCOUNTER — APPOINTMENT (OUTPATIENT)
Dept: CT IMAGING | Age: 45
End: 2020-11-03
Payer: MEDICARE

## 2020-11-03 ENCOUNTER — HOSPITAL ENCOUNTER (EMERGENCY)
Age: 45
Discharge: HOME | End: 2020-11-03
Attending: STUDENT IN AN ORGANIZED HEALTH CARE EDUCATION/TRAINING PROGRAM
Payer: MEDICARE

## 2020-11-03 VITALS
WEIGHT: 240 LBS | HEIGHT: 65 IN | RESPIRATION RATE: 21 BRPM | TEMPERATURE: 97.9 F | SYSTOLIC BLOOD PRESSURE: 135 MMHG | HEART RATE: 52 BPM | BODY MASS INDEX: 39.99 KG/M2 | DIASTOLIC BLOOD PRESSURE: 79 MMHG | OXYGEN SATURATION: 99 %

## 2020-11-03 LAB
ALBUMIN SERPL-MCNC: 3.5 G/DL (ref 3.5–4.6)
ALP BLD-CCNC: 142 U/L (ref 40–130)
ALT SERPL-CCNC: 24 U/L (ref 0–33)
AMPHETAMINE SCREEN, URINE: ABNORMAL
ANION GAP SERPL CALCULATED.3IONS-SCNC: 9 MEQ/L (ref 9–15)
APTT: 33.4 SEC (ref 24.4–36.8)
AST SERPL-CCNC: 34 U/L (ref 0–35)
BARBITURATE SCREEN URINE: ABNORMAL
BASOPHILS ABSOLUTE: 0.1 K/UL (ref 0–0.2)
BASOPHILS RELATIVE PERCENT: 1 %
BENZODIAZEPINE SCREEN, URINE: ABNORMAL
BILIRUB SERPL-MCNC: <0.2 MG/DL (ref 0.2–0.7)
BILIRUBIN URINE: NEGATIVE
BLOOD, URINE: NEGATIVE
BUN BLDV-MCNC: 16 MG/DL (ref 6–20)
CALCIUM SERPL-MCNC: 8.5 MG/DL (ref 8.5–9.9)
CANNABINOID SCREEN URINE: ABNORMAL
CHLORIDE BLD-SCNC: 103 MEQ/L (ref 95–107)
CLARITY: CLEAR
CO2: 26 MEQ/L (ref 20–31)
COCAINE METABOLITE SCREEN URINE: ABNORMAL
COLOR: YELLOW
CREAT SERPL-MCNC: 1.21 MG/DL (ref 0.5–0.9)
EKG ATRIAL RATE: 59 BPM
EKG P AXIS: 30 DEGREES
EKG P-R INTERVAL: 182 MS
EKG Q-T INTERVAL: 424 MS
EKG QRS DURATION: 90 MS
EKG QTC CALCULATION (BAZETT): 419 MS
EKG R AXIS: 68 DEGREES
EKG T AXIS: 48 DEGREES
EKG VENTRICULAR RATE: 59 BPM
EOSINOPHILS ABSOLUTE: 0.2 K/UL (ref 0–0.7)
EOSINOPHILS RELATIVE PERCENT: 2.4 %
GFR AFRICAN AMERICAN: 58.1
GFR AFRICAN AMERICAN: 59
GFR NON-AFRICAN AMERICAN: 48
GFR NON-AFRICAN AMERICAN: 48
GLOBULIN: 3.1 G/DL (ref 2.3–3.5)
GLUCOSE BLD-MCNC: 85 MG/DL (ref 70–99)
GLUCOSE URINE: NEGATIVE MG/DL
HCT VFR BLD CALC: 37.4 % (ref 37–47)
HEMOGLOBIN: 12.6 G/DL (ref 12–16)
KETONES, URINE: NEGATIVE MG/DL
LACTIC ACID: 1.4 MMOL/L (ref 0.5–2.2)
LEUKOCYTE ESTERASE, URINE: NEGATIVE
LIPASE: 16 U/L (ref 12–95)
LYMPHOCYTES ABSOLUTE: 1.7 K/UL (ref 1–4.8)
LYMPHOCYTES RELATIVE PERCENT: 21.9 %
Lab: ABNORMAL
MAGNESIUM: 1.9 MG/DL (ref 1.7–2.4)
MCH RBC QN AUTO: 30.4 PG (ref 27–31.3)
MCHC RBC AUTO-ENTMCNC: 33.8 % (ref 33–37)
MCV RBC AUTO: 90 FL (ref 82–100)
METHADONE SCREEN, URINE: ABNORMAL
MONOCYTES ABSOLUTE: 0.5 K/UL (ref 0.2–0.8)
MONOCYTES RELATIVE PERCENT: 6.1 %
NEUTROPHILS ABSOLUTE: 5.5 K/UL (ref 1.4–6.5)
NEUTROPHILS RELATIVE PERCENT: 68.6 %
NITRITE, URINE: NEGATIVE
OPIATE SCREEN URINE: ABNORMAL
OXYCODONE URINE: POSITIVE
PDW BLD-RTO: 14.1 % (ref 11.5–14.5)
PERFORMED ON: ABNORMAL
PH UA: 5 (ref 5–9)
PHENCYCLIDINE SCREEN URINE: ABNORMAL
PLATELET # BLD: 274 K/UL (ref 130–400)
POC CREATININE: 1.2 MG/DL (ref 0.6–1.1)
POC SAMPLE TYPE: ABNORMAL
POTASSIUM SERPL-SCNC: 4.8 MEQ/L (ref 3.4–4.9)
PROPOXYPHENE SCREEN: ABNORMAL
PROTEIN UA: NEGATIVE MG/DL
RBC # BLD: 4.15 M/UL (ref 4.2–5.4)
SODIUM BLD-SCNC: 138 MEQ/L (ref 135–144)
SPECIFIC GRAVITY UA: 1.01 (ref 1–1.03)
TOTAL CK: 68 U/L (ref 0–170)
TOTAL PROTEIN: 6.6 G/DL (ref 6.3–8)
TROPONIN: <0.01 NG/ML (ref 0–0.01)
URINE REFLEX TO CULTURE: NORMAL
UROBILINOGEN, URINE: 0.2 E.U./DL
WBC # BLD: 8 K/UL (ref 4.8–10.8)

## 2020-11-03 PROCEDURE — 99285 EMERGENCY DEPT VISIT HI MDM: CPT

## 2020-11-03 PROCEDURE — 6360000002 HC RX W HCPCS: Performed by: STUDENT IN AN ORGANIZED HEALTH CARE EDUCATION/TRAINING PROGRAM

## 2020-11-03 PROCEDURE — 85025 COMPLETE CBC W/AUTO DIFF WBC: CPT

## 2020-11-03 PROCEDURE — 83735 ASSAY OF MAGNESIUM: CPT

## 2020-11-03 PROCEDURE — 74177 CT ABD & PELVIS W/CONTRAST: CPT

## 2020-11-03 PROCEDURE — 2500000003 HC RX 250 WO HCPCS: Performed by: STUDENT IN AN ORGANIZED HEALTH CARE EDUCATION/TRAINING PROGRAM

## 2020-11-03 PROCEDURE — 83605 ASSAY OF LACTIC ACID: CPT

## 2020-11-03 PROCEDURE — 80307 DRUG TEST PRSMV CHEM ANLYZR: CPT

## 2020-11-03 PROCEDURE — 83690 ASSAY OF LIPASE: CPT

## 2020-11-03 PROCEDURE — 80053 COMPREHEN METABOLIC PANEL: CPT

## 2020-11-03 PROCEDURE — 85730 THROMBOPLASTIN TIME PARTIAL: CPT

## 2020-11-03 PROCEDURE — 96375 TX/PRO/DX INJ NEW DRUG ADDON: CPT

## 2020-11-03 PROCEDURE — 2580000003 HC RX 258: Performed by: STUDENT IN AN ORGANIZED HEALTH CARE EDUCATION/TRAINING PROGRAM

## 2020-11-03 PROCEDURE — 6360000004 HC RX CONTRAST MEDICATION: Performed by: STUDENT IN AN ORGANIZED HEALTH CARE EDUCATION/TRAINING PROGRAM

## 2020-11-03 PROCEDURE — 36415 COLL VENOUS BLD VENIPUNCTURE: CPT

## 2020-11-03 PROCEDURE — 93005 ELECTROCARDIOGRAM TRACING: CPT | Performed by: STUDENT IN AN ORGANIZED HEALTH CARE EDUCATION/TRAINING PROGRAM

## 2020-11-03 PROCEDURE — 81003 URINALYSIS AUTO W/O SCOPE: CPT

## 2020-11-03 PROCEDURE — 96374 THER/PROPH/DIAG INJ IV PUSH: CPT

## 2020-11-03 PROCEDURE — 82550 ASSAY OF CK (CPK): CPT

## 2020-11-03 PROCEDURE — 84484 ASSAY OF TROPONIN QUANT: CPT

## 2020-11-03 RX ORDER — DIPHENHYDRAMINE HYDROCHLORIDE 50 MG/ML
25 INJECTION INTRAMUSCULAR; INTRAVENOUS ONCE
Status: COMPLETED | OUTPATIENT
Start: 2020-11-03 | End: 2020-11-03

## 2020-11-03 RX ORDER — 0.9 % SODIUM CHLORIDE 0.9 %
1000 INTRAVENOUS SOLUTION INTRAVENOUS ONCE
Status: COMPLETED | OUTPATIENT
Start: 2020-11-03 | End: 2020-11-03

## 2020-11-03 RX ORDER — FAMOTIDINE 20 MG/1
20 TABLET, FILM COATED ORAL 2 TIMES DAILY
Qty: 10 TABLET | Refills: 0 | Status: SHIPPED | OUTPATIENT
Start: 2020-11-03 | End: 2020-11-08

## 2020-11-03 RX ORDER — ONDANSETRON 2 MG/ML
4 INJECTION INTRAMUSCULAR; INTRAVENOUS ONCE
Status: COMPLETED | OUTPATIENT
Start: 2020-11-03 | End: 2020-11-03

## 2020-11-03 RX ORDER — DEXAMETHASONE SODIUM PHOSPHATE 4 MG/ML
10 INJECTION, SOLUTION INTRA-ARTICULAR; INTRALESIONAL; INTRAMUSCULAR; INTRAVENOUS; SOFT TISSUE ONCE
Status: COMPLETED | OUTPATIENT
Start: 2020-11-03 | End: 2020-11-03

## 2020-11-03 RX ORDER — DICYCLOMINE HYDROCHLORIDE 10 MG/1
10 CAPSULE ORAL EVERY 6 HOURS PRN
Qty: 12 CAPSULE | Refills: 0 | Status: SHIPPED | OUTPATIENT
Start: 2020-11-03 | End: 2020-11-06

## 2020-11-03 RX ORDER — FENTANYL CITRATE 50 UG/ML
100 INJECTION, SOLUTION INTRAMUSCULAR; INTRAVENOUS ONCE
Status: COMPLETED | OUTPATIENT
Start: 2020-11-03 | End: 2020-11-03

## 2020-11-03 RX ORDER — ONDANSETRON 4 MG/1
4 TABLET, ORALLY DISINTEGRATING ORAL EVERY 8 HOURS PRN
Qty: 20 TABLET | Refills: 0 | Status: SHIPPED | OUTPATIENT
Start: 2020-11-03 | End: 2020-12-08

## 2020-11-03 RX ADMIN — DIPHENHYDRAMINE HYDROCHLORIDE 25 MG: 50 INJECTION, SOLUTION INTRAMUSCULAR; INTRAVENOUS at 12:46

## 2020-11-03 RX ADMIN — SODIUM CHLORIDE 1000 ML: 9 INJECTION, SOLUTION INTRAVENOUS at 12:22

## 2020-11-03 RX ADMIN — IOPAMIDOL 100 ML: 612 INJECTION, SOLUTION INTRAVENOUS at 12:56

## 2020-11-03 RX ADMIN — ONDANSETRON 4 MG: 2 INJECTION INTRAMUSCULAR; INTRAVENOUS at 12:22

## 2020-11-03 RX ADMIN — DEXAMETHASONE SODIUM PHOSPHATE 10 MG: 4 INJECTION, SOLUTION INTRAMUSCULAR; INTRAVENOUS at 12:45

## 2020-11-03 RX ADMIN — FAMOTIDINE 20 MG: 10 INJECTION INTRAVENOUS at 12:45

## 2020-11-03 RX ADMIN — FENTANYL CITRATE 100 MCG: 50 INJECTION INTRAMUSCULAR; INTRAVENOUS at 12:22

## 2020-11-03 RX ADMIN — SODIUM CHLORIDE 1000 ML: 9 INJECTION, SOLUTION INTRAVENOUS at 14:05

## 2020-11-03 ASSESSMENT — ENCOUNTER SYMPTOMS
SINUS PRESSURE: 0
DIARRHEA: 0
COUGH: 0
SHORTNESS OF BREATH: 0
ABDOMINAL PAIN: 1
NAUSEA: 1
CHEST TIGHTNESS: 0
TROUBLE SWALLOWING: 0
VOMITING: 1
BACK PAIN: 0

## 2020-11-03 ASSESSMENT — PAIN DESCRIPTION - DESCRIPTORS: DESCRIPTORS: ACHING

## 2020-11-03 ASSESSMENT — PAIN SCALES - GENERAL
PAINLEVEL_OUTOF10: 9
PAINLEVEL_OUTOF10: 9
PAINLEVEL_OUTOF10: 3

## 2020-11-03 ASSESSMENT — PAIN DESCRIPTION - PAIN TYPE: TYPE: ACUTE PAIN

## 2020-11-03 ASSESSMENT — PAIN DESCRIPTION - ONSET: ONSET: ON-GOING

## 2020-11-03 ASSESSMENT — PAIN DESCRIPTION - LOCATION
LOCATION: ABDOMEN;CHEST
LOCATION: ABDOMEN

## 2020-11-03 ASSESSMENT — PAIN DESCRIPTION - ORIENTATION: ORIENTATION: LEFT;RIGHT

## 2020-11-03 ASSESSMENT — PAIN DESCRIPTION - PROGRESSION: CLINICAL_PROGRESSION: GRADUALLY IMPROVING

## 2020-11-03 ASSESSMENT — PAIN DESCRIPTION - FREQUENCY: FREQUENCY: CONTINUOUS

## 2020-11-03 NOTE — ED NOTES
Patient sitting up in bed. No distress noted. Respirations even and unlabored.       Hue Trujillo RN  11/03/20 0063

## 2020-11-03 NOTE — ED NOTES
Patient amb to bathroom. Patient pulled call light and stated that she dropped urine cup and went to pick it up by leaning over and tipped over and hit head on floor. Urine noted on floor. Patient taken back to room with steady gait after refusing w/c. Urinated on pants and gown. Changed into new gown. No loc noted. No redness noted on forehead where patient claims she hit. Dr. Maximino Claudio made aware and at bedside.       Tali Del Rosario RN  11/03/20 3515

## 2020-11-03 NOTE — ED TRIAGE NOTES
Pt to ER with c/o upper abd pain that radiates up between her breasts, pain 9/10, pt a&ox4, resp even and unlabored, pt states she does have a hernia

## 2020-11-03 NOTE — ED NOTES
Tyler Meyer RN @ bedside for straight cath. Patient tolerated well.       Valerie Ochoa RN  11/03/20 9315

## 2020-11-03 NOTE — ED PROVIDER NOTES
[address] [facility] ED  eMERGENCY dEPARTMENT eNCOUnter      Pt Name: Martha Lisa  MRN: [id number]  [place] 1975  Date of evaluation: 11/3/2020  Provider: Ashtyn Dong DO    CHIEF COMPLAINT       Chief Complaint   Patient presents with    Abdominal Pain     upper abd , pain goes up between pt breasts         HISTORY OF PRESENT ILLNESS   (Location/Symptom, Timing/Onset,Context/Setting, Quality, Duration, Modifying Factors, Severity)  Note limiting factors. Martha Lisa is a 39 y.o. female who presents to the emergency department with c/o epigastric abdominal pain with vomiting. Patient states that she has had pain that is just below her breast but in between and similar to when she has had prior bowel obstruction before. Patient denies any shortness of breath. Patient states that she has had a couple bowel obstruction surgeries before in the past and she now requires a feeding tube. Patient denies any fever, chills or cough. Pain is described as a stabbing in quality. There is no change in the pain with breathing. She states the pain is constant. She has not missed any doses of her pain medicine. The history is provided by the patient. NursingNotes were reviewed. REVIEW OF SYSTEMS    (2-9 systems for level 4, 10 or more for level 5)     Review of Systems   Constitutional: Negative for activity change, appetite change, chills, fever and unexpected weight change. HENT: Negative for drooling, ear pain, nosebleeds, sinus pressure and trouble swallowing. Respiratory: Negative for cough, chest tightness and shortness of breath. Cardiovascular: Negative for chest pain and leg swelling. Gastrointestinal: Positive for abdominal pain, nausea and vomiting. Negative for diarrhea. Endocrine: Negative for polydipsia and polyphagia. Genitourinary: Negative for dysuria, flank pain and frequency. Musculoskeletal: Negative for back pain and myalgias.    Skin: Negative for pallor and rash.   Neurological: Negative for syncope, weakness and headaches. Hematological: Does not bruise/bleed easily. All other systems reviewed and are negative. Except as noted above the remainder of the review of systems was reviewed and negative. PAST MEDICAL HISTORY     Past Medical History:   Diagnosis Date    Asthma     Cerebral artery occlusion with cerebral infarction ([facility] [address]     Chronic back pain     Chronic kidney disease     Depression     Headache     Kidney disease     Kidney stone     Seizures ([facility] [address] 5/24/2016    Suprapubic catheter ([facility] [address] 06/2018         SURGICALHISTORY       Past Surgical History:   Procedure Laterality Date    APPENDECTOMY      BACK SURGERY      CHOLECYSTECTOMY      CORONARY ANGIOPLASTY WITH STENT PLACEMENT  2007    [facility]  12/09/2019    [facility]    HYSTERECTOMY      HYSTERECTOMY, TOTAL ABDOMINAL      KNEE SURGERY Bilateral     OTHER SURGICAL HISTORY      sup/pub cath june 1, 2018   [facility] SPINE [facility]ERY           CURRENT MEDICATIONS       Previous Medications    ALPRAZOLAM (XANAX) 0.5 MG TABLET    0.5 mg by PEG Tube route 2 times daily. Crushed via peg tube    AMITRIPTYLINE (ELAVIL) 100 MG TABLET    Take 100 mg by mouth nightly    APIXABAN (ELIQUIS) 5 MG TABS TABLET    10 mg 2 times daily Via peg tube    ARIPIPRAZOLE (ABILIFY) 15 MG TABLET    Take 15 mg by mouth daily    ATORVASTATIN (LIPITOR) 40 MG TABLET    Take 1 tablet by mouth nightly    CYCLOBENZAPRINE (FLEXERIL) 5 MG TABLET    Take 2 tablets by mouth 3 times daily as needed for Muscle spasms    DOCUSATE SODIUM (COLACE) 100 MG CAPSULE    Take 100 mg by mouth 2 times daily    FEXOFENADINE (ALLEGRA) 60 MG TABLET    Take 60 mg by mouth 2 times daily    FLUTICASONE (FLONASE) 50 MCG/ACT NASAL SPRAY    2 sprays by Each Nostril route daily    LEVETIRACETAM (KEPPRA) 1000 MG TABLET    Take 1 tablet by mouth 2 times daily    LIDOCAINE (LMX) 4 % CREAM    Apply topically as needed.     LORAZEPAM (ATIVAN) 0.5 MG TABLET    Take 0.5 mg by mouth daily as needed for Anxiety. Via peg tube    MECLIZINE (ANTIVERT) 25 MG TABLET    Take 25 mg by mouth 2 times daily    METOPROLOL TARTRATE (LOPRESSOR) 25 MG TABLET    Take 12.5 mg by mouth 2 times daily    MICONAZOLE (MICOTIN) 2 % POWDER    Apply topically 2 times daily. NITROGLYCERIN (NITROSTAT) 0.4 MG SL TABLET    Place 0.4 mg under the tongue every 5 minutes as needed for Chest pain Dissolve 1 tablet under tongue for chest pain and repeat every 5 min up to max of 3 total doses. If no relief after 3 doses call 911    ONDANSETRON (ZOFRAN) 4 MG TABLET    Take 4 mg by mouth every 8 hours as needed    OXYCODONE-ACETAMINOPHEN (PERCOCET) 5-325 MG PER TABLET    every 6 hours as needed (7.5 ml. every 6 hours as needed). 7.5 ml. Every 6 hours prn via pg tube    PAROXETINE (PAXIL) 40 MG TABLET    Take 40 mg by mouth every morning     POTASSIUM CHLORIDE (KLOR-CON) 10 MEQ EXTENDED RELEASE TABLET    Take 20 mEq by mouth 2 times daily Once in morning and once at night    PRAZOSIN (MINIPRESS) 1 MG CAPSULE    Take 1 mg by mouth nightly    SCOPOLAMINE (TRANSDERM-SCOP) TRANSDERMAL PATCH    Place 1 patch onto the skin See Admin Instructions    TOPIRAMATE (TOPAMAX) 200 MG TABLET    Take 200 mg by mouth 2 times daily    TRAZODONE (DESYREL) 50 MG TABLET    Take 300 mg by mouth nightly     ZOLPIDEM (AMBIEN) 10 MG TABLET    Take 10 mg by mouth nightly as needed for Sleep. .       ALLERGIES     Latex; Ciprofloxacin; Shellfish-derived products; Daypro [oxaprozin]; Iodides; Iodine; Lidocaine; Macrobid [nitrofurantoin monohyd macro]; Macrolides and ketolides; Other; Penicillins; Pollen extract; Propranolol; Propranolol hcl; Tape [adhesive tape]; Tegretol [carbamazepine]; Tizanidine; Toradol [ketorolac tromethamine]; Tramadol; Zanaflex [tizanidine hcl]; Estrogens; Lamictal [lamotrigine]; Lyrica [pregabalin];  Tylenol [acetaminophen]; and Vicodin [hydrocodone-acetaminophen]    FAMILY HISTORY       Family History   Problem Relation Age of Onset    Cancer Father     Cancer Paternal Aunt           SOCIAL HISTORY       Social History     Socioeconomic History    Marital status: Single     Spouse name: None    Number of children: None    Years of education: None    Highest education level: None   Occupational History    None   Social Needs    Financial resource strain: None    Food insecurity     Worry: None     Inability: None    Transportation needs     Medical: None     Non-medical: None   Tobacco Use    Smoking status: Never Smoker    Smokeless tobacco: Never Used   Substance and Sexual Activity    Alcohol use: No     Alcohol/week: 0.0 standard drinks    Drug use: No    Sexual activity: Not Currently     Partners: Male   Lifestyle    Physical activity     Days per week: None     Minutes per session: None    Stress: None   Relationships    Social connections     Talks on phone: None     Gets together: None     Attends [employer] service: None     Active member of club or organization: None     Attends meetings of clubs or organizations: None     Relationship status: None    Intimate partner violence     Fear of current or ex partner: None     Emotionally abused: None     Physically abused: None     Forced sexual activity: None   Other Topics Concern    None   Social History Narrative    None       SCREENINGS    [place] Coma Scale  Eye Opening: Spontaneous  Best Verbal Response: Oriented  Best Motor Response: Obeys commands  [place] Coma Scale Score: 15 @FLOW(88503333)@      PHYSICAL EXAM    (up to 7 for level 4, 8 or more for level 5)     ED Triage Vitals [11/03/20 1123]   BP Temp Temp Source Pulse Resp SpO2 Height Weight   116/64 97.9 °F (36.6 °C) Temporal 71 19 96 % 5' 5\" (1.651 m) 240 lb (108.9 kg)       Physical Exam  Vitals signs and nursing note reviewed. Constitutional:       General: She is awake. She is not in acute distress. Appearance: Normal appearance.  She is well-developed and well-groomed. She is morbidly obese. She is not ill-appearing, toxic-appearing or diaphoretic. Comments: No photophobia. No phonophobia. HENT:      Head: Normocephalic and atraumatic. No Hutchison's sign. Right Ear: External ear normal.      Left Ear: External ear normal.      Nose: Nose normal. No congestion or rhinorrhea. Mouth/Throat:      Mouth: Mucous membranes are moist.      Pharynx: Oropharynx is clear. No oropharyngeal exudate or posterior oropharyngeal erythema. Eyes:      General: No scleral icterus. Right eye: No foreign body or discharge. Left eye: No discharge. Extraocular Movements: Extraocular movements intact. Conjunctiva/sclera: Conjunctivae normal.      Left eye: No exudate. Pupils: Pupils are equal, round, and reactive to light. Neck:      Musculoskeletal: Normal range of motion and neck supple. No neck rigidity. Vascular: No JVD. Trachea: No tracheal deviation. Comments: No meningismus. Cardiovascular:      Rate and Rhythm: Normal rate and regular rhythm. Heart sounds: Normal heart sounds. Heart sounds not distant. No murmur. No friction rub. No gallop. Pulmonary:      Effort: Pulmonary effort is normal. No respiratory distress. Breath sounds: Normal breath sounds. No stridor. No wheezing, rhonchi or rales. Chest:      Chest wall: No tenderness. Abdominal:      General: Abdomen is flat. Bowel sounds are normal. There is no distension or abdominal bruit. Palpations: Abdomen is soft. There is no shifting dullness, fluid wave, hepatomegaly, splenomegaly, mass or pulsatile mass. Tenderness: There is abdominal tenderness in the right upper quadrant, epigastric area and left upper quadrant. There is no right CVA tenderness, left CVA tenderness, guarding or rebound. Negative signs include Funk's sign, Rovsing's sign and McBurney's sign. Hernia: No hernia is present. Comments: No flank ecchymosis. Negative Sage sign. Musculoskeletal: Normal range of motion. General: No swelling, tenderness, deformity or signs of injury. Lymphadenopathy:      Head:      Right side of head: No submental adenopathy. Left side of head: No submental adenopathy. Skin:     General: Skin is warm and dry. Capillary Refill: Capillary refill takes less than 2 seconds. Coloration: Skin is not jaundiced or pale. Findings: No bruising, erythema, lesion or rash. Neurological:      General: No focal deficit present. Mental Status: She is alert and oriented to person, place, and time. Mental status is at baseline. Cranial Nerves: No cranial nerve deficit. Sensory: No sensory deficit. Motor: No weakness. Coordination: Coordination normal.      Deep Tendon Reflexes: Reflexes are normal and symmetric. Psychiatric:         Mood and Affect: Mood normal.         Behavior: Behavior normal. Behavior is cooperative. Thought Content: Thought content normal.         Judgment: Judgment normal.         DIAGNOSTIC RESULTS     EKG: All EKG's are interpreted by the Emergency Department Physician who either signs or Co-signsthis chart in the absence of a cardiologist.    EKG: Sinus bradycardia 59 bpm, normal axis, QT interval of 429 ms. There is T wave inversion in leads V1, V2, aVR and lead III. This is unchanged from EKG dated 10/4/2020. There are no PVCs. RADIOLOGY:   Non-plain filmimages such as CT, Ultrasound and MRI are read by the radiologist. Plain radiographic images are visualized and preliminarily interpreted by the emergency physician with the below findings:        Interpretation per the Radiologist below, if available at the time ofthis note:    CT ABDOMEN PELVIS W IV CONTRAST Additional Contrast? None   Final Result      No acute intra-abdominal process.          All CT scans at this facility use dose modulation, iterative reconstruction, and/or weight based dosing when appropriate to reduce radiation dose to as low as reasonably achievable. ED BEDSIDE ULTRASOUND:   Performed by ED Physician - none    LABS:  Labs Reviewed   CBC WITH AUTO DIFFERENTIAL - Abnormal; Notable for the following components:       Result Value    RBC 4.15 (*)     All other components within normal limits   COMPREHENSIVE METABOLIC PANEL - Abnormal; Notable for the following components:    CREATININE 1.21 (*)     GFR Non- 48.0 (*)     GFR  58.1 (*)     Alkaline Phosphatase 142 (*)     All other components within normal limits   URINE DRUG SCREEN - Abnormal; Notable for the following components:    Oxycodone Urine POSITIVE (*)     All other components within normal limits   POCT VENOUS - Abnormal; Notable for the following components:    POC Creatinine 1.2 (*)     GFR Non- 48 (*)     GFR  59 (*)     All other components within normal limits   LIPASE   LACTIC ACID, PLASMA   APTT   CK   TROPONIN   MAGNESIUM   URINE RT REFLEX TO CULTURE       All other labs were within normal range or not returned as of this dictation. EMERGENCY DEPARTMENT COURSE and DIFFERENTIAL DIAGNOSIS/MDM:   Vitals:    Vitals:    11/03/20 1123 11/03/20 1224 11/03/20 1323 11/03/20 1430   BP: 116/64 122/68 (!) 131/48 126/79   Pulse: 71 68 80 56   Resp: 19 20 23 18   Temp: 97.9 °F (36.6 °C)      TempSrc: Temporal      SpO2: 96% 98% 94% 99%   Weight: 240 lb (108.9 kg)      Height: 5' 5\" (1.651 m)              MDM  Patient had vomiting multiple history of small bowel obstructions. She had prior surgical repairs for small bowel obstruction. CAT scan shows no small bowel obstruction and no acute findings. Urinalysis was obtained and the patient does not have an urinary tract infection. During the patient's stay she had slipped off the toilet bumping her head against the wall. She is not vomiting and on reexam has no scalp or facial tenderness.     On reexamination the findings were discussed with the patient. She verbalized understanding the care and has no further questions. CONSULTS:  None    PROCEDURES:  Unless otherwise noted below, none     Procedures    FINAL IMPRESSION      1. Non-intractable vomiting with nausea, unspecified vomiting type    2. Abdominal pain, epigastric    3. History of small bowel obstruction    4.  Multiple drug allergies          DISPOSITION/PLAN   DISPOSITION Decision To Discharge 11/03/2020 02:49:26 PM      PATIENT REFERRED TO:  Anne Gutierrez  [address] [place] [address]  [phone]    Schedule an appointment as soon as possible for a visit in 1 day        DISCHARGE MEDICATIONS:  New Prescriptions    DICYCLOMINE (BENTYL) 10 MG CAPSULE    Take 1 capsule by mouth every 6 hours as needed (cramps)    FAMOTIDINE (PEPCID) 20 MG TABLET    Take 1 tablet by mouth 2 times daily for 5 days    ONDANSETRON (ZOFRAN ODT) 4 MG DISINTEGRATING TABLET    Take 1 tablet by mouth every 8 hours as needed for Nausea          (Please note that portions of this note were completed with a voice recognition program.  Efforts were made to edit the dictations but occasionally words are mis-transcribed.)    Christopher Orta DO (electronically signed)  Attending Emergency Physician          Christopher Orta DO  11/03/20 [id number]

## 2020-11-03 NOTE — ED NOTES
Pt discharged in stable condition. All questions answered and education provided.        Thomas Jones RN  11/03/20 [id number]

## 2020-11-04 PROCEDURE — 93010 ELECTROCARDIOGRAM REPORT: CPT | Performed by: INTERNAL MEDICINE

## 2020-11-24 ENCOUNTER — APPOINTMENT (OUTPATIENT)
Dept: CT IMAGING | Age: 45
End: 2020-11-24
Payer: MEDICARE

## 2020-11-24 ENCOUNTER — HOSPITAL ENCOUNTER (EMERGENCY)
Age: 45
Discharge: HOME | End: 2020-11-25
Attending: EMERGENCY MEDICINE
Payer: MEDICARE

## 2020-11-24 LAB
ALBUMIN SERPL-MCNC: 3.6 G/DL (ref 3.5–4.6)
ALP BLD-CCNC: 133 U/L (ref 40–130)
ALT SERPL-CCNC: 23 U/L (ref 0–33)
ANION GAP SERPL CALCULATED.3IONS-SCNC: 12 MEQ/L (ref 9–15)
AST SERPL-CCNC: 43 U/L (ref 0–35)
BASOPHILS ABSOLUTE: 0.1 K/UL (ref 0–0.2)
BASOPHILS RELATIVE PERCENT: 0.8 %
BILIRUB SERPL-MCNC: 0.4 MG/DL (ref 0.2–0.7)
BUN BLDV-MCNC: 11 MG/DL (ref 6–20)
CALCIUM SERPL-MCNC: 8.7 MG/DL (ref 8.5–9.9)
CHLORIDE BLD-SCNC: 97 MEQ/L (ref 95–107)
CO2: 26 MEQ/L (ref 20–31)
CREAT SERPL-MCNC: 1.04 MG/DL (ref 0.5–0.9)
EOSINOPHILS ABSOLUTE: 0.1 K/UL (ref 0–0.7)
EOSINOPHILS RELATIVE PERCENT: 1.4 %
GFR AFRICAN AMERICAN: >60
GFR NON-AFRICAN AMERICAN: 57.1
GLOBULIN: 3.2 G/DL (ref 2.3–3.5)
GLUCOSE BLD-MCNC: 106 MG/DL (ref 70–99)
HCT VFR BLD CALC: 38.4 % (ref 37–47)
HEMOGLOBIN: 13 G/DL (ref 12–16)
LACTIC ACID: 1.5 MMOL/L (ref 0.5–2.2)
LYMPHOCYTES ABSOLUTE: 1.5 K/UL (ref 1–4.8)
LYMPHOCYTES RELATIVE PERCENT: 15.7 %
MCH RBC QN AUTO: 29.8 PG (ref 27–31.3)
MCHC RBC AUTO-ENTMCNC: 33.9 % (ref 33–37)
MCV RBC AUTO: 88 FL (ref 82–100)
MONOCYTES ABSOLUTE: 0.5 K/UL (ref 0.2–0.8)
MONOCYTES RELATIVE PERCENT: 4.9 %
NEUTROPHILS ABSOLUTE: 7.2 K/UL (ref 1.4–6.5)
NEUTROPHILS RELATIVE PERCENT: 77.2 %
PDW BLD-RTO: 14.2 % (ref 11.5–14.5)
PLATELET # BLD: 262 K/UL (ref 130–400)
POTASSIUM SERPL-SCNC: 4.7 MEQ/L (ref 3.4–4.9)
RBC # BLD: 4.36 M/UL (ref 4.2–5.4)
SODIUM BLD-SCNC: 135 MEQ/L (ref 135–144)
TOTAL PROTEIN: 6.8 G/DL (ref 6.3–8)
WBC # BLD: 9.4 K/UL (ref 4.8–10.8)

## 2020-11-24 PROCEDURE — 83605 ASSAY OF LACTIC ACID: CPT

## 2020-11-24 PROCEDURE — 96375 TX/PRO/DX INJ NEW DRUG ADDON: CPT

## 2020-11-24 PROCEDURE — 96376 TX/PRO/DX INJ SAME DRUG ADON: CPT

## 2020-11-24 PROCEDURE — 99285 EMERGENCY DEPT VISIT HI MDM: CPT

## 2020-11-24 PROCEDURE — 74176 CT ABD & PELVIS W/O CONTRAST: CPT

## 2020-11-24 PROCEDURE — 2580000003 HC RX 258: Performed by: EMERGENCY MEDICINE

## 2020-11-24 PROCEDURE — 6360000002 HC RX W HCPCS: Performed by: EMERGENCY MEDICINE

## 2020-11-24 PROCEDURE — 80053 COMPREHEN METABOLIC PANEL: CPT

## 2020-11-24 PROCEDURE — 85025 COMPLETE CBC W/AUTO DIFF WBC: CPT

## 2020-11-24 PROCEDURE — 96365 THER/PROPH/DIAG IV INF INIT: CPT

## 2020-11-24 PROCEDURE — 36415 COLL VENOUS BLD VENIPUNCTURE: CPT

## 2020-11-24 PROCEDURE — 6360000004 HC RX CONTRAST MEDICATION: Performed by: EMERGENCY MEDICINE

## 2020-11-24 RX ORDER — ONDANSETRON 2 MG/ML
4 INJECTION INTRAMUSCULAR; INTRAVENOUS ONCE
Status: COMPLETED | OUTPATIENT
Start: 2020-11-24 | End: 2020-11-24

## 2020-11-24 RX ORDER — DIPHENHYDRAMINE HYDROCHLORIDE 50 MG/ML
50 INJECTION INTRAMUSCULAR; INTRAVENOUS ONCE
Status: COMPLETED | OUTPATIENT
Start: 2020-11-24 | End: 2020-11-24

## 2020-11-24 RX ORDER — MORPHINE SULFATE 2 MG/ML
4 INJECTION, SOLUTION INTRAMUSCULAR; INTRAVENOUS ONCE
Status: COMPLETED | OUTPATIENT
Start: 2020-11-24 | End: 2020-11-24

## 2020-11-24 RX ORDER — CEFAZOLIN SODIUM 2 G/50ML
2 SOLUTION INTRAVENOUS ONCE
Status: COMPLETED | OUTPATIENT
Start: 2020-11-24 | End: 2020-11-25

## 2020-11-24 RX ORDER — 0.9 % SODIUM CHLORIDE 0.9 %
1000 INTRAVENOUS SOLUTION INTRAVENOUS ONCE
Status: COMPLETED | OUTPATIENT
Start: 2020-11-24 | End: 2020-11-24

## 2020-11-24 RX ADMIN — ONDANSETRON 4 MG: 2 INJECTION INTRAMUSCULAR; INTRAVENOUS at 20:05

## 2020-11-24 RX ADMIN — DIPHENHYDRAMINE HYDROCHLORIDE 50 MG: 50 INJECTION, SOLUTION INTRAMUSCULAR; INTRAVENOUS at 20:32

## 2020-11-24 RX ADMIN — MORPHINE SULFATE 4 MG: 2 INJECTION, SOLUTION INTRAMUSCULAR; INTRAVENOUS at 22:27

## 2020-11-24 RX ADMIN — DIATRIZOATE MEGLUMINE AND DIATRIZOATE SODIUM 30 ML: 600; 100 SOLUTION ORAL; RECTAL at 22:01

## 2020-11-24 RX ADMIN — SODIUM CHLORIDE 1000 ML: 9 INJECTION, SOLUTION INTRAVENOUS at 20:05

## 2020-11-24 RX ADMIN — CEFAZOLIN SODIUM 2 G: 2 SOLUTION INTRAVENOUS at 23:27

## 2020-11-24 RX ADMIN — MORPHINE SULFATE 4 MG: 2 INJECTION, SOLUTION INTRAMUSCULAR; INTRAVENOUS at 20:05

## 2020-11-24 ASSESSMENT — PAIN DESCRIPTION - ORIENTATION: ORIENTATION: LEFT;UPPER

## 2020-11-24 ASSESSMENT — PAIN DESCRIPTION - LOCATION
LOCATION: ABDOMEN

## 2020-11-24 ASSESSMENT — ENCOUNTER SYMPTOMS
SHORTNESS OF BREATH: 0
EYE DISCHARGE: 0
CHEST TIGHTNESS: 0
NAUSEA: 1
VOMITING: 1
PHOTOPHOBIA: 0
COUGH: 0
SORE THROAT: 0
ABDOMINAL DISTENTION: 0
WHEEZING: 0
ABDOMINAL PAIN: 1

## 2020-11-24 ASSESSMENT — PAIN SCALES - GENERAL
PAINLEVEL_OUTOF10: 10
PAINLEVEL_OUTOF10: 3
PAINLEVEL_OUTOF10: 9
PAINLEVEL_OUTOF10: 0
PAINLEVEL_OUTOF10: 0
PAINLEVEL_OUTOF10: 8
PAINLEVEL_OUTOF10: 8

## 2020-11-24 ASSESSMENT — PAIN DESCRIPTION - FREQUENCY: FREQUENCY: CONTINUOUS

## 2020-11-24 ASSESSMENT — PAIN DESCRIPTION - PAIN TYPE: TYPE: ACUTE PAIN

## 2020-11-24 NOTE — ED TRIAGE NOTES
Pt presents to the Er with complaints of J Tube complications/infection  Patient states that for the past 1.5 week she has noticed discharge coming from around J tube insertion site  Describes it as yellow or black at times  Afebrile  Complains of nausea and vomiting also

## 2020-11-25 VITALS
HEART RATE: 82 BPM | SYSTOLIC BLOOD PRESSURE: 102 MMHG | WEIGHT: 228 LBS | RESPIRATION RATE: 18 BRPM | OXYGEN SATURATION: 100 % | HEIGHT: 65 IN | DIASTOLIC BLOOD PRESSURE: 49 MMHG | TEMPERATURE: 98 F | BODY MASS INDEX: 37.99 KG/M2

## 2020-11-25 PROCEDURE — 99284 EMERGENCY DEPT VISIT MOD MDM: CPT | Performed by: SURGERY

## 2020-11-25 PROCEDURE — 87205 SMEAR GRAM STAIN: CPT

## 2020-11-25 PROCEDURE — 87070 CULTURE OTHR SPECIMN AEROBIC: CPT

## 2020-11-25 PROCEDURE — 87075 CULTR BACTERIA EXCEPT BLOOD: CPT

## 2020-11-25 RX ORDER — HYDROCODONE BITARTRATE AND ACETAMINOPHEN 5; 325 MG/1; MG/1
1 TABLET ORAL EVERY 6 HOURS PRN
Qty: 10 TABLET | Refills: 0 | Status: SHIPPED | OUTPATIENT
Start: 2020-11-25 | End: 2020-11-28

## 2020-11-25 NOTE — ED NOTES
Patient C/O of itching. States it happens when she gets Morphine.  Denies shortness of breath     Cheri De La Fuente RN  11/24/20 2029

## 2020-11-25 NOTE — CONSULTS
EMERGENCY GENERAL SURGERY CONSULTATION / H&P    Reason for Consultation:  Drainage around g-tube, possible fistula  Consulting Provider: Irma Browning MD    HISTORY OF PRESENT INJURY:   Sherwin Gr is a 39 y.o. female with a PMH of asthma, CVA, CKD, depression, seizures, bipolar d/o, and malrotation s/p exploratory laparotomy and gastrostomy that was complicated by enterocutaneous fistula. She presents to [facility] ED today with complaints of drainage and pain around her G tube for the last several days. She also states that about 4d ago, the area where her fistula was on her lower abdomen \"popped open\" and began to drain. She has had to change the 4x4 gauze dressing 1-2 times daily for the drainage over her fistula. She states that she only uses her gastrostomy tube for meds and is otherwise only eating PO. She is unsure of how much she is eating but states that she mostly eats things that \"make me feel better\" such as fruits and occasional protein shakes. She denies f/c, CP/SOB. She does c/o constipation and only has a BM every 3 days; she says she takes something for this but is not sure what it is called. Otherwise, no new changes in her GI fxn.     PMH:    Past Medical History:   Diagnosis Date    Asthma     Cerebral artery occlusion with cerebral infarction ([address]     Chronic back pain     Chronic kidney disease     Depression     Headache     Kidney disease     Kidney stone     Seizures ([address] 5/24/2016    Suprapubic catheter ([address] 06/2018       PSH:    Past Surgical History:   Procedure Laterality Date    APPENDECTOMY      BACK SURGERY      CHOLECYSTECTOMY      CORONARY ANGIOPLASTY WITH STENT PLACEMENT  2007    [facility]    HYSTERECTOMY      HYSTERECTOMY, TOTAL ABDOMINAL      KNEE SURGERY Bilateral     OTHER SURGICAL HISTORY      sup/pub cath june 1, 2018   [facility] SPINE SURGERY     exploratory laparotomy with adhesiolysis complicated by enterocutaneous fistula (12/2019)    FH:    Family History   Problem Relation Age of Onset    Cancer Father     Cancer Paternal Aunt      She denies h/o bleeding or clotting disorders    SH:    Social History     Tobacco Use    Smoking status: Never Smoker    Smokeless tobacco: Never Used   Substance Use Topics    Alcohol use: No     Alcohol/week: 0.0 standard drinks    Drug use: No       Home Medications:  No current facility-administered medications on file prior to encounter. Current Outpatient Medications on File Prior to Encounter   Medication Sig Dispense Refill    ondansetron (ZOFRAN ODT) 4 MG disintegrating tablet Take 1 tablet by mouth every 8 hours as needed for Nausea 20 tablet 0    dicyclomine (BENTYL) 10 MG capsule Take 1 capsule by mouth every 6 hours as needed (cramps) 12 capsule 0    famotidine (PEPCID) 20 MG tablet Take 1 tablet by mouth 2 times daily for 5 days 10 tablet 0    lidocaine (LMX) 4 % cream Apply topically as needed. 1 Tube 0    miconazole (MICOTIN) 2 % powder Apply topically 2 times daily. 45 g 1    levETIRAcetam (KEPPRA) 1000 MG tablet Take 1 tablet by mouth 2 times daily 60 tablet 1    scopolamine (TRANSDERM-SCOP) transdermal patch Place 1 patch onto the skin See Admin Instructions      prazosin (MINIPRESS) 1 MG capsule Take 1 mg by mouth nightly      oxyCODONE-acetaminophen (PERCOCET) 5-325 MG per tablet every 6 hours as needed (7.5 ml. every 6 hours as needed). 7.5 ml.  Every 6 hours prn via pg tube      ondansetron (ZOFRAN) 4 MG tablet Take 4 mg by mouth every 8 hours as needed      metoprolol tartrate (LOPRESSOR) 25 MG tablet Take 12.5 mg by mouth 2 times daily      meclizine (ANTIVERT) 25 MG tablet Take 25 mg by mouth 2 times daily      potassium chloride (KLOR-CON) 10 MEQ extended release tablet Take 20 mEq by mouth 2 times daily Once in morning and once at night      docusate sodium (COLACE) 100 MG capsule Take 100 mg by mouth 2 times daily      cyclobenzaprine (FLEXERIL) 5 MG tablet Take 2 tablets by mouth 3 times daily as needed for Muscle spasms 8 tablet 0    atorvastatin (LIPITOR) 40 MG tablet Take 1 tablet by mouth nightly 30 tablet 3    fluticasone (FLONASE) 50 MCG/ACT nasal spray 2 sprays by Each Nostril route daily      apixaban (ELIQUIS) 5 MG TABS tablet 10 mg 2 times daily Via peg tube      amitriptyline (ELAVIL) 100 MG tablet Take 100 mg by mouth nightly      zolpidem (AMBIEN) 10 MG tablet Take 10 mg by mouth nightly as needed for Sleep. Tien Erica LORazepam (ATIVAN) 0.5 MG tablet Take 0.5 mg by mouth daily as needed for Anxiety. Via peg tube      nitroGLYCERIN (NITROSTAT) 0.4 MG SL tablet Place 0.4 mg under the tongue every 5 minutes as needed for Chest pain Dissolve 1 tablet under tongue for chest pain and repeat every 5 min up to max of 3 total doses. If no relief after 3 doses call 911      ARIPiprazole (ABILIFY) 15 MG tablet Take 15 mg by mouth daily      ALPRAZolam (XANAX) 0.5 MG tablet 0.5 mg by PEG Tube route 2 times daily. Crushed via peg tube      PARoxetine (PAXIL) 40 MG tablet Take 40 mg by mouth every morning       topiramate (TOPAMAX) 200 MG tablet Take 200 mg by mouth 2 times daily      traZODone (DESYREL) 50 MG tablet Take 300 mg by mouth nightly       fexofenadine (ALLEGRA) 60 MG tablet Take 60 mg by mouth 2 times daily         Review Of Systems:    Constitutional: Negative for  weight loss  HENT: Negative for congestion, facial swelling and bloody nose  Eyes: Negative for  vision changes  Respiratory: Negative for shortness of breath, difficulty breathing  Cardiovascular: Negative for chest wall pain. Gastrointestinal: Positive for abdominal pain, constipation, g-tube dysfunction, abdominal wall drainage  Genitourinary: Negative for  hematuria  Musculoskeletal: Negative for gait difficulties   Skin: Negative for bruising, abrasions, positive for skin breakdown  Neurological: Negative for dizziness, weakness and light-headedness.    Hematological: Negative for easy bruising/bleeding  Psychiatric/Behavioral: Negative for behavioral problems. Except as noted above the remainder of the review of systems was reviewed and negative. PHYSICAL EXAM:  Vitals: BP (!) 117/48   Pulse 77   Temp 98 °F (36.7 °C) (Oral)   Resp 20   Ht 5' 5\" (1.651 m)   Wt 228 lb (103.4 kg)   LMP  (LMP Unknown) Comment: total abd.hysterectomy  SpO2 100%   BMI 37.94 kg/m²   Constituational: NAD, resting comfortably in bed  Cardiovascular: Regular rate and rhythm. Pulmonary: Clear to auscultation bilaterally. No acute distress or labored breathing. Symmetric chest rise. Abdominal: Obese, soft, distractable TTP to midline in region of her upper abdominal wall ventral hernia. Small punctate enterocutaneous fistula at lower midline abdominal wall with scant purulent drainage on bandage, no surrounding erythema/edema/creptius/fluctuance. Moderate drainage from her gastrostomy tube with ~3cm diameter area of erythematous skin breakdown w/ local TTP but no underlying fluctuance/crepitus surrounding gastrostomy w/ small area of hypertrophic granulation tissue at stoma site. The gastrostomy bumper is very loose and does not cinch down well. Musculoskeletal: Good ROM in all extremities. Neurological: Alert, awake, and oriented x 3. Motor and sensory grossly intact. GCS of 15. No focal deficits.        BASIC LABS:  CBC with Differential:    Lab Results   Component Value Date    WBC 9.4 11/24/2020    RBC 4.36 11/24/2020    HGB 13.0 11/24/2020    HCT 38.4 11/24/2020     11/24/2020    MCV 88.0 11/24/2020    MCH 29.8 11/24/2020    MCHC 33.9 11/24/2020    RDW 14.2 11/24/2020    BANDSPCT 2 09/03/2018    LYMPHOPCT 15.7 11/24/2020    MONOPCT 4.9 11/24/2020    BASOPCT 0.8 11/24/2020    MONOSABS 0.5 11/24/2020    LYMPHSABS 1.5 11/24/2020    EOSABS 0.1 11/24/2020    BASOSABS 0.1 11/24/2020     CMP:    Lab Results   Component Value Date     11/24/2020    K 4.7 11/24/2020    K 4.2 08/20/2020    CL 97 11/24/2020    CO2 26 11/24/2020    BUN 11 11/24/2020    CREATININE 1.04 11/24/2020    GFRAA >60.0 11/24/2020    LABGLOM 57.1 11/24/2020    GLUCOSE 106 11/24/2020    PROT 6.8 11/24/2020    LABALBU 3.6 11/24/2020    CALCIUM 8.7 11/24/2020    BILITOT 0.4 11/24/2020    ALKPHOS 133 11/24/2020    AST 43 11/24/2020    ALT 23 11/24/2020     Magnesium:  Lab Results   Component Value Date    MG 1.9 11/03/2020     Troponin:    Lab Results   Component Value Date    TROPONINI <0.010 11/03/2020       IMAGING:    CT abd/pelvis w/ PO contrast personally reviewed - per my read, there is a malrotation, e/o moderate stool burden in proximal colon, and a wide-necked ventral hernia in her upper abdomen. There is a small focus of air adjacent to her colon at the abdominal wall underlying the site of her fistula with what appears to be a chronic tract overlying however there is no contrast extravasation. No small/large bowel wall edema/thickening, no small bowel distention, or evidence of obstructive process. Contrast passes into colon. ASSESSMENT/RECOMMENDATIONS:  Reopening of preexisting enterocutaneous fistula at lower abdominal wall midline with what appears to be low output based on her hx. Constipation. Mild skin breakdown associated with leakage around loose gastrostomy tube. I was able to apply tape to tighten down her gastrostomy tube bumper, but given that she does not appear to be using it for nutrition, she may be able to have the tube removed soon.       - I have asked her to keep a food diary and follow up in clinic next week to assess whether her nutrition has been adequate to remove the tube at that time  - encouraged protein intake w/ e.g. boost for fistula healing  - continue barrier cream around gastrostomy to prevent further skin breakdown  - keep gastrostomy bumper tight at the skin to prevent further leakage  - local wound care to low output enterocutaneous fistula  - miralax with goal of 1x BM daily    Otoniel Ibarra MD  Trauma/Critical Care/General Surgery  [phone] ([id number])  [phone]

## 2020-11-25 NOTE — ED NOTES
Dr Sumeet Avelar at [facility] side.      Darylene Console, RN  11/24/20 1925

## 2020-11-25 NOTE — ED NOTES
States J-tube site is infected. Has been having increase in pain and drainage for 1 week. Reports the drainage ranges from white to black. Started vomiting today and is unable to maintain po intake. J-tube is used for medications and did take am meds today. Reports abdominal pain from midline to left side describes as throbbing with occasional stabbing. Last BM yesterday, denies diarrhea. Denies CP, SOB, or cough. Distal end of original incision site opened and began draining 3-4 days ago, states there was a fistula at this site after surgery.       [address], RN  11/24/20 5189

## 2020-11-25 NOTE — ED PROVIDER NOTES
[id number] ED  eMERGENCY dEPARTMENT eNCOUnter      Pt Name: Bonifacio Jenkins  MRN: [id number]  [place] 1975  Date of evaluation: 11/24/2020  Provider: Dustin Elizondo MD    CHIEF COMPLAINT       Chief Complaint   Patient presents with    J Tube Complications     Infection at J Tube site         HISTORY OF PRESENT ILLNESS   (Location/Symptom, Timing/Onset,Context/Setting, Quality, Duration, Modifying Factors, Severity)  Note limiting factors. Bonifacio Jenkins is a 39 y.o. female who presents to the emergency department for evaluation of problems related to her G-tube. Patient states that she has been getting out colored drainage almost black color in the outside of her G-tube over the past week. Initially asked her how long she has had the G-tube and she states about a week and a half. Specifically I said not since the G-tube is been changed but one was that initially placed and again she feels like it has been sometime in the past couple weeks. Her G-tube was actually placed in August or earlier of 2020. In addition, she is complaining of discomfort and drainage around a lower abdominal fistula in the suprapubic region. This has been over the last few days. She has generalized moderate abdominal pain. She has the G-tube for medication administration but takes all of her liquid and nutrition orally. No new trauma. HPI    NursingNotes were reviewed. REVIEW OF SYSTEMS    (2-9 systems for level 4, 10 or more for level 5)     Review of Systems   Constitutional: Negative for chills and diaphoresis. HENT: Negative for congestion, ear pain, mouth sores and sore throat. Eyes: Negative for photophobia and discharge. Respiratory: Negative for cough, chest tightness, shortness of breath and wheezing. Cardiovascular: Negative for chest pain and palpitations. Gastrointestinal: Positive for abdominal pain, nausea and vomiting. Negative for abdominal distention.    Endocrine: Negative for cold intolerance. Genitourinary: Negative for difficulty urinating. Musculoskeletal: Negative for arthralgias. Skin: Negative for pallor and rash. Allergic/Immunologic: Negative for immunocompromised state. Neurological: Negative for dizziness and syncope. Hematological: Negative for adenopathy. Psychiatric/Behavioral: Negative for agitation and hallucinations. All other systems reviewed and are negative. Except as noted above the remainder of the review of systems was reviewed and negative. PAST MEDICAL HISTORY     Past Medical History:   Diagnosis Date    Asthma     Cerebral artery occlusion with cerebral infarction ([address]     Chronic back pain     Chronic kidney disease     Depression     Headache     Kidney disease     Kidney stone     [facility] 5/24/2016    Suprapubic catheter ([facility] 06/2018         SURGICALHISTORY       Past Surgical History:   Procedure Laterality Date    APPENDECTOMY      BACK SURGERY      CHOLECYSTECTOMY      CORONARY ANGIOPLASTY WITH STENT PLACEMENT  2007    [facility]    HYSTERECTOMY      HYSTERECTOMY, TOTAL ABDOMINAL      KNEE SURGERY Bilateral     OTHER SURGICAL HISTORY      sup/pub cath june 1, 2018   [facility] SPINE SURGERY           CURRENT MEDICATIONS       Previous Medications    ALPRAZOLAM (XANAX) 0.5 MG TABLET    0.5 mg by PEG Tube route 2 times daily.  Crushed via peg tube    AMITRIPTYLINE (ELAVIL) 100 MG TABLET    Take 100 mg by mouth nightly    APIXABAN (ELIQUIS) 5 MG TABS TABLET    10 mg 2 times daily Via peg tube    ARIPIPRAZOLE (ABILIFY) 15 MG TABLET    Take 15 mg by mouth daily    ATORVASTATIN (LIPITOR) 40 MG TABLET    Take 1 tablet by mouth nightly    CYCLOBENZAPRINE (FLEXERIL) 5 MG TABLET    Take 2 tablets by mouth 3 times daily as needed for Muscle spasms    DICYCLOMINE (BENTYL) 10 MG CAPSULE    Take 1 capsule by mouth every 6 hours as needed (cramps)    DOCUSATE SODIUM (COLACE) 100 MG CAPSULE    Take 100 as needed for Sleep. .       ALLERGIES     Latex; Ciprofloxacin; Shellfish-derived products; Daypro [oxaprozin]; Iodides; Iodine; Lidocaine; Macrobid [nitrofurantoin monohyd macro]; Macrolides and ketolides; Other; Penicillins; Pollen extract; Propranolol; Propranolol hcl; Tape [adhesive tape]; Tegretol [carbamazepine]; Tizanidine; Toradol [ketorolac tromethamine]; Tramadol; Zanaflex [tizanidine hcl]; Estrogens; Lamictal [lamotrigine]; Lyrica [pregabalin];  Tylenol [acetaminophen]; and Vicodin [hydrocodone-acetaminophen]    FAMILY HISTORY       Family History   Problem Relation Age of Onset    Cancer Father     Cancer Paternal Aunt           SOCIAL HISTORY       Social History     Socioeconomic History    Marital status: Single     Spouse name: None    Number of children: None    Years of education: None    Highest education level: None   Occupational History    None   Social Needs    Financial resource strain: None    Food insecurity     Worry: None     Inability: None    Transportation needs     Medical: None     Non-medical: None   Tobacco Use    Smoking status: Never Smoker    Smokeless tobacco: Never Used   Substance and Sexual Activity    Alcohol use: No     Alcohol/week: 0.0 standard drinks    Drug use: No    Sexual activity: Not Currently     Partners: Male   Lifestyle    Physical activity     Days per week: None     Minutes per session: None    Stress: None   Relationships    Social connections     Talks on phone: None     Gets together: None     Attends Jew service: None     Active member of club or organization: None     Attends meetings of clubs or organizations: None     Relationship status: None    Intimate partner violence     Fear of current or ex partner: None     Emotionally abused: None     Physically abused: None     Forced sexual activity: None   Other Topics Concern    None   Social History Narrative    None       SCREENINGS    [place] Coma Scale  Eye Opening: Spontaneous  Best Verbal Response: Oriented  Best Motor Response: Obeys commands  [place] Coma Scale Score: 15 @FLOW([id number])@      PHYSICAL EXAM    (up to 7 for level 4, 8 or more for level 5)     ED Triage Vitals [11/24/20 1844]   BP Temp Temp Source Pulse Resp SpO2 Height Weight   122/81 98 °F (36.7 °C) Oral 79 16 99 % 5' 5\" (1.651 m) 228 lb (103.4 kg)       Physical Exam  Vitals signs and nursing note reviewed. Constitutional:       Appearance: She is well-developed. HENT:      Head: Normocephalic. Right Ear: Tympanic membrane normal.      Left Ear: Tympanic membrane normal.      Nose: Nose normal.      Mouth/Throat:      Mouth: Mucous membranes are moist.   Eyes:      Conjunctiva/sclera: Conjunctivae normal.      Pupils: Pupils are equal, round, and reactive to light. Neck:      Musculoskeletal: Normal range of motion and neck supple. Cardiovascular:      Rate and Rhythm: Normal rate and regular rhythm. Heart sounds: Normal heart sounds. Pulmonary:      Effort: Pulmonary effort is normal.      Breath sounds: Normal breath sounds. Abdominal:      General: Bowel sounds are normal.      Palpations: Abdomen is soft. There is no shifting dullness or fluid wave. Tenderness: There is no abdominal tenderness. There is no guarding. Musculoskeletal: Normal range of motion. Skin:     General: Skin is warm and dry. Capillary Refill: Capillary refill takes less than 2 seconds. Neurological:      Mental Status: She is alert and oriented to person, place, and time.    Psychiatric:         Mood and Affect: Mood normal.         DIAGNOSTIC RESULTS     EKG: All EKG's are interpreted by the Emergency Department Physician who either signs or Co-signsthis chart in the absence of a cardiologist.      RADIOLOGY:   Non-plain filmimages such as CT, Ultrasound and MRI are read by the radiologist. Plain radiographic images are visualized and preliminarily interpreted by the emergency physician with the below findings:      Interpretation per the Radiologist below, if available at the time ofthis note:    CT ABDOMEN PELVIS WO CONTRAST Additional Contrast? Oral    (Results Pending)         ED BEDSIDE ULTRASOUND:   Performed by ED Physician - none    LABS:  Labs Reviewed   COMPREHENSIVE METABOLIC PANEL - Abnormal; Notable for the following components:       Result Value    Glucose 106 (*)     CREATININE 1.04 (*)     GFR Non- 57.1 (*)     Alkaline Phosphatase 133 (*)     AST 43 (*)     All other components within normal limits   CBC WITH AUTO DIFFERENTIAL - Abnormal; Notable for the following components:    Neutrophils Absolute 7.2 (*)     All other components within normal limits   CULTURE, ANAEROBIC AND AEROBIC   LACTIC ACID, PLASMA       All other labs were within normal range or not returned as of this dictation. EMERGENCY DEPARTMENT COURSE and DIFFERENTIAL DIAGNOSIS/MDM:   Vitals:    Vitals:    11/24/20 2027 11/24/20 2214 11/24/20 2259 11/25/20 0035   BP: 128/63 (!) 125/55 126/61 (!) 117/48   Pulse: 80 74 80 77   Resp: 18 20 18 20   Temp:       TempSrc:       SpO2: 97% 98% 97% 100%   Weight:       Height:            MDM patient CT shows a small amount of air in the lower abdominal wall which could be from abscess sinus or fistula. She has a known fistula here. CT showed a small amount of abdominal wall air near the fistula. The patient is being followed by the [facility] trauma surgeons and the surgeon came in to evaluate the patient and reviewed the films and her history. Decision was made by him to go ahead and let her go home without any antibiotic therapy. he has given recommendations to the patient regarding a food diary with hopeful plans to pull the G-tube in the near future. CONSULTS:  None    PROCEDURES:  Unless otherwise noted below, none     Procedures    FINAL IMPRESSION      1. Attention to G-tube ([address]    2.  Recurrent abdominal wall fistula          DISPOSITION/PLAN DISPOSITION        PATIENT REFERRED TO:  James Pelayo  [address] [address]  [phone]    In 1 week  Next Wednesday make appointment with the general surgery clinic      DISCHARGE MEDICATIONS:  New Prescriptions    No medications on file          (Please note that portions of this note were completed with a voice recognition program.  Efforts were made to edit the dictations but occasionally words are mis-transcribed.)    Kwan Diaz MD (electronically signed)  Attending Emergency Physician         Kwan Diaz MD  11/25/20 3038

## 2020-11-27 LAB
ANAEROBIC CULTURE: NORMAL
GRAM STAIN RESULT: NORMAL
WOUND/ABSCESS: NORMAL

## 2020-12-08 ENCOUNTER — APPOINTMENT (OUTPATIENT)
Dept: CT IMAGING | Age: 45
End: 2020-12-08
Payer: MEDICARE

## 2020-12-08 ENCOUNTER — HOSPITAL ENCOUNTER (EMERGENCY)
Age: 45
Discharge: HOME | End: 2020-12-08
Payer: MEDICARE

## 2020-12-08 VITALS
HEIGHT: 65 IN | SYSTOLIC BLOOD PRESSURE: 139 MMHG | WEIGHT: 240 LBS | BODY MASS INDEX: 39.99 KG/M2 | RESPIRATION RATE: 18 BRPM | HEART RATE: 80 BPM | TEMPERATURE: 97.8 F | DIASTOLIC BLOOD PRESSURE: 81 MMHG | OXYGEN SATURATION: 98 %

## 2020-12-08 LAB
ALBUMIN SERPL-MCNC: 3.8 G/DL (ref 3.5–4.6)
ALP BLD-CCNC: 147 U/L (ref 40–130)
ALT SERPL-CCNC: 26 U/L (ref 0–33)
ANION GAP SERPL CALCULATED.3IONS-SCNC: 7 MEQ/L (ref 9–15)
AST SERPL-CCNC: 24 U/L (ref 0–35)
BASOPHILS ABSOLUTE: 0.1 K/UL (ref 0–0.2)
BASOPHILS RELATIVE PERCENT: 0.9 %
BILIRUB SERPL-MCNC: 0.3 MG/DL (ref 0.2–0.7)
BUN BLDV-MCNC: 16 MG/DL (ref 6–20)
CALCIUM SERPL-MCNC: 9.1 MG/DL (ref 8.5–9.9)
CHLORIDE BLD-SCNC: 98 MEQ/L (ref 95–107)
CO2: 29 MEQ/L (ref 20–31)
CREAT SERPL-MCNC: 1.02 MG/DL (ref 0.5–0.9)
EOSINOPHILS ABSOLUTE: 0.2 K/UL (ref 0–0.7)
EOSINOPHILS RELATIVE PERCENT: 2.2 %
GFR AFRICAN AMERICAN: >60
GFR NON-AFRICAN AMERICAN: 58.4
GLOBULIN: 2.6 G/DL (ref 2.3–3.5)
GLUCOSE BLD-MCNC: 93 MG/DL (ref 70–99)
HCT VFR BLD CALC: 38.2 % (ref 37–47)
HEMOGLOBIN: 13 G/DL (ref 12–16)
LIPASE: 18 U/L (ref 12–95)
LYMPHOCYTES ABSOLUTE: 1.7 K/UL (ref 1–4.8)
LYMPHOCYTES RELATIVE PERCENT: 21.9 %
MCH RBC QN AUTO: 30.6 PG (ref 27–31.3)
MCHC RBC AUTO-ENTMCNC: 34.1 % (ref 33–37)
MCV RBC AUTO: 89.6 FL (ref 82–100)
MONOCYTES ABSOLUTE: 0.4 K/UL (ref 0.2–0.8)
MONOCYTES RELATIVE PERCENT: 5.4 %
NEUTROPHILS ABSOLUTE: 5.3 K/UL (ref 1.4–6.5)
NEUTROPHILS RELATIVE PERCENT: 69.6 %
PDW BLD-RTO: 14.7 % (ref 11.5–14.5)
PLATELET # BLD: 265 K/UL (ref 130–400)
POTASSIUM SERPL-SCNC: 4.3 MEQ/L (ref 3.4–4.9)
RBC # BLD: 4.27 M/UL (ref 4.2–5.4)
SODIUM BLD-SCNC: 134 MEQ/L (ref 135–144)
TOTAL PROTEIN: 6.4 G/DL (ref 6.3–8)
WBC # BLD: 7.6 K/UL (ref 4.8–10.8)

## 2020-12-08 PROCEDURE — 83690 ASSAY OF LIPASE: CPT

## 2020-12-08 PROCEDURE — 96375 TX/PRO/DX INJ NEW DRUG ADDON: CPT

## 2020-12-08 PROCEDURE — 74176 CT ABD & PELVIS W/O CONTRAST: CPT

## 2020-12-08 PROCEDURE — 99284 EMERGENCY DEPT VISIT MOD MDM: CPT

## 2020-12-08 PROCEDURE — 80053 COMPREHEN METABOLIC PANEL: CPT

## 2020-12-08 PROCEDURE — 6360000002 HC RX W HCPCS: Performed by: PHYSICIAN ASSISTANT

## 2020-12-08 PROCEDURE — 6370000000 HC RX 637 (ALT 250 FOR IP): Performed by: PHYSICIAN ASSISTANT

## 2020-12-08 PROCEDURE — 85025 COMPLETE CBC W/AUTO DIFF WBC: CPT

## 2020-12-08 PROCEDURE — 96374 THER/PROPH/DIAG INJ IV PUSH: CPT

## 2020-12-08 PROCEDURE — 2580000003 HC RX 258: Performed by: PHYSICIAN ASSISTANT

## 2020-12-08 PROCEDURE — 36415 COLL VENOUS BLD VENIPUNCTURE: CPT

## 2020-12-08 RX ORDER — 0.9 % SODIUM CHLORIDE 0.9 %
1000 INTRAVENOUS SOLUTION INTRAVENOUS ONCE
Status: COMPLETED | OUTPATIENT
Start: 2020-12-08 | End: 2020-12-08

## 2020-12-08 RX ORDER — MORPHINE SULFATE 2 MG/ML
4 INJECTION, SOLUTION INTRAMUSCULAR; INTRAVENOUS ONCE
Status: COMPLETED | OUTPATIENT
Start: 2020-12-08 | End: 2020-12-08

## 2020-12-08 RX ORDER — CEPHALEXIN 500 MG/1
500 CAPSULE ORAL ONCE
Status: DISCONTINUED | OUTPATIENT
Start: 2020-12-08 | End: 2020-12-08

## 2020-12-08 RX ORDER — ONDANSETRON 4 MG/1
4 TABLET, FILM COATED ORAL EVERY 8 HOURS PRN
Qty: 20 TABLET | Refills: 0 | Status: SHIPPED | OUTPATIENT
Start: 2020-12-08 | End: 2021-03-15 | Stop reason: SDUPTHER

## 2020-12-08 RX ORDER — CEPHALEXIN 250 MG/5ML
25 POWDER, FOR SUSPENSION ORAL 4 TIMES DAILY
Qty: 544 ML | Refills: 0 | Status: SHIPPED | OUTPATIENT
Start: 2020-12-08 | End: 2020-12-18

## 2020-12-08 RX ORDER — DIPHENHYDRAMINE HYDROCHLORIDE 50 MG/ML
50 INJECTION INTRAMUSCULAR; INTRAVENOUS ONCE
Status: COMPLETED | OUTPATIENT
Start: 2020-12-08 | End: 2020-12-08

## 2020-12-08 RX ORDER — ONDANSETRON 2 MG/ML
4 INJECTION INTRAMUSCULAR; INTRAVENOUS ONCE
Status: COMPLETED | OUTPATIENT
Start: 2020-12-08 | End: 2020-12-08

## 2020-12-08 RX ORDER — CEPHALEXIN 250 MG/5ML
500 POWDER, FOR SUSPENSION ORAL ONCE
Status: COMPLETED | OUTPATIENT
Start: 2020-12-08 | End: 2020-12-08

## 2020-12-08 RX ADMIN — MORPHINE SULFATE 4 MG: 2 INJECTION, SOLUTION INTRAMUSCULAR; INTRAVENOUS at 18:29

## 2020-12-08 RX ADMIN — ONDANSETRON 4 MG: 2 INJECTION INTRAMUSCULAR; INTRAVENOUS at 18:29

## 2020-12-08 RX ADMIN — SODIUM CHLORIDE 1000 ML: 9 INJECTION, SOLUTION INTRAVENOUS at 18:29

## 2020-12-08 RX ADMIN — CEPHALEXIN 500 MG: 250 FOR SUSPENSION ORAL at 20:23

## 2020-12-08 RX ADMIN — DIPHENHYDRAMINE HYDROCHLORIDE 50 MG: 50 INJECTION, SOLUTION INTRAMUSCULAR; INTRAVENOUS at 18:29

## 2020-12-08 ASSESSMENT — PAIN DESCRIPTION - DESCRIPTORS: DESCRIPTORS: STABBING

## 2020-12-08 ASSESSMENT — ENCOUNTER SYMPTOMS
APNEA: 0
VOICE CHANGE: 0
VOMITING: 1
NAUSEA: 1
ABDOMINAL DISTENTION: 0
ANAL BLEEDING: 0
EYE DISCHARGE: 0
ABDOMINAL PAIN: 1
COLOR CHANGE: 0
PHOTOPHOBIA: 0

## 2020-12-08 ASSESSMENT — PAIN DESCRIPTION - ORIENTATION: ORIENTATION: MID;UPPER

## 2020-12-08 ASSESSMENT — PAIN DESCRIPTION - LOCATION: LOCATION: ABDOMEN

## 2020-12-08 ASSESSMENT — PAIN SCALES - GENERAL
PAINLEVEL_OUTOF10: 9
PAINLEVEL_OUTOF10: 9

## 2020-12-09 ENCOUNTER — HOSPITAL ENCOUNTER (EMERGENCY)
Age: 45
Discharge: HOME | End: 2020-12-09
Payer: MEDICARE

## 2020-12-09 VITALS
WEIGHT: 240 LBS | RESPIRATION RATE: 17 BRPM | DIASTOLIC BLOOD PRESSURE: 82 MMHG | OXYGEN SATURATION: 94 % | HEART RATE: 101 BPM | HEIGHT: 65 IN | BODY MASS INDEX: 39.99 KG/M2 | SYSTOLIC BLOOD PRESSURE: 139 MMHG | TEMPERATURE: 97.7 F

## 2020-12-09 LAB
ALBUMIN SERPL-MCNC: 3.6 G/DL (ref 3.5–4.6)
ALP BLD-CCNC: 139 U/L (ref 40–130)
ALT SERPL-CCNC: 26 U/L (ref 0–33)
ANION GAP SERPL CALCULATED.3IONS-SCNC: 7 MEQ/L (ref 9–15)
AST SERPL-CCNC: 40 U/L (ref 0–35)
BASOPHILS ABSOLUTE: 0.1 K/UL (ref 0–0.2)
BASOPHILS RELATIVE PERCENT: 0.9 %
BILIRUB SERPL-MCNC: 0.3 MG/DL (ref 0.2–0.7)
BUN BLDV-MCNC: 14 MG/DL (ref 6–20)
CALCIUM SERPL-MCNC: 8.7 MG/DL (ref 8.5–9.9)
CHLORIDE BLD-SCNC: 101 MEQ/L (ref 95–107)
CO2: 28 MEQ/L (ref 20–31)
CREAT SERPL-MCNC: 1.02 MG/DL (ref 0.5–0.9)
EOSINOPHILS ABSOLUTE: 0.2 K/UL (ref 0–0.7)
EOSINOPHILS RELATIVE PERCENT: 1.7 %
GFR AFRICAN AMERICAN: >60
GFR NON-AFRICAN AMERICAN: 58.4
GLOBULIN: 2.8 G/DL (ref 2.3–3.5)
GLUCOSE BLD-MCNC: 87 MG/DL (ref 70–99)
HCT VFR BLD CALC: 36.1 % (ref 37–47)
HEMOGLOBIN: 12.3 G/DL (ref 12–16)
LYMPHOCYTES ABSOLUTE: 1.4 K/UL (ref 1–4.8)
LYMPHOCYTES RELATIVE PERCENT: 14.5 %
MCH RBC QN AUTO: 31.1 PG (ref 27–31.3)
MCHC RBC AUTO-ENTMCNC: 34.2 % (ref 33–37)
MCV RBC AUTO: 90.9 FL (ref 82–100)
MONOCYTES ABSOLUTE: 0.4 K/UL (ref 0.2–0.8)
MONOCYTES RELATIVE PERCENT: 4.6 %
NEUTROPHILS ABSOLUTE: 7.3 K/UL (ref 1.4–6.5)
NEUTROPHILS RELATIVE PERCENT: 78.3 %
PDW BLD-RTO: 15.1 % (ref 11.5–14.5)
PLATELET # BLD: 277 K/UL (ref 130–400)
POTASSIUM SERPL-SCNC: 5 MEQ/L (ref 3.4–4.9)
RBC # BLD: 3.97 M/UL (ref 4.2–5.4)
SODIUM BLD-SCNC: 136 MEQ/L (ref 135–144)
TOTAL PROTEIN: 6.4 G/DL (ref 6.3–8)
WBC # BLD: 9.3 K/UL (ref 4.8–10.8)

## 2020-12-09 PROCEDURE — 6360000002 HC RX W HCPCS: Performed by: NURSE PRACTITIONER

## 2020-12-09 PROCEDURE — 80053 COMPREHEN METABOLIC PANEL: CPT

## 2020-12-09 PROCEDURE — 96375 TX/PRO/DX INJ NEW DRUG ADDON: CPT

## 2020-12-09 PROCEDURE — 99283 EMERGENCY DEPT VISIT LOW MDM: CPT

## 2020-12-09 PROCEDURE — 36415 COLL VENOUS BLD VENIPUNCTURE: CPT

## 2020-12-09 PROCEDURE — 85025 COMPLETE CBC W/AUTO DIFF WBC: CPT

## 2020-12-09 PROCEDURE — 96374 THER/PROPH/DIAG INJ IV PUSH: CPT

## 2020-12-09 PROCEDURE — 2580000003 HC RX 258: Performed by: NURSE PRACTITIONER

## 2020-12-09 RX ORDER — 0.9 % SODIUM CHLORIDE 0.9 %
1000 INTRAVENOUS SOLUTION INTRAVENOUS ONCE
Status: COMPLETED | OUTPATIENT
Start: 2020-12-09 | End: 2020-12-09

## 2020-12-09 RX ORDER — MORPHINE SULFATE 2 MG/ML
4 INJECTION, SOLUTION INTRAMUSCULAR; INTRAVENOUS ONCE
Status: COMPLETED | OUTPATIENT
Start: 2020-12-09 | End: 2020-12-09

## 2020-12-09 RX ORDER — ONDANSETRON 2 MG/ML
4 INJECTION INTRAMUSCULAR; INTRAVENOUS ONCE
Status: COMPLETED | OUTPATIENT
Start: 2020-12-09 | End: 2020-12-09

## 2020-12-09 RX ORDER — METOCLOPRAMIDE 10 MG/1
10 TABLET ORAL 4 TIMES DAILY
Qty: 120 TABLET | Refills: 0 | Status: SHIPPED | OUTPATIENT
Start: 2020-12-09

## 2020-12-09 RX ADMIN — MORPHINE SULFATE 4 MG: 2 INJECTION, SOLUTION INTRAMUSCULAR; INTRAVENOUS at 17:11

## 2020-12-09 RX ADMIN — ONDANSETRON 4 MG: 2 INJECTION INTRAMUSCULAR; INTRAVENOUS at 17:12

## 2020-12-09 RX ADMIN — SODIUM CHLORIDE 1000 ML: 9 INJECTION, SOLUTION INTRAVENOUS at 17:10

## 2020-12-09 ASSESSMENT — ENCOUNTER SYMPTOMS
BACK PAIN: 0
NAUSEA: 1
WHEEZING: 0
COLOR CHANGE: 0
EYE REDNESS: 0
VOMITING: 1
SHORTNESS OF BREATH: 0
EYE PAIN: 0
COUGH: 0
ABDOMINAL PAIN: 1
EYE DISCHARGE: 0
TROUBLE SWALLOWING: 0
CONSTIPATION: 0
RHINORRHEA: 0
BLOOD IN STOOL: 0
DIARRHEA: 0
SORE THROAT: 0

## 2020-12-09 ASSESSMENT — PAIN DESCRIPTION - LOCATION: LOCATION: ABDOMEN

## 2020-12-09 ASSESSMENT — PAIN DESCRIPTION - DESCRIPTORS: DESCRIPTORS: STABBING

## 2020-12-09 ASSESSMENT — PAIN DESCRIPTION - ORIENTATION: ORIENTATION: MID

## 2020-12-09 ASSESSMENT — PAIN DESCRIPTION - PAIN TYPE: TYPE: ACUTE PAIN

## 2020-12-09 ASSESSMENT — PAIN SCALES - GENERAL: PAINLEVEL_OUTOF10: 9

## 2020-12-09 ASSESSMENT — PAIN DESCRIPTION - FREQUENCY: FREQUENCY: CONTINUOUS

## 2020-12-09 NOTE — ED PROVIDER NOTES
[address] [facility] ED  eMERGENCY dEPARTMENT eNCOUnter      Pt Name: Steph Le  MRN: [id number]  [place] 1975  Date of evaluation: 12/8/2020  Provider: Yuan Torres PA-C    CHIEF COMPLAINT       Chief Complaint   Patient presents with    Hernia     hernia pain that started yesterday          HISTORY OF PRESENT ILLNESS   (Location/Symptom, Timing/Onset,Context/Setting, Quality, Duration, Modifying Factors, Severity)  Note limiting factors. Steph Le is a 39 y.o. female who presents to the emergency department    She presents with area pain states is a lump and pain at her hernia site. She has multiple surgeries in the past including jejunostomy tube. Does take some oral fluids and foods notes that she has been vomiting. Denies vomiting blood denies fever chills. Does have multiple allergies to medications. She sees Dr. Sade Tuttle at [facility] for general surgery. She placed a call they have not gotten back to her yet today. She notes that she has a fistula below her umbilicus that has been improving she also notes that she has J-tube in place. And has multiple surgeries. Including hernias. Symptoms moderate severity nothing improves symptoms touch or motion or food worsening symptoms. HPI    NursingNotes were reviewed. REVIEW OF SYSTEMS    (2-9 systems for level 4, 10 or more for level 5)     Review of Systems   Constitutional: Negative for activity change, appetite change, chills, fever and unexpected weight change. HENT: Negative for ear discharge, nosebleeds and voice change. Eyes: Negative for photophobia and discharge. Respiratory: Negative for apnea. Cardiovascular: Negative for chest pain. Gastrointestinal: Positive for abdominal pain, nausea and vomiting. Negative for abdominal distention and anal bleeding. Endocrine: Negative for cold intolerance, heat intolerance and polyphagia. Genitourinary: Negative for genital sores.    Musculoskeletal: Negative for joint swelling. Skin: Negative for color change and pallor. Allergic/Immunologic: Negative for immunocompromised state. Neurological: Negative for seizures and facial asymmetry. Hematological: Does not bruise/bleed easily. Psychiatric/Behavioral: Negative for behavioral problems, self-injury and sleep disturbance. All other systems reviewed and are negative. Except as noted above the remainder of the review of systems was reviewed and negative. PAST MEDICAL HISTORY     Past Medical History:   Diagnosis Date    Asthma     Cerebral artery occlusion with cerebral infarction ([address]     Chronic back pain     Chronic kidney disease     Depression     Headache     Kidney disease     Kidney stone     Seizures ([address] 5/24/2016    Suprapubic catheter ([address] 06/2018         SURGICALHISTORY       Past Surgical History:   Procedure Laterality Date    APPENDECTOMY      BACK SURGERY      CHOLECYSTECTOMY      CORONARY ANGIOPLASTY WITH STENT PLACEMENT  2007    GA[facility]OSTOMY  12/09/2019    [facility]    HYSTERECTOMY      HYSTERECTOMY, TOTAL ABDOMINAL      KNEE SURGERY Bilateral     OTHER SURGICAL HISTORY      sup/pub cath june 1, 2018   [facility] SPINE SURGERY           CURRENT MEDICATIONS       Previous Medications    ALPRAZOLAM (XANAX) 0.5 MG TABLET    0.5 mg by PEG Tube route 2 times daily.  Crushed via peg tube    AMITRIPTYLINE (ELAVIL) 100 MG TABLET    Take 100 mg by mouth nightly    APIXABAN (ELIQUIS) 5 MG TABS TABLET    10 mg 2 times daily Via peg tube    ARIPIPRAZOLE (ABILIFY) 15 MG TABLET    Take 15 mg by mouth daily    ATORVASTATIN (LIPITOR) 40 MG TABLET    Take 1 tablet by mouth nightly    CYCLOBENZAPRINE (FLEXERIL) 5 MG TABLET    Take 2 tablets by mouth 3 times daily as needed for Muscle spasms    DICYCLOMINE (BENTYL) 10 MG CAPSULE    Take 1 capsule by mouth every 6 hours as needed (cramps)    DOCUSATE SODIUM (COLACE) 100 MG CAPSULE    Take 100 mg by mouth 2 times daily    FAMOTIDINE (PEPCID) 20 MG TABLET    Take 1 tablet by mouth 2 times daily for 5 days    FEXOFENADINE (ALLEGRA) 60 MG TABLET    Take 60 mg by mouth 2 times daily    FLUTICASONE (FLONASE) 50 MCG/ACT NASAL SPRAY    2 sprays by Each Nostril route daily    LEVETIRACETAM (KEPPRA) 1000 MG TABLET    Take 1 tablet by mouth 2 times daily    LIDOCAINE (LMX) 4 % CREAM    Apply topically as needed. LORAZEPAM (ATIVAN) 0.5 MG TABLET    Take 0.5 mg by mouth daily as needed for Anxiety. Via peg tube    MECLIZINE (ANTIVERT) 25 MG TABLET    Take 25 mg by mouth 2 times daily    METOPROLOL TARTRATE (LOPRESSOR) 25 MG TABLET    Take 12.5 mg by mouth 2 times daily    MICONAZOLE (MICOTIN) 2 % POWDER    Apply topically 2 times daily. NITROGLYCERIN (NITROSTAT) 0.4 MG SL TABLET    Place 0.4 mg under the tongue every 5 minutes as needed for Chest pain Dissolve 1 tablet under tongue for chest pain and repeat every 5 min up to max of 3 total doses. If no relief after 3 doses call 911    OXYCODONE-ACETAMINOPHEN (PERCOCET) 5-325 MG PER TABLET    every 6 hours as needed (7.5 ml. every 6 hours as needed). 7.5 ml. Every 6 hours prn via pg tube    PAROXETINE (PAXIL) 40 MG TABLET    Take 40 mg by mouth every morning     POTASSIUM CHLORIDE (KLOR-CON) 10 MEQ EXTENDED RELEASE TABLET    Take 20 mEq by mouth 2 times daily Once in morning and once at night    PRAZOSIN (MINIPRESS) 1 MG CAPSULE    Take 1 mg by mouth nightly    SCOPOLAMINE (TRANSDERM-SCOP) TRANSDERMAL PATCH    Place 1 patch onto the skin See Admin Instructions    TOPIRAMATE (TOPAMAX) 200 MG TABLET    Take 200 mg by mouth 2 times daily    TRAZODONE (DESYREL) 50 MG TABLET    Take 300 mg by mouth nightly     ZOLPIDEM (AMBIEN) 10 MG TABLET    Take 10 mg by mouth nightly as needed for Sleep. .       ALLERGIES     Latex; Ciprofloxacin; Shellfish-derived products; Daypro [oxaprozin]; Iodides; Iodine; Lidocaine; Macrobid [nitrofurantoin monohyd macro]; Macrolides and ketolides; Other; Penicillins;  Pollen extract; [12/08/20 1706]   BP Temp Temp Source Pulse Resp SpO2 Height Weight   121/84 97.8 °F (36.6 °C) Temporal 96 17 95 % 5' 5\" (1.651 m) 240 lb (108.9 kg)       Physical Exam  Vitals signs and nursing note reviewed. Constitutional:       General: She is not in acute distress. Appearance: She is well-developed. HENT:      Head: Normocephalic and atraumatic. Right Ear: External ear normal.      Left Ear: External ear normal.      Nose: Nose normal.      Mouth/Throat:      Mouth: Mucous membranes are moist.      Pharynx: No oropharyngeal exudate or posterior oropharyngeal erythema. Eyes:      General:         Right eye: No discharge. Left eye: No discharge. Extraocular Movements: Extraocular movements intact. Pupils: Pupils are equal, round, and reactive to light. Neck:      Musculoskeletal: Normal range of motion and neck supple. Cardiovascular:      Rate and Rhythm: Normal rate and regular rhythm. Pulses: Normal pulses. Heart sounds: Normal heart sounds. Pulmonary:      Effort: Pulmonary effort is normal. No respiratory distress. Breath sounds: Normal breath sounds. No stridor. Abdominal:      General: Bowel sounds are normal. There is no distension. Palpations: There is mass. Tenderness: There is abdominal tenderness. Musculoskeletal: Normal range of motion. Skin:     General: Skin is warm. Findings: No erythema. Neurological:      Mental Status: She is alert and oriented to person, place, and time.          DIAGNOSTIC RESULTS     EKG: All EKG's are interpreted by the Emergency Department Physician who either signs or Co-signsthis chart in the absence of a cardiologist.          RADIOLOGY:   Non-plain filmimages such as CT, Ultrasound and MRI are read by the radiologist. Plain radiographic images are visualized and preliminarily interpreted by the emergency physician with the below findings:           Interpretation per the Radiologist below, notes 2 small punctate air bubbles in the vicinity of the infraumbilical lesion. Patient states it has been improving. Patient follows up with Dr. Esha Allen at [facility] discussed with Dr. Cathy Dunbar will start Keflex patient states she can take this medication with her allergies. She is to follow-up with [facility] tomorrow. Will provide Zofran patient be on clear liquid diet. Return if any symptoms worsen or new symptoms develop. Amount and/or Complexity of Data Reviewed  Clinical lab tests: reviewed and ordered  Tests in the radiology section of CPT®: reviewed and ordered  Discuss the patient with other providers: yes        CRITICAL CARE TIME         CONSULTS:  None    PROCEDURES:  Unless otherwise noted below, none     Procedures    FINAL IMPRESSION      1. Pain of upper abdomen    2. Non-intractable vomiting with nausea, unspecified vomiting type    3.  Recurrent abdominal wall fistula          DISPOSITION/PLAN   DISPOSITION        PATIENT REFERRED TO:  [facility]  [address] [address]  [phone]    Call in 1 day      Queenie Harmon MD  [address] [facility] [address] [facility] [address]  [phone]    Call in 1 day      [facility]) ED  [address] [address]  [phone]    If symptoms worsen      DISCHARGE MEDICATIONS:  New Prescriptions    CEPHALEXIN (KEFLEX) 250 MG/5ML SUSPENSION    Take 13.6 mLs by mouth 4 times daily for 10 days    ONDANSETRON (ZOFRAN) 4 MG TABLET    Take 1 tablet by mouth every 8 hours as needed for Nausea          (Please note that portions of this note were completed with a voice recognition program.  Efforts were made to edit the dictations but occasionally words are mis-transcribed.)    Mirlande Wall PA-C (electronically signed)  Attending Emergency Physician       Mirlande Wall PA-C  12/08/20 2004

## 2020-12-09 NOTE — ED TRIAGE NOTES
Pt in with pain around Peg tube. Area is clean and dressing is intact. Pt is A&Ox4, skin intact, msps intact, afebrile, breaths are equal and unlabored.

## 2020-12-09 NOTE — ED PROVIDER NOTES
[address] [facility] ED  EMERGENCY DEPARTMENT ENCOUNTER      Pt Name: Cait Kennedy  MRN: [id number]  [place] 1975  Date of evaluation: 12/9/2020  Provider: SILKE Ngo CNP    CHIEF COMPLAINT       Chief Complaint   Patient presents with    Abdominal Pain     pt reports abominal pain x3 days w/nausea and vomiting         HISTORY OF PRESENT ILLNESS   (Location/Symptom, Timing/Onset,Context/Setting, Quality, Duration, Modifying Factors, Severity)  Note limiting factors. Cait Kennedy is a 39 y.o. female who presents to the emergency department with a chart reviewed past medical history of angina, seizures, bipolar disorder, TIA, GI bleed, and colitis for chief complaint of 9 out of 10 generalized abdominal pain associated with nausea vomiting. Patient states she was evaluated in the emergency department yesterday and had a CAT scan and full work-up done and they told her that everything looked good. She states she has been unable to keep food down since yesterday and has been vomiting everything up. She denies any fevers or chills. Denies any alleviating modifying factors. Nursing Notes were reviewed. REVIEW OF SYSTEMS    (2-9 systems for level 4, 10 or more for level 5)     Review of Systems   Constitutional: Negative for activity change, appetite change, fatigue and fever. HENT: Negative for congestion, ear pain, rhinorrhea, sore throat and trouble swallowing. Eyes: Negative for pain, discharge and redness. Respiratory: Negative for cough, shortness of breath and wheezing. Cardiovascular: Negative for chest pain and palpitations. Gastrointestinal: Positive for abdominal pain, nausea and vomiting. Negative for blood in stool, constipation and diarrhea. Endocrine: Negative for polydipsia and polyuria. Genitourinary: Negative for decreased urine volume, dysuria, flank pain and hematuria. Musculoskeletal: Negative for arthralgias, back pain and myalgias.    Skin: Negative of clubs or organizations: None     Relationship status: None    Intimate partner violence     Fear of current or ex partner: None     Emotionally abused: None     Physically abused: None     Forced sexual activity: None   Other Topics Concern    None   Social History Narrative    None       SCREENINGS             PHYSICAL EXAM    (up to 7 for level 4, 8 or more for level 5)     ED Triage Vitals [12/09/20 1520]   BP Temp Temp Source Pulse Resp SpO2 Height Weight   139/82 97.7 °F (36.5 °C) Temporal 101 17 94 % 5' 5\" (1.651 m) 240 lb (108.9 kg)       Physical Exam  Vitals signs and nursing note reviewed. Constitutional:       General: She is not in acute distress. Appearance: She is well-developed. She is not diaphoretic. HENT:      Head: Normocephalic and atraumatic. Nose: Nose normal.   Eyes:      Conjunctiva/sclera: Conjunctivae normal.      Pupils: Pupils are equal, round, and reactive to light. Neck:      Musculoskeletal: Normal range of motion and neck supple. Cardiovascular:      Rate and Rhythm: Normal rate and regular rhythm. Heart sounds: Normal heart sounds. Abdominal:      General: Bowel sounds are normal.      Palpations: Abdomen is soft. Tenderness: There is generalized abdominal tenderness. Skin:     General: Skin is warm and dry. Capillary Refill: Capillary refill takes less than 2 seconds. Findings: No rash. Neurological:      Mental Status: She is alert and oriented to person, place, and time. Cranial Nerves: No cranial nerve deficit.    Psychiatric:         Behavior: Behavior normal.         RESULTS     EKG: All EKG's are interpreted by the Emergency Department Physician who either signs or Co-signsthis chart in the absence of a cardiologist.        RADIOLOGY:   Non-plain filmimages such as CT, Ultrasound and MRI are read by the radiologist. Plain radiographic images are visualized and preliminarily interpreted by the emergency physician with the below findings:        Interpretation per the Radiologist below, if available at the time ofthis note:    No orders to display         ED BEDSIDE ULTRASOUND:   Performed by ED Physician - none    LABS:  Labs Reviewed   COMPREHENSIVE METABOLIC PANEL - Abnormal; Notable for the following components:       Result Value    Potassium 5.0 (*)     Anion Gap 7 (*)     CREATININE 1.02 (*)     GFR Non- 58.4 (*)     Alkaline Phosphatase 139 (*)     AST 40 (*)     All other components within normal limits   CBC WITH AUTO DIFFERENTIAL - Abnormal; Notable for the following components:    RBC 3.97 (*)     Hematocrit 36.1 (*)     RDW 15.1 (*)     Neutrophils Absolute 7.3 (*)     All other components within normal limits       All other labs were within normal range or not returned as of this dictation. EMERGENCY DEPARTMENT COURSE and DIFFERENTIAL DIAGNOSIS/MDM:   Vitals:    Vitals:    12/09/20 1520   BP: 139/82   Pulse: 101   Resp: 17   Temp: 97.7 °F (36.5 °C)   TempSrc: Temporal   SpO2: 94%   Weight: 240 lb (108.9 kg)   Height: 5' 5\" (1.651 m)            MDM   Patient is a 25-year-old female presenting to the emergency department with a chief complaint of abdominal pain and unable to keep food down. Hemodynamically stable nontoxic-appearing. She had a full work-up done yesterday and everything was negative on her. I repeated her basic blood work give her a liter fluid, Zofran, morphine, and will be reassessing her. Patient is continuing to ask for pain medications. Patient's lab work is unremarkable. Patient is instructed to follow-up with GI and given a prescription for Reglan for her nausea. She has not vomited at all throughout her stay in the emergency department. She is discharged in stable condition stable vital signs. CRITICAL CARE TIME       CONSULTS:  None    PROCEDURES:  Unless otherwise noted below, none     Procedures    FINAL IMPRESSION      1. Pain of upper abdomen    2. Non-intractable vomiting with nausea, unspecified vomiting type          DISPOSITION/PLAN   DISPOSITION Decision To Discharge 12/09/2020 05:34:58 PM      PATIENT REFERRED TO:  Anne Gutierrez  [address] [address]  [phone]    Schedule an appointment as soon as possible for a visit in 1 day      Sarai Card  [address] [facility] [phone]    Schedule an appointment as soon as possible for a visit in 1 day        DISCHARGE MEDICATIONS:  New Prescriptions    METOCLOPRAMIDE (REGLAN) 10 MG TABLET    Take 1 tablet by mouth 4 times daily          (Please notethat portions of this note were completed with a voice recognition program.  Efforts were made to edit the dictations but occasionally words are mis-transcribed.)    SILKE Canada CNP (electronically signed)  Attending Emergency Physician          [facility], APRN - CNP  12/09/20 0337

## 2020-12-09 NOTE — ED NOTES
Patient has Peg tube and states that she can only receive medication through it, Keflex changed to liquid form.       Franc Joy RN  12/08/20 2003

## 2020-12-09 NOTE — ED NOTES
Patient given discharge instructions and prescriptions and verbalized understanding. Vital signs stable. Resp even and unlabored. Skin warm, dry and intact. Patient is alert and oriented. IV removed. Patient doesn't have any questions at this time. Patient transported to husbands car via wheelchair, she transferred from wheel chair to car with steady gate.       Dorlene Pallas, RN  12/08/20 6186

## 2020-12-29 ENCOUNTER — APPOINTMENT (OUTPATIENT)
Dept: GENERAL RADIOLOGY | Age: 45
End: 2020-12-29
Payer: MEDICARE

## 2020-12-29 ENCOUNTER — HOSPITAL ENCOUNTER (EMERGENCY)
Age: 45
Discharge: HOME | End: 2020-12-29
Payer: MEDICARE

## 2020-12-29 VITALS
OXYGEN SATURATION: 95 % | SYSTOLIC BLOOD PRESSURE: 149 MMHG | WEIGHT: 230 LBS | DIASTOLIC BLOOD PRESSURE: 71 MMHG | TEMPERATURE: 99.5 F | BODY MASS INDEX: 38.32 KG/M2 | HEART RATE: 99 BPM | RESPIRATION RATE: 20 BRPM | HEIGHT: 65 IN

## 2020-12-29 LAB
ALBUMIN SERPL-MCNC: 3.5 G/DL (ref 3.5–4.6)
ALP BLD-CCNC: 120 U/L (ref 40–130)
ALT SERPL-CCNC: 21 U/L (ref 0–33)
ANION GAP SERPL CALCULATED.3IONS-SCNC: 11 MEQ/L (ref 9–15)
AST SERPL-CCNC: 26 U/L (ref 0–35)
BASOPHILS ABSOLUTE: 0 K/UL (ref 0–0.2)
BASOPHILS RELATIVE PERCENT: 0.5 %
BILIRUB SERPL-MCNC: 0.3 MG/DL (ref 0.2–0.7)
BUN BLDV-MCNC: 10 MG/DL (ref 6–20)
CALCIUM SERPL-MCNC: 8.7 MG/DL (ref 8.5–9.9)
CHLORIDE BLD-SCNC: 95 MEQ/L (ref 95–107)
CO2: 28 MEQ/L (ref 20–31)
CREAT SERPL-MCNC: 1.35 MG/DL (ref 0.5–0.9)
EKG ATRIAL RATE: 94 BPM
EKG P AXIS: 39 DEGREES
EKG P-R INTERVAL: 156 MS
EKG Q-T INTERVAL: 352 MS
EKG QRS DURATION: 82 MS
EKG QTC CALCULATION (BAZETT): 440 MS
EKG R AXIS: 82 DEGREES
EKG T AXIS: 19 DEGREES
EKG VENTRICULAR RATE: 94 BPM
EOSINOPHILS ABSOLUTE: 0 K/UL (ref 0–0.7)
EOSINOPHILS RELATIVE PERCENT: 0.3 %
GFR AFRICAN AMERICAN: 51.1
GFR NON-AFRICAN AMERICAN: 42.3
GLOBULIN: 3.1 G/DL (ref 2.3–3.5)
GLUCOSE BLD-MCNC: 107 MG/DL (ref 70–99)
HCT VFR BLD CALC: 38.4 % (ref 37–47)
HEMOGLOBIN: 13.2 G/DL (ref 12–16)
LACTIC ACID: 1.2 MMOL/L (ref 0.5–2.2)
LYMPHOCYTES ABSOLUTE: 0.7 K/UL (ref 1–4.8)
LYMPHOCYTES RELATIVE PERCENT: 14.5 %
MAGNESIUM: 1.9 MG/DL (ref 1.7–2.4)
MCH RBC QN AUTO: 30.2 PG (ref 27–31.3)
MCHC RBC AUTO-ENTMCNC: 34.3 % (ref 33–37)
MCV RBC AUTO: 88 FL (ref 82–100)
MONOCYTES ABSOLUTE: 0.5 K/UL (ref 0.2–0.8)
MONOCYTES RELATIVE PERCENT: 9 %
NEUTROPHILS ABSOLUTE: 3.8 K/UL (ref 1.4–6.5)
NEUTROPHILS RELATIVE PERCENT: 75.7 %
PDW BLD-RTO: 14.4 % (ref 11.5–14.5)
PLATELET # BLD: 195 K/UL (ref 130–400)
POTASSIUM SERPL-SCNC: 4.2 MEQ/L (ref 3.4–4.9)
RBC # BLD: 4.36 M/UL (ref 4.2–5.4)
SODIUM BLD-SCNC: 134 MEQ/L (ref 135–144)
TOTAL CK: 50 U/L (ref 0–170)
TOTAL PROTEIN: 6.6 G/DL (ref 6.3–8)
TROPONIN: <0.01 NG/ML (ref 0–0.01)
WBC # BLD: 5.1 K/UL (ref 4.8–10.8)

## 2020-12-29 PROCEDURE — 80053 COMPREHEN METABOLIC PANEL: CPT

## 2020-12-29 PROCEDURE — 93010 ELECTROCARDIOGRAM REPORT: CPT | Performed by: INTERNAL MEDICINE

## 2020-12-29 PROCEDURE — 93005 ELECTROCARDIOGRAM TRACING: CPT | Performed by: NURSE PRACTITIONER

## 2020-12-29 PROCEDURE — 96375 TX/PRO/DX INJ NEW DRUG ADDON: CPT

## 2020-12-29 PROCEDURE — 84484 ASSAY OF TROPONIN QUANT: CPT

## 2020-12-29 PROCEDURE — 82550 ASSAY OF CK (CPK): CPT

## 2020-12-29 PROCEDURE — 2580000003 HC RX 258: Performed by: NURSE PRACTITIONER

## 2020-12-29 PROCEDURE — 85025 COMPLETE CBC W/AUTO DIFF WBC: CPT

## 2020-12-29 PROCEDURE — 99284 EMERGENCY DEPT VISIT MOD MDM: CPT

## 2020-12-29 PROCEDURE — 36415 COLL VENOUS BLD VENIPUNCTURE: CPT

## 2020-12-29 PROCEDURE — 83605 ASSAY OF LACTIC ACID: CPT

## 2020-12-29 PROCEDURE — 83735 ASSAY OF MAGNESIUM: CPT

## 2020-12-29 PROCEDURE — 71045 X-RAY EXAM CHEST 1 VIEW: CPT

## 2020-12-29 PROCEDURE — 96374 THER/PROPH/DIAG INJ IV PUSH: CPT

## 2020-12-29 PROCEDURE — 6360000002 HC RX W HCPCS: Performed by: NURSE PRACTITIONER

## 2020-12-29 RX ORDER — MORPHINE SULFATE 2 MG/ML
4 INJECTION, SOLUTION INTRAMUSCULAR; INTRAVENOUS ONCE
Status: COMPLETED | OUTPATIENT
Start: 2020-12-29 | End: 2020-12-29

## 2020-12-29 RX ORDER — 0.9 % SODIUM CHLORIDE 0.9 %
1000 INTRAVENOUS SOLUTION INTRAVENOUS ONCE
Status: COMPLETED | OUTPATIENT
Start: 2020-12-29 | End: 2020-12-29

## 2020-12-29 RX ORDER — DEXAMETHASONE SODIUM PHOSPHATE 10 MG/ML
6 INJECTION INTRAMUSCULAR; INTRAVENOUS ONCE
Status: COMPLETED | OUTPATIENT
Start: 2020-12-29 | End: 2020-12-29

## 2020-12-29 RX ORDER — ONDANSETRON 2 MG/ML
4 INJECTION INTRAMUSCULAR; INTRAVENOUS ONCE
Status: COMPLETED | OUTPATIENT
Start: 2020-12-29 | End: 2020-12-29

## 2020-12-29 RX ORDER — DEXAMETHASONE 6 MG/1
6 TABLET ORAL
Qty: 10 TABLET | Refills: 0 | Status: SHIPPED | OUTPATIENT
Start: 2020-12-29 | End: 2020-12-29 | Stop reason: SDUPTHER

## 2020-12-29 RX ORDER — DEXAMETHASONE 6 MG/1
6 TABLET ORAL
Qty: 10 TABLET | Refills: 0 | Status: SHIPPED | OUTPATIENT
Start: 2020-12-29 | End: 2021-01-08

## 2020-12-29 RX ADMIN — ONDANSETRON 4 MG: 2 INJECTION INTRAMUSCULAR; INTRAVENOUS at 05:09

## 2020-12-29 RX ADMIN — DEXAMETHASONE SODIUM PHOSPHATE 6 MG: 10 INJECTION INTRAMUSCULAR; INTRAVENOUS at 05:17

## 2020-12-29 RX ADMIN — SODIUM CHLORIDE 1000 ML: 9 INJECTION, SOLUTION INTRAVENOUS at 05:09

## 2020-12-29 RX ADMIN — MORPHINE SULFATE 4 MG: 2 INJECTION, SOLUTION INTRAMUSCULAR; INTRAVENOUS at 05:09

## 2020-12-29 ASSESSMENT — ENCOUNTER SYMPTOMS
SORE THROAT: 0
COUGH: 1
TROUBLE SWALLOWING: 0
ABDOMINAL DISTENTION: 0
DIARRHEA: 0
ABDOMINAL PAIN: 1
WHEEZING: 0
CHEST TIGHTNESS: 1
NAUSEA: 0
VOMITING: 0
SHORTNESS OF BREATH: 1
RHINORRHEA: 1
CONSTIPATION: 0
COLOR CHANGE: 0
BACK PAIN: 0

## 2020-12-29 ASSESSMENT — PAIN DESCRIPTION - PAIN TYPE: TYPE: ACUTE PAIN

## 2020-12-29 ASSESSMENT — PAIN SCALES - GENERAL
PAINLEVEL_OUTOF10: 10
PAINLEVEL_OUTOF10: 10

## 2020-12-29 ASSESSMENT — PAIN DESCRIPTION - LOCATION: LOCATION: GENERALIZED

## 2020-12-29 NOTE — ED NOTES
Patient's  is here   Patient assisted out to the car via wheel chair without any complications.             Hi Lam RN  12/29/20 2000

## 2020-12-29 NOTE — ED NOTES
Ambulated patient around the room and the lowest pulse ox was 93 percent on room air     [facility], [address] [place] [address]  12/29/20 1186

## 2020-12-29 NOTE — ED NOTES
D/C instructions given to patient no questions ask. Patient verbalized understanding and is waiting for her  to pick her up         Kelly Hamilton RN  12/29/20 5963

## 2020-12-29 NOTE — ED PROVIDER NOTES
[address] [facility] ED  EMERGENCY DEPARTMENT ENCOUNTER      Pt Name: Quique Morales  MRN: [id number]  [place] 1975  Date of evaluation: 12/29/2020  Provider: Richard Marsh       Chief Complaint   Patient presents with    Fatigue     increased symptoms of covid. she is covid positive         HISTORY OF PRESENT ILLNESS   (Location/Symptom, Timing/Onset,Context/Setting, Quality, Duration, Modifying Factors, Severity)  Note limiting factors. Quique Morales is a 39 y.o. female who presents to the emergency department for complaint of chest tightness pressure shortness of breath and cough. Patient reports that her mother was positive for COVID-19 last week and she was tested on the 26th just 3 days ago and tested positive. She states at that time she was only feel mildly short of breath and over the past 24 hours has become much more short of breath with increasing nonproductive cough. She denies any fevers states that she has chills at times denies any dizziness or disorientation. States she has a headache but only with the cough as well as an increase in chest pressure and pain of the left lower rib area with coughing. She states the pain is progressively become worse with the increase in coughing this is above her PEG tube site rating the pain 10 out of 10 sharp aching pains with coughing fits. She states she started to feel more short of breath even while at rest and has not been given any medications at this time. She is not currently on any steroids for this. She states that she has abdominal pain around the PEG tube site but states this is chronic in nature denies any new nausea vomiting patient states she does have some diarrhea that started over the past 2 days. She reports some nasal congestion runny nose throat irritation coughing denies further upper respiratory symptoms. Nursing Notes were reviewed.     REVIEW OF SYSTEMS    (2-9 systems for level 4, 10 or more for level 5)     Review of Systems   Constitutional: Positive for activity change, appetite change, chills and fatigue. Negative for diaphoresis and fever. HENT: Positive for congestion and rhinorrhea. Negative for ear pain, postnasal drip, sore throat and trouble swallowing. Eyes: Negative for visual disturbance. Respiratory: Positive for cough, chest tightness and shortness of breath. Negative for wheezing. Cardiovascular: Positive for chest pain. Negative for palpitations. Gastrointestinal: Positive for abdominal pain (near PEG tube site with coughing). Negative for abdominal distention, constipation, diarrhea, nausea and vomiting. Genitourinary: Negative for difficulty urinating, dysuria, flank pain, frequency and urgency. Musculoskeletal: Positive for myalgias (Generalized body aches). Negative for arthralgias, back pain, neck pain and neck stiffness. Skin: Negative for color change and rash. Neurological: Positive for weakness. Negative for dizziness, tremors, seizures, syncope, speech difficulty, light-headedness, numbness and headaches. Except as noted above the remainder of the review of systems was reviewed and negative.        PAST MEDICAL HISTORY     Past Medical History:   Diagnosis Date    Asthma     Cerebral artery occlusion with cerebral infarction ([address]     Chronic back pain     Chronic kidney disease     Depression     Headache     Kidney disease     Kidney stone     Seizures ([address] 5/24/2016    Suprapubic catheter ([address] 06/2018     Past Surgical History:   Procedure Laterality Date    APPENDECTOMY      BACK SURGERY      CHOLECYSTECTOMY      CORONARY ANGIOPLASTY WITH STENT PLACEMENT  2007    [facility]/09/2019    [facility]    HYSTERECTOMY      HYSTERECTOMY, TOTAL ABDOMINAL      KNEE SURGERY Bilateral     OTHER SURGICAL HISTORY      sup/pub cath june 1, 2018   [facility] SPINE SURG[facility]       Social History     Socioeconomic History    Marital status: discharge. Conjunctiva/sclera: Conjunctivae normal.      Pupils: Pupils are equal, round, and reactive to light. Neck:      Musculoskeletal: Normal range of motion and neck supple. No neck rigidity or muscular tenderness. Cardiovascular:      Rate and Rhythm: Normal rate and regular rhythm. Pulses: Normal pulses. Pulmonary:      Effort: Pulmonary effort is normal.      Breath sounds: Normal breath sounds. Chest:      Chest wall: Tenderness (Palpable reproducible tenderness midsternal as well as left lower rib margin left upper quadrant) present. No swelling. Abdominal:      General: Bowel sounds are normal. There is no distension. Palpations: Abdomen is soft. There is no mass. Tenderness: There is abdominal tenderness. There is no guarding or rebound. Hernia: No hernia is present. Musculoskeletal: Normal range of motion. General: No tenderness or signs of injury. Skin:     General: Skin is warm and dry. Capillary Refill: Capillary refill takes less than 2 seconds. Neurological:      General: No focal deficit present. Mental Status: She is alert and oriented to person, place, and time. Mental status is at baseline. Cranial Nerves: No cranial nerve deficit. Sensory: No sensory deficit. Motor: No weakness.       Coordination: Coordination normal.         RESULTS     EKG: All EKG's are interpreted by the Emergency Department Physician who either signs or Co-signsthis chart in the absence of a cardiologist.    Sinus rhythm 94 bpm no apparent acute ST elevation or deviation no ectopy QTc 440 ms    RADIOLOGY:   Non-plain filmimages such as CT, Ultrasound and MRI are read by the radiologist. Plain radiographic images are visualized and preliminarily interpreted by the emergency physician with the below findings:    Portable chest x-ray shows poor inspiratory effort, no apparent acute disease process no focal infiltrates or medicated for pain discomfort related to coughing I believe this area is generally tender for the patient due to her placement of PEG tube and that the coughing is aggravating his musculoskeletal area around this palpable reproducible tenderness is present. She is able to take deep breaths and has a strong cough. She was able to stand and ambulate in the room with pulse oxygen remaining 95% on room air. She is noted to be on chronic pain medication and will not be discharged home with anything additional for pain or discomfort. She will be discharged home with prescription for tablets of Decadron for steroid support of her suspected COVID-19. She is directed to follow-up primary care providers as possible for evaluation. Return to the ER for any onset of new concerning symptoms worsening condition specifically onset of severe chest pain or shortness of breath. Patient verbalized understanding of all given instruction education. CRITICAL CARE TIME       CONSULTS:  None    PROCEDURES:  Unless otherwise noted below, none     Procedures    FINAL IMPRESSION      1. URI with cough and congestion    2.  Suspected COVID-19 virus infection          DISPOSITION/PLAN   DISPOSITION        PATIENT REFERRED TO:  [address] [address]  [phone]    Call in 1 day        DISCHARGE MEDICATIONS:  Current Discharge Medication List      START taking these medications    Details   dexamethasone (DECADRON) 6 MG tablet Take 1 tablet by mouth daily (with breakfast) for 10 days  Qty: 10 tablet, Refills: 0                (Please notethat portions of this note were completed with a voice recognition program.  Efforts were made to edit the dictations but occasionally words are mis-transcribed.)    SILKE Mckeon CNP (electronically signed)  Attending Emergency Physician         SILKE Mckeon CNP  12/29/20 [id number]

## 2021-01-15 ENCOUNTER — HOSPITAL ENCOUNTER (EMERGENCY)
Age: 46
Discharge: HOME | End: 2021-01-15
Payer: MEDICARE

## 2021-01-15 VITALS
WEIGHT: 225 LBS | HEIGHT: 65 IN | RESPIRATION RATE: 18 BRPM | HEART RATE: 98 BPM | OXYGEN SATURATION: 96 % | BODY MASS INDEX: 37.49 KG/M2 | TEMPERATURE: 98.2 F | SYSTOLIC BLOOD PRESSURE: 120 MMHG | DIASTOLIC BLOOD PRESSURE: 66 MMHG

## 2021-01-15 DIAGNOSIS — B37.2 YEAST DERMATITIS: Primary | ICD-10-CM

## 2021-01-15 PROCEDURE — 99283 EMERGENCY DEPT VISIT LOW MDM: CPT

## 2021-01-15 RX ORDER — CLOTRIMAZOLE AND BETAMETHASONE DIPROPIONATE 10; .64 MG/G; MG/G
CREAM TOPICAL
Qty: 1 TUBE | Refills: 0 | Status: SHIPPED | OUTPATIENT
Start: 2021-01-15

## 2021-01-15 ASSESSMENT — ENCOUNTER SYMPTOMS
ABDOMINAL PAIN: 0
SHORTNESS OF BREATH: 0
BACK PAIN: 0
COUGH: 0

## 2021-01-15 ASSESSMENT — PAIN SCALES - GENERAL: PAINLEVEL_OUTOF10: 8

## 2021-01-15 ASSESSMENT — PAIN DESCRIPTION - DESCRIPTORS: DESCRIPTORS: BURNING

## 2021-01-15 NOTE — ED TRIAGE NOTES
Pt states that she has blisters around her PEG tube. States that she is allergic to tape so they have been using a Tegaderm.

## 2021-01-15 NOTE — ED PROVIDER NOTES
[address] [facility] ED  eMERGENCY dEPARTMENT eNCOUnter      Pt Name: James Gilbert  MRN: [id number]  [place] 1975  Date of evaluation: 1/15/2021  Provider: SILKE Ibarra CNP      HISTORY OF PRESENT ILLNESS    James Gilbert is a 39 y.o. female who presents to the Emergency Department with L upper quadrant pain around there PEG tube site. Patient does eat food,  She has been eating and drinking ok. She uses the PEG tube for her medications. Area is tender around PEG site. She states tube is flushing well and it feels ok. Pain is moderate. REVIEW OF SYSTEMS       Review of Systems   Constitutional: Negative for activity change, appetite change and fever. HENT: Negative for congestion. Respiratory: Negative for cough and shortness of breath. Cardiovascular: Negative for chest pain. Gastrointestinal: Negative for abdominal pain. Genitourinary: Negative for dysuria. Musculoskeletal: Negative for arthralgias and back pain. Skin: Positive for rash (erythema around PEG tube site with yellow draiange. ). All other systems reviewed and are negative.         PAST MEDICAL HISTORY     Past Medical History:   Diagnosis Date    Asthma     Cerebral artery occlusion with cerebral infarction ([address]     Chronic back pain     Chronic kidney disease     Depression     Headache     Kidney disease     Kidney stone     Seizures ([address] 5/24/2016    Suprapubic catheter ([address] 06/2018         SURGICAL HISTORY       Past Surgical History:   Procedure Laterality Date    APPENDECTOMY      BACK SURGERY      CHOLECYSTECTOMY      CORONARY ANGIOPLASTY WITH STENT PLACEMENT  2007    [facility]09/2019    [facility]    HYSTERECTOMY      HYSTERECTOMY, TOTAL ABDOMINAL      KNEE SURGERY Bilateral     OTHER SURGICAL HISTORY      sup/pub cath june 1, 2018   [facility] SPINE SURGERY           CURRENT MEDICATIONS       Previous Medications    ALPRAZOLAM (XANAX) 0.5 MG TABLET    0.5 mg by PEG Tube route 2 times daily. Crushed via peg tube    AMITRIPTYLINE (ELAVIL) 100 MG TABLET    Take 100 mg by mouth nightly    APIXABAN (ELIQUIS) 5 MG TABS TABLET    10 mg 2 times daily Via peg tube    ARIPIPRAZOLE (ABILIFY) 15 MG TABLET    Take 15 mg by mouth daily    ATORVASTATIN (LIPITOR) 40 MG TABLET    Take 1 tablet by mouth nightly    CYCLOBENZAPRINE (FLEXERIL) 5 MG TABLET    Take 2 tablets by mouth 3 times daily as needed for Muscle spasms    DICYCLOMINE (BENTYL) 10 MG CAPSULE    Take 1 capsule by mouth every 6 hours as needed (cramps)    DOCUSATE SODIUM (COLACE) 100 MG CAPSULE    Take 100 mg by mouth 2 times daily    FAMOTIDINE (PEPCID) 20 MG TABLET    Take 1 tablet by mouth 2 times daily for 5 days    FEXOFENADINE (ALLEGRA) 60 MG TABLET    Take 60 mg by mouth 2 times daily    FLUTICASONE (FLONASE) 50 MCG/ACT NASAL SPRAY    2 sprays by Each Nostril route daily    LEVETIRACETAM (KEPPRA) 1000 MG TABLET    Take 1 tablet by mouth 2 times daily    LIDOCAINE (LMX) 4 % CREAM    Apply topically as needed. LORAZEPAM (ATIVAN) 0.5 MG TABLET    Take 0.5 mg by mouth daily as needed for Anxiety. Via peg tube    MECLIZINE (ANTIVERT) 25 MG TABLET    Take 25 mg by mouth 2 times daily    METOCLOPRAMIDE (REGLAN) 10 MG TABLET    Take 1 tablet by mouth 4 times daily    METOPROLOL TARTRATE (LOPRESSOR) 25 MG TABLET    Take 12.5 mg by mouth 2 times daily    MICONAZOLE (MICOTIN) 2 % POWDER    Apply topically 2 times daily. NITROGLYCERIN (NITROSTAT) 0.4 MG SL TABLET    Place 0.4 mg under the tongue every 5 minutes as needed for Chest pain Dissolve 1 tablet under tongue for chest pain and repeat every 5 min up to max of 3 total doses. If no relief after 3 doses call 911    ONDANSETRON (ZOFRAN) 4 MG TABLET    Take 1 tablet by mouth every 8 hours as needed for Nausea    OXYCODONE-ACETAMINOPHEN (PERCOCET) 5-325 MG PER TABLET    every 6 hours as needed (7.5 ml. every 6 hours as needed). 7.5 ml.  Every 6 hours prn via pg tube    PAROXETINE (PAXIL) 40 MG TABLET    Take 40 mg by mouth every morning     POTASSIUM CHLORIDE (KLOR-CON) 10 MEQ EXTENDED RELEASE TABLET    Take 20 mEq by mouth 2 times daily Once in morning and once at night    PRAZOSIN (MINIPRESS) 1 MG CAPSULE    Take 1 mg by mouth nightly    SCOPOLAMINE (TRANSDERM-SCOP) TRANSDERMAL PATCH    Place 1 patch onto the skin See Admin Instructions    TOPIRAMATE (TOPAMAX) 200 MG TABLET    Take 200 mg by mouth 2 times daily    TRAZODONE (DESYREL) 50 MG TABLET    Take 300 mg by mouth nightly     ZOLPIDEM (AMBIEN) 10 MG TABLET    Take 10 mg by mouth nightly as needed for Sleep. .       ALLERGIES     Latex, Ciprofloxacin, Shellfish-derived products, Daypro [oxaprozin], Iodides, Iodine, Lidocaine, Macrobid [nitrofurantoin monohyd macro], Macrolides and ketolides, Other, Penicillins, Pollen extract, Propranolol, Propranolol hcl, Tape [adhesive tape], Tegretol [carbamazepine], Tizanidine, Toradol [ketorolac tromethamine], Tramadol, Zanaflex [tizanidine hcl], Estrogens, Lamictal [lamotrigine], Lyrica [pregabalin], Tylenol [acetaminophen], and Vicodin [hydrocodone-acetaminophen]    FAMILY HISTORY       Family History   Problem Relation Age of Onset    Cancer Father     Cancer Paternal Aunt           SOCIAL HISTORY       Social History     Socioeconomic History    Marital status: Single     Spouse name: None    Number of children: None    Years of education: None    Highest education level: None   Occupational History    None   Social Needs    Financial resource strain: None    Food insecurity     Worry: None     Inability: None    Transportation needs     Medical: None     Non-medical: None   Tobacco Use    Smoking status: Never Smoker    Smokeless tobacco: Never Used   Substance and Sexual Activity    Alcohol use: No     Alcohol/week: 0.0 standard drinks    Drug use: No    Sexual activity: Not Currently     Partners: Male   Lifestyle    Physical activity     Days per week: None     Minutes per session: None    Stress: None   Relationships    Social connections     Talks on phone: None     Gets together: None     Attends Yarsani service: None     Active member of club or organization: None     Attends meetings of clubs or organizations: None     Relationship status: None    Intimate partner violence     Fear of current or ex partner: None     Emotionally abused: None     Physically abused: None     Forced sexual activity: None   Other Topics Concern    None   Social History Narrative    None       SCREENINGS    Alex Coma Scale  Eye Opening: Spontaneous  Best Verbal Response: Oriented  Best Motor Response: Obeys commands  Alex Coma Scale Score: 15 @FLOW([id number])@      PHYSICAL EXAM    (up to 7 for level 4, 8 or more for level 5)     ED Triage Vitals   BP Temp Temp Source Pulse Resp SpO2 Height Weight   01/15/21 1320 01/15/21 1319 01/15/21 1319 01/15/21 1319 01/15/21 1319 01/15/21 1319 01/15/21 1319 01/15/21 1319   120/66 98.2 °F (36.8 °C) Temporal 98 18 96 % 5' 5\" (1.651 m) 225 lb (102.1 kg)       Physical Exam  Vitals signs and nursing note reviewed. Constitutional:       Appearance: She is well-developed. HENT:      Head: Normocephalic and atraumatic. Right Ear: External ear normal.      Left Ear: External ear normal.   Eyes:      Conjunctiva/sclera: Conjunctivae normal.      Pupils: Pupils are equal, round, and reactive to light. Neck:      Musculoskeletal: Normal range of motion and neck supple. Cardiovascular:      Rate and Rhythm: Normal rate and regular rhythm. Pulmonary:      Effort: Pulmonary effort is normal.      Breath sounds: Normal breath sounds. Abdominal:      General: Bowel sounds are normal. There is no distension. Palpations: Abdomen is soft. Tenderness: There is no abdominal tenderness. Musculoskeletal: Normal range of motion. Skin:     General: Skin is warm and dry.    Neurological:      Mental Status: She is alert and oriented to person, place, and time. Deep Tendon Reflexes: Reflexes are normal and symmetric. Psychiatric:         Judgment: Judgment normal.           All other labs were within normal range or not returned as of this dictation. EMERGENCY DEPARTMENT COURSE and DIFFERENTIALDIAGNOSIS/MDM:   Vitals:    Vitals:    01/15/21 1319 01/15/21 1320   BP:  120/66   Pulse: 98    Resp: 18    Temp: 98.2 °F (36.8 °C)    TempSrc: Temporal    SpO2: 96%    Weight: 225 lb (102.1 kg)    Height: 5' 5\" (1.651 m)             39 yr old female with yeast dermatitis. Prescription for Lotrisone was given to the patient. F/U With PCP in 1-2 days. Patient verbalizes understanding. PROCEDURES:  Unless otherwise noted below, none     Procedures      FINAL IMPRESSION      1.  Yeast dermatitis          DISPOSITION/PLAN   DISPOSITION Decision To Discharge 01/15/2021 01:57:32 PM          SILKE Garcia CNP (electronically signed)  Attending Emergency Physician     SILKE Garcia CNP  01/15/21 [id number]

## 2021-03-15 ENCOUNTER — HOSPITAL ENCOUNTER (EMERGENCY)
Age: 46
Discharge: HOME | End: 2021-03-15
Attending: STUDENT IN AN ORGANIZED HEALTH CARE EDUCATION/TRAINING PROGRAM
Payer: MEDICARE

## 2021-03-15 ENCOUNTER — APPOINTMENT (OUTPATIENT)
Dept: CT IMAGING | Age: 46
End: 2021-03-15
Payer: MEDICARE

## 2021-03-15 VITALS
BODY MASS INDEX: 43.32 KG/M2 | RESPIRATION RATE: 16 BRPM | SYSTOLIC BLOOD PRESSURE: 106 MMHG | DIASTOLIC BLOOD PRESSURE: 61 MMHG | HEART RATE: 69 BPM | WEIGHT: 260 LBS | HEIGHT: 65 IN | TEMPERATURE: 98.3 F | OXYGEN SATURATION: 94 %

## 2021-03-15 DIAGNOSIS — R10.9 ABDOMINAL PAIN, UNSPECIFIED ABDOMINAL LOCATION: ICD-10-CM

## 2021-03-15 DIAGNOSIS — R11.2 NAUSEA AND VOMITING, INTRACTABILITY OF VOMITING NOT SPECIFIED, UNSPECIFIED VOMITING TYPE: ICD-10-CM

## 2021-03-15 DIAGNOSIS — K59.00 CONSTIPATION, UNSPECIFIED CONSTIPATION TYPE: Primary | ICD-10-CM

## 2021-03-15 LAB
ALBUMIN SERPL-MCNC: 3.8 G/DL (ref 3.5–4.6)
ALP BLD-CCNC: 109 U/L (ref 40–130)
ALT SERPL-CCNC: 18 U/L (ref 0–33)
ANION GAP SERPL CALCULATED.3IONS-SCNC: 11 MEQ/L (ref 9–15)
AST SERPL-CCNC: 20 U/L (ref 0–35)
BASOPHILS ABSOLUTE: 0.1 K/UL (ref 0–0.2)
BASOPHILS RELATIVE PERCENT: 1.2 %
BILIRUB SERPL-MCNC: 0.3 MG/DL (ref 0.2–0.7)
BUN BLDV-MCNC: 11 MG/DL (ref 6–20)
CALCIUM SERPL-MCNC: 9 MG/DL (ref 8.5–9.9)
CHLORIDE BLD-SCNC: 102 MEQ/L (ref 95–107)
CO2: 28 MEQ/L (ref 20–31)
CREAT SERPL-MCNC: 0.99 MG/DL (ref 0.5–0.9)
EOSINOPHILS ABSOLUTE: 0.3 K/UL (ref 0–0.7)
EOSINOPHILS RELATIVE PERCENT: 3.4 %
GFR AFRICAN AMERICAN: >60
GFR NON-AFRICAN AMERICAN: >60
GLOBULIN: 2.8 G/DL (ref 2.3–3.5)
GLUCOSE BLD-MCNC: 92 MG/DL (ref 70–99)
HCT VFR BLD CALC: 37.8 % (ref 37–47)
HEMOGLOBIN: 12.9 G/DL (ref 12–16)
LIPASE: 21 U/L (ref 12–95)
LYMPHOCYTES ABSOLUTE: 1.4 K/UL (ref 1–4.8)
LYMPHOCYTES RELATIVE PERCENT: 18.3 %
MCH RBC QN AUTO: 31 PG (ref 27–31.3)
MCHC RBC AUTO-ENTMCNC: 34.1 % (ref 33–37)
MCV RBC AUTO: 91 FL (ref 82–100)
MONOCYTES ABSOLUTE: 0.4 K/UL (ref 0.2–0.8)
MONOCYTES RELATIVE PERCENT: 5.7 %
NEUTROPHILS ABSOLUTE: 5.6 K/UL (ref 1.4–6.5)
NEUTROPHILS RELATIVE PERCENT: 71.4 %
PDW BLD-RTO: 16.3 % (ref 11.5–14.5)
PLATELET # BLD: 298 K/UL (ref 130–400)
POTASSIUM SERPL-SCNC: 4.4 MEQ/L (ref 3.4–4.9)
RBC # BLD: 4.16 M/UL (ref 4.2–5.4)
SODIUM BLD-SCNC: 141 MEQ/L (ref 135–144)
TOTAL PROTEIN: 6.6 G/DL (ref 6.3–8)
WBC # BLD: 7.9 K/UL (ref 4.8–10.8)

## 2021-03-15 PROCEDURE — 74176 CT ABD & PELVIS W/O CONTRAST: CPT

## 2021-03-15 PROCEDURE — 96376 TX/PRO/DX INJ SAME DRUG ADON: CPT

## 2021-03-15 PROCEDURE — 2580000003 HC RX 258: Performed by: PHYSICIAN ASSISTANT

## 2021-03-15 PROCEDURE — 85025 COMPLETE CBC W/AUTO DIFF WBC: CPT

## 2021-03-15 PROCEDURE — 80053 COMPREHEN METABOLIC PANEL: CPT

## 2021-03-15 PROCEDURE — 36415 COLL VENOUS BLD VENIPUNCTURE: CPT

## 2021-03-15 PROCEDURE — 99284 EMERGENCY DEPT VISIT MOD MDM: CPT

## 2021-03-15 PROCEDURE — 6370000000 HC RX 637 (ALT 250 FOR IP): Performed by: PHYSICIAN ASSISTANT

## 2021-03-15 PROCEDURE — 96372 THER/PROPH/DIAG INJ SC/IM: CPT

## 2021-03-15 PROCEDURE — 83690 ASSAY OF LIPASE: CPT

## 2021-03-15 PROCEDURE — 96374 THER/PROPH/DIAG INJ IV PUSH: CPT

## 2021-03-15 PROCEDURE — 99281 EMR DPT VST MAYX REQ PHY/QHP: CPT | Performed by: SURGERY

## 2021-03-15 PROCEDURE — 6360000002 HC RX W HCPCS: Performed by: PHYSICIAN ASSISTANT

## 2021-03-15 PROCEDURE — 96375 TX/PRO/DX INJ NEW DRUG ADDON: CPT

## 2021-03-15 RX ORDER — ONDANSETRON 2 MG/ML
4 INJECTION INTRAMUSCULAR; INTRAVENOUS ONCE
Status: COMPLETED | OUTPATIENT
Start: 2021-03-15 | End: 2021-03-15

## 2021-03-15 RX ORDER — ONDANSETRON 4 MG/1
4 TABLET, FILM COATED ORAL EVERY 8 HOURS PRN
Qty: 20 TABLET | Refills: 0 | Status: SHIPPED | OUTPATIENT
Start: 2021-03-15

## 2021-03-15 RX ORDER — DIPHENHYDRAMINE HYDROCHLORIDE 50 MG/ML
50 INJECTION INTRAMUSCULAR; INTRAVENOUS ONCE
Status: COMPLETED | OUTPATIENT
Start: 2021-03-15 | End: 2021-03-15

## 2021-03-15 RX ORDER — ACETAMINOPHEN 500 MG
1000 TABLET ORAL ONCE
Status: COMPLETED | OUTPATIENT
Start: 2021-03-15 | End: 2021-03-15

## 2021-03-15 RX ORDER — MORPHINE SULFATE 4 MG/ML
4 INJECTION, SOLUTION INTRAMUSCULAR; INTRAVENOUS
Status: DISCONTINUED | OUTPATIENT
Start: 2021-03-15 | End: 2021-03-16 | Stop reason: HOSPADM

## 2021-03-15 RX ORDER — SODIUM PHOSPHATE, DIBASIC AND SODIUM PHOSPHATE, MONOBASIC 7; 19 G/133ML; G/133ML
2 ENEMA RECTAL
Status: DISCONTINUED | OUTPATIENT
Start: 2021-03-15 | End: 2021-03-15 | Stop reason: HOSPADM

## 2021-03-15 RX ORDER — 0.9 % SODIUM CHLORIDE 0.9 %
1000 INTRAVENOUS SOLUTION INTRAVENOUS ONCE
Status: COMPLETED | OUTPATIENT
Start: 2021-03-15 | End: 2021-03-15

## 2021-03-15 RX ADMIN — MAGNESIUM CITRATE 296 ML: 1.75 LIQUID ORAL at 23:09

## 2021-03-15 RX ADMIN — METHYLNALTREXONE BROMIDE 12 MG: 12 INJECTION, SOLUTION SUBCUTANEOUS at 22:10

## 2021-03-15 RX ADMIN — DIPHENHYDRAMINE HYDROCHLORIDE 50 MG: 50 INJECTION, SOLUTION INTRAMUSCULAR; INTRAVENOUS at 19:49

## 2021-03-15 RX ADMIN — ONDANSETRON 4 MG: 2 INJECTION INTRAMUSCULAR; INTRAVENOUS at 19:40

## 2021-03-15 RX ADMIN — SODIUM CHLORIDE 1000 ML: 9 INJECTION, SOLUTION INTRAVENOUS at 19:40

## 2021-03-15 RX ADMIN — ONDANSETRON 4 MG: 2 INJECTION INTRAMUSCULAR; INTRAVENOUS at 22:10

## 2021-03-15 RX ADMIN — MORPHINE SULFATE 4 MG: 4 INJECTION, SOLUTION INTRAMUSCULAR; INTRAVENOUS at 19:40

## 2021-03-15 RX ADMIN — ACETAMINOPHEN 1000 MG: 500 TABLET ORAL at 22:10

## 2021-03-15 ASSESSMENT — PAIN SCALES - GENERAL
PAINLEVEL_OUTOF10: 9
PAINLEVEL_OUTOF10: 6
PAINLEVEL_OUTOF10: 3

## 2021-03-15 ASSESSMENT — ENCOUNTER SYMPTOMS
APNEA: 0
ABDOMINAL DISTENTION: 0
ABDOMINAL PAIN: 1
COUGH: 0
EYE DISCHARGE: 0
NAUSEA: 1
PHOTOPHOBIA: 0
VOMITING: 1
WHEEZING: 0
ANAL BLEEDING: 0
CONSTIPATION: 1
VOICE CHANGE: 0

## 2021-03-15 ASSESSMENT — PAIN DESCRIPTION - LOCATION
LOCATION: ABDOMEN
LOCATION: ABDOMEN

## 2021-03-15 ASSESSMENT — PAIN DESCRIPTION - ORIENTATION: ORIENTATION: MID

## 2021-03-15 ASSESSMENT — PAIN DESCRIPTION - ONSET: ONSET: ON-GOING

## 2021-03-15 ASSESSMENT — PAIN DESCRIPTION - FREQUENCY: FREQUENCY: CONTINUOUS

## 2021-03-16 NOTE — CONSULTS
EMERGENCY GENERAL SURGERY CONSULTATION / H&P    Reason for Consultation:  Abdominal pain, constipation, abnormal CT abdomen  Consulting Provider: Lisa Cuadra PA-C    HISTORY OF PRESENT INJURY:   Lashon Cottrell is a 39 y.o. female with a complex past surgical hx who is well-known to the surgical service. She presents with several days of nonspecific abdominal pain which she inconsistently characterizes as crampy and sharp. She also c/o constipation x12d. She had her PEG tube replaced at an OSH about 3d ago according to her. She also c/o nausea. She has been taking percocet for her pain, but when she is questioned further she states that she is also taking tylenol and advil on occasion. She also states that she takes colace daily for her constipation without relief. Denies f/c. She is planning to have surgery in early April.   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    PMH:    Past Medical History:   Diagnosis Date    Asthma     Cerebral artery occlusion with cerebral infarction ([address]     Chronic back pain     Chronic kidney disease     Depression     Headache     Kidney disease     Kidney stone     Seizures ([address] 5/24/2016    Suprapubic catheter ([address] 06/2018       PSH:    Past Surgical History:   Procedure Laterality Date    APPENDECTOMY      BACK SURGERY      CHOLECYSTECTOMY      CORONARY ANGIOPLASTY WITH STENT PLACEMENT  2007    [facility]    HYSTERECTOMY      HYSTERECTOMY, TOTAL ABDOMINAL      KNEE SURGERY Bilateral     OTHER SURGICAL HISTORY      sup/pub cath june 1, 2018    SPINE SURGERY         FH:    Family History   Problem Relation Age of Onset    Cancer Father     Cancer Paternal Aunt      She denies h/o bleeding or clotting disorders    SH:    Social History     Tobacco Use    Smoking status: Never Smoker    Smokeless tobacco: Never Used   Substance Use Topics    Alcohol use: No     Alcohol/week: 0.0 standard drinks    Drug use: No       Home Medications:  No current facility-administered medications on file prior to encounter. Current Outpatient Medications on File Prior to Encounter   Medication Sig Dispense Refill    clotrimazole-betamethasone (LOTRISONE) 1-0.05 % cream Apply topically 2 times daily. 1 Tube 0    metoclopramide (REGLAN) 10 MG tablet Take 1 tablet by mouth 4 times daily 120 tablet 0    dicyclomine (BENTYL) 10 MG capsule Take 1 capsule by mouth every 6 hours as needed (cramps) 12 capsule 0    famotidine (PEPCID) 20 MG tablet Take 1 tablet by mouth 2 times daily for 5 days 10 tablet 0    lidocaine (LMX) 4 % cream Apply topically as needed. 1 Tube 0    miconazole (MICOTIN) 2 % powder Apply topically 2 times daily. 45 g 1    levETIRAcetam (KEPPRA) 1000 MG tablet Take 1 tablet by mouth 2 times daily 60 tablet 1    scopolamine (TRANSDERM-SCOP) transdermal patch Place 1 patch onto the skin See Admin Instructions      prazosin (MINIPRESS) 1 MG capsule Take 1 mg by mouth nightly      oxyCODONE-acetaminophen (PERCOCET) 5-325 MG per tablet every 6 hours as needed (7.5 ml. every 6 hours as needed). 7.5 ml.  Every 6 hours prn via pg tube      metoprolol tartrate (LOPRESSOR) 25 MG tablet Take 12.5 mg by mouth 2 times daily      meclizine (ANTIVERT) 25 MG tablet Take 25 mg by mouth 2 times daily      potassium chloride (KLOR-CON) 10 MEQ extended release tablet Take 20 mEq by mouth 2 times daily Once in morning and once at night      docusate sodium (COLACE) 100 MG capsule Take 100 mg by mouth 2 times daily      cyclobenzaprine (FLEXERIL) 5 MG tablet Take 2 tablets by mouth 3 times daily as needed for Muscle spasms 8 tablet 0    atorvastatin (LIPITOR) 40 MG tablet Take 1 tablet by mouth nightly 30 tablet 3    fluticasone (FLONASE) 50 MCG/ACT nasal spray 2 sprays by Each Nostril route daily      apixaban (ELIQUIS) 5 MG TABS tablet 10 mg 2 times daily Via peg tube      amitriptyline (ELAVIL) 100 MG tablet Take 100 mg by mouth nightly      zolpidem (AMBIEN) 10 MG tablet Take 10 mg by mouth nightly as needed for Sleep. Sudhakar Boone LORazepam (ATIVAN) 0.5 MG tablet Take 0.5 mg by mouth daily as needed for Anxiety. Via peg tube      nitroGLYCERIN (NITROSTAT) 0.4 MG SL tablet Place 0.4 mg under the tongue every 5 minutes as needed for Chest pain Dissolve 1 tablet under tongue for chest pain and repeat every 5 min up to max of 3 total doses. If no relief after 3 doses call 911      ARIPiprazole (ABILIFY) 15 MG tablet Take 15 mg by mouth daily      ALPRAZolam (XANAX) 0.5 MG tablet 0.5 mg by PEG Tube route 2 times daily. Crushed via peg tube      PARoxetine (PAXIL) 40 MG tablet Take 40 mg by mouth every morning       topiramate (TOPAMAX) 200 MG tablet Take 200 mg by mouth 2 times daily      traZODone (DESYREL) 50 MG tablet Take 300 mg by mouth nightly       fexofenadine (ALLEGRA) 60 MG tablet Take 60 mg by mouth 2 times daily         Review Of Systems:       Constitutional: Negative for  weight loss  HENT: Negative for congestion, facial swelling and bloody nose  Eyes: Negative for  vision changes  Respiratory: Negative for shortness of breath, difficulty breathing  Cardiovascular: Negative for chest wall pain. Gastrointestinal: + abdominal pain, constipation, nausea  Genitourinary: Negative for  hematuria  Musculoskeletal: Negative for gait difficulties   Skin: Negative for bruising, abrasions  Neurological: Negative for dizziness, weakness and light-headedness. Hematological: Negative for easy bruising/bleeding  Psychiatric/Behavioral: Negative for behavioral problems. Except as noted above the remainder of the review of systems was reviewed and negative.      PHYSICAL EXAM:  Vitals: /63   Pulse 69   Temp 98.3 °F (36.8 °C) (Oral) Resp 16   Ht 5' 5\" (1.651 m)   Wt 260 lb (117.9 kg)   LMP  (LMP Unknown) Comment: total abd.hysterectomy  SpO2 95%   BMI 43.27 kg/m²   Constituational: NAD, comfortable-appearing  Cardiovascular: Regular rate and rhythm. Pulmonary: Clear to auscultation bilaterally. No acute distress or labored breathing. Symmetric chest rise. Abdominal: Obese, soft. Mild TTP LLQ no r/g. Midline hernia nontender w/o overlying skin changes. Lower midline enteroatmospheric fistula dressed w/o drainage on bandage, no significant surrounding erythema. Remainder of abdomen is soft and nontender (she has distractable tenderness throughout -- on initial attempts to examine, there is diffuse tenderness but when she is distracted by discussion or other exam maneuvers, the tenderness in the rest of her abdomen resolves and only mildly persists in LLQ)    Musculoskeletal: Good ROM in all extremities. Neurological: Alert, awake, and oriented x 3. Motor and sensory grossly intact. GCS of 15. No focal deficits.        BASIC LABS:  CBC with Differential:    Lab Results   Component Value Date    WBC 7.9 03/15/2021    RBC 4.16 03/15/2021    HGB 12.9 03/15/2021    HCT 37.8 03/15/2021     03/15/2021    MCV 91.0 03/15/2021    MCH 31.0 03/15/2021    MCHC 34.1 03/15/2021    RDW 16.3 03/15/2021    BANDSPCT 2 09/03/2018    LYMPHOPCT 18.3 03/15/2021    MONOPCT 5.7 03/15/2021    BASOPCT 1.2 03/15/2021    MONOSABS 0.4 03/15/2021    LYMPHSABS 1.4 03/15/2021    EOSABS 0.3 03/15/2021    BASOSABS 0.1 03/15/2021     CMP:    Lab Results   Component Value Date     03/15/2021    K 4.4 03/15/2021    K 4.2 08/20/2020     03/15/2021    CO2 28 03/15/2021    BUN 11 03/15/2021    CREATININE 0.99 03/15/2021    GFRAA >60.0 03/15/2021    LABGLOM >60.0 03/15/2021    GLUCOSE 92 03/15/2021    PROT 6.6 03/15/2021    LABALBU 3.8 03/15/2021    CALCIUM 9.0 03/15/2021    BILITOT 0.3 03/15/2021    ALKPHOS 109 03/15/2021    AST 20 03/15/2021    ALT 18 03/15/2021     Magnesium:  Lab Results   Component Value Date    MG 1.9 12/29/2020     Troponin:    Lab Results   Component Value Date    TROPONINI <0.010 12/29/2020         IMAGING (personally reviewed):    CT abd/pelvis  LLQ inflammation of omentum vs epiploic appendage adjacent to colon without mesenteric stranding, bowel wall thickening, or free fluid. Constipation with retained PO contrast in rectum. 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 Significant stool burden throughout the colon from chronic constipation. Small bowel is decompressed and w/o e/o obstruction. Labs unremarkable. - I spent 15 minutes counseling the patient on her condition and the likely exacerbating factors, especially the use of narcotics  - advised avoidance of narcotics, use of e.g. motrin for her current pain as this will be more effective for the inflammatory pain she is having.  - fleets enemas  - laxatives (go-lytely, mag citrate, MoM).   Advised to avoid colace as this is ineffective and is no better than placebo in the management of constipation  - hydration, fiber  - keep scheduled appointment [facility]/ [facility]  [facility]  [phone] ([phone])  [phone]

## 2021-03-16 NOTE — ED PROVIDER NOTES
[address] [facility] ED  eMERGENCY dEPARTMENT eNCOUnter      Pt Name: Giovanna Waller  MRN: [id number]  [place] 1975  Date of evaluation: 3/15/2021  Provider: Angel Conley PA-C    CHIEF COMPLAINT       Chief Complaint   Patient presents with    Abdominal Pain     x2 weeks    Constipation     x2 weeks         HISTORY OF PRESENT ILLNESS   (Location/Symptom, Timing/Onset,Context/Setting, Quality, Duration, Modifying Factors, Severity)  Note limiting factors. Giovanna Waller is a 39 y.o. female who presents to the emergency department patient presents with abdominal pain and constipation x12 days. Was seen at [facility] last week with G-tube replacement patient denies fever chills she has had some nausea vomiting. Denies rectal bleeding. She has had 2 surgeries. Does see Dr. Nicole Mansfield at [facility] for general surgery. Symptoms mild to moderate severity worse with touch or motion. HPI    NursingNotes were reviewed. REVIEW OF SYSTEMS    (2-9 systems for level 4, 10 or more for level 5)     Review of Systems   Constitutional: Negative for activity change, appetite change, chills, fever and unexpected weight change. HENT: Negative for ear discharge, nosebleeds and voice change. Eyes: Negative for photophobia and discharge. Respiratory: Negative for apnea, cough and wheezing. Cardiovascular: Negative for chest pain. Gastrointestinal: Positive for abdominal pain, constipation, nausea and vomiting. Negative for abdominal distention and anal bleeding. Endocrine: Negative for cold intolerance, heat intolerance and polyphagia. Genitourinary: Negative for dysuria, genital sores and hematuria. Musculoskeletal: Negative for joint swelling. Skin: Negative for pallor. Allergic/Immunologic: Negative for immunocompromised state. Neurological: Negative for seizures and facial asymmetry. Hematological: Does not bruise/bleed easily.    Psychiatric/Behavioral: Negative for behavioral problems, Shellfish-derived products, Daypro [oxaprozin], Iodides, Iodine, Lidocaine, Macrobid [nitrofurantoin monohyd macro], Macrolides and ketolides, Other, Penicillins, Pollen extract, Propranolol, Propranolol hcl, Tape [adhesive tape], Tegretol [carbamazepine], Tizanidine, Toradol [ketorolac tromethamine], Tramadol, Zanaflex [tizanidine hcl], Estrogens, Lamictal [lamotrigine], Lyrica [pregabalin], Tylenol [acetaminophen], and Vicodin [hydrocodone-acetaminophen]    FAMILY HISTORY       Family History   Problem Relation Age of Onset    Cancer Father     Cancer Paternal Aunt           SOCIAL HISTORY       Social History     Socioeconomic History    Marital status: Single     Spouse name: Not on file    Number of children: Not on file    Years of education: Not on file    Highest education level: Not on file   Occupational History    Not on file   Social Needs    Financial resource strain: Not on file    Food insecurity     Worry: Not on file     Inability: Not on file    Transportation needs     Medical: Not on file     Non-medical: Not on file   Tobacco Use    Smoking status: Never Smoker    Smokeless tobacco: Never Used   Substance and Sexual Activity    Alcohol use: No     Alcohol/week: 0.0 standard drinks    Drug use: No    Sexual activity: Not Currently     Partners: Male   Lifestyle    Physical activity     Days per week: Not on file     Minutes per session: Not on file    Stress: Not on file   Relationships    Social connections     Talks on phone: Not on file     Gets together: Not on file     Attends Evangelical service: Not on file     Active member of club or organization: Not on file     Attends meetings of clubs or organizations: Not on file     Relationship status: Not on file    Intimate partner violence     Fear of current or ex partner: Not on file     Emotionally abused: Not on file     Physically abused: Not on file     Forced sexual activity: Not on file   Other Topics Concern    Not on file   Social History Narrative    Not on file       SCREENINGS      @[id number])@      PHYSICAL EXAM    (up to 7 for level 4, 8 or more for level 5)     ED Triage Vitals [03/15/21 1852]   BP Temp Temp Source Pulse Resp SpO2 Height Weight   130/78 98.3 °F (36.8 °C) Oral 81 16 96 % 5' 5\" (1.651 m) 260 lb (117.9 kg)       Physical Exam  Vitals signs and nursing note reviewed. Constitutional:       General: She is not in acute distress. Appearance: She is well-developed. HENT:      Head: Normocephalic and atraumatic. Right Ear: External ear normal.      Left Ear: External ear normal.      Nose: Nose normal.      Mouth/Throat:      Mouth: Mucous membranes are moist.   Eyes:      General:         Right eye: No discharge. Left eye: No discharge. Pupils: Pupils are equal, round, and reactive to light. Neck:      Musculoskeletal: Normal range of motion and neck supple. Cardiovascular:      Rate and Rhythm: Normal rate and regular rhythm. Pulses: Normal pulses. Heart sounds: Normal heart sounds. Pulmonary:      Effort: Pulmonary effort is normal. No respiratory distress. Breath sounds: Normal breath sounds. No stridor. Abdominal:      General: Bowel sounds are normal. There is no distension. Palpations: Abdomen is soft. Tenderness: There is abdominal tenderness in the right upper quadrant. Comments: Left upper quadrant gastric tube noted with yellow fluid in tubing dressings in place midline scar is noted. Tender overlying right upper quadrant. Musculoskeletal: Normal range of motion. Skin:     General: Skin is warm. Findings: No erythema. Neurological:      Mental Status: She is alert and oriented to person, place, and time.    Psychiatric:         Mood and Affect: Mood normal.         DIAGNOSTIC RESULTS     EKG: All EKG's are interpreted by the Emergency Department Physician who either signs or Co-signsthis chart in the absence of a

## 2021-05-11 ENCOUNTER — HOSPITAL ENCOUNTER (EMERGENCY)
Age: 46
Discharge: HOME | End: 2021-05-11
Payer: MEDICARE

## 2021-05-11 ENCOUNTER — APPOINTMENT (OUTPATIENT)
Dept: CT IMAGING | Age: 46
End: 2021-05-11
Payer: MEDICARE

## 2021-05-11 VITALS
SYSTOLIC BLOOD PRESSURE: 155 MMHG | TEMPERATURE: 98 F | BODY MASS INDEX: 39.32 KG/M2 | OXYGEN SATURATION: 99 % | HEART RATE: 79 BPM | HEIGHT: 65 IN | RESPIRATION RATE: 16 BRPM | DIASTOLIC BLOOD PRESSURE: 88 MMHG | WEIGHT: 236 LBS

## 2021-05-11 DIAGNOSIS — G89.18 POST-OP PAIN: Primary | ICD-10-CM

## 2021-05-11 LAB
ALBUMIN SERPL-MCNC: 3.6 G/DL (ref 3.5–4.6)
ALP BLD-CCNC: 148 U/L (ref 40–130)
ALT SERPL-CCNC: 26 U/L (ref 0–33)
ANION GAP SERPL CALCULATED.3IONS-SCNC: 10 MEQ/L (ref 9–15)
AST SERPL-CCNC: 45 U/L (ref 0–35)
BACTERIA: NORMAL /HPF
BASOPHILS ABSOLUTE: 0.1 K/UL (ref 0–0.2)
BASOPHILS RELATIVE PERCENT: 1 %
BILIRUB SERPL-MCNC: 0.3 MG/DL (ref 0.2–0.7)
BILIRUBIN URINE: NEGATIVE
BILIRUBIN URINE: NORMAL
BLOOD, URINE: NEGATIVE
BLOOD, URINE: NORMAL
BUN BLDV-MCNC: 6 MG/DL (ref 6–20)
CALCIUM SERPL-MCNC: 9.3 MG/DL (ref 8.5–9.9)
CHLORIDE BLD-SCNC: 103 MEQ/L (ref 95–107)
CLARITY: CLEAR
CLARITY: NORMAL
CO2: 27 MEQ/L (ref 20–31)
COLOR: NORMAL
COLOR: YELLOW
CREAT SERPL-MCNC: 0.89 MG/DL (ref 0.5–0.9)
EOSINOPHILS ABSOLUTE: 0.2 K/UL (ref 0–0.7)
EOSINOPHILS RELATIVE PERCENT: 3.7 %
EPITHELIAL CELLS, UA: NORMAL /HPF (ref 0–5)
GFR AFRICAN AMERICAN: >60
GFR NON-AFRICAN AMERICAN: >60
GLOBULIN: 3.4 G/DL (ref 2.3–3.5)
GLUCOSE BLD-MCNC: 104 MG/DL (ref 70–99)
GLUCOSE URINE: NEGATIVE MG/DL
GLUCOSE URINE: NORMAL MG/DL
HCT VFR BLD CALC: 33.2 % (ref 37–47)
HEMOGLOBIN: 10.7 G/DL (ref 12–16)
HYALINE CASTS: NORMAL /HPF (ref 0–5)
KETONES, URINE: NEGATIVE MG/DL
KETONES, URINE: NORMAL MG/DL
LEUKOCYTE ESTERASE, URINE: NEGATIVE
LEUKOCYTE ESTERASE, URINE: NORMAL
LIPASE: 19 U/L (ref 12–95)
LYMPHOCYTES ABSOLUTE: 1.2 K/UL (ref 1–4.8)
LYMPHOCYTES RELATIVE PERCENT: 20.5 %
MCH RBC QN AUTO: 27.7 PG (ref 27–31.3)
MCHC RBC AUTO-ENTMCNC: 32.4 % (ref 33–37)
MCV RBC AUTO: 85.5 FL (ref 82–100)
MONOCYTES ABSOLUTE: 0.3 K/UL (ref 0.2–0.8)
MONOCYTES RELATIVE PERCENT: 4.5 %
NEUTROPHILS ABSOLUTE: 4.2 K/UL (ref 1.4–6.5)
NEUTROPHILS RELATIVE PERCENT: 70.3 %
NITRITE, URINE: NEGATIVE
NITRITE, URINE: NORMAL
PDW BLD-RTO: 15.8 % (ref 11.5–14.5)
PH UA: 5.5 (ref 5–9)
PH UA: NORMAL (ref 5–9)
PLATELET # BLD: 392 K/UL (ref 130–400)
POTASSIUM SERPL-SCNC: 5.4 MEQ/L (ref 3.4–4.9)
PROTEIN UA: NEGATIVE MG/DL
PROTEIN UA: NORMAL MG/DL
RBC # BLD: 3.88 M/UL (ref 4.2–5.4)
RBC UA: NORMAL /HPF (ref 0–5)
SODIUM BLD-SCNC: 140 MEQ/L (ref 135–144)
SPECIFIC GRAVITY UA: 1.01 (ref 1–1.03)
SPECIFIC GRAVITY UA: NORMAL (ref 1–1.03)
TOTAL PROTEIN: 7 G/DL (ref 6.3–8)
URINE REFLEX TO CULTURE: NORMAL
URINE REFLEX TO CULTURE: NORMAL
UROBILINOGEN, URINE: 0.2 E.U./DL
UROBILINOGEN, URINE: NORMAL E.U./DL
WBC # BLD: 6 K/UL (ref 4.8–10.8)
WBC UA: NORMAL /HPF (ref 0–5)

## 2021-05-11 PROCEDURE — 99284 EMERGENCY DEPT VISIT MOD MDM: CPT

## 2021-05-11 PROCEDURE — 2500000003 HC RX 250 WO HCPCS: Performed by: PHYSICIAN ASSISTANT

## 2021-05-11 PROCEDURE — 96375 TX/PRO/DX INJ NEW DRUG ADDON: CPT

## 2021-05-11 PROCEDURE — 87040 BLOOD CULTURE FOR BACTERIA: CPT

## 2021-05-11 PROCEDURE — 85025 COMPLETE CBC W/AUTO DIFF WBC: CPT

## 2021-05-11 PROCEDURE — 99024 POSTOP FOLLOW-UP VISIT: CPT | Performed by: SURGERY

## 2021-05-11 PROCEDURE — 83690 ASSAY OF LIPASE: CPT

## 2021-05-11 PROCEDURE — 81003 URINALYSIS AUTO W/O SCOPE: CPT

## 2021-05-11 PROCEDURE — 80053 COMPREHEN METABOLIC PANEL: CPT

## 2021-05-11 PROCEDURE — 96374 THER/PROPH/DIAG INJ IV PUSH: CPT

## 2021-05-11 PROCEDURE — 36415 COLL VENOUS BLD VENIPUNCTURE: CPT

## 2021-05-11 PROCEDURE — 81001 URINALYSIS AUTO W/SCOPE: CPT

## 2021-05-11 PROCEDURE — 6360000002 HC RX W HCPCS: Performed by: PHYSICIAN ASSISTANT

## 2021-05-11 PROCEDURE — 74176 CT ABD & PELVIS W/O CONTRAST: CPT

## 2021-05-11 PROCEDURE — 99283 EMERGENCY DEPT VISIT LOW MDM: CPT | Performed by: SURGERY

## 2021-05-11 PROCEDURE — 2580000003 HC RX 258: Performed by: PHYSICIAN ASSISTANT

## 2021-05-11 RX ORDER — MORPHINE SULFATE 2 MG/ML
4 INJECTION, SOLUTION INTRAMUSCULAR; INTRAVENOUS ONCE
Status: COMPLETED | OUTPATIENT
Start: 2021-05-11 | End: 2021-05-11

## 2021-05-11 RX ORDER — BACITRACIN 500 [USP'U]/G
OINTMENT TOPICAL 4 TIMES DAILY PRN
Status: DISCONTINUED | OUTPATIENT
Start: 2021-05-11 | End: 2021-05-11 | Stop reason: HOSPADM

## 2021-05-11 RX ORDER — DIPHENHYDRAMINE HYDROCHLORIDE 50 MG/ML
25 INJECTION INTRAMUSCULAR; INTRAVENOUS ONCE
Status: COMPLETED | OUTPATIENT
Start: 2021-05-11 | End: 2021-05-11

## 2021-05-11 RX ORDER — 0.9 % SODIUM CHLORIDE 0.9 %
1000 INTRAVENOUS SOLUTION INTRAVENOUS ONCE
Status: COMPLETED | OUTPATIENT
Start: 2021-05-11 | End: 2021-05-11

## 2021-05-11 RX ORDER — ONDANSETRON 2 MG/ML
4 INJECTION INTRAMUSCULAR; INTRAVENOUS ONCE
Status: COMPLETED | OUTPATIENT
Start: 2021-05-11 | End: 2021-05-11

## 2021-05-11 RX ADMIN — ONDANSETRON 4 MG: 2 INJECTION INTRAMUSCULAR; INTRAVENOUS at 09:22

## 2021-05-11 RX ADMIN — MORPHINE SULFATE 4 MG: 2 INJECTION, SOLUTION INTRAMUSCULAR; INTRAVENOUS at 09:22

## 2021-05-11 RX ADMIN — DIPHENHYDRAMINE HYDROCHLORIDE 25 MG: 50 INJECTION, SOLUTION INTRAMUSCULAR; INTRAVENOUS at 09:59

## 2021-05-11 RX ADMIN — SODIUM CHLORIDE 1000 ML: 9 INJECTION, SOLUTION INTRAVENOUS at 09:22

## 2021-05-11 RX ADMIN — FAMOTIDINE 20 MG: 10 INJECTION, SOLUTION INTRAVENOUS at 09:22

## 2021-05-11 ASSESSMENT — PAIN DESCRIPTION - ORIENTATION
ORIENTATION: LEFT
ORIENTATION: LEFT

## 2021-05-11 ASSESSMENT — ENCOUNTER SYMPTOMS
DIARRHEA: 0
COLOR CHANGE: 0
CONSTIPATION: 0
RHINORRHEA: 0
ABDOMINAL PAIN: 1
ABDOMINAL DISTENTION: 0
VOMITING: 0
EYE DISCHARGE: 0
SHORTNESS OF BREATH: 0
SORE THROAT: 0
NAUSEA: 0

## 2021-05-11 ASSESSMENT — PAIN SCALES - GENERAL
PAINLEVEL_OUTOF10: 10
PAINLEVEL_OUTOF10: 1

## 2021-05-11 ASSESSMENT — PAIN DESCRIPTION - PAIN TYPE: TYPE: ACUTE PAIN

## 2021-05-11 ASSESSMENT — PAIN DESCRIPTION - FREQUENCY: FREQUENCY: CONTINUOUS

## 2021-05-11 ASSESSMENT — PAIN DESCRIPTION - LOCATION
LOCATION: ABDOMEN
LOCATION: ABDOMEN

## 2021-05-11 ASSESSMENT — PAIN DESCRIPTION - PROGRESSION: CLINICAL_PROGRESSION: RAPIDLY IMPROVING

## 2021-05-11 ASSESSMENT — PAIN DESCRIPTION - DESCRIPTORS: DESCRIPTORS: BURNING

## 2021-05-11 NOTE — ED TRIAGE NOTES
Pt presents to the er with complaints of wound infection post removal of G tube  Pts G tube was removed on April 27th  4 days ago, patient started to experience pain, redness, burning, and discharge at site of prior g tube  Covered with abd pad now  Denies fevers, complains of chills

## 2021-05-11 NOTE — CONSULTS
EMERGENCY GENERAL SURGERY CONSULTATION / H&P    Reason for Consultation:  LUQ abdominal pain  Consulting Provider: No att. providers found    HISTORY OF PRESENT INJURY:   Tony Frias is a 55 y.o. 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 Bony Tran at [facility] and has since had complex surgical hx. Patient is well-known to the [facility] surgical service at all locations [facility], [facility]). Patient's most recent admission was at [facility] on 4/1, at which time she went to OR for extensive DARIO and extended right colectomy. Hospital course was complicated by ileus and high amount of drainage from G tube site. Also found to have large intraabdominal abscess for which IR drain was placed on 4/15. Patient was discharged on 4/20. During clinic follow up on 4/20, the patient's g-tube was removed. The patient presents today with complaint of burning around g-tube side and continued drainage from site. Drainage content increases after drinking/eating. Patient has been limiting PO intact to decrease g-tube site output. Patient reportedly went to 's ED yesterday with same complaint. Denies N/V, fever, change in bowel movements, difficulty with urination, and SOB.       PMH:    Past Medical History:   Diagnosis Date    Asthma     Cerebral artery occlusion with cerebral infarction (HCC)     Chronic back pain     Chronic kidney disease     Depression     Headache     Kidney disease     Kidney stone     Seizures ([address] 5/24/2016    Suprapubic catheter ([facility] [address] 06/2018       PSH:    Past Surgical History:   Procedure Laterality Date    APPENDECTOMY      BACK SURGERY      CHOLECYSTECTOMY      CORONARY ANGIOPLASTY WITH STENT PLACEMENT  2007    [place]  12/09/2019    [facility]    HYSTERECTOMY      HYSTERECTOMY, TOTAL ABDOMINAL      KNEE SURGERY Bilateral     OTHER SURGICAL HISTORY      sup/pub cath june 1, 2018    SPINE SURGERY         FH:    Family History   Problem Relation Age of Onset   [de-identified] Cancer Father     Cancer Paternal Aunt        SH:    Social History     Tobacco Use    Smoking status: Never Smoker    Smokeless tobacco: Never Used   Substance Use Topics    Alcohol use: No     Alcohol/week: 0.0 standard drinks    Drug use: No       Home Medications:  No current facility-administered medications on file prior to encounter. Current Outpatient Medications on File Prior to Encounter   Medication Sig Dispense Refill    ondansetron (ZOFRAN) 4 MG tablet Take 1 tablet by mouth every 8 hours as needed for Nausea 20 tablet 0    clotrimazole-betamethasone (LOTRISONE) 1-0.05 % cream Apply topically 2 times daily. 1 Tube 0    metoclopramide (REGLAN) 10 MG tablet Take 1 tablet by mouth 4 times daily 120 tablet 0    dicyclomine (BENTYL) 10 MG capsule Take 1 capsule by mouth every 6 hours as needed (cramps) 12 capsule 0    famotidine (PEPCID) 20 MG tablet Take 1 tablet by mouth 2 times daily for 5 days 10 tablet 0    lidocaine (LMX) 4 % cream Apply topically as needed. 1 Tube 0    miconazole (MICOTIN) 2 % powder Apply topically 2 times daily. 45 g 1    levETIRAcetam (KEPPRA) 1000 MG tablet Take 1 tablet by mouth 2 times daily 60 tablet 1    scopolamine (TRANSDERM-SCOP) transdermal patch Place 1 patch onto the skin See Admin Instructions      prazosin (MINIPRESS) 1 MG capsule Take 1 mg by mouth nightly      oxyCODONE-acetaminophen (PERCOCET) 5-325 MG per tablet every 6 hours as needed (7.5 ml. every 6 hours as needed). 7.5 ml.  Every 6 hours prn via pg tube      metoprolol tartrate (LOPRESSOR) 25 MG tablet Take 12.5 mg by mouth 2 times daily      meclizine (ANTIVERT) 25 MG tablet Take 25 mg by mouth 2 times daily      potassium chloride (KLOR-CON) 10 MEQ extended release tablet Take 20 mEq by mouth 2 times daily Once in morning and once at night      docusate sodium (COLACE) 100 MG capsule Take 100 mg by mouth 2 times daily      cyclobenzaprine (FLEXERIL) 5 MG tablet Take 2 tablets by mouth 3 times daily as needed for Muscle spasms 8 tablet 0    atorvastatin (LIPITOR) 40 MG tablet Take 1 tablet by mouth nightly 30 tablet 3    fluticasone (FLONASE) 50 MCG/ACT nasal spray 2 sprays by Each Nostril route daily      apixaban (ELIQUIS) 5 MG TABS tablet 10 mg 2 times daily Via peg tube      amitriptyline (ELAVIL) 100 MG tablet Take 100 mg by mouth nightly      zolpidem (AMBIEN) 10 MG tablet Take 10 mg by mouth nightly as needed for Sleep. Isha Efren LORazepam (ATIVAN) 0.5 MG tablet Take 0.5 mg by mouth daily as needed for Anxiety. Via peg tube      nitroGLYCERIN (NITROSTAT) 0.4 MG SL tablet Place 0.4 mg under the tongue every 5 minutes as needed for Chest pain Dissolve 1 tablet under tongue for chest pain and repeat every 5 min up to max of 3 total doses. If no relief after 3 doses call 911      ARIPiprazole (ABILIFY) 15 MG tablet Take 15 mg by mouth daily      ALPRAZolam (XANAX) 0.5 MG tablet 0.5 mg by PEG Tube route 2 times daily. Crushed via peg tube      PARoxetine (PAXIL) 40 MG tablet Take 40 mg by mouth every morning       topiramate (TOPAMAX) 200 MG tablet Take 200 mg by mouth 2 times daily      traZODone (DESYREL) 50 MG tablet Take 300 mg by mouth nightly       fexofenadine (ALLEGRA) 60 MG tablet Take 60 mg by mouth 2 times daily         Review Of Systems:    Constitutional: Negative for  weight loss  HENT: Negative for congestion, facial swelling and bloody nose  Eyes: Negative for  vision changes  Respiratory: Negative for shortness of breath, difficulty breathing  Cardiovascular: Negative for chest wall pain. Gastrointestinal: Positive for abdominal burning around previous g-tube site. Negative for abdominal distention and nausea/vomiting.    Genitourinary: Negative for  hematuria  Musculoskeletal: Negative for gait difficulties   Skin: Negative for bruising, abrasions  Neurological: Negative for dizziness, weakness and light-headedness. Hematological: Negative for easy bruising/bleeding  Psychiatric/Behavioral: Negative for behavioral problems. Except as noted above the remainder of the review of systems was reviewed and negative. PHYSICAL EXAM:  Vitals: BP (!) 155/88   Pulse 79   Temp 98 °F (36.7 °C) (Oral)   Resp 16   Ht 5' 5\" (1.651 m)   Wt 236 lb (107 kg)   LMP  (LMP Unknown) Comment: total abd.hysterectomy  SpO2 99%   BMI 39.27 kg/m²   Constituational: NAD, comfortable-appearing, conversant, frustrated regarding medical complications  Cardiovascular: Regular rate and rhythm. Pulmonary: Clear to auscultation bilaterally. No acute distress or labored breathing. Symmetric chest rise. Abdominal: Obese, soft, no TTP to palpation or guarding/rebound tenderness. Previous LUQ G-tube site with drainage/gastric contents noted. 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 Skin around fistula opening is mildly erythematous and with erosions. Not concerning for infection. Well-healing midline incisions. Musculoskeletal: Good ROM in all extremities. Neurological: Alert, awake, and oriented x 3. Motor and sensory grossly intact. GCS of 15. No focal deficits.       BASIC LABS:  CBC with Differential:    Lab Results   Component Value Date    WBC 6.0 05/11/2021    RBC 3.88 05/11/2021    HGB 10.7 05/11/2021    HCT 33.2 05/11/2021     05/11/2021    MCV 85.5 05/11/2021    MCH 27.7 05/11/2021    MCHC 32.4 05/11/2021    RDW 15.8 05/11/2021    BANDSPCT 2 09/03/2018    LYMPHOPCT 20.5 05/11/2021    MONOPCT 4.5 05/11/2021    BASOPCT 1.0 05/11/2021    MONOSABS 0.3 05/11/2021    LYMPHSABS 1.2 05/11/2021    EOSABS 0.2 05/11/2021    BASOSABS 0.1 05/11/2021     CMP:    Lab Results   Component Value Date     05/11/2021    K 5.4 05/11/2021    K 4.2 08/20/2020     05/11/2021    CO2 27 05/11/2021    BUN 6 05/11/2021    CREATININE 0.89 05/11/2021    GFRAA >60.0 05/11/2021 LABGLOM >60.0 05/11/2021    GLUCOSE 104 05/11/2021    PROT 7.0 05/11/2021    LABALBU 3.6 05/11/2021    CALCIUM 9.3 05/11/2021    BILITOT 0.3 05/11/2021    ALKPHOS 148 05/11/2021    AST 45 05/11/2021    ALT 26 05/11/2021     Magnesium:  Lab Results   Component Value Date    MG 1.9 12/29/2020     Troponin:    Lab Results   Component Value Date    TROPONINI <0.010 12/29/2020       IMAGING:  CT Abd:  - GENERALIZED DILATATION OF SMALL BOWEL AND COLON, SUSPECT GENERALIZED ILEUS. STRANDING CHANGES FAIRLY DIFFUSELY IN THE ABDOMINAL MESENTERY WITH SOME FLUID IN THE MESENTERY CENTRALLY, SUSPECTED PERITONITIS.  - STRANDING CHANGES IN THE ANTERIOR ABDOMINAL WALL, AS SEEN PREVIOUSLY, SUSPECTED POSSIBLE CELLULITIS. THIS PROCESS IS SLIGHTLY INCREASED.  - VERY MINIMAL FREE AIR POSTERIOR TO THE UMBILICUS. QUESTION RELATED TO REMOVAL OF CATHETER VERSUS MINIMAL FREE AIR. THIS IS LOCATED ANTERIOR TO THE SUTURES IN THE TRANSVERSE COLON. POSSIBLE INFLAMMATORY PROCESS IN THIS REGION AS SEEN PREVIOUSLY.   - CONGENITAL MALROTATION OF BOWEL, UNCHANGED. ASSESSMENT/RECOMMENDATIONS:  Hailey Rico is a 55 y.o. 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 Byron Mustafa at [facility] and has since had complex surgical hx. Patient is well known to [facility] ACS team.    Patient has complaint of skin burning and gastric content drainage from previous g-tube site. Patient does not have abdominal tenderness/rebound tenderness/guarding of physical exam and does not appear toxic. Radiology report from CT abdomen reports question of ileus/peritonitis/minimal free air. However, patient does not have leukocytosis, is afebrile, appears clinically well, has no N/V, and is passing BMs. On personal review of CT with Trauma Attending, do not suspect that patient has ileus/peritonitis.  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 - Recommended ostomy bag placement over gastrocutaneous fistula to collect gastric contents. Patient adamantly refused. - Recommend using nystatin cream and Zinc Oxide cream over the skin surrounding fistula opening to act as a barrier to gastric contents. Recommended that patient reapply cream frequently/after every dressing change. Patient understands and is willing to try cream.  - Recommend that patient follow up with [facility] team in Friday clinic at [facility]. If she no longer wants to follow up with our team, patient should follow up with a general surgeon with another health system. - Patient may benefit from GI referral to consider endoscopic repair of gastrocutaneous fistula. If patient decides to not return to our team for further care, she should be referred to a medical system with this capability. Lana Pickett PA-C  Trauma/Critical Care  Emergency General Surgery  [phone] ([id number])  [phone]    This patient's plan of care was discussed and made in collaboration with Trauma Attending physician, Nicolasa Clay MD.        Teaching Physician Note:  I saw and evaluated the patient. I personally obtained the key and critical portions of the history and physical exam.  I reviewed the YAMINI's documentation and discussed the patient with the YAMINI. I agree with the YAMINI's medical decision making as documented in the YAMINI note.       Reviewed CT scan and compared to April CT scan at [facility] - all findings appear improved (specifically mesenteric fluid collection and small amount of extraperitoneal air); exam benign except for skin inflammation related to gastric leakage from non-healing G tube site; patient declined ostomy appliance to collect leakage but will try barrier cream with dressing changes; pt unsure where she wants to follow-up because she is quite frustrated but was given options for follow-up      [facility]

## 2021-05-11 NOTE — ED PROVIDER NOTES
CLOTRIMAZOLE-BETAMETHASONE (LOTRISONE) 1-0.05 % CREAM    Apply topically 2 times daily. CYCLOBENZAPRINE (FLEXERIL) 5 MG TABLET    Take 2 tablets by mouth 3 times daily as needed for Muscle spasms    DICYCLOMINE (BENTYL) 10 MG CAPSULE    Take 1 capsule by mouth every 6 hours as needed (cramps)    DOCUSATE SODIUM (COLACE) 100 MG CAPSULE    Take 100 mg by mouth 2 times daily    FAMOTIDINE (PEPCID) 20 MG TABLET    Take 1 tablet by mouth 2 times daily for 5 days    FEXOFENADINE (ALLEGRA) 60 MG TABLET    Take 60 mg by mouth 2 times daily    FLUTICASONE (FLONASE) 50 MCG/ACT NASAL SPRAY    2 sprays by Each Nostril route daily    LEVETIRACETAM (KEPPRA) 1000 MG TABLET    Take 1 tablet by mouth 2 times daily    LIDOCAINE (LMX) 4 % CREAM    Apply topically as needed. LORAZEPAM (ATIVAN) 0.5 MG TABLET    Take 0.5 mg by mouth daily as needed for Anxiety. Via peg tube    MECLIZINE (ANTIVERT) 25 MG TABLET    Take 25 mg by mouth 2 times daily    METOCLOPRAMIDE (REGLAN) 10 MG TABLET    Take 1 tablet by mouth 4 times daily    METOPROLOL TARTRATE (LOPRESSOR) 25 MG TABLET    Take 12.5 mg by mouth 2 times daily    MICONAZOLE (MICOTIN) 2 % POWDER    Apply topically 2 times daily. NITROGLYCERIN (NITROSTAT) 0.4 MG SL TABLET    Place 0.4 mg under the tongue every 5 minutes as needed for Chest pain Dissolve 1 tablet under tongue for chest pain and repeat every 5 min up to max of 3 total doses. If no relief after 3 doses call 911    ONDANSETRON (ZOFRAN) 4 MG TABLET    Take 1 tablet by mouth every 8 hours as needed for Nausea    OXYCODONE-ACETAMINOPHEN (PERCOCET) 5-325 MG PER TABLET    every 6 hours as needed (7.5 ml. every 6 hours as needed). 7.5 ml.  Every 6 hours prn via pg tube    PAROXETINE (PAXIL) 40 MG TABLET    Take 40 mg by mouth every morning     POTASSIUM CHLORIDE (KLOR-CON) 10 MEQ EXTENDED RELEASE TABLET    Take 20 mEq by mouth 2 times daily Once in morning and once at night    PRAZOSIN (MINIPRESS) 1 MG CAPSULE    Take 1 mg organizations: None     Relationship status: None    Intimate partner violence     Fear of current or ex partner: None     Emotionally abused: None     Physically abused: None     Forced sexual activity: None   Other Topics Concern    None   Social History Narrative    None       SCREENINGS    Alex Coma Scale  Eye Opening: Spontaneous  Best Verbal Response: Oriented  Best Motor Response: Obeys commands  Everett Coma Scale Score: 15 @FLOW([id number])@      PHYSICAL EXAM    (up to 7 for level 4, 8 or more for level 5)     ED Triage Vitals   BP Temp Temp Source Pulse Resp SpO2 Height Weight   05/11/21 0857 05/11/21 0854 05/11/21 0854 05/11/21 0854 05/11/21 0854 05/11/21 0854 05/11/21 0854 05/11/21 0854   138/78 98 °F (36.7 °C) Oral 103 14 97 % 5' 5\" (1.651 m) 236 lb (107 kg)       Physical Exam  Vitals signs and nursing note reviewed. Constitutional:       General: She is not in acute distress. Appearance: She is well-developed. She is not ill-appearing, toxic-appearing or diaphoretic. HENT:      Head: Normocephalic. Right Ear: Tympanic membrane normal.      Left Ear: Tympanic membrane normal.      Nose: No congestion. Mouth/Throat:      Mouth: Mucous membranes are moist.      Pharynx: No oropharyngeal exudate or posterior oropharyngeal erythema. Eyes:      Extraocular Movements: Extraocular movements intact. Conjunctiva/sclera: Conjunctivae normal.      Pupils: Pupils are equal, round, and reactive to light. Neck:      Musculoskeletal: Normal range of motion and neck supple. No neck rigidity. Vascular: No JVD. Trachea: No tracheal deviation. Cardiovascular:      Rate and Rhythm: Normal rate. Pulses: Normal pulses. Heart sounds: Normal heart sounds. No murmur. No friction rub. No gallop. Pulmonary:      Effort: Pulmonary effort is normal. No tachypnea, accessory muscle usage, respiratory distress or retractions. Breath sounds: No stridor.  No wheezing, rhonchi None   Final Result   GENERALIZED DILATATION OF SMALL BOWEL AND COLON, SUSPECT GENERALIZED ILEUS. STRANDING CHANGES FAIRLY DIFFUSELY IN THE ABDOMINAL MESENTERY WITH SOME FLUID IN THE MESENTERY CENTRALLY, SUSPECTED PERITONITIS. STRANDING CHANGES IN THE ANTERIOR ABDOMINAL WALL, AS SEEN PREVIOUSLY, SUSPECTED POSSIBLE CELLULITIS. THIS PROCESS IS SLIGHTLY INCREASED.      VERY MINIMAL FREE AIR POSTERIOR TO THE UMBILICUS. QUESTION RELATED TO REMOVAL OF CATHETER VERSUS MINIMAL FREE AIR. THIS IS LOCATED ANTERIOR TO THE SUTURES IN THE TRANSVERSE COLON. POSSIBLE INFLAMMATORY PROCESS IN THIS REGION AS SEEN PREVIOUSLY. CONGENITAL MALROTATION OF BOWEL, UNCHANGED. All CT scans at this facility use dose modulation, iterative reconstruction, and/or weight based dosing when appropriate to reduce radiation dose to as low as reasonably achievable. ED BEDSIDE ULTRASOUND:   Performed by ED Physician - none    LABS:  Labs Reviewed   COMPREHENSIVE METABOLIC PANEL - Abnormal; Notable for the following components:       Result Value    Potassium 5.4 (*)     Glucose 104 (*)     Alkaline Phosphatase 148 (*)     AST 45 (*)     All other components within normal limits   CBC WITH AUTO DIFFERENTIAL - Abnormal; Notable for the following components:    RBC 3.88 (*)     Hemoglobin 10.7 (*)     Hematocrit 33.2 (*)     MCHC 32.4 (*)     RDW 15.8 (*)     All other components within normal limits   URINE RT REFLEX TO CULTURE - Abnormal; Notable for the following components:    Ketones, Urine TRACE (*)     Blood, Urine SMALL (*)     Protein, UA >=300 (*)     All other components within normal limits   MICROSCOPIC URINALYSIS - Abnormal; Notable for the following components:    RBC, UA 6-10 (*)     All other components within normal limits   CULTURE, BLOOD 1   CULTURE, BLOOD 2   LIPASE   URINE RT REFLEX TO CULTURE       All other labs were within normal range or not returned as of this dictation.     EMERGENCY DEPARTMENT COURSE and DIFFERENTIAL DIAGNOSIS/MDM:   Vitals:    Vitals:    05/11/21 0854 05/11/21 0857 05/11/21 1003 05/11/21 1133   BP:  138/78 (!) 167/95 (!) 155/88   Pulse: 103  87 79   Resp: 14  16 16   Temp: 98 °F (36.7 °C)      TempSrc: Oral      SpO2: 97%  100% 99%   Weight: 236 lb (107 kg)      Height: 5' 5\" (1.651 m)             MDM  Number of Diagnoses or Management Options  Post-op pain  Diagnosis management comments: After reviewing patient's CT scan abdomen pelvis which shows generalized dilation of the small bowel and colon, suspect generalized ileus. Reviewing the scans, I did contact patient's surgeon Dr. Ginna Bower of [facility].  He reviewed labs as well as CT scan of the abdomen pelvis, at this time he states no acute changes need to be made, he does not want any administration of antiantibiotics, no changes in current pain medications. He had stated to me that patient had just been seen yesterday at [facility] for the same issue, he asked that I would please discussed with the patient is concerned that the patient does not bounce from hospital to hospital to be evaluated for ongoing conditions, he would like for her to stay at 1 location, as he is concerned that she may receive multiple overlapping treatments. I did discuss this with the patient, she does seem to understand this. I also advised her that I was unaware that she received a CT scan of her abdomen pelvis yesterday at [place] [facility]. Patient states that she does not recall of any procedure done, but family members were present with her verify that she did. I advised her of the risk of repeated radiation exposure. At this time no additional changes were made to patient's medications, she is advised to follow-up through the clinic at [place] [facility] on Monday. And if she has any further questions or concerns contact her surgeon Dr. Stoney Walker directly.   Patient was discharged, and advised if she has any worsening or changes symptoms return to the ER. CRITICAL CARE TIME   Total Critical Care time was 0 minutes, excluding separately reportableprocedures. There was a high probability of clinicallysignificant/life threatening deterioration in the patient's condition which required my urgent intervention. CONSULTS:  None    PROCEDURES:  Unless otherwise noted below, none     Procedures    FINAL IMPRESSION      1. Post-op pain          DISPOSITION/PLAN   DISPOSITION Decision To Discharge 05/11/2021 11:53:42 AM      PATIENT REFERRED TO:  Cipriano Stallings  [facility] [place] [address]  [phone]    In 2 days        Follow-up with [facility] clinic at [facility] on Monday 5/17/2021.   If you have any further concerns or questions contact Dr. Tejada Slot your surgeon          DISCHARGE MEDICATIONS:  New Prescriptions    No medications on file          (Please note that portions of this note were completed with a voice recognition program.  Efforts were made to edit the dictations but occasionally words are mis-transcribed.)    Damian Kitchen PA-C (electronically signed)  Attending Emergency Physician         Damian Kitchen PA-C  05/11/21 [id number]

## 2021-05-16 LAB
BLOOD CULTURE, ROUTINE: NORMAL
CULTURE, BLOOD 2: NORMAL

## 2022-03-16 ENCOUNTER — APPOINTMENT (OUTPATIENT)
Dept: CT IMAGING | Age: 47
End: 2022-03-16
Payer: MEDICAID

## 2022-03-16 ENCOUNTER — HOSPITAL ENCOUNTER (EMERGENCY)
Age: 47
Discharge: HOME | End: 2022-03-16
Attending: STUDENT IN AN ORGANIZED HEALTH CARE EDUCATION/TRAINING PROGRAM
Payer: MEDICAID

## 2022-03-16 VITALS
HEART RATE: 78 BPM | DIASTOLIC BLOOD PRESSURE: 63 MMHG | SYSTOLIC BLOOD PRESSURE: 127 MMHG | TEMPERATURE: 97.9 F | BODY MASS INDEX: 38.32 KG/M2 | WEIGHT: 230 LBS | RESPIRATION RATE: 17 BRPM | OXYGEN SATURATION: 98 % | HEIGHT: 65 IN

## 2022-03-16 DIAGNOSIS — G89.29 CHRONIC LOW BACK PAIN WITHOUT SCIATICA, UNSPECIFIED BACK PAIN LATERALITY: ICD-10-CM

## 2022-03-16 DIAGNOSIS — M54.50 CHRONIC LOW BACK PAIN WITHOUT SCIATICA, UNSPECIFIED BACK PAIN LATERALITY: ICD-10-CM

## 2022-03-16 DIAGNOSIS — Z88.9 MULTIPLE DRUG ALLERGIES: ICD-10-CM

## 2022-03-16 DIAGNOSIS — E86.0 DEHYDRATION: ICD-10-CM

## 2022-03-16 DIAGNOSIS — R11.2 NON-INTRACTABLE VOMITING WITH NAUSEA, UNSPECIFIED VOMITING TYPE: Primary | ICD-10-CM

## 2022-03-16 DIAGNOSIS — S80.10XA CONTUSION OF LOWER LEG, UNSPECIFIED LATERALITY, INITIAL ENCOUNTER: ICD-10-CM

## 2022-03-16 LAB
ALBUMIN SERPL-MCNC: 4.1 G/DL (ref 3.5–4.6)
ALP BLD-CCNC: 156 U/L (ref 40–130)
ALT SERPL-CCNC: 30 U/L (ref 0–33)
AMPHETAMINE SCREEN, URINE: ABNORMAL
ANION GAP SERPL CALCULATED.3IONS-SCNC: 14 MEQ/L (ref 9–15)
AST SERPL-CCNC: 23 U/L (ref 0–35)
BARBITURATE SCREEN URINE: ABNORMAL
BASOPHILS ABSOLUTE: 0.1 K/UL (ref 0–0.2)
BASOPHILS RELATIVE PERCENT: 0.7 %
BENZODIAZEPINE SCREEN, URINE: POSITIVE
BILIRUB SERPL-MCNC: 0.3 MG/DL (ref 0.2–0.7)
BILIRUBIN URINE: NEGATIVE
BLOOD, URINE: NEGATIVE
BUN BLDV-MCNC: 12 MG/DL (ref 6–20)
CALCIUM SERPL-MCNC: 9.3 MG/DL (ref 8.5–9.9)
CANNABINOID SCREEN URINE: ABNORMAL
CHLORIDE BLD-SCNC: 98 MEQ/L (ref 95–107)
CLARITY: CLEAR
CO2: 26 MEQ/L (ref 20–31)
COCAINE METABOLITE SCREEN URINE: ABNORMAL
COLOR: YELLOW
CREAT SERPL-MCNC: 1.39 MG/DL (ref 0.5–0.9)
EOSINOPHILS ABSOLUTE: 0.3 K/UL (ref 0–0.7)
EOSINOPHILS RELATIVE PERCENT: 3.5 %
GFR AFRICAN AMERICAN: 49.2
GFR NON-AFRICAN AMERICAN: 40.6
GLOBULIN: 3.1 G/DL (ref 2.3–3.5)
GLUCOSE BLD-MCNC: 97 MG/DL (ref 70–99)
GLUCOSE URINE: NEGATIVE MG/DL
HCT VFR BLD CALC: 42.4 % (ref 37–47)
HEMOGLOBIN: 14.1 G/DL (ref 12–16)
KETONES, URINE: NEGATIVE MG/DL
LEUKOCYTE ESTERASE, URINE: NEGATIVE
LYMPHOCYTES ABSOLUTE: 1.4 K/UL (ref 1–4.8)
LYMPHOCYTES RELATIVE PERCENT: 17.4 %
Lab: ABNORMAL
MCH RBC QN AUTO: 28.8 PG (ref 27–31.3)
MCHC RBC AUTO-ENTMCNC: 33.1 % (ref 33–37)
MCV RBC AUTO: 86.9 FL (ref 82–100)
METHADONE SCREEN, URINE: ABNORMAL
MONOCYTES ABSOLUTE: 0.4 K/UL (ref 0.2–0.8)
MONOCYTES RELATIVE PERCENT: 4.9 %
NEUTROPHILS ABSOLUTE: 5.9 K/UL (ref 1.4–6.5)
NEUTROPHILS RELATIVE PERCENT: 73.5 %
NITRITE, URINE: NEGATIVE
OPIATE SCREEN URINE: ABNORMAL
OXYCODONE URINE: POSITIVE
PDW BLD-RTO: 14.8 % (ref 11.5–14.5)
PH UA: 5.5 (ref 5–9)
PHENCYCLIDINE SCREEN URINE: ABNORMAL
PLATELET # BLD: 280 K/UL (ref 130–400)
POTASSIUM SERPL-SCNC: 4.4 MEQ/L (ref 3.4–4.9)
PROPOXYPHENE SCREEN: ABNORMAL
PROTEIN UA: NEGATIVE MG/DL
RBC # BLD: 4.88 M/UL (ref 4.2–5.4)
SODIUM BLD-SCNC: 138 MEQ/L (ref 135–144)
SPECIFIC GRAVITY UA: 1.03 (ref 1–1.03)
TOTAL CK: 117 U/L (ref 0–170)
TOTAL PROTEIN: 7.2 G/DL (ref 6.3–8)
URINE REFLEX TO CULTURE: NORMAL
UROBILINOGEN, URINE: 0.2 E.U./DL
WBC # BLD: 8 K/UL (ref 4.8–10.8)

## 2022-03-16 PROCEDURE — 80053 COMPREHEN METABOLIC PANEL: CPT

## 2022-03-16 PROCEDURE — 80307 DRUG TEST PRSMV CHEM ANLYZR: CPT

## 2022-03-16 PROCEDURE — 74150 CT ABDOMEN W/O CONTRAST: CPT

## 2022-03-16 PROCEDURE — 99285 EMERGENCY DEPT VISIT HI MDM: CPT

## 2022-03-16 PROCEDURE — 6370000000 HC RX 637 (ALT 250 FOR IP): Performed by: STUDENT IN AN ORGANIZED HEALTH CARE EDUCATION/TRAINING PROGRAM

## 2022-03-16 PROCEDURE — 96375 TX/PRO/DX INJ NEW DRUG ADDON: CPT

## 2022-03-16 PROCEDURE — 51798 US URINE CAPACITY MEASURE: CPT

## 2022-03-16 PROCEDURE — 82550 ASSAY OF CK (CPK): CPT

## 2022-03-16 PROCEDURE — 85025 COMPLETE CBC W/AUTO DIFF WBC: CPT

## 2022-03-16 PROCEDURE — 2580000003 HC RX 258: Performed by: STUDENT IN AN ORGANIZED HEALTH CARE EDUCATION/TRAINING PROGRAM

## 2022-03-16 PROCEDURE — 96361 HYDRATE IV INFUSION ADD-ON: CPT

## 2022-03-16 PROCEDURE — 96365 THER/PROPH/DIAG IV INF INIT: CPT

## 2022-03-16 PROCEDURE — 36415 COLL VENOUS BLD VENIPUNCTURE: CPT

## 2022-03-16 PROCEDURE — 96372 THER/PROPH/DIAG INJ SC/IM: CPT

## 2022-03-16 PROCEDURE — 81003 URINALYSIS AUTO W/O SCOPE: CPT

## 2022-03-16 PROCEDURE — 6360000002 HC RX W HCPCS: Performed by: STUDENT IN AN ORGANIZED HEALTH CARE EDUCATION/TRAINING PROGRAM

## 2022-03-16 RX ORDER — 0.9 % SODIUM CHLORIDE 0.9 %
1000 INTRAVENOUS SOLUTION INTRAVENOUS ONCE
Status: COMPLETED | OUTPATIENT
Start: 2022-03-16 | End: 2022-03-16

## 2022-03-16 RX ORDER — ONDANSETRON 4 MG/1
4 TABLET, ORALLY DISINTEGRATING ORAL EVERY 8 HOURS PRN
Qty: 5 TABLET | Refills: 0 | Status: SHIPPED | OUTPATIENT
Start: 2022-03-16 | End: 2022-03-21

## 2022-03-16 RX ORDER — TAMSULOSIN HYDROCHLORIDE 0.4 MG/1
0.4 CAPSULE ORAL ONCE
Status: COMPLETED | OUTPATIENT
Start: 2022-03-16 | End: 2022-03-16

## 2022-03-16 RX ORDER — DIPHENHYDRAMINE HYDROCHLORIDE 50 MG/ML
50 INJECTION INTRAMUSCULAR; INTRAVENOUS ONCE
Status: COMPLETED | OUTPATIENT
Start: 2022-03-16 | End: 2022-03-16

## 2022-03-16 RX ORDER — PROMETHAZINE HYDROCHLORIDE 25 MG/ML
25 INJECTION, SOLUTION INTRAMUSCULAR; INTRAVENOUS ONCE
Status: COMPLETED | OUTPATIENT
Start: 2022-03-16 | End: 2022-03-16

## 2022-03-16 RX ORDER — KETOROLAC TROMETHAMINE 15 MG/ML
15 INJECTION, SOLUTION INTRAMUSCULAR; INTRAVENOUS ONCE
Status: COMPLETED | OUTPATIENT
Start: 2022-03-16 | End: 2022-03-16

## 2022-03-16 RX ADMIN — SODIUM CHLORIDE 1000 ML: 9 INJECTION, SOLUTION INTRAVENOUS at 10:06

## 2022-03-16 RX ADMIN — DIPHENHYDRAMINE HYDROCHLORIDE 50 MG: 50 INJECTION, SOLUTION INTRAMUSCULAR; INTRAVENOUS at 11:46

## 2022-03-16 RX ADMIN — PROMETHAZINE HYDROCHLORIDE 25 MG: 25 INJECTION, SOLUTION INTRAMUSCULAR; INTRAVENOUS at 09:05

## 2022-03-16 RX ADMIN — TAMSULOSIN HYDROCHLORIDE 0.4 MG: 0.4 CAPSULE ORAL at 09:07

## 2022-03-16 RX ADMIN — KETOROLAC TROMETHAMINE 15 MG: 15 INJECTION, SOLUTION INTRAMUSCULAR; INTRAVENOUS at 11:45

## 2022-03-16 RX ADMIN — LIDOCAINE HYDROCHLORIDE 100 MG: 20 INJECTION, SOLUTION INTRAVENOUS at 09:08

## 2022-03-16 ASSESSMENT — PAIN - FUNCTIONAL ASSESSMENT: PAIN_FUNCTIONAL_ASSESSMENT: 0-10

## 2022-03-16 ASSESSMENT — PAIN DESCRIPTION - LOCATION
LOCATION: ABDOMEN
LOCATION: BACK

## 2022-03-16 ASSESSMENT — ENCOUNTER SYMPTOMS
DIARRHEA: 0
SHORTNESS OF BREATH: 0
NAUSEA: 1
VOMITING: 1
ABDOMINAL PAIN: 0
SINUS PRESSURE: 0
CHEST TIGHTNESS: 0
TROUBLE SWALLOWING: 0
COUGH: 0
BACK PAIN: 1

## 2022-03-16 ASSESSMENT — PAIN DESCRIPTION - FREQUENCY: FREQUENCY: INTERMITTENT

## 2022-03-16 ASSESSMENT — PAIN DESCRIPTION - PAIN TYPE: TYPE: ACUTE PAIN

## 2022-03-16 ASSESSMENT — PAIN SCALES - GENERAL
PAINLEVEL_OUTOF10: 4
PAINLEVEL_OUTOF10: 9
PAINLEVEL_OUTOF10: 5

## 2022-03-16 ASSESSMENT — PAIN DESCRIPTION - DESCRIPTORS: DESCRIPTORS: DISCOMFORT

## 2022-03-16 ASSESSMENT — PAIN DESCRIPTION - ORIENTATION: ORIENTATION: LOWER

## 2022-03-16 NOTE — ED PROVIDER NOTES
[facility] ED  eMERGENCY dEPARTMENT eNCOUnter      Pt Name: Sandee Laguna  MRN: [id number]  [place] 1975  Date of evaluation: 3/16/2022  Provider: Javed Vergara, [address] [place] [address]       Chief Complaint   Patient presents with    Emesis     pt c/o vomiting for 5 days. pt also c/o lower back pain and rash on right calf, right thigh and left thigh. HISTORY OF PRESENT ILLNESS   (Location/Symptom, Timing/Onset,Context/Setting, Quality, Duration, Modifying Factors, Severity)  Note limiting factors. Sandee Laguna is a 55 y.o. female who presents to the emergency department with c/o nausea and vomiting off and on x 5 days. No diarrhea. C/o low back pain and not urinating as much as usual. Patient twisted her back and the pain medications that are prescribed to her (she has prescriptions for benzodiazepines and opiates) are not helping. Patient denies any continence of bowel or bladder control. Patient denies any saddle anesthesia symptoms. Patient states that she has what she thought was a rash to her thigh and her leg. There is areas have contusions to them and the ER physician explained to her that a bruise is not a rash. Patient denies any trauma. Patient denies any fever, chills or cough. The patient's male fiancé is present. I have the patient's permission to interview, examine her, and discuss her care with him present. The history is provided by the patient and a significant other. NursingNotes were reviewed. REVIEW OF SYSTEMS    (2-9 systems for level 4, 10 or more for level 5)     Review of Systems   Constitutional: Negative for activity change, appetite change, chills, fever and unexpected weight change. HENT: Negative for drooling, ear pain, nosebleeds, sinus pressure and trouble swallowing. Respiratory: Negative for cough, chest tightness and shortness of breath. Cardiovascular: Negative for chest pain and leg swelling.    Gastrointestinal: Positive for nausea and vomiting. Negative for abdominal pain and diarrhea. Endocrine: Negative for polydipsia and polyphagia. Genitourinary: Positive for decreased urine volume and difficulty urinating. Negative for dysuria, flank pain and frequency. Musculoskeletal: Positive for back pain. Negative for myalgias. Skin: Negative for pallor and rash. Bruise to skin of leg   Neurological: Negative for syncope, weakness and headaches. Hematological: Does not bruise/bleed easily. All other systems reviewed and are negative. Except as noted above the remainder of the review of systems was reviewed and negative. PAST MEDICAL HISTORY     Past Medical History:   Diagnosis Date    Asthma     Cerebral artery occlusion with [facility] infarction ([facility] [address] [facility]     Chronic back pain     Chronic kidney disease     Depression     Headache     Kidney disease     Kidney stone     Seizures ([facility] [address] 5/24/2016    Suprapubic catheter ([facility] [address] 06/2018         SURGICALHISTORY       Past Surgical History:   Procedure Laterality Date    APPENDECTOMY      BACK SURGERY      CHOLECYSTECTOMY      CORONARY ANGIOPLASTY WITH STENT PLACEMENT  2007    [facility]  12/09/2019    [facility]    HYSTERECTOMY      HYSTERECTOMY, TOTAL ABDOMINAL      KNEE SURGERY Bilateral     OTHER SURGICAL HISTORY      sup/pub cath june 1, 2018   [facility] SPINE SURGERY           CURRENT MEDICATIONS       Previous Medications    ALPRAZOLAM (XANAX) 0.5 MG TABLET    0.5 mg by PEG Tube route 2 times daily. Crushed via peg tube    AMITRIPTYLINE (ELAVIL) 100 MG TABLET    Take 100 mg by mouth nightly    APIXABAN (ELIQUIS) 5 MG TABS TABLET    10 mg 2 times daily Via peg tube    ARIPIPRAZOLE (ABILIFY) 15 MG TABLET    Take 15 mg by mouth daily    ATORVASTATIN (LIPITOR) 40 MG TABLET    Take 1 tablet by mouth nightly    CLOTRIMAZOLE-BETAMETHASONE (LOTRISONE) 1-0.05 % CREAM    Apply topically 2 times daily.     CYCLOBENZAPRINE (FLEXERIL) 5 MG TABLET    Take 2 tablets by mouth 3 times daily as needed for Muscle spasms    DICYCLOMINE (BENTYL) 10 MG CAPSULE    Take 1 capsule by mouth every 6 hours as needed (cramps)    DOCUSATE SODIUM (COLACE) 100 MG CAPSULE    Take 100 mg by mouth 2 times daily    FAMOTIDINE (PEPCID) 20 MG TABLET    Take 1 tablet by mouth 2 times daily for 5 days    FEXOFENADINE (ALLEGRA) 60 MG TABLET    Take 60 mg by mouth 2 times daily    FLUTICASONE (FLONASE) 50 MCG/ACT NASAL SPRAY    2 sprays by Each Nostril route daily    LEVETIRACETAM (KEPPRA) 1000 MG TABLET    Take 1 tablet by mouth 2 times daily    LIDOCAINE (LMX) 4 % CREAM    Apply topically as needed. LORAZEPAM (ATIVAN) 0.5 MG TABLET    Take 0.5 mg by mouth daily as needed for Anxiety. Via peg tube    MECLIZINE (ANTIVERT) 25 MG TABLET    Take 25 mg by mouth 2 times daily    METOCLOPRAMIDE (REGLAN) 10 MG TABLET    Take 1 tablet by mouth 4 times daily    METOPROLOL TARTRATE (LOPRESSOR) 25 MG TABLET    Take 12.5 mg by mouth 2 times daily    MICONAZOLE (MICOTIN) 2 % POWDER    Apply topically 2 times daily. NITROGLYCERIN (NITROSTAT) 0.4 MG SL TABLET    Place 0.4 mg under the tongue every 5 minutes as needed for Chest pain Dissolve 1 tablet under tongue for chest pain and repeat every 5 min up to max of 3 total doses. If no relief after 3 doses call 911    ONDANSETRON (ZOFRAN) 4 MG TABLET    Take 1 tablet by mouth every 8 hours as needed for Nausea    OXYCODONE-ACETAMINOPHEN (PERCOCET) 5-325 MG PER TABLET    every 6 hours as needed (7.5 ml. every 6 hours as needed). 7.5 ml.  Every 6 hours prn via pg tube    PAROXETINE (PAXIL) 40 MG TABLET    Take 40 mg by mouth every morning     POTASSIUM CHLORIDE (KLOR-CON) 10 MEQ EXTENDED RELEASE TABLET    Take 20 mEq by mouth 2 times daily Once in morning and once at night    PRAZOSIN (MINIPRESS) 1 MG CAPSULE    Take 1 mg by mouth nightly    SCOPOLAMINE (TRANSDERM-SCOP) TRANSDERMAL PATCH    Place 1 patch onto the skin See Admin Instructions    TOPIRAMATE (TOPAMAX) 200 MG TABLET    Take 200 mg by mouth 2 times daily    TRAZODONE (DESYREL) 50 MG TABLET    Take 300 mg by mouth nightly     ZOLPIDEM (AMBIEN) 10 MG TABLET    Take 10 mg by mouth nightly as needed for Sleep. .       ALLERGIES     Latex, Ciprofloxacin, Shellfish-derived products, Daypro [oxaprozin], Iodides, Iodine, Lidocaine, Macrobid [nitrofurantoin monohyd macro], Macrolides and ketolides, Other, Penicillins, Pollen extract, Propranolol, Propranolol hcl, Tape [adhesive tape], Tegretol [carbamazepine], Tizanidine, Toradol [ketorolac tromethamine], Tramadol, Zanaflex [tizanidine hcl], Estrogens, Lamictal [lamotrigine], Lyrica [pregabalin], Tylenol [acetaminophen], and Vicodin [hydrocodone-acetaminophen]    FAMILY HISTORY       Family History   Problem Relation Age of Onset    Cancer Father     Cancer Paternal Aunt           SOCIAL HISTORY       Social History     Socioeconomic History    Marital status: Single     Spouse name: Not on file    Number of children: Not on file    Years of education: Not on file    Highest education level: Not on file   Occupational History    Not on file   Tobacco Use    Smoking status: Never Smoker    Smokeless tobacco: Never Used   Vaping Use    Vaping Use: Never used   Substance and Sexual Activity    Alcohol use: No     Alcohol/week: 0.0 standard drinks    Drug use: No    Sexual activity: Not Currently     Partners: Male   Other Topics Concern    Not on file   Social History Narrative    Not on file     Social Determinants of Health     Financial Resource Strain:     Difficulty of Paying Living Expenses: Not on file   Food Insecurity:     Worried About Running Out of Food in the Last Year: Not on file    Cipriano of Food in the Last Year: Not on file   Transportation Needs:     Lack of Transportation (Medical): Not on file    Lack of Transportation (Non-Medical):  Not on file   Physical Activity:     Days of Exercise per Week: Not on file    Minutes of Exercise per Session: Not on file   Stress:     Feeling of Stress : Not on file   Social Connections:     Frequency of Communication with Friends and Family: Not on file    Frequency of Social Gatherings with Friends and Family: Not on file    Attends [employer] Services: Not on file    Active Member of [address] [place] [address] or Organizations: Not on file    Attends Club or Organization Meetings: Not on file    Marital Status: Not on file   Intimate Partner Violence:     Fear of Current or Ex-Partner: Not on file    Emotionally Abused: Not on file    Physically Abused: Not on file    Sexually Abused: Not on file   Housing Stability:     Unable to Pay for Housing in the Last Year: Not on file    Number of [place] in the Last Year: Not on file    Unstable Housing in the Last Year: Not on file       SCREENINGS    [place] Coma Scale  Eye Opening: Spontaneous  Best Verbal Response: Oriented  Best Motor Response: Obeys commands  Alex Coma Scale Score: 15 @FLOW([id number])@      PHYSICAL EXAM    (up to 7 for level 4, 8 or more for level 5)     ED Triage Vitals [03/16/22 0713]   BP Temp Temp src Pulse Resp SpO2 Height Weight   127/72 97.9 °F (36.6 °C) -- 86 18 97 % 5' 5\" (1.651 m) 230 lb (104.3 kg)       Physical Exam  Vitals and nursing note reviewed. Constitutional:       General: She is awake. She is in acute distress. Appearance: Normal appearance. She is well-developed. She is morbidly obese. She is not ill-appearing, toxic-appearing or diaphoretic. Comments: No photophobia. No phonophobia. HENT:      Head: Normocephalic and atraumatic. No Hutchison's sign. Right Ear: External ear normal.      Left Ear: External ear normal.      Nose: Nose normal. No congestion or rhinorrhea. Mouth/Throat:      Mouth: Mucous membranes are moist.      Pharynx: Oropharynx is clear. No oropharyngeal exudate or posterior oropharyngeal erythema. Eyes:      General: No scleral icterus.         Right eye: No foreign Alkaline Phosphatase 156 (*)     All other components within normal limits   URINE DRUG SCREEN - Abnormal; Notable for the following components:    Benzodiazepine Screen, Urine POSITIVE (*)     Oxycodone Urine POSITIVE (*)     All other components within normal limits   CK   URINALYSIS WITH REFLEX TO CULTURE   URINALYSIS WITH REFLEX TO CULTURE       All other labs were within normal range or not returned as of this dictation. EMERGENCY DEPARTMENT COURSE and DIFFERENTIAL DIAGNOSIS/MDM:   Vitals:    Vitals:    03/16/22 0713 03/16/22 0917 03/16/22 1000   BP: 127/72 127/72 135/87   Pulse: 86 85 79   Resp: 18 16 17   Temp: 97.9 °F (36.6 °C)     SpO2: 97% 97% 95%   Weight: 230 lb (104.3 kg)     Height: 5' 5\" (1.651 m)             MDM  Patient is acute on chronic low back pain. She had some bruises to her legs that she missed took for a rash. The ER physician explained to the patient is flat, not raised and it looks like a bruise and it is a bruise. EXTENSIVE OARRS report. Patient claimed to be vomiting for 5 days. Her talk screen is positive for benzos and opiates. She would not be opiate positive if she had not hold anything down including her pain medicine for 2 whole days. Patient was initially treated for possible kidney stone with Flomax which she was able to orally take without any vomiting. Patient is watched in the emergency room for over 4 hours no vomiting. She be discharged to home. Patient may have some mild GI illness such as a viral infection or possibly food poisoning. Hospitalization is unnecessary. Urinalysis indicates no urinary tract infection. CT shows no bowel obstruction, no obstructive uropathy, and no acute findings that have not been seen on prior CAT scans. On reexam the patient is asking for something for her low back pain so Toradol and Benadryl will be ordered. Allergy review shows that the patient tolerates Toradol if given with Benadryl.   The ER physician suggested the patient continue her other medicines already prescribed that her controlled substances by her primary care physician to follow-up with her family doctor tomorrow. CONSULTS:  IP CONSULT TO PHARMACY    PROCEDURES:  Unless otherwise noted below, none     Procedures    FINAL IMPRESSION      1. Non-intractable vomiting with nausea, unspecified vomiting type    2. Dehydration    3. Contusion of lower leg, unspecified laterality, initial encounter    4. Chronic low back pain without sciatica, unspecified back pain laterality    5. Multiple drug allergies          DISPOSITION/PLAN   DISPOSITION Decision To Discharge 03/16/2022 11:16:57 AM      PATIENT REFERRED TO:  Lory Chávez  [address] [place] [address]  [phone]    Call today  To arrange a follow up visit.       DISCHARGE MEDICATIONS:  New Prescriptions    No medications on file          (Please note that portions of this note were completed with a voice recognition program.  Efforts were made to edit the dictations but occasionally words are mis-transcribed.)    Juan How, DO (electronically signed)  Attending Emergency Physician          Juan How, DO  03/16/22 1788

## 2022-03-16 NOTE — ED TRIAGE NOTES
Pt c/o vomiting x 5 days, lower back pain, and a rash on right calf, right thigh, left thigh. Pt is a/o x 4 calm, skin p/w/d resp even and non-labored. Pt In w/c. No sob or acute distress noted.

## 2022-06-15 ENCOUNTER — HOSPITAL ENCOUNTER (OUTPATIENT)
Dept: PREADMISSION TESTING | Age: 47
Discharge: HOME | End: 2022-06-19
Payer: MEDICAID

## 2022-06-15 VITALS
HEART RATE: 16 BPM | BODY MASS INDEX: 47.31 KG/M2 | TEMPERATURE: 97.2 F | HEIGHT: 63 IN | OXYGEN SATURATION: 96 % | SYSTOLIC BLOOD PRESSURE: 132 MMHG | RESPIRATION RATE: 16 BRPM | DIASTOLIC BLOOD PRESSURE: 59 MMHG | WEIGHT: 267 LBS

## 2022-06-15 DIAGNOSIS — I82.499 DEEP VEIN THROMBOSIS (DVT) OF OTHER VEIN OF LOWER EXTREMITY, UNSPECIFIED CHRONICITY, UNSPECIFIED LATERALITY (HCC): Chronic | ICD-10-CM

## 2022-06-15 DIAGNOSIS — I28.1 PULMONARY ARTERY ANEURYSM (HCC): ICD-10-CM

## 2022-06-15 DIAGNOSIS — Q25.72: Chronic | ICD-10-CM

## 2022-06-15 DIAGNOSIS — M96.1 POST LAMINECTOMY SYNDROME: ICD-10-CM

## 2022-06-15 PROBLEM — I82.409 DVT (DEEP VENOUS THROMBOSIS) (HCC): Chronic | Status: ACTIVE | Noted: 2022-06-15

## 2022-06-15 LAB
ANION GAP SERPL CALCULATED.3IONS-SCNC: 12 MEQ/L (ref 9–15)
BACTERIA: ABNORMAL /HPF
BILIRUBIN URINE: NEGATIVE
BLOOD, URINE: ABNORMAL
BUN BLDV-MCNC: 14 MG/DL (ref 6–20)
CALCIUM SERPL-MCNC: 9 MG/DL (ref 8.5–9.9)
CHLORIDE BLD-SCNC: 100 MEQ/L (ref 95–107)
CLARITY: CLEAR
CO2: 26 MEQ/L (ref 20–31)
COLOR: YELLOW
CREAT SERPL-MCNC: 1.04 MG/DL (ref 0.5–0.9)
EPITHELIAL CELLS, UA: ABNORMAL /HPF (ref 0–5)
GFR AFRICAN AMERICAN: >60
GFR NON-AFRICAN AMERICAN: 56.7
GLUCOSE BLD-MCNC: 90 MG/DL (ref 70–99)
GLUCOSE URINE: NEGATIVE MG/DL
HCT VFR BLD CALC: 40.7 % (ref 37–47)
HEMOGLOBIN: 13.7 G/DL (ref 12–16)
HYALINE CASTS: ABNORMAL /HPF (ref 0–5)
KETONES, URINE: NEGATIVE MG/DL
LEUKOCYTE ESTERASE, URINE: ABNORMAL
MCH RBC QN AUTO: 30.1 PG (ref 27–31.3)
MCHC RBC AUTO-ENTMCNC: 33.7 % (ref 33–37)
MCV RBC AUTO: 89.3 FL (ref 82–100)
NITRITE, URINE: NEGATIVE
PDW BLD-RTO: 15.3 % (ref 11.5–14.5)
PH UA: 5.5 (ref 5–9)
PLATELET # BLD: 261 K/UL (ref 130–400)
PLATELET SLIDE REVIEW: NORMAL
POTASSIUM SERPL-SCNC: 4.3 MEQ/L (ref 3.4–4.9)
PROTEIN UA: 30 MG/DL
RBC # BLD: 4.56 M/UL (ref 4.2–5.4)
RBC UA: ABNORMAL /HPF (ref 0–5)
SODIUM BLD-SCNC: 138 MEQ/L (ref 135–144)
SPECIFIC GRAVITY UA: 1.02 (ref 1–1.03)
URINE REFLEX TO CULTURE: YES
UROBILINOGEN, URINE: 0.2 E.U./DL
WBC # BLD: 9.2 K/UL (ref 4.8–10.8)
WBC UA: >100 /HPF (ref 0–5)

## 2022-06-15 PROCEDURE — 93005 ELECTROCARDIOGRAM TRACING: CPT

## 2022-06-15 PROCEDURE — 87186 SC STD MICRODIL/AGAR DIL: CPT

## 2022-06-15 PROCEDURE — 80048 BASIC METABOLIC PNL TOTAL CA: CPT

## 2022-06-15 PROCEDURE — 87086 URINE CULTURE/COLONY COUNT: CPT

## 2022-06-15 PROCEDURE — 81001 URINALYSIS AUTO W/SCOPE: CPT

## 2022-06-15 PROCEDURE — 87088 URINE BACTERIA CULTURE: CPT

## 2022-06-15 PROCEDURE — 85027 COMPLETE CBC AUTOMATED: CPT

## 2022-06-15 RX ORDER — SODIUM CHLORIDE 0.9 % (FLUSH) 0.9 %
5-40 SYRINGE (ML) INJECTION EVERY 12 HOURS SCHEDULED
Status: CANCELLED | OUTPATIENT
Start: 2022-06-21

## 2022-06-15 RX ORDER — BUDESONIDE AND FORMOTEROL FUMARATE DIHYDRATE 160; 4.5 UG/1; UG/1
2 AEROSOL RESPIRATORY (INHALATION) 2 TIMES DAILY
COMMUNITY

## 2022-06-15 RX ORDER — BACLOFEN 10 MG/1
10 TABLET ORAL 3 TIMES DAILY
COMMUNITY

## 2022-06-15 RX ORDER — SODIUM CHLORIDE 9 MG/ML
INJECTION, SOLUTION INTRAVENOUS PRN
Status: CANCELLED | OUTPATIENT
Start: 2022-06-21

## 2022-06-15 RX ORDER — TEMAZEPAM 15 MG/1
15 CAPSULE ORAL NIGHTLY PRN
COMMUNITY

## 2022-06-15 RX ORDER — SODIUM CHLORIDE 0.9 % (FLUSH) 0.9 %
5-40 SYRINGE (ML) INJECTION PRN
Status: CANCELLED | OUTPATIENT
Start: 2022-06-21

## 2022-06-15 RX ORDER — VENLAFAXINE 75 MG/1
75 TABLET ORAL 3 TIMES DAILY
COMMUNITY

## 2022-06-15 RX ORDER — SODIUM CHLORIDE, SODIUM LACTATE, POTASSIUM CHLORIDE, CALCIUM CHLORIDE 600; 310; 30; 20 MG/100ML; MG/100ML; MG/100ML; MG/100ML
INJECTION, SOLUTION INTRAVENOUS CONTINUOUS
Status: CANCELLED | OUTPATIENT
Start: 2022-06-21

## 2022-06-15 RX ORDER — OXYCODONE HYDROCHLORIDE 10 MG/1
10 TABLET ORAL EVERY 6 HOURS PRN
COMMUNITY
Start: 2022-03-10

## 2022-06-15 RX ORDER — BUTALBITAL, ACETAMINOPHEN AND CAFFEINE 50; 325; 40 MG/1; MG/1; MG/1
1 TABLET ORAL EVERY 4 HOURS PRN
COMMUNITY

## 2022-06-15 ASSESSMENT — ENCOUNTER SYMPTOMS
CONSTIPATION: 0
VOMITING: 0
NAUSEA: 0
ABDOMINAL DISTENTION: 0
BLOOD IN STOOL: 0
ALLERGIC/IMMUNOLOGIC NEGATIVE: 1
RHINORRHEA: 0
COUGH: 0
SINUS PAIN: 0
BACK PAIN: 1
EYE DISCHARGE: 0
TROUBLE SWALLOWING: 0
PHOTOPHOBIA: 0
EYE ITCHING: 0
WHEEZING: 0
SINUS PRESSURE: 0
EYE PAIN: 0
ABDOMINAL PAIN: 1
DIARRHEA: 0
CHEST TIGHTNESS: 0
SHORTNESS OF BREATH: 0
EYE REDNESS: 0
SORE THROAT: 0

## 2022-06-15 NOTE — PROGRESS NOTES
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 Dr. Adriano Cadet office closed during lunch when patient was here for PAT. Pt instructed that I would call once I spoke with Dr. Adriano Cadet. Dr. Adriano Cadet office called and asked when they want patient to stop Eliquis. Dr. Adriano Cadet wants Eliquis stopped 5 days prior to surgery. Last dose will be 6/15/22. Pt called and instructed to stop Eliquis 5 days prior to surgery (last dose will be today 6/15/22). Pt reports difficulty waking up from anesthesia with prior surgeries. Pt reports severe NV with prior anesthesia. Pt reports history of seizure while waking up from anesthesia with past surgeries. Pt has history of seizures. Pt takes Keppra daily. Last known seizure 1 month ago per pt. Pt reports cardiac arrest during bowl surgery in 2019.
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
## 2022-06-16 LAB
EKG ATRIAL RATE: 61 BPM
EKG P-R INTERVAL: 190 MS
EKG Q-T INTERVAL: 408 MS
EKG QRS DURATION: 84 MS
EKG QTC CALCULATION (BAZETT): 410 MS
EKG R AXIS: 143 DEGREES
EKG T AXIS: 115 DEGREES
EKG VENTRICULAR RATE: 61 BPM

## 2022-06-17 LAB
ORGANISM: ABNORMAL
URINE CULTURE, ROUTINE: ABNORMAL
URINE CULTURE, ROUTINE: ABNORMAL

## 2022-06-21 ENCOUNTER — ANESTHESIA (OUTPATIENT)
Dept: OPERATING ROOM | Age: 47
End: 2022-06-21
Payer: MEDICAID

## 2022-06-21 ENCOUNTER — HOSPITAL ENCOUNTER (OUTPATIENT)
Age: 47
Setting detail: OUTPATIENT SURGERY
Discharge: HOME | End: 2022-06-21
Attending: SPECIALIST | Admitting: SPECIALIST
Payer: MEDICAID

## 2022-06-21 ENCOUNTER — APPOINTMENT (OUTPATIENT)
Dept: GENERAL RADIOLOGY | Age: 47
End: 2022-06-21
Attending: SPECIALIST
Payer: MEDICAID

## 2022-06-21 ENCOUNTER — ANESTHESIA EVENT (OUTPATIENT)
Dept: OPERATING ROOM | Age: 47
End: 2022-06-21
Payer: MEDICAID

## 2022-06-21 VITALS
TEMPERATURE: 97.4 F | BODY MASS INDEX: 46.07 KG/M2 | RESPIRATION RATE: 16 BRPM | DIASTOLIC BLOOD PRESSURE: 60 MMHG | SYSTOLIC BLOOD PRESSURE: 125 MMHG | OXYGEN SATURATION: 98 % | HEIGHT: 63 IN | HEART RATE: 66 BPM | WEIGHT: 260 LBS

## 2022-06-21 PROCEDURE — 7100000000 HC PACU RECOVERY - FIRST 15 MIN: Performed by: SPECIALIST

## 2022-06-21 PROCEDURE — 7100000011 HC PHASE II RECOVERY - ADDTL 15 MIN: Performed by: SPECIALIST

## 2022-06-21 PROCEDURE — 2709999900 HC NON-CHARGEABLE SUPPLY: Performed by: SPECIALIST

## 2022-06-21 PROCEDURE — 6360000002 HC RX W HCPCS: Performed by: NURSE ANESTHETIST, CERTIFIED REGISTERED

## 2022-06-21 PROCEDURE — 3700000000 HC ANESTHESIA ATTENDED CARE: Performed by: SPECIALIST

## 2022-06-21 PROCEDURE — 3209999900 FLUORO FOR SURGICAL PROCEDURES

## 2022-06-21 PROCEDURE — 2720000010 HC SURG SUPPLY STERILE: Performed by: SPECIALIST

## 2022-06-21 PROCEDURE — 3600000003 HC SURGERY LEVEL 3 BASE: Performed by: SPECIALIST

## 2022-06-21 PROCEDURE — 7100000010 HC PHASE II RECOVERY - FIRST 15 MIN: Performed by: SPECIALIST

## 2022-06-21 PROCEDURE — 2580000003 HC RX 258

## 2022-06-21 PROCEDURE — 6360000002 HC RX W HCPCS

## 2022-06-21 PROCEDURE — A4217 STERILE WATER/SALINE, 500 ML: HCPCS | Performed by: SPECIALIST

## 2022-06-21 PROCEDURE — 7100000001 HC PACU RECOVERY - ADDTL 15 MIN: Performed by: SPECIALIST

## 2022-06-21 PROCEDURE — 6360000002 HC RX W HCPCS: Performed by: STUDENT IN AN ORGANIZED HEALTH CARE EDUCATION/TRAINING PROGRAM

## 2022-06-21 PROCEDURE — 2500000003 HC RX 250 WO HCPCS: Performed by: NURSE ANESTHETIST, CERTIFIED REGISTERED

## 2022-06-21 PROCEDURE — 2580000003 HC RX 258: Performed by: SPECIALIST

## 2022-06-21 PROCEDURE — 3700000001 HC ADD 15 MINUTES (ANESTHESIA): Performed by: SPECIALIST

## 2022-06-21 PROCEDURE — 3600000013 HC SURGERY LEVEL 3 ADDTL 15MIN: Performed by: SPECIALIST

## 2022-06-21 RX ORDER — SODIUM CHLORIDE 0.9 % (FLUSH) 0.9 %
5-40 SYRINGE (ML) INJECTION PRN
Status: DISCONTINUED | OUTPATIENT
Start: 2022-06-21 | End: 2022-06-21 | Stop reason: HOSPADM

## 2022-06-21 RX ORDER — PROCHLORPERAZINE EDISYLATE 5 MG/ML
5 INJECTION INTRAMUSCULAR; INTRAVENOUS
Status: DISCONTINUED | OUTPATIENT
Start: 2022-06-21 | End: 2022-06-21 | Stop reason: HOSPADM

## 2022-06-21 RX ORDER — FENTANYL CITRATE 50 UG/ML
25 INJECTION, SOLUTION INTRAMUSCULAR; INTRAVENOUS EVERY 5 MIN PRN
Status: DISCONTINUED | OUTPATIENT
Start: 2022-06-21 | End: 2022-06-21 | Stop reason: HOSPADM

## 2022-06-21 RX ORDER — MEPERIDINE HYDROCHLORIDE 25 MG/ML
12.5 INJECTION INTRAMUSCULAR; INTRAVENOUS; SUBCUTANEOUS EVERY 5 MIN PRN
Status: DISCONTINUED | OUTPATIENT
Start: 2022-06-21 | End: 2022-06-21 | Stop reason: HOSPADM

## 2022-06-21 RX ORDER — SODIUM CHLORIDE 0.9 % (FLUSH) 0.9 %
5-40 SYRINGE (ML) INJECTION EVERY 12 HOURS SCHEDULED
Status: DISCONTINUED | OUTPATIENT
Start: 2022-06-21 | End: 2022-06-21 | Stop reason: HOSPADM

## 2022-06-21 RX ORDER — MAGNESIUM HYDROXIDE 1200 MG/15ML
LIQUID ORAL CONTINUOUS PRN
Status: DISCONTINUED | OUTPATIENT
Start: 2022-06-21 | End: 2022-06-21 | Stop reason: HOSPADM

## 2022-06-21 RX ORDER — LABETALOL HYDROCHLORIDE 5 MG/ML
10 INJECTION, SOLUTION INTRAVENOUS
Status: DISCONTINUED | OUTPATIENT
Start: 2022-06-21 | End: 2022-06-21 | Stop reason: HOSPADM

## 2022-06-21 RX ORDER — PROPOFOL 10 MG/ML
INJECTION, EMULSION INTRAVENOUS PRN
Status: DISCONTINUED | OUTPATIENT
Start: 2022-06-21 | End: 2022-06-21 | Stop reason: SDUPTHER

## 2022-06-21 RX ORDER — HYDRALAZINE HYDROCHLORIDE 20 MG/ML
10 INJECTION INTRAMUSCULAR; INTRAVENOUS
Status: DISCONTINUED | OUTPATIENT
Start: 2022-06-21 | End: 2022-06-21 | Stop reason: HOSPADM

## 2022-06-21 RX ORDER — SODIUM CHLORIDE 9 MG/ML
INJECTION, SOLUTION INTRAVENOUS PRN
Status: DISCONTINUED | OUTPATIENT
Start: 2022-06-21 | End: 2022-06-21 | Stop reason: HOSPADM

## 2022-06-21 RX ORDER — MIDAZOLAM HYDROCHLORIDE 1 MG/ML
INJECTION INTRAMUSCULAR; INTRAVENOUS PRN
Status: DISCONTINUED | OUTPATIENT
Start: 2022-06-21 | End: 2022-06-21 | Stop reason: SDUPTHER

## 2022-06-21 RX ORDER — ONDANSETRON 2 MG/ML
INJECTION INTRAMUSCULAR; INTRAVENOUS PRN
Status: DISCONTINUED | OUTPATIENT
Start: 2022-06-21 | End: 2022-06-21 | Stop reason: SDUPTHER

## 2022-06-21 RX ORDER — SODIUM CHLORIDE, SODIUM LACTATE, POTASSIUM CHLORIDE, CALCIUM CHLORIDE 600; 310; 30; 20 MG/100ML; MG/100ML; MG/100ML; MG/100ML
INJECTION, SOLUTION INTRAVENOUS CONTINUOUS
Status: DISCONTINUED | OUTPATIENT
Start: 2022-06-21 | End: 2022-06-21 | Stop reason: HOSPADM

## 2022-06-21 RX ORDER — OXYCODONE HYDROCHLORIDE 5 MG/1
10 TABLET ORAL PRN
Status: DISCONTINUED | OUTPATIENT
Start: 2022-06-21 | End: 2022-06-21 | Stop reason: HOSPADM

## 2022-06-21 RX ORDER — DIPHENHYDRAMINE HYDROCHLORIDE 50 MG/ML
12.5 INJECTION INTRAMUSCULAR; INTRAVENOUS
Status: COMPLETED | OUTPATIENT
Start: 2022-06-21 | End: 2022-06-21

## 2022-06-21 RX ORDER — FENTANYL CITRATE 50 UG/ML
50 INJECTION, SOLUTION INTRAMUSCULAR; INTRAVENOUS EVERY 5 MIN PRN
Status: DISCONTINUED | OUTPATIENT
Start: 2022-06-21 | End: 2022-06-21 | Stop reason: HOSPADM

## 2022-06-21 RX ORDER — FENTANYL CITRATE 50 UG/ML
INJECTION, SOLUTION INTRAMUSCULAR; INTRAVENOUS PRN
Status: DISCONTINUED | OUTPATIENT
Start: 2022-06-21 | End: 2022-06-21 | Stop reason: SDUPTHER

## 2022-06-21 RX ORDER — ROCURONIUM BROMIDE 10 MG/ML
INJECTION, SOLUTION INTRAVENOUS PRN
Status: DISCONTINUED | OUTPATIENT
Start: 2022-06-21 | End: 2022-06-21 | Stop reason: SDUPTHER

## 2022-06-21 RX ORDER — ROCURONIUM BROMIDE 10 MG/ML
INJECTION, SOLUTION INTRAVENOUS PRN
Status: DISCONTINUED | OUTPATIENT
Start: 2022-06-21 | End: 2022-06-21

## 2022-06-21 RX ORDER — DEXAMETHASONE SODIUM PHOSPHATE 10 MG/ML
INJECTION INTRAMUSCULAR; INTRAVENOUS PRN
Status: DISCONTINUED | OUTPATIENT
Start: 2022-06-21 | End: 2022-06-21 | Stop reason: SDUPTHER

## 2022-06-21 RX ORDER — ONDANSETRON 2 MG/ML
4 INJECTION INTRAMUSCULAR; INTRAVENOUS
Status: COMPLETED | OUTPATIENT
Start: 2022-06-21 | End: 2022-06-21

## 2022-06-21 RX ORDER — SUCCINYLCHOLINE/SOD CL,ISO/PF 100 MG/5ML
SYRINGE (ML) INTRAVENOUS PRN
Status: DISCONTINUED | OUTPATIENT
Start: 2022-06-21 | End: 2022-06-21 | Stop reason: SDUPTHER

## 2022-06-21 RX ORDER — SODIUM CHLORIDE 9 MG/ML
25 INJECTION, SOLUTION INTRAVENOUS PRN
Status: DISCONTINUED | OUTPATIENT
Start: 2022-06-21 | End: 2022-06-21 | Stop reason: HOSPADM

## 2022-06-21 RX ORDER — OXYCODONE HYDROCHLORIDE 5 MG/1
5 TABLET ORAL PRN
Status: DISCONTINUED | OUTPATIENT
Start: 2022-06-21 | End: 2022-06-21 | Stop reason: HOSPADM

## 2022-06-21 RX ADMIN — FENTANYL CITRATE 25 MCG: 50 INJECTION, SOLUTION INTRAMUSCULAR; INTRAVENOUS at 17:05

## 2022-06-21 RX ADMIN — MIDAZOLAM HYDROCHLORIDE 2 MG: 1 INJECTION, SOLUTION INTRAMUSCULAR; INTRAVENOUS at 15:38

## 2022-06-21 RX ADMIN — Medication 3000 MG: at 15:40

## 2022-06-21 RX ADMIN — ONDANSETRON 4 MG: 2 INJECTION INTRAMUSCULAR; INTRAVENOUS at 15:55

## 2022-06-21 RX ADMIN — Medication 100 MG: at 15:45

## 2022-06-21 RX ADMIN — ONDANSETRON 4 MG: 2 INJECTION INTRAMUSCULAR; INTRAVENOUS at 17:24

## 2022-06-21 RX ADMIN — PROPOFOL 200 MG: 10 INJECTION, EMULSION INTRAVENOUS at 15:45

## 2022-06-21 RX ADMIN — DEXAMETHASONE SODIUM PHOSPHATE 10 MG: 10 INJECTION INTRAMUSCULAR; INTRAVENOUS at 15:55

## 2022-06-21 RX ADMIN — ROCURONIUM BROMIDE 30 MG: 10 INJECTION INTRAVENOUS at 15:45

## 2022-06-21 RX ADMIN — SUGAMMADEX 200 MG: 100 INJECTION, SOLUTION INTRAVENOUS at 16:37

## 2022-06-21 RX ADMIN — SODIUM CHLORIDE, POTASSIUM CHLORIDE, SODIUM LACTATE AND CALCIUM CHLORIDE: 600; 310; 30; 20 INJECTION, SOLUTION INTRAVENOUS at 12:41

## 2022-06-21 RX ADMIN — FENTANYL CITRATE 25 MCG: 50 INJECTION, SOLUTION INTRAMUSCULAR; INTRAVENOUS at 17:17

## 2022-06-21 RX ADMIN — DIPHENHYDRAMINE HYDROCHLORIDE 12.5 MG: 50 INJECTION, SOLUTION INTRAMUSCULAR; INTRAVENOUS at 17:25

## 2022-06-21 RX ADMIN — FENTANYL CITRATE 100 MCG: 50 INJECTION, SOLUTION INTRAMUSCULAR; INTRAVENOUS at 15:45

## 2022-06-21 ASSESSMENT — PAIN DESCRIPTION - LOCATION
LOCATION: BACK
LOCATION: BACK

## 2022-06-21 ASSESSMENT — PAIN DESCRIPTION - ORIENTATION: ORIENTATION: LOWER

## 2022-06-21 ASSESSMENT — PAIN DESCRIPTION - DESCRIPTORS: DESCRIPTORS: SORE

## 2022-06-21 ASSESSMENT — PAIN SCALES - GENERAL
PAINLEVEL_OUTOF10: 8
PAINLEVEL_OUTOF10: 4
PAINLEVEL_OUTOF10: 4
PAINLEVEL_OUTOF10: 6

## 2022-06-21 ASSESSMENT — PAIN DESCRIPTION - PAIN TYPE: TYPE: SURGICAL PAIN

## 2022-06-21 NOTE — PROGRESS NOTES
Patient ID:  Linnea Sanchez  [id number]  52 y.o.  1975  BOVIE PAD SITE CLEAR AND INTACT PRE AND POST OP. TAKEN TO PACU,   ATTACHED TO MONITOR AND REPORT GIVEN TO RN.   VSS DRSG DRY AND INTACT        Electronically signed by Yasmeen Gagnon RN on 6/21/2022

## 2022-06-21 NOTE — PROGRESS NOTES
Assisted up to bathroom gait steady, voided without difficulty. Back to cart, assisted to dress. Back dressings clean dry and intact.

## 2022-06-21 NOTE — PROGRESS NOTES
Received from pacu on a cart alert and oriented, Lower back dressing clean and dry. Rates back pain as \"4\" and \"tolerable\" Tolerating po fluids.  here.

## 2022-06-21 NOTE — PROGRESS NOTES
Back dressings times two clean dry and intact, site soft and without swelling. Pt states pain is \"4\" on pain scale  and \"tolerable\" Discharge instructions reviewed, patient verbalized understanding.

## 2022-06-21 NOTE — ANESTHESIA POSTPROCEDURE EVALUATION
Department of Anesthesiology  Postprocedure Note    Patient: Trace Gil  MRN: [id number]  YOB: 1975  Date of evaluation: 6/21/2022      Procedure Summary     Date: 06/21/22 Room / Location: [address]    Anesthesia Start: 9822 Anesthesia Stop: 7954    Procedure: REMOVAL OF SPINAL CORD STIMULATOR BATTERY AND LEADS (N/A Buttocks) Diagnosis:       Post laminectomy syndrome      (POST LAMINECTOMY SYNDROME)    Surgeons: Rosalino Archibald MD Responsible Provider: Elizabeth Kim MD    Anesthesia Type: general ASA Status: 3          Anesthesia Type: No value filed.     Doug Phase I:      Doug Phase II:      Last vitals:   Vitals Value Taken Time   /62 06/21/22 1700   Temp 36.2 °C (97.2 °F) 06/21/22 1700   Pulse 69 06/21/22 1700   Resp 18 06/21/22 1700   SpO2 100 % 06/21/22 1700         Anesthesia Post Evaluation    Patient location during evaluation: PACU  Patient participation: waiting for patient participation  Level of consciousness: awake  Pain score: 4  Airway patency: patent  Nausea & Vomiting: no nausea and no vomiting  Complications: no  Cardiovascular status: hemodynamically stable  Respiratory status: acceptable  Hydration status: euvolemic  Comments: Report to RN, normal sinus rhythm  Multimodal analgesia pain management approach
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
## 2023-05-08 ENCOUNTER — HOSPITAL ENCOUNTER (EMERGENCY)
Age: 48
Discharge: HOME | End: 2023-05-08
Payer: MEDICAID

## 2023-05-08 VITALS
HEART RATE: 80 BPM | RESPIRATION RATE: 14 BRPM | BODY MASS INDEX: 41.65 KG/M2 | SYSTOLIC BLOOD PRESSURE: 115 MMHG | TEMPERATURE: 97.4 F | OXYGEN SATURATION: 97 % | HEIGHT: 65 IN | DIASTOLIC BLOOD PRESSURE: 69 MMHG | WEIGHT: 250 LBS

## 2023-05-08 DIAGNOSIS — G89.29 ACUTE EXACERBATION OF CHRONIC LOW BACK PAIN: Primary | ICD-10-CM

## 2023-05-08 DIAGNOSIS — M54.50 ACUTE EXACERBATION OF CHRONIC LOW BACK PAIN: Primary | ICD-10-CM

## 2023-05-08 PROCEDURE — 6370000000 HC RX 637 (ALT 250 FOR IP)

## 2023-05-08 PROCEDURE — 99283 EMERGENCY DEPT VISIT LOW MDM: CPT

## 2023-05-08 RX ORDER — PREDNISONE 20 MG/1
60 TABLET ORAL ONCE
Status: COMPLETED | OUTPATIENT
Start: 2023-05-08 | End: 2023-05-08

## 2023-05-08 RX ORDER — PREDNISONE 20 MG/1
20 TABLET ORAL ONCE
Status: DISCONTINUED | OUTPATIENT
Start: 2023-05-08 | End: 2023-05-08

## 2023-05-08 RX ORDER — ORPHENADRINE CITRATE 30 MG/ML
60 INJECTION INTRAMUSCULAR; INTRAVENOUS ONCE
Status: DISCONTINUED | OUTPATIENT
Start: 2023-05-08 | End: 2023-05-08

## 2023-05-08 RX ORDER — PREDNISONE 50 MG/1
50 TABLET ORAL DAILY
Qty: 10 TABLET | Refills: 0 | Status: SHIPPED | OUTPATIENT
Start: 2023-05-08 | End: 2023-05-18

## 2023-05-08 RX ORDER — OXYCODONE HYDROCHLORIDE AND ACETAMINOPHEN 5; 325 MG/1; MG/1
2 TABLET ORAL ONCE
Status: COMPLETED | OUTPATIENT
Start: 2023-05-08 | End: 2023-05-08

## 2023-05-08 RX ADMIN — PREDNISONE 60 MG: 20 TABLET ORAL at 14:17

## 2023-05-08 RX ADMIN — OXYCODONE AND ACETAMINOPHEN 2 TABLET: 5; 325 TABLET ORAL at 14:17

## 2023-05-08 ASSESSMENT — PAIN SCALES - GENERAL
PAINLEVEL_OUTOF10: 10
PAINLEVEL_OUTOF10: 10

## 2023-05-08 ASSESSMENT — ENCOUNTER SYMPTOMS
SHORTNESS OF BREATH: 0
ABDOMINAL PAIN: 0
BACK PAIN: 1
COUGH: 0
CHOKING: 0
VOMITING: 0
DIARRHEA: 0
EYES NEGATIVE: 1
ABDOMINAL DISTENTION: 0
NAUSEA: 0
SORE THROAT: 0
RHINORRHEA: 0

## 2023-05-08 ASSESSMENT — PAIN DESCRIPTION - LOCATION: LOCATION: BACK

## 2023-05-08 ASSESSMENT — PAIN DESCRIPTION - DESCRIPTORS: DESCRIPTORS: SHOOTING

## 2023-05-08 ASSESSMENT — PAIN - FUNCTIONAL ASSESSMENT: PAIN_FUNCTIONAL_ASSESSMENT: 0-10

## 2023-05-08 ASSESSMENT — PAIN DESCRIPTION - ORIENTATION: ORIENTATION: MID;LOWER

## 2023-05-08 NOTE — ED PROVIDER NOTES
prn via pg tube    PAROXETINE (PAXIL) 40 MG TABLET    Take 40 mg by mouth every morning     POTASSIUM CHLORIDE (KLOR-CON) 10 MEQ EXTENDED RELEASE TABLET    Take 20 mEq by mouth 2 times daily Once in morning and once at night    PRAZOSIN (MINIPRESS) 1 MG CAPSULE    Take 1 mg by mouth nightly    SCOPOLAMINE (TRANSDERM-SCOP) TRANSDERMAL PATCH    Place 1 patch onto the skin See Admin Instructions    TEMAZEPAM (RESTORIL) 15 MG CAPSULE    Take 15 mg by mouth nightly as needed for Sleep. TOPIRAMATE (TOPAMAX) 200 MG TABLET    Take 200 mg by mouth 2 times daily    TRAZODONE (DESYREL) 50 MG TABLET    Take 300 mg by mouth nightly     VENLAFAXINE (EFFEXOR) 75 MG TABLET    Take 75 mg by mouth 3 times daily    ZOLPIDEM (AMBIEN) 10 MG TABLET    Take 10 mg by mouth nightly as needed for Sleep. .       ALLERGIES     Latex, Ciprofloxacin, Shellfish-derived products, Daypro [oxaprozin], Iodides, Iodine, Lidocaine, Macrobid [nitrofurantoin monohyd macro], Macrolides and ketolides, Other, Penicillins, Pollen extract, Propranolol, Propranolol hcl, Tape [adhesive tape], Tegretol [carbamazepine], Tizanidine, Toradol [ketorolac tromethamine], Tramadol, Zanaflex [tizanidine hcl], Estrogens, Lamictal [lamotrigine], Lyrica [pregabalin], Tylenol [acetaminophen], and Vicodin [hydrocodone-acetaminophen]    FAMILY HISTORY       Family History   Problem Relation Age of Onset    Cancer Father         kidney    High Blood Pressure Father     Cancer Paternal Aunt     High Blood Pressure Mother     Stroke Mother     Cancer Sister         breast cancer    Heart Attack Brother     Heart Disease Brother     Other Maternal Aunt         passes as an infant          SOCIAL HISTORY       Social History     Socioeconomic History    Marital status: Single     Spouse name: None    Number of children: None    Years of education: None    Highest education level: None   Tobacco Use    Smoking status: Never    Smokeless tobacco: Never   Vaping Use    Vaping Use:

## 2023-09-26 NOTE — CARE COORDINATION
Met with patient and she is off vent and on room air. She still plans that she will dc home with [facility] on dc.
[facility] Case Management Initial Discharge Assessment    Met with Patient to discuss discharge plan. PCP: Sawyer Morales                                Date of Last Visit: 2 days ago    If no PCP, list provided? N/A    Discharge Planning    Living Arrangements: independently at home    Who do you live with?     Who helps you with your care:  spouse     If lives at home:     Do you have any barriers navigating in your home? no    Patient can perform ADL? Yes    Current Services (outpatient and in home) :  [facility] she has a nurse that comes 3 times a week.)    Dialysis: No    Is transportation available to get to your appointments? Yes    DME Equipment:  no    Respiratory equipment: None    Respiratory provider:  no     Ph[facility]acy:  [facility] - [facility]    Consult with Medication Assistance Program?  No        Does Patient Have a High-Risk for Readmission Diagnosis (CHF, PN, MI, COPD)? No      Initial Discharge Plan? (Note: please see concurrent daily documentation for any updates after initial note). Pt is currently receiving [facility] services from [facility] and receives care from a nurse 3 times a week to care for her g tube site. She and her  do her tube feeds by gravity bid. CM to assess for any further d/c needs.     Electronically signed by Fredy Christian on 8/16/2020 at 8:15 PM
Statement Selected

## 2024-09-17 NOTE — ED NOTES
Multiple attempts to obtain iv by several nurses. 22 obtained in left shoulder by this Rn. Lab at [facility] getting lab work.   Urine obtained and sent to lab      Fransisca Downey RN  07/10/18 [id number] Comment: Patient followed up with Dr. FAIRCHILD on 06/26/2024, R Antihelix was improved and not painful. On visit excision versus no intervention and monitor was discussed. Today patient has a flare and discussed donut pillow vs excision. Will avoid pressure to site. RTC: 3 months. Detail Level: Zone Render Risk Assessment In Note?: yes

## (undated) DEVICE — SPONGE GZ W4XL4IN RAYON POLY FILL CVR W/ NONWOVEN FAB

## (undated) DEVICE — GOWN,AURORA,NONREINFORCED,LARGE: Brand: MEDLINE

## (undated) DEVICE — PACK,LAPAROTOMY,NO GOWNS: Brand: MEDLINE

## (undated) DEVICE — SUTURE VCRL SZ 3-0 L27IN ABSRB UD L26MM SH 1/2 CIR J416H

## (undated) DEVICE — 3M™ TEGADERM™ TRANSPARENT FILM DRESSING FRAME STYLE, 1624W, 2-3/8 IN X 2-3/4 IN (6 CM X 7 CM), 100/CT 4CT/CASE: Brand: 3M™ TEGADERM™

## (undated) DEVICE — INTENDED FOR TISSUE SEPARATION, AND OTHER PROCEDURES THAT REQUIRE A SHARP SURGICAL BLADE TO PUNCTURE OR CUT.: Brand: BARD-PARKER ® CARBON RIB-BACK BLADES

## (undated) DEVICE — GLOVE ORANGE PI 8   MSG9080

## (undated) DEVICE — COUNTER NDL 40 COUNT HLD 70 FOAM BLK ADH W/ MAG

## (undated) DEVICE — TUBING, SUCTION, 1/4" X 10', STRAIGHT: Brand: MEDLINE

## (undated) DEVICE — SYRINGE MED 10ML LUERLOCK TIP W/O SFTY DISP

## (undated) DEVICE — AGENT HEMSTAT 3GM OXIDIZED REGENERATED CELOS ABSRB FOR CONT (ORDER MULTIPLES OF 5EA)

## (undated) DEVICE — NEPTUNE E-SEP SMOKE EVACUATION PENCIL, COATED, 70MM BLADE, PUSH BUTTON SWITCH: Brand: NEPTUNE E-SEP

## (undated) DEVICE — SHEET,DRAPE,53X77,STERILE: Brand: MEDLINE

## (undated) DEVICE — SUTURE VCRL SZ 4-0 L18IN ABSRB UD L19MM PS-2 3/8 CIR PRIM J496H

## (undated) DEVICE — GAUZE,SPONGE,4"X4",16PLY,XRAY,STRL,LF: Brand: MEDLINE

## (undated) DEVICE — YANKAUER,SMOOTH HANDLE,HIGH CAPACITY: Brand: MEDLINE INDUSTRIES, INC.

## (undated) DEVICE — 1010 S-DRAPE TOWEL DRAPE 10/BX: Brand: STERI-DRAPE™

## (undated) DEVICE — ELECTRODE PT RET AD L9FT HI MOIST COND ADH HYDRGEL CORDED

## (undated) DEVICE — 3M™ STERI-STRIP™ REINFORCED ADHESIVE SKIN CLOSURES, R1547, 1/2 IN X 4 IN (12 MM X 100 MM), 6 STRIPS/ENVELOPE: Brand: 3M™ STERI-STRIP™

## (undated) DEVICE — APPLICATOR MEDICATED 26 CC SOLUTION HI LT ORNG CHLORAPREP

## (undated) DEVICE — HYPODERMIC SAFETY NEEDLE: Brand: MAGELLAN

## (undated) DEVICE — DECANTER FLD 9IN ST BG FOR ASEP TRNSF OF FLD

## (undated) DEVICE — SUTURE PERMA-HAND SZ 2-0 L30IN NONABSORBABLE BLK L26MM SH K833H

## (undated) DEVICE — NEEDLE HYPO 25GA L1.5IN BLU POLYPR HUB S STL REG BVL STR

## (undated) DEVICE — SYRINGE IRRIG 60ML SFT PLIABLE BLB EZ TO GRP 1 HND USE W/

## (undated) DEVICE — 4-PORT MANIFOLD: Brand: NEPTUNE 2

## (undated) DEVICE — BLADE ES ELASTOMERIC COAT INSUL DURABLE BEND UPTO 90DEG

## (undated) DEVICE — 3M™ IOBAN™ 2 ANTIMICROBIAL INCISE DRAPE 6650EZ: Brand: IOBAN™ 2

## (undated) DEVICE — MARKER SURG SKIN GENTIAN VLT REG TIP W/ 6IN RUL

## (undated) DEVICE — C-ARM: Brand: UNBRANDED

## (undated) DEVICE — GLOVE ORANGE PI 7 1/2   MSG9075